# Patient Record
Sex: MALE | Race: ASIAN | NOT HISPANIC OR LATINO | Employment: FULL TIME | URBAN - METROPOLITAN AREA
[De-identification: names, ages, dates, MRNs, and addresses within clinical notes are randomized per-mention and may not be internally consistent; named-entity substitution may affect disease eponyms.]

---

## 2017-01-30 ENCOUNTER — OFFICE VISIT (OUTPATIENT)
Dept: FAMILY MEDICINE | Facility: CLINIC | Age: 37
End: 2017-01-30
Payer: COMMERCIAL

## 2017-01-30 VITALS
DIASTOLIC BLOOD PRESSURE: 91 MMHG | HEIGHT: 68 IN | TEMPERATURE: 98.4 F | BODY MASS INDEX: 41.68 KG/M2 | SYSTOLIC BLOOD PRESSURE: 141 MMHG | WEIGHT: 275 LBS | HEART RATE: 85 BPM

## 2017-01-30 DIAGNOSIS — I10 BENIGN ESSENTIAL HYPERTENSION: ICD-10-CM

## 2017-01-30 DIAGNOSIS — R51.9 NONINTRACTABLE EPISODIC HEADACHE, UNSPECIFIED HEADACHE TYPE: Primary | ICD-10-CM

## 2017-01-30 LAB
ANION GAP SERPL CALCULATED.3IONS-SCNC: 8 MMOL/L (ref 3–14)
BASOPHILS # BLD AUTO: 0.1 10E9/L (ref 0–0.2)
BASOPHILS NFR BLD AUTO: 1 %
BUN SERPL-MCNC: 12 MG/DL (ref 7–30)
CALCIUM SERPL-MCNC: 9.1 MG/DL (ref 8.5–10.1)
CHLORIDE SERPL-SCNC: 104 MMOL/L (ref 94–109)
CO2 SERPL-SCNC: 24 MMOL/L (ref 20–32)
CREAT SERPL-MCNC: 0.95 MG/DL (ref 0.66–1.25)
DIFFERENTIAL METHOD BLD: NORMAL
EOSINOPHIL # BLD AUTO: 0.2 10E9/L (ref 0–0.7)
EOSINOPHIL NFR BLD AUTO: 2.8 %
ERYTHROCYTE [DISTWIDTH] IN BLOOD BY AUTOMATED COUNT: 12.8 % (ref 10–15)
GFR SERPL CREATININE-BSD FRML MDRD: 90 ML/MIN/1.7M2
GLUCOSE SERPL-MCNC: 94 MG/DL (ref 70–99)
HBA1C MFR BLD: 6.3 % (ref 4.3–6)
HCT VFR BLD AUTO: 48.9 % (ref 40–53)
HGB BLD-MCNC: 16.9 G/DL (ref 13.3–17.7)
LYMPHOCYTES # BLD AUTO: 1.6 10E9/L (ref 0.8–5.3)
LYMPHOCYTES NFR BLD AUTO: 22.9 %
MCH RBC QN AUTO: 30.3 PG (ref 26.5–33)
MCHC RBC AUTO-ENTMCNC: 34.6 G/DL (ref 31.5–36.5)
MCV RBC AUTO: 88 FL (ref 78–100)
MONOCYTES # BLD AUTO: 0.6 10E9/L (ref 0–1.3)
MONOCYTES NFR BLD AUTO: 8.7 %
NEUTROPHILS # BLD AUTO: 4.6 10E9/L (ref 1.6–8.3)
NEUTROPHILS NFR BLD AUTO: 64.6 %
PLATELET # BLD AUTO: 237 10E9/L (ref 150–450)
POTASSIUM SERPL-SCNC: 3.7 MMOL/L (ref 3.4–5.3)
RBC # BLD AUTO: 5.57 10E12/L (ref 4.4–5.9)
SODIUM SERPL-SCNC: 136 MMOL/L (ref 133–144)
TSH SERPL DL<=0.005 MIU/L-ACNC: 0.92 MU/L (ref 0.4–4)
WBC # BLD AUTO: 7.2 10E9/L (ref 4–11)

## 2017-01-30 PROCEDURE — 86618 LYME DISEASE ANTIBODY: CPT | Performed by: PHYSICIAN ASSISTANT

## 2017-01-30 PROCEDURE — 84443 ASSAY THYROID STIM HORMONE: CPT | Performed by: PHYSICIAN ASSISTANT

## 2017-01-30 PROCEDURE — 36415 COLL VENOUS BLD VENIPUNCTURE: CPT | Performed by: PHYSICIAN ASSISTANT

## 2017-01-30 PROCEDURE — 99214 OFFICE O/P EST MOD 30 MIN: CPT | Performed by: PHYSICIAN ASSISTANT

## 2017-01-30 PROCEDURE — 83036 HEMOGLOBIN GLYCOSYLATED A1C: CPT | Performed by: PHYSICIAN ASSISTANT

## 2017-01-30 PROCEDURE — 80048 BASIC METABOLIC PNL TOTAL CA: CPT | Performed by: PHYSICIAN ASSISTANT

## 2017-01-30 PROCEDURE — 85025 COMPLETE CBC W/AUTO DIFF WBC: CPT | Performed by: PHYSICIAN ASSISTANT

## 2017-01-30 RX ORDER — LISINOPRIL 10 MG/1
10 TABLET ORAL DAILY
Qty: 90 TABLET | Refills: 1 | Status: SHIPPED | OUTPATIENT
Start: 2017-01-30 | End: 2018-10-01

## 2017-01-30 NOTE — MR AVS SNAPSHOT
After Visit Summary   1/30/2017    Sean Woodard    MRN: [id number]           Patient Information     Date Of Birth          1980        Visit Information        Provider Department      1/30/2017 5:20 PM Susanne Alberto PA-C [facility]        Today's Diagnoses     Nonintractable episodic headache, unspecified headache type    -  1     Benign essential hypertension           Care Instructions    Labs today - I will be in touch with you in the next 1-2 days with the results    Start the lisinopril for blood pressure    Set some goals for exercise and weight loss and start working on these as well     Return in 3-4 weeks to recheck blood pressure and follow up on new medication, may need to be sooner depending on results of labs        Follow-ups after your visit        Who to contact     Normal or non-critical lab and imaging results will be communicated to you by [employer], letter or phone within 4 business days after the clinic has received the results. If you do not hear from us within 7 days, please contact the clinic through [employer] or phone. If you have a critical or abnormal lab result, we will notify you by phone as soon as possible.  Submit refill requests through [employer] or call your pharmacy and they will forward the refill request to us. Please allow 3 business days for your refill to be completed.          If you need to speak with a  for additional information , please call: [phone]             Additional Information About Your Visit        [employer] Information     [employer] gives you secure access to your electronic health record. If you see a primary care provider, you can also send messages to your care team and make appointments. If you have questions, please call your primary care clinic.  If you do not have a primary care provider, please call [phone] and they will assist you.        Care EveryWhere ID     This is your Care EveryWhere  "ID. This could be used by other organizations to access your [facility] medical records  [id number]        Your Vitals Were     Pulse Temperature Height BMI (Body Mass Index)          85 98.4  F (36.9  C) (Tympanic) 5' 8\" (1.727 m) 41.82 kg/m2         Blood Pressure from Last 3 Encounters:   01/30/17 141/91   11/14/16 147/96   10/03/16 145/82    Weight from Last 3 Encounters:   01/30/17 275 lb (124.739 kg)   09/19/16 266 lb (120.657 kg)   09/02/16 266 lb (120.657 kg)              We Performed the Following     Basic metabolic panel  (Ca, Cl, CO2, Creat, Gluc, K, Na, BUN)     CBC with platelets and differential     Hemoglobin A1c     Lyme Disease Doris with reflex to WB Serum     TSH with free T4 reflex          Today's Medication Changes          These changes are accurate as of: 1/30/17  6:12 PM.  If you have any questions, ask your nurse or doctor.               Start taking these medicines.        Dose/Directions    lisinopril 10 MG tablet   Commonly known as:  PRINIVIL/ZESTRIL   Used for:  Benign essential hypertension   Started by:  Susanne Alberto PA-C        Dose:  10 mg   Take 1 tablet (10 mg) by mouth daily   Quantity:  90 tablet   Refills:  1            Where to get your medicines      These medications were sent to [facility] [place] [id number] [facility]  [address] [facility] [address]     Phone:  [phone]    - lisinopril 10 MG tablet             Primary Care Provider Office Phone # Fax #    Mariangel Sung PA-C [phone]       [facility] [id number] [place] [address]        Thank you!     Thank you for choosing [facility]  for your care. Our goal is always to provide you with excellent care. Hearing back from our patients is one way we can continue to improve our services. Please take a few minutes to complete the written survey that you may receive in the mail after your visit with us. Thank you!             Your " Updated Medication List - Protect others around you: Learn how to safely use, store and throw away your medicines at www.disposemymeds.org.          This list is accurate as of: 1/30/17  6:12 PM.  Always use your most recent med list.                   Brand Name Dispense Instructions for use    atorvastatin 20 MG tablet    LIPITOR    90 tablet    Take 1 tablet (20 mg) by mouth daily       augmented betamethasone dipropionate 0.05 % ointment    DIPROLENE-AF    45 g    Apply sparingly to affected area twice daily as needed.  Do not apply to face.       doxycycline Monohydrate 100 MG Caps     60 capsule    1 tab PO BID with food and full glass of water       lisinopril 10 MG tablet    PRINIVIL/ZESTRIL    90 tablet    Take 1 tablet (10 mg) by mouth daily       mupirocin 2 % cream    BACTROBAN    30 g    Apply topically 3 times daily

## 2017-01-30 NOTE — NURSING NOTE
"Chief Complaint   Patient presents with     Headache       Initial /91 mmHg  Pulse 85  Temp(Src) 98.4  F (36.9  C) (Tympanic)  Ht 5' 8\" (1.727 m)  Wt 275 lb (124.739 kg)  BMI 41.82 kg/m2 Estimated body mass index is 41.82 kg/(m^2) as calculated from the following:    Height as of this encounter: 5' 8\" (1.727 m).    Weight as of this encounter: 275 lb (124.739 kg).  BP completed using cuff size: violet Batres CMA    "

## 2017-01-30 NOTE — PROGRESS NOTES
SUBJECTIVE:                                                    Sean Woodard is a 37 year old male who presents to clinic today for the following health issues:      Headache     Onset: 1 week consistently, otherwise once every other day      Description:   Location: Right side of head, back of head    Character: Pressure  Frequency: Everyday   Duration: 1-2 hours, in the morning     Intensity: moderate    Progression of Symptoms:  same    Accompanying Signs & Symptoms:  Stiff neck: YES- sometimes   Neck or upper back pain: YES- sometimes when turning his head   Fever: no  Sinus pressure: no  Nausea or vomiting: no  Dizziness: no  Numbness: no  Weakness: no  Visual changes: no   History:   Head trauma: no  Family history of migraines: no  Previous tests for headaches: no  Neurologist evaluations: no  Able to do daily activities: YES  Wake with a headaches: YES, sometimes   Do headaches wake you up: no  Daily pain medication use: YES- Excedrin   Work/school stressors/changes: no    Precipitating factors:   Does light make it worse: no  Does sound make it worse: no    Alleviating factors:  Does sleep help: no         Therapies Tried and outcome: Excedrin    His blood pressure has been running higher lately, he is concerned about that.   He also has been gaining weight.   He has a brother that is a few years older than him who had a stroke. The symptoms he is having is making him concerned about having a stroke or heart attack.     Has had issues with more frequent headaches over the last 1-2 weeks  Says he does get headache but usually only one every few months          Problem list and histories reviewed & adjusted, as indicated.  Additional history: as documented    Current Outpatient Prescriptions   Medication Sig Dispense Refill     lisinopril (PRINIVIL/ZESTRIL) 10 MG tablet Take 1 tablet (10 mg) by mouth daily 90 tablet 1     atorvastatin (LIPITOR) 20 MG tablet Take 1 tablet (20 mg) by mouth daily 90 tablet 2      "augmented betamethasone dipropionate (DIPROLENE-AF) 0.05 % ointment Apply sparingly to affected area twice daily as needed.  Do not apply to face. 45 g 1     doxycycline Monohydrate 100 MG CAPS 1 tab PO BID with food and full glass of water 60 capsule 2     mupirocin (BACTROBAN) 2 % cream Apply topically 3 times daily 30 g 0     No Known Allergies    ROS:  Remainder of ROS obtained and found to be negative other than that which was documented above      OBJECTIVE:                                                    /91 mmHg  Pulse 85  Temp(Src) 98.4  F (36.9  C) (Tympanic)  Ht 5' 8\" (1.727 m)  Wt 275 lb (124.739 kg)  BMI 41.82 kg/m2  Body mass index is 41.82 kg/(m^2).  GENERAL: healthy, alert and no distress  EYES: Eyes grossly normal to inspection  HENT: ear canals and TM's normal, nose and mouth without ulcers or lesions  NECK: no adenopathy  RESP: lungs clear to auscultation - no rales, rhonchi or wheezes  CV: regular rates and rhythm, normal S1 S2, no S3 or S4 and no murmur, click or rub  MS: no gross musculoskeletal defects noted, no edema  SKIN: no suspicious lesions or rashes  NEURO: Normal strength and tone, sensory exam grossly normal, mentation intact, speech normal, cranial nerves 2-12 intact and Romberg normal    Diagnostic Test Results:  labs     ASSESSMENT/PLAN:                                                    (R51) Nonintractable episodic headache, unspecified headache type  (primary encounter diagnosis)  Comment: No red flags on exam today. Exam unremarkable other than bp which was again noted to be high. Treatment of bp as per below. Will check some labs today. Keep an eye on headaches and if they worsen or do not improve, follow up with me. Will follow with labs   Plan: TSH with free T4 reflex, Basic metabolic panel         (Ca, Cl, CO2, Creat, Gluc, K, Na, BUN), CBC         with platelets and differential, Lyme Disease         Doris with reflex to WB Serum, Hemoglobin A1c          (I10) " Benign essential hypertension  Comment: elevated blood pressure. Reviewed treatment and potential side effects. Possible that improvement in blood pressure could also improve headache frequency  Plan: lisinopril (PRINIVIL/ZESTRIL) 10 MG tablet                Susanne Alberto PA-C  [facility]

## 2017-01-31 LAB — B BURGDOR IGG+IGM SER QL: 0.04 (ref 0–0.89)

## 2017-01-31 NOTE — PATIENT INSTRUCTIONS
Labs today - I will be in touch with you in the next 1-2 days with the results    Start the lisinopril for blood pressure    Set some goals for exercise and weight loss and start working on these as well     Return in 3-4 weeks to recheck blood pressure and follow up on new medication, may need to be sooner depending on results of labs

## 2017-02-02 ENCOUNTER — HOSPITAL ENCOUNTER (OUTPATIENT)
Dept: MRI IMAGING | Facility: HOSPITAL | Age: 37
Discharge: HOME | End: 2017-02-02
Attending: FAMILY MEDICINE

## 2017-02-02 DIAGNOSIS — R51.9 HEADACHE: ICD-10-CM

## 2017-02-02 DIAGNOSIS — R51.9 SEVERE HEADACHE: ICD-10-CM

## 2017-02-21 DIAGNOSIS — L30.9 CHRONIC DERMATITIS: ICD-10-CM

## 2017-02-21 RX ORDER — DOXYCYCLINE 100 MG/1
CAPSULE ORAL
Qty: 60 CAPSULE | Refills: 2 | Status: SHIPPED | OUTPATIENT
Start: 2017-02-21 | End: 2017-09-18

## 2017-02-21 NOTE — TELEPHONE ENCOUNTER
Doxycycline      Last Written Prescription Date:  10/3/16  Last Fill Quantity: 60,   # refills: 2  Last Office Visit with G, P or [facility] prescribing provider: 11/14/16  Future Office visit:

## 2017-04-24 ENCOUNTER — OFFICE VISIT (OUTPATIENT)
Dept: DERMATOLOGY | Facility: CLINIC | Age: 37
End: 2017-04-24
Payer: COMMERCIAL

## 2017-04-24 VITALS — HEART RATE: 101 BPM | OXYGEN SATURATION: 98 % | DIASTOLIC BLOOD PRESSURE: 73 MMHG | SYSTOLIC BLOOD PRESSURE: 132 MMHG

## 2017-04-24 DIAGNOSIS — L73.0 ACNE KELOIDALIS: Primary | ICD-10-CM

## 2017-04-24 PROCEDURE — 11900 INJECT SKIN LESIONS </W 7: CPT | Performed by: PHYSICIAN ASSISTANT

## 2017-04-24 PROCEDURE — 99212 OFFICE O/P EST SF 10 MIN: CPT | Mod: 25 | Performed by: PHYSICIAN ASSISTANT

## 2017-04-24 RX ORDER — SULFAMETHOXAZOLE/TRIMETHOPRIM 800-160 MG
TABLET ORAL
Qty: 14 TABLET | Refills: 0 | Status: SHIPPED | OUTPATIENT
Start: 2017-04-24 | End: 2019-05-01

## 2017-04-24 RX ORDER — CLINDAMYCIN PHOSPHATE 10 UG/ML
LOTION TOPICAL
Qty: 60 ML | Refills: 11 | Status: SHIPPED | OUTPATIENT
Start: 2017-04-24 | End: 2018-04-27

## 2017-04-24 NOTE — NURSING NOTE
"Chief Complaint   Patient presents with     Derm Problem     neck lesions       Initial /73  Pulse 101  SpO2 98% Estimated body mass index is 41.81 kg/(m^2) as calculated from the following:    Height as of 1/30/17: 1.727 m (5' 8\").    Weight as of 1/30/17: 124.7 kg (275 lb).  BP completed using cuff size: violet Wright LPN    "

## 2017-04-24 NOTE — PATIENT INSTRUCTIONS
Start trimethoprim/sulfamethaoxazole  - take for 2 weeks; can repeat as needed    Start clindamycin lotion  - apply to back of neck twice per day    Continue the benzoyl peroxide wash daily in the shower    Will inject kenalog (steroid) today - can repeat every 4 weeks as needed

## 2017-04-24 NOTE — MR AVS SNAPSHOT
After Visit Summary   4/24/2017    Sean Woodard    MRN: [id number]           Patient Information     Date Of Birth          1980        Visit Information        Provider Department      4/24/2017 6:00 PM Jahaira Garcia PA-C [facility]        Today's Diagnoses     Acne keloidalis    -  1      Care Instructions    Start trimethoprim/sulfamethaoxazole  - take for 2 weeks; can repeat as needed    Start clindamycin lotion  - apply to back of neck twice per day    Continue the benzoyl peroxide wash daily in the shower    Will inject kenalog (steroid) today - can repeat every 4 weeks as needed        Follow-ups after your visit        Who to contact     If you have questions or need follow up information about today's clinic visit or your schedule please contact [facility] directly at [phone].  Normal or non-critical lab and imaging results will be communicated to you by [employer], letter or phone within 4 business days after the clinic has received the results. If you do not hear from us within 7 days, please contact the clinic through [employer] or phone. If you have a critical or abnormal lab result, we will notify you by phone as soon as possible.  Submit refill requests through [employer] or call your pharmacy and they will forward the refill request to us. Please allow 3 business days for your refill to be completed.          Additional Information About Your Visit        MyChart Information     [employer] gives you secure access to your electronic health record. If you see a primary care provider, you can also send messages to your care team and make appointments. If you have questions, please call your primary care clinic.  If you do not have a primary care provider, please call [phone] and they will assist you.        Care EveryWhere ID     This is your Care EveryWhere ID. This could be used by other organizations to access your [facility]  records  [id number]        Your Vitals Were     Pulse Pulse Oximetry                101 98%           Blood Pressure from Last 3 Encounters:   04/24/17 132/73   01/30/17 (!) 141/91   11/14/16 (!) 147/96    Weight from Last 3 Encounters:   01/30/17 124.7 kg (275 lb)   09/19/16 120.7 kg (266 lb)   09/02/16 120.7 kg (266 lb)              Today, you had the following     No orders found for display         Today's Medication Changes          These changes are accurate as of: 4/24/17  6:11 PM.  If you have any questions, ask your nurse or doctor.               Start taking these medicines.        Dose/Directions    clindamycin 1 % lotion   Commonly known as:  CLINDAMAX   Used for:  Acne keloidalis   Started by:  Jahaira Garcia PA-C        Apply to AA BID   Quantity:  60 mL   Refills:  11       sulfamethoxazole-trimethoprim 800-160 MG per tablet   Commonly known as:  BACTRIM DS   Used for:  Acne keloidalis   Started by:  Jahaira Garcia PA-C        1 tab PO BID   Quantity:  14 tablet   Refills:  0            Where to get your medicines      These medications were sent to [facility] [place], [place] - [address]  [address] [place] [address]     Phone:  [phone]     clindamycin 1 % lotion    sulfamethoxazole-trimethoprim 800-160 MG per tablet                Primary Care Provider Office Phone # Fax #    Mariangel Sung PA-C [phone]       [place] [address] [facility] [address]        Thank you!     Thank you for choosing [facility]  for your care. Our goal is always to provide you with excellent care. Hearing back from our patients is one way we can continue to improve our services. Please take a few minutes to complete the written survey that you may receive in the mail after your visit with us. Thank you!             Your Updated Medication List - Protect others around you: Learn how to safely use, store and throw away your  medicines at www.disposemymeds.org.          This list is accurate as of: 4/24/17  6:11 PM.  Always use your most recent med list.                   Brand Name Dispense Instructions for use    atorvastatin 20 MG tablet    LIPITOR    90 tablet    Take 1 tablet (20 mg) by mouth daily       augmented betamethasone dipropionate 0.05 % ointment    DIPROLENE-AF    45 g    Apply sparingly to affected area twice daily as needed.  Do not apply to face.       clindamycin 1 % lotion    CLINDAMAX    60 mL    Apply to AA BID       doxycycline Monohydrate 100 MG Caps     60 capsule    1 tab PO BID with food and full glass of water       lisinopril 10 MG tablet    PRINIVIL/ZESTRIL    90 tablet    Take 1 tablet (10 mg) by mouth daily       mupirocin 2 % cream    BACTROBAN    30 g    Apply topically 3 times daily       sulfamethoxazole-trimethoprim 800-160 MG per tablet    BACTRIM DS    14 tablet    1 tab PO BID

## 2017-04-25 NOTE — PROGRESS NOTES
HPI:   Sean Woodard is a 36 year old male who presents for recheck of skin eruption on back of neck  chief complaint  Location: back of neck   Condition present for:  About 1 year.   Previous treatments include: Lamisil, multiple abx, mupirocin, desonide. Began after hair cut    Review Of Systems  Eyes: negative  Ears/Nose/Throat: negative  Respiratory: No shortness of breath, dyspnea on exertion, cough, or hemoptysis  Cardiovascular: negative  Gastrointestinal: negative  Genitourinary: negative  Musculoskeletal: negative  Neurologic: negative  Psychiatric: negative        PHYSICAL EXAM:   A&Ox3:Yes    Well developed, well nourished male Yes   Skin Type: 3  1. Multiple pustules, firm nodules on back of neck    ASSESSMENT/PLAN:     1. Acne keloidalis on posterior neck - advised. Has a few areas that are flaring today. Didn't think that doxycycline was helping much so has been off for 3 weeks. Still using the betamethasone on inflamed areas. ILK actually helped a lot. Discussed ILK, abx, and accutane. He has done 1 round of accutane in his early 20's for acne.  After discussion, he would like to hold off on Accutane for now. Discussed ILK again and he is amenable to this. Negative tissue culture, biopsy and fungal culture. Had negative aerobic skin culture but + KOH scraping by PCP. Has been on multiple antibiotics, antifungals but nothing has helped.    --Start Bactrim DS x 2 weeks   --Continue betamethasone ointment BID alternating with mupirocin ointment BID  --Kenalog 10 mg/cc injected to posterior neck. Total of 1 cc's used.  Advised on risk of atrophy and failure to respond. Advised on aftercare.           Follow-up: 4 weeks  CC:   Scribed By: Jahaira Garcia, MS, PA-C

## 2017-05-22 ENCOUNTER — OFFICE VISIT (OUTPATIENT)
Dept: DERMATOLOGY | Facility: CLINIC | Age: 37
End: 2017-05-22
Payer: COMMERCIAL

## 2017-05-22 ENCOUNTER — OFFICE VISIT (OUTPATIENT)
Dept: FAMILY MEDICINE | Facility: CLINIC | Age: 37
End: 2017-05-22
Payer: COMMERCIAL

## 2017-05-22 VITALS
DIASTOLIC BLOOD PRESSURE: 88 MMHG | HEIGHT: 68 IN | SYSTOLIC BLOOD PRESSURE: 135 MMHG | HEART RATE: 81 BPM | BODY MASS INDEX: 41.22 KG/M2 | TEMPERATURE: 98.8 F | WEIGHT: 272 LBS

## 2017-05-22 VITALS — SYSTOLIC BLOOD PRESSURE: 139 MMHG | OXYGEN SATURATION: 98 % | HEART RATE: 95 BPM | DIASTOLIC BLOOD PRESSURE: 88 MMHG

## 2017-05-22 DIAGNOSIS — L73.0 ACNE NUCHAE KELOIDALIS: Primary | ICD-10-CM

## 2017-05-22 DIAGNOSIS — H69.91 EUSTACHIAN TUBE DYSFUNCTION, RIGHT: ICD-10-CM

## 2017-05-22 DIAGNOSIS — R73.03 PREDIABETES: ICD-10-CM

## 2017-05-22 DIAGNOSIS — I10 BENIGN ESSENTIAL HYPERTENSION: ICD-10-CM

## 2017-05-22 DIAGNOSIS — Z00.01 ENCOUNTER FOR ROUTINE ADULT HEALTH EXAMINATION WITH ABNORMAL FINDINGS: Primary | ICD-10-CM

## 2017-05-22 LAB — HBA1C MFR BLD: 6.1 % (ref 4.3–6)

## 2017-05-22 PROCEDURE — 36415 COLL VENOUS BLD VENIPUNCTURE: CPT | Performed by: PHYSICIAN ASSISTANT

## 2017-05-22 PROCEDURE — 99395 PREV VISIT EST AGE 18-39: CPT | Performed by: PHYSICIAN ASSISTANT

## 2017-05-22 PROCEDURE — 99213 OFFICE O/P EST LOW 20 MIN: CPT | Mod: 25 | Performed by: PHYSICIAN ASSISTANT

## 2017-05-22 PROCEDURE — 11900 INJECT SKIN LESIONS </W 7: CPT | Performed by: PHYSICIAN ASSISTANT

## 2017-05-22 PROCEDURE — 83036 HEMOGLOBIN GLYCOSYLATED A1C: CPT | Performed by: PHYSICIAN ASSISTANT

## 2017-05-22 RX ORDER — HYDROCHLOROTHIAZIDE 25 MG/1
25 TABLET ORAL DAILY
Qty: 90 TABLET | Refills: 1 | Status: SHIPPED | OUTPATIENT
Start: 2017-05-22 | End: 2018-03-26

## 2017-05-22 RX ORDER — SULFAMETHOXAZOLE/TRIMETHOPRIM 800-160 MG
1 TABLET ORAL 2 TIMES DAILY
Qty: 60 TABLET | Refills: 2 | Status: SHIPPED | OUTPATIENT
Start: 2017-05-22 | End: 2019-05-01

## 2017-05-22 NOTE — NURSING NOTE
"Chief Complaint   Patient presents with     Physical       Initial /88 (BP Location: Right arm, Patient Position: Chair, Cuff Size: Adult Large)  Pulse 81  Temp 98.8  F (37.1  C) (Tympanic)  Ht 5' 8\" (1.727 m)  Wt 272 lb (123.4 kg)  BMI 41.36 kg/m2 Estimated body mass index is 41.36 kg/(m^2) as calculated from the following:    Height as of this encounter: 5' 8\" (1.727 m).    Weight as of this encounter: 272 lb (123.4 kg).  Medication Reconciliation: complete     Travis Batres CMA    "

## 2017-05-22 NOTE — PROGRESS NOTES
HPI:   Sean Woodard is a 36 year old male who presents for recheck of skin eruption on back of neck  chief complaint  Location: back of neck   Condition present for:  About 1 year.   Previous treatments include: Lamisil, multiple abx, mupirocin, desonide. Began after hair cut    Review Of Systems  Eyes: negative  Ears/Nose/Throat: negative  Respiratory: No shortness of breath, dyspnea on exertion, cough, or hemoptysis  Cardiovascular: negative  Gastrointestinal: negative  Genitourinary: negative  Musculoskeletal: negative  Neurologic: negative  Psychiatric: negative        PHYSICAL EXAM:   A&Ox3:Yes    Well developed, well nourished male Yes   Skin Type: 3  1. Multiple pustules, firm nodules on back of neck    ASSESSMENT/PLAN:     1. Acne keloidalis on posterior neck - advised. Did well on ILK 4 weeks ago. Has a few areas that are flaring today.  He has done 1 round of accutane in his early 20's for acne.  After discussion, he would like to hold off on Accutane for now. Discussed ILK again and he is amenable to this. Negative tissue culture, biopsy and fungal culture. Had negative aerobic skin culture but + KOH scraping by PCP. Has been on multiple antibiotics, antifungals but nothing has helped.    --Continue Bactrim DS for another 4 weeks  --Continue betamethasone ointment BID alternating with mupirocin ointment BID  --Kenalog 10 mg/cc injected to posterior neck. Total of 1.5 cc's used.  Advised on risk of atrophy and failure to respond. Advised on aftercare.           Follow-up: 4 weeks  CC:   Scribed By: Jahaira Garcia, MS, PAMasonC

## 2017-05-22 NOTE — PATIENT INSTRUCTIONS
For plugged ear(s):     Continue the flonase - use daily for the next 1-2 weeks  Start zyrtec (certirizine) 10mg daily    Can use benadryl at night prn    If no improvement or worsening over the next week, let me know      Keep an eye on your blood pressure    GOAL is less than 140/90    Start the new blood pressure medication if numbers start to increase    Return for a lab visit after 1 month of medication          Preventive Health Recommendations  Male Ages 26 - 39    Yearly exam:             See your health care provider every year in order to  o   Review health changes.   o   Discuss preventive care.    o   Review your medicines if your doctor has prescribed any.    You should be tested each year for STDs (sexually transmitted diseases), if you re at risk.     After age 35, talk to your provider about cholesterol testing. If you are at risk for heart disease, have your cholesterol tested at least every 5 years.     If you are at risk for diabetes, you should have a diabetes test (fasting glucose).  Shots: Get a flu shot each year. Get a tetanus shot every 10 years.     Nutrition:    Eat at least 5 servings of fruits and vegetables daily.     Eat whole-grain bread, whole-wheat pasta and brown rice instead of white grains and rice.     Talk to your provider about Calcium and Vitamin D.     Lifestyle    Exercise for at least 150 minutes a week (30 minutes a day, 5 days a week). This will help you control your weight and prevent disease.     Limit alcohol to one drink per day.     No smoking.     Wear sunscreen to prevent skin cancer.     See your dentist every six months for an exam and cleaning.

## 2017-05-22 NOTE — PROGRESS NOTES
SUBJECTIVE:     CC: Sean Woodard is an 37 year old male who presents for preventative health visit.     Healthy Habits:    Do you get at least three servings of calcium containing foods daily (dairy, green leafy vegetables, etc.)? no, taking calcium and/or vitamin D supplement: no    Amount of exercise or daily activities, outside of work: Trying to start working out     Problems taking medications regularly No    Medication side effects: Yes, see below     Have you had an eye exam in the past two years? no    Do you see a dentist twice per year? yes    Do you have sleep apnea, excessive snoring or daytime drowsiness?no        Medication Followup of Lisinopril 10 mg     Taking Medication as prescribed: NO-patient stopped taking it 3-4 days ago    Side Effects: Cough    Medication Helping Symptoms:  Yes, it was helping with his blood pressure      Developed a cough that he thought was from medication  Stopped 4 days ago - cough still present but is improved  Had helped with blood pressure - headache also improved with getting bp under better control      He also is feeling that his right ear is plugged.  Some sinus congestion/pressure on the right side  No fevers  Slight nasal congestion  No sore throat  2 kids at home with hand, foot, mouth  1 had strep a couple weeks ago      Today's PHQ-2 Score:   PHQ-2 ( 1999 [employer]) 8/16/2016 1/9/2015   Q1: Little interest or pleasure in doing things 0 0   Q2: Feeling down, depressed or hopeless 0 0   PHQ-2 Score 0 0       Abuse: Current or Past(Physical, Sexual or Emotional)- No  Do you feel safe in your environment - Yes    Social History   Substance Use Topics     Smoking status: Former Smoker     Packs/day: 0.50     Years: 5.00     Types: Cigarettes     Quit date: 1/1/2005     Smokeless tobacco: Never Used     Alcohol use Yes      Comment: Social. 1 drink per month     The patient does not drink >3 drinks per day nor >7 drinks per week.    Last PSA: No results found for:  "PSA    Recent Labs   Lab Test  02/15/16   0805  11/13/15   0831   CHOL  195  249*   HDL  44  43   LDL  119*  166*   TRIG  158*  198*   CHOLHDLRATIO   --   5.8*   NHDL  151*   --        Reviewed orders with patient. Reviewed health maintenance and updated orders accordingly - Yes    Reviewed and updated as needed this visit by clinical staff         Reviewed and updated as needed this visit by Provider            ROS:  C: NEGATIVE for fever, chills, change in weight  I: NEGATIVE for worrisome rashes, moles or lesions  E: NEGATIVE for vision changes or irritation  ENT: NEGATIVE for ear, mouth and throat problems  R: NEGATIVE for significant cough or SOB  CV: NEGATIVE for chest pain, palpitations or peripheral edema  GI: NEGATIVE for nausea, abdominal pain, heartburn, or change in bowel habits   male: negative for dysuria, hematuria, decreased urinary stream, erectile dysfunction, urethral discharge  M: NEGATIVE for significant arthralgias or myalgia  N: NEGATIVE for weakness, dizziness or paresthesias  P: NEGATIVE for changes in mood or affect    Problem list, Medication list, Allergies, and Medical/Social/Surgical histories reviewed in EPIC and updated as appropriate.  BP Readings from Last 3 Encounters:   05/22/17 135/88   04/24/17 132/73   01/30/17 (!) 141/91    Wt Readings from Last 3 Encounters:   05/22/17 272 lb (123.4 kg)   01/30/17 275 lb (124.7 kg)   09/19/16 266 lb (120.7 kg)                  OBJECTIVE:     /88 (BP Location: Right arm, Patient Position: Chair, Cuff Size: Adult Large)  Pulse 81  Temp 98.8  F (37.1  C) (Tympanic)  Ht 5' 8\" (1.727 m)  Wt 272 lb (123.4 kg)  BMI 41.36 kg/m2  EXAM:  GENERAL: healthy, alert and no distress  EYES: Eyes grossly normal to inspection, PERRL and conjunctivae and sclerae normal  HENT: ear canals and TM's normal, nose and mouth without ulcers or lesions  NECK: no adenopathy, no asymmetry, masses, or scars and thyroid normal to palpation  RESP: lungs clear to " auscultation - no rales, rhonchi or wheezes  CV: regular rate and rhythm, normal S1 S2, no S3 or S4, no murmur, click or rub, no peripheral edema and peripheral pulses strong  ABDOMEN: soft, nontender, no hepatosplenomegaly, no masses and bowel sounds normal  MS: no gross musculoskeletal defects noted, no edema  SKIN: no suspicious lesions or rashes  NEURO: Normal strength and tone, mentation intact and speech normal  PSYCH: mentation appears normal, affect normal/bright    ASSESSMENT/PLAN:       ASSESSMENT/PLAN:      ICD-10-CM    1. Encounter for routine adult health examination with abnormal findings Z00.01    2. Eustachian tube dysfunction, right H69.81    3. Benign essential hypertension I10 hydrochlorothiazide (HYDRODIURIL) 25 MG tablet   4. Prediabetes R73.03 Hemoglobin A1c     Repeat A1c to keep tabs on    BP okay today but expect it will trend up off medication\  Start HCTZ      Patient Instructions   For plugged ear(s):     Continue the flonase - use daily for the next 1-2 weeks  Start zyrtec (certirizine) 10mg daily    Can use benadryl at night prn    If no improvement or worsening over the next week, let me know      Keep an eye on your blood pressure    GOAL is less than 140/90    Start the new blood pressure medication if numbers start to increase    Return for a lab visit after 1 month of medication          Preventive Health Recommendations  Male Ages 26 - 39    Yearly exam:             See your health care provider every year in order to  o   Review health changes.   o   Discuss preventive care.    o   Review your medicines if your doctor has prescribed any.    You should be tested each year for STDs (sexually transmitted diseases), if you re at risk.     After age 35, talk to your provider about cholesterol testing. If you are at risk for heart disease, have your cholesterol tested at least every 5 years.     If you are at risk for diabetes, you should have a diabetes test (fasting glucose).  Shots:  "Get a flu shot each year. Get a tetanus shot every 10 years.     Nutrition:    Eat at least 5 servings of fruits and vegetables daily.     Eat whole-grain bread, whole-wheat pasta and brown rice instead of white grains and rice.     Talk to your provider about Calcium and Vitamin D.     Lifestyle    Exercise for at least 150 minutes a week (30 minutes a day, 5 days a week). This will help you control your weight and prevent disease.     Limit alcohol to one drink per day.     No smoking.     Wear sunscreen to prevent skin cancer.     See your dentist every six months for an exam and cleaning.                 COUNSELING:  Reviewed preventive health counseling, as reflected in patient instructions       Regular exercise       Healthy diet/nutrition         reports that he quit smoking about 12 years ago. His smoking use included Cigarettes. He has a 2.50 pack-year smoking history. He has never used smokeless tobacco.    Estimated body mass index is 41.81 kg/(m^2) as calculated from the following:    Height as of 1/30/17: 5' 8\" (1.727 m).    Weight as of 1/30/17: 275 lb (124.7 kg).       Counseling Resources:  ATP IV Guidelines  Pooled Cohorts Equation Calculator  FRAX Risk Assessment  ICSI Preventive Guidelines  Dietary Guidelines for Americans, 2010  USDA's MyPlate  ASA Prophylaxis  Lung CA Screening    Susanne Alberto PA-C  [facility]  "

## 2017-05-22 NOTE — MR AVS SNAPSHOT
After Visit Summary   5/22/2017    Sean Woodard    MRN: [id number]           Patient Information     Date Of Birth          1980        Visit Information        Provider Department      5/22/2017 6:00 PM Jahaira Garcia PA-C [facility]        Today's Diagnoses     Acne nuchae keloidalis    -  1       Follow-ups after your visit        Your next 10 appointments already scheduled     Jun 26, 2017  6:00 PM CDT   Return Visit with Jahaira Garcia PA-C   [facility]    [address] [facility] [address]   [phone]              Who to contact     If you have questions or need follow up information about today's clinic visit or your schedule please contact [facility] directly at [phone].  Normal or non-critical lab and imaging results will be communicated to you by MyChart, letter or phone within 4 business days after the clinic has received the results. If you do not hear from us within 7 days, please contact the clinic through [employer] or phone. If you have a critical or abnormal lab result, we will notify you by phone as soon as possible.  Submit refill requests through [employer] or call your pharmacy and they will forward the refill request to us. Please allow 3 business days for your refill to be completed.          Additional Information About Your Visit        MyChart Information     [employer] gives you secure access to your electronic health record. If you see a primary care provider, you can also send messages to your care team and make appointments. If you have questions, please call your primary care clinic.  If you do not have a primary care provider, please call [phone] and they will assist you.        Care EveryWhere ID     This is your Care EveryWhere ID. This could be used by other organizations to access your [facility] medical records  [id number]        Your Vitals Were     Pulse Pulse Oximetry                 95 98%           Blood Pressure from Last 3 Encounters:   05/22/17 139/88   05/22/17 135/88   04/24/17 132/73    Weight from Last 3 Encounters:   05/22/17 123.4 kg (272 lb)   01/30/17 124.7 kg (275 lb)   09/19/16 120.7 kg (266 lb)              We Performed the Following     INJECTION INTO SKIN LESIONS <=7     KENALOG PER 10 MG          Today's Medication Changes          These changes are accurate as of: 5/22/17  6:58 PM.  If you have any questions, ask your nurse or doctor.               Start taking these medicines.        Dose/Directions    hydrochlorothiazide 25 MG tablet   Commonly known as:  HYDRODIURIL   Used for:  Benign essential hypertension   Started by:  Susanne Alberto PA-C        Dose:  25 mg   Take 1 tablet (25 mg) by mouth daily   Quantity:  90 tablet   Refills:  1         These medicines have changed or have updated prescriptions.        Dose/Directions    * sulfamethoxazole-trimethoprim 800-160 MG per tablet   Commonly known as:  BACTRIM DS   This may have changed:  Another medication with the same name was added. Make sure you understand how and when to take each.   Used for:  Acne keloidalis   Changed by:  Jahaira Garcia PA-C        1 tab PO BID   Quantity:  14 tablet   Refills:  0       * sulfamethoxazole-trimethoprim 800-160 MG per tablet   Commonly known as:  BACTRIM DS   This may have changed:  You were already taking a medication with the same name, and this prescription was added. Make sure you understand how and when to take each.   Used for:  Acne nuchae keloidalis   Changed by:  Jahaira Garcia PA-C        Dose:  1 tablet   Take 1 tablet by mouth 2 times daily   Quantity:  60 tablet   Refills:  2       * Notice:  This list has 2 medication(s) that are the same as other medications prescribed for you. Read the directions carefully, and ask your doctor or other care provider to review them with you.         Where to get your medicines      These  medications were sent to [facility] [address]  [address] [facility] [address]     Phone:  [phone]     hydrochlorothiazide 25 MG tablet    sulfamethoxazole-trimethoprim 800-160 MG per tablet                Primary Care Provider Office Phone # Fax #    Mariangel Sung PA-C [phone]       [place] [address] [facility] [address]        Thank you!     Thank you for choosing [facility]  for your care. Our goal is always to provide you with excellent care. Hearing back from our patients is one way we can continue to improve our services. Please take a few minutes to complete the written survey that you may receive in the mail after your visit with us. Thank you!             Your Updated Medication List - Protect others around you: Learn how to safely use, store and throw away your medicines at www.disposemymeds.org.          This list is accurate as of: 5/22/17  6:58 PM.  Always use your most recent med list.                   Brand Name Dispense Instructions for use    atorvastatin 20 MG tablet    LIPITOR    90 tablet    Take 1 tablet (20 mg) by mouth daily       augmented betamethasone dipropionate 0.05 % ointment    DIPROLENE-AF    45 g    Apply sparingly to affected area twice daily as needed.  Do not apply to face.       clindamycin 1 % lotion    CLINDAMAX    60 mL    Apply to AA BID       doxycycline Monohydrate 100 MG Caps     60 capsule    1 tab PO BID with food and full glass of water       hydrochlorothiazide 25 MG tablet    HYDRODIURIL    90 tablet    Take 1 tablet (25 mg) by mouth daily       lisinopril 10 MG tablet    PRINIVIL/ZESTRIL    90 tablet    Take 1 tablet (10 mg) by mouth daily       mupirocin 2 % cream    BACTROBAN    30 g    Apply topically 3 times daily       * sulfamethoxazole-trimethoprim 800-160 MG per tablet    BACTRIM DS    14 tablet    1 tab PO BID       * sulfamethoxazole-trimethoprim  800-160 MG per tablet    BACTRIM DS    60 tablet    Take 1 tablet by mouth 2 times daily       * Notice:  This list has 2 medication(s) that are the same as other medications prescribed for you. Read the directions carefully, and ask your doctor or other care provider to review them with you.

## 2017-05-22 NOTE — MR AVS SNAPSHOT
After Visit Summary   5/22/2017    Sean Woodard    MRN: [id number]           Patient Information     Date Of Birth          1980        Visit Information        Provider Department      5/22/2017 4:20 PM Susanne Alberto PA-C [facility]        Today's Diagnoses     Encounter for routine adult health examination with abnormal findings    -  1    Eustachian tube dysfunction, right        Benign essential hypertension        Prediabetes          Care Instructions    For plugged ear(s):     Continue the flonase - use daily for the next 1-2 weeks  Start zyrtec (certirizine) 10mg daily    Can use benadryl at night prn    If no improvement or worsening over the next week, let me know      Keep an eye on your blood pressure    GOAL is less than 140/90    Start the new blood pressure medication if numbers start to increase    Return for a lab visit after 1 month of medication          Preventive Health Recommendations  Male Ages 26 - 39    Yearly exam:             See your health care provider every year in order to  o   Review health changes.   o   Discuss preventive care.    o   Review your medicines if your doctor has prescribed any.    You should be tested each year for STDs (sexually transmitted diseases), if you re at risk.     After age 35, talk to your provider about cholesterol testing. If you are at risk for heart disease, have your cholesterol tested at least every 5 years.     If you are at risk for diabetes, you should have a diabetes test (fasting glucose).  Shots: Get a flu shot each year. Get a tetanus shot every 10 years.     Nutrition:    Eat at least 5 servings of fruits and vegetables daily.     Eat whole-grain bread, whole-wheat pasta and brown rice instead of white grains and rice.     Talk to your provider about Calcium and Vitamin D.     Lifestyle    Exercise for at least 150 minutes a week (30 minutes a day, 5 days a week). This will help you control your  "weight and prevent disease.     Limit alcohol to one drink per day.     No smoking.     Wear sunscreen to prevent skin cancer.     See your dentist every six months for an exam and cleaning.           Follow-ups after your visit        Your next 10 appointments already scheduled     May 22, 2017  6:00 PM CDT   Return Visit with Jahaira Garcia PA-C   [facility]    [address] [facility] [address] [phone]              Who to contact     Normal or non-critical lab and imaging results will be communicated to you by [employer], letter or phone within 4 business days after the clinic has received the results. If you do not hear from us within 7 days, please contact the clinic through [employer] or phone. If you have a critical or abnormal lab result, we will notify you by phone as soon as possible.  Submit refill requests through [employer] or call your pharmacy and they will forward the refill request to us. Please allow 3 business days for your refill to be completed.          If you need to speak with a  for additional information , please call: [phone]             Additional Information About Your Visit        [employer] Information     [employer] gives you secure access to your electronic health record. If you see a primary care provider, you can also send messages to your care team and make appointments. If you have questions, please call your primary care clinic.  If you do not have a primary care provider, please call [phone] and they will assist you.        Care EveryWhere ID     This is your Care EveryWhere ID. This could be used by other organizations to access your [facility] medical records  [id number]        Your Vitals Were     Pulse Temperature Height BMI (Body Mass Index)          81 98.8  F (37.1  C) (Tympanic) 5' 8\" (1.727 m) 41.36 kg/m2         Blood Pressure from Last 3 Encounters:   05/22/17 135/88   04/24/17 132/73 "   01/30/17 (!) 141/91    Weight from Last 3 Encounters:   05/22/17 272 lb (123.4 kg)   01/30/17 275 lb (124.7 kg)   09/19/16 266 lb (120.7 kg)              We Performed the Following     Hemoglobin A1c          Today's Medication Changes          These changes are accurate as of: 5/22/17  4:49 PM.  If you have any questions, ask your nurse or doctor.               Start taking these medicines.        Dose/Directions    hydrochlorothiazide 25 MG tablet   Commonly known as:  HYDRODIURIL   Used for:  Benign essential hypertension   Started by:  Susanne Alberto PA-C        Dose:  25 mg   Take 1 tablet (25 mg) by mouth daily   Quantity:  90 tablet   Refills:  1            Where to get your medicines      These medications were sent to [facility] [address]  [address] [place] [facility] [address]     Phone:  [phone]     hydrochlorothiazide 25 MG tablet                Primary Care Provider Office Phone # Fax #    Mariangel Sung PA-C [phone]       [address] [address]        Thank you!     Thank you for choosing [facility]  for your care. Our goal is always to provide you with excellent care. Hearing back from our patients is one way we can continue to improve our services. Please take a few minutes to complete the written survey that you may receive in the mail after your visit with us. Thank you!             Your Updated Medication List - Protect others around you: Learn how to safely use, store and throw away your medicines at www.disposemymeds.org.          This list is accurate as of: 5/22/17  4:49 PM.  Always use your most recent med list.                   Brand Name Dispense Instructions for use    atorvastatin 20 MG tablet    LIPITOR    90 tablet    Take 1 tablet (20 mg) by mouth daily       augmented betamethasone dipropionate 0.05 % ointment    DIPROLENE-AF    45 g    Apply sparingly to  affected area twice daily as needed.  Do not apply to face.       clindamycin 1 % lotion    CLINDAMAX    60 mL    Apply to AA BID       doxycycline Monohydrate 100 MG Caps     60 capsule    1 tab PO BID with food and full glass of water       hydrochlorothiazide 25 MG tablet    HYDRODIURIL    90 tablet    Take 1 tablet (25 mg) by mouth daily       lisinopril 10 MG tablet    PRINIVIL/ZESTRIL    90 tablet    Take 1 tablet (10 mg) by mouth daily       mupirocin 2 % cream    BACTROBAN    30 g    Apply topically 3 times daily       sulfamethoxazole-trimethoprim 800-160 MG per tablet    BACTRIM DS    14 tablet    1 tab PO BID

## 2017-05-22 NOTE — LETTER
[facility]  [address] [place] [address]  Phone: [phone]    May 23, 2017    Sean Woodard  [address] [facility] [address]              Dear Sean Jenkins,   Your A1c is 6.1 so still in that prediabetic range but lower than it was at last check which is good. Keep working on diet and exercise and we can plan on rechecking again in about 6 months.            Sincerely,      Susanne FULLER/ estellek

## 2017-05-22 NOTE — NURSING NOTE
"Chief Complaint   Patient presents with     Derm Problem     f/u rash       Initial /88  Pulse 95  SpO2 98% Estimated body mass index is 41.36 kg/(m^2) as calculated from the following:    Height as of an earlier encounter on 5/22/17: 1.727 m (5' 8\").    Weight as of an earlier encounter on 5/22/17: 123.4 kg (272 lb).  BP completed using cuff size: violet Wright LPN    "

## 2017-07-28 DIAGNOSIS — E78.5 HYPERLIPIDEMIA WITH TARGET LDL LESS THAN 100: ICD-10-CM

## 2017-07-28 DIAGNOSIS — I10 BENIGN ESSENTIAL HYPERTENSION: Primary | ICD-10-CM

## 2017-07-28 LAB
ANION GAP SERPL CALCULATED.3IONS-SCNC: 8 MMOL/L (ref 3–14)
BUN SERPL-MCNC: 14 MG/DL (ref 7–30)
CALCIUM SERPL-MCNC: 10.2 MG/DL (ref 8.5–10.1)
CHLORIDE SERPL-SCNC: 105 MMOL/L (ref 94–109)
CHOLEST SERPL-MCNC: 191 MG/DL
CO2 SERPL-SCNC: 27 MMOL/L (ref 20–32)
CREAT SERPL-MCNC: 1.08 MG/DL (ref 0.66–1.25)
GFR SERPL CREATININE-BSD FRML MDRD: 77 ML/MIN/1.7M2
GLUCOSE SERPL-MCNC: 81 MG/DL (ref 70–99)
HDLC SERPL-MCNC: 51 MG/DL
LDLC SERPL CALC-MCNC: 97 MG/DL
NONHDLC SERPL-MCNC: 140 MG/DL
POTASSIUM SERPL-SCNC: 3.5 MMOL/L (ref 3.4–5.3)
SODIUM SERPL-SCNC: 140 MMOL/L (ref 133–144)
TRIGL SERPL-MCNC: 214 MG/DL

## 2017-07-28 PROCEDURE — 80048 BASIC METABOLIC PNL TOTAL CA: CPT | Performed by: PHYSICIAN ASSISTANT

## 2017-07-28 PROCEDURE — 80061 LIPID PANEL: CPT | Performed by: PHYSICIAN ASSISTANT

## 2017-07-28 PROCEDURE — 36415 COLL VENOUS BLD VENIPUNCTURE: CPT | Performed by: PHYSICIAN ASSISTANT

## 2017-08-07 ENCOUNTER — OFFICE VISIT (OUTPATIENT)
Dept: DERMATOLOGY | Facility: CLINIC | Age: 37
End: 2017-08-07
Payer: COMMERCIAL

## 2017-08-07 VITALS — DIASTOLIC BLOOD PRESSURE: 86 MMHG | OXYGEN SATURATION: 96 % | SYSTOLIC BLOOD PRESSURE: 152 MMHG | HEART RATE: 88 BPM

## 2017-08-07 DIAGNOSIS — L73.0 ACNE NUCHAE KELOIDALIS: ICD-10-CM

## 2017-08-07 DIAGNOSIS — Z51.81 THERAPEUTIC DRUG MONITORING: Primary | ICD-10-CM

## 2017-08-07 LAB
ALBUMIN SERPL-MCNC: 4 G/DL (ref 3.4–5)
ALP SERPL-CCNC: 74 U/L (ref 40–150)
ALT SERPL W P-5'-P-CCNC: 59 U/L (ref 0–70)
ANION GAP SERPL CALCULATED.3IONS-SCNC: 8 MMOL/L (ref 3–14)
AST SERPL W P-5'-P-CCNC: 40 U/L (ref 0–45)
BILIRUB SERPL-MCNC: 0.9 MG/DL (ref 0.2–1.3)
BUN SERPL-MCNC: 16 MG/DL (ref 7–30)
CALCIUM SERPL-MCNC: 9.8 MG/DL (ref 8.5–10.1)
CHLORIDE SERPL-SCNC: 102 MMOL/L (ref 94–109)
CO2 SERPL-SCNC: 25 MMOL/L (ref 20–32)
CREAT SERPL-MCNC: 1.06 MG/DL (ref 0.66–1.25)
GFR SERPL CREATININE-BSD FRML MDRD: 79 ML/MIN/1.7M2
GLUCOSE SERPL-MCNC: 164 MG/DL (ref 70–99)
POTASSIUM SERPL-SCNC: 3.9 MMOL/L (ref 3.4–5.3)
PROT SERPL-MCNC: 8.3 G/DL (ref 6.8–8.8)
SODIUM SERPL-SCNC: 135 MMOL/L (ref 133–144)

## 2017-08-07 PROCEDURE — 80053 COMPREHEN METABOLIC PANEL: CPT | Performed by: PHYSICIAN ASSISTANT

## 2017-08-07 PROCEDURE — 36415 COLL VENOUS BLD VENIPUNCTURE: CPT | Performed by: PHYSICIAN ASSISTANT

## 2017-08-07 PROCEDURE — 11900 INJECT SKIN LESIONS </W 7: CPT | Performed by: PHYSICIAN ASSISTANT

## 2017-08-07 PROCEDURE — 99212 OFFICE O/P EST SF 10 MIN: CPT | Mod: 25 | Performed by: PHYSICIAN ASSISTANT

## 2017-08-07 RX ORDER — DOXYCYCLINE 100 MG/1
100 CAPSULE ORAL 2 TIMES DAILY
Qty: 28 CAPSULE | Refills: 0 | Status: SHIPPED | OUTPATIENT
Start: 2017-08-07 | End: 2017-08-21

## 2017-08-07 NOTE — PROGRESS NOTES
HPI:   Sean Woodard is a 36 year old male who presents for recheck of skin eruption on back of neck  chief complaint  Location: back of neck   Condition present for:  About 1 year.   Previous treatments include: Lamisil, multiple abx, mupirocin, desonide. Began after hair cut    Review Of Systems  Eyes: negative  Ears/Nose/Throat: negative  Respiratory: No shortness of breath, dyspnea on exertion, cough, or hemoptysis  Cardiovascular: negative  Gastrointestinal: negative  Genitourinary: negative  Musculoskeletal: negative  Neurologic: negative  Psychiatric: negative        PHYSICAL EXAM:   A&Ox3:Yes    Well developed, well nourished male Yes   Skin Type: 3  1. Multiple pustules, firm nodules on back of neck    ASSESSMENT/PLAN:     1. Acne keloidalis on posterior neck - advised. Has just a couple of areas that are minimally inflamed; overall better. Has only been using topicals. Didn't feel like Bactrim was helpful. Discussed doing Accutane again and he is amenable to starting this. Going on a trip to [facility] in 2 weeks so will wait until he returns to start this. Negative tissue culture, biopsy and fungal culture. Had negative aerobic skin culture but + KOH scraping by PCP. Has been on multiple antibiotics, antifungals but nothing has helped.    --Rx written for doxycycline - can resume this if needed  --Continue betamethasone ointment BID alternating with mupirocin ointment BID  --Kenalog 10 mg/cc injected to posterior neck. Total of 1.0 cc's used.  Advised on risk of atrophy and failure to respond. Advised on aftercare.   --Plan to start isotretinoin in 1 month          Follow-up: 4 weeks  CC:   Scribed By: Jahaira Garcia, MS, PA-C

## 2017-08-07 NOTE — MR AVS SNAPSHOT
After Visit Summary   8/7/2017    Sean Woodard    MRN: [id number]           Patient Information     Date Of Birth          1980        Visit Information        Provider Department      8/7/2017 6:00 PM Jahaira Garcia PA-C [facility]        Today's Diagnoses     Therapeutic drug monitoring    -  1    Acne nuchae keloidalis           Follow-ups after your visit        Your next 10 appointments already scheduled     Sep 18, 2017  5:45 PM CDT   Return Visit with Jahaira Garcia PA-C   [facility] ([facility]    [address] [address]   [phone]              Who to contact     If you have questions or need follow up information about today's clinic visit or your schedule please contact [facility] directly at [phone].  Normal or non-critical lab and imaging results will be communicated to you by MyChart, letter or phone within 4 business days after the clinic has received the results. If you do not hear from us within 7 days, please contact the clinic through MyChart or phone. If you have a critical or abnormal lab result, we will notify you by phone as soon as possible.  Submit refill requests through [employer] or call your pharmacy and they will forward the refill request to us. Please allow 3 business days for your refill to be completed.          Additional Information About Your Visit        MyChart Information     [employer] gives you secure access to your electronic health record. If you see a primary care provider, you can also send messages to your care team and make appointments. If you have questions, please call your primary care clinic.  If you do not have a primary care provider, please call [phone] and they will assist you.        Care EveryWhere ID     This is your Care EveryWhere ID. This could be used by other organizations to access your [place] medical records  [id number]        Your  Vitals Were     Pulse Pulse Oximetry                88 96%           Blood Pressure from Last 3 Encounters:   08/07/17 152/86   05/22/17 139/88   05/22/17 135/88    Weight from Last 3 Encounters:   05/22/17 123.4 kg (272 lb)   01/30/17 124.7 kg (275 lb)   09/19/16 120.7 kg (266 lb)              We Performed the Following     Comprehensive metabolic panel     INJECTION INTO SKIN LESIONS <=7     KENALOG PER 10 MG          Today's Medication Changes          These changes are accurate as of: 8/7/17  6:42 PM.  If you have any questions, ask your nurse or doctor.               These medicines have changed or have updated prescriptions.        Dose/Directions    * doxycycline Monohydrate 100 MG Caps   This may have changed:  Another medication with the same name was added. Make sure you understand how and when to take each.   Used for:  Chronic dermatitis   Changed by:  Mariangel Sung PA-C        1 tab PO BID with food and full glass of water   Quantity:  60 capsule   Refills:  2       * doxycycline Monohydrate 100 MG Caps   This may have changed:  You were already taking a medication with the same name, and this prescription was added. Make sure you understand how and when to take each.   Used for:  Acne nuchae keloidalis   Changed by:  Jahaira Garcia PA-C        Dose:  100 mg   Take 1 capsule (100 mg) by mouth 2 times daily for 14 days   Quantity:  28 capsule   Refills:  0       * Notice:  This list has 2 medication(s) that are the same as other medications prescribed for you. Read the directions carefully, and ask your doctor or other care provider to review them with you.         Where to get your medicines      These medications were sent to [facility] - [address]  [address] [address]     Phone:  [phone]     doxycycline Monohydrate 100 MG Caps                Primary Care Provider Office Phone # Fax #    Mariangel Sung PA-C [phone]  [phone]       [facility] [address]  [facility] [address]        Equal Access to Services     VINNIE MAYEN : Hadii pamela cortez shawn Lazo, wamemeda luqporfirio, carylta kajoseda christofer, kd jamesin hayaaalex albertsdeepika meyer laJacobnehemiah moncada. So [facility] [phone].    ATENCIÓN: Si habla español, tiene a camacho disposición servicios gratuitos de asistencia lingüística. Llame al [phone].    We comply with applicable federal civil rights laws and [place] laws. We do not discriminate on the basis of race, color, national origin, age, disability sex, sexual orientation or gender identity.            Thank you!     Thank you for choosing [facility]  for your care. Our goal is always to provide you with excellent care. Hearing back from our patients is one way we can continue to improve our services. Please take a few minutes to complete the written survey that you may receive in the mail after your visit with us. Thank you!             Your Updated Medication List - Protect others around you: Learn how to safely use, store and throw away your medicines at www.disposemymeds.org.          This list is accurate as of: 8/7/17  6:42 PM.  Always use your most recent med list.                   Brand Name Dispense Instructions for use Diagnosis    atorvastatin 20 MG tablet    LIPITOR    90 tablet    Take 1 tablet (20 mg) by mouth daily    Hyperlipidemia with target LDL less than 100       augmented betamethasone dipropionate 0.05 % ointment    DIPROLENE-AF    45 g    Apply sparingly to affected area twice daily as needed.  Do not apply to face.    Chronic dermatitis       clindamycin 1 % lotion    CLINDAMAX    60 mL    Apply to AA BID    Acne keloidalis       * doxycycline Monohydrate 100 MG Caps     60 capsule    1 tab PO BID with food and full glass of water    Chronic dermatitis       * doxycycline Monohydrate 100 MG Caps     28 capsule    Take 1 capsule (100 mg) by mouth 2 times daily for 14 days    Acne  nuchae keloidalis       hydrochlorothiazide 25 MG tablet    HYDRODIURIL    90 tablet    Take 1 tablet (25 mg) by mouth daily    Benign essential hypertension       lisinopril 10 MG tablet    PRINIVIL/ZESTRIL    90 tablet    Take 1 tablet (10 mg) by mouth daily    Benign essential hypertension       mupirocin 2 % cream    BACTROBAN    30 g    Apply topically 3 times daily    Rash and nonspecific skin eruption       * sulfamethoxazole-trimethoprim 800-160 MG per tablet    BACTRIM DS    14 tablet    1 tab PO BID    Acne keloidalis       * sulfamethoxazole-trimethoprim 800-160 MG per tablet    BACTRIM DS    60 tablet    Take 1 tablet by mouth 2 times daily    Acne nuchae keloidalis       * Notice:  This list has 4 medication(s) that are the same as other medications prescribed for you. Read the directions carefully, and ask your doctor or other care provider to review them with you.

## 2017-08-07 NOTE — NURSING NOTE
"Initial /86  Pulse 88  SpO2 96% Estimated body mass index is 41.36 kg/(m^2) as calculated from the following:    Height as of 5/22/17: 1.727 m (5' 8\").    Weight as of 5/22/17: 123.4 kg (272 lb). .      "

## 2017-09-18 ENCOUNTER — OFFICE VISIT (OUTPATIENT)
Dept: DERMATOLOGY | Facility: CLINIC | Age: 37
End: 2017-09-18
Payer: COMMERCIAL

## 2017-09-18 VITALS — OXYGEN SATURATION: 98 % | HEART RATE: 85 BPM | DIASTOLIC BLOOD PRESSURE: 92 MMHG | SYSTOLIC BLOOD PRESSURE: 151 MMHG

## 2017-09-18 DIAGNOSIS — L73.0 ACNE NUCHAE KELOIDALIS: Primary | ICD-10-CM

## 2017-09-18 DIAGNOSIS — Z51.81 THERAPEUTIC DRUG MONITORING: ICD-10-CM

## 2017-09-18 LAB
ALBUMIN SERPL-MCNC: NORMAL G/DL (ref 3.4–5)
ALP SERPL-CCNC: NORMAL U/L (ref 40–150)
ALT SERPL W P-5'-P-CCNC: NORMAL U/L (ref 0–70)
ANION GAP SERPL CALCULATED.3IONS-SCNC: NORMAL MMOL/L (ref 6–17)
AST SERPL W P-5'-P-CCNC: NORMAL U/L (ref 0–45)
BILIRUB SERPL-MCNC: NORMAL MG/DL (ref 0.2–1.3)
BUN SERPL-MCNC: NORMAL MG/DL (ref 7–30)
CALCIUM SERPL-MCNC: NORMAL MG/DL (ref 8.5–10.1)
CHLORIDE SERPL-SCNC: NORMAL MMOL/L (ref 94–109)
CHOLEST SERPL-MCNC: 189 MG/DL
CO2 SERPL-SCNC: NORMAL MMOL/L (ref 20–32)
CREAT SERPL-MCNC: NORMAL MG/DL (ref 0.66–1.25)
ERYTHROCYTE [DISTWIDTH] IN BLOOD BY AUTOMATED COUNT: 12.4 % (ref 10–15)
GFR SERPL CREATININE-BSD FRML MDRD: NORMAL ML/MIN/1.7M2
GLUCOSE SERPL-MCNC: NORMAL MG/DL (ref 70–99)
HCT VFR BLD AUTO: 45.3 % (ref 40–53)
HDLC SERPL-MCNC: 36 MG/DL
HGB BLD-MCNC: 15.7 G/DL (ref 13.3–17.7)
LDLC SERPL CALC-MCNC: ABNORMAL MG/DL
MCH RBC QN AUTO: 31.3 PG (ref 26.5–33)
MCHC RBC AUTO-ENTMCNC: 34.7 G/DL (ref 31.5–36.5)
MCV RBC AUTO: 90 FL (ref 78–100)
NONHDLC SERPL-MCNC: 153 MG/DL
PLATELET # BLD AUTO: 206 10E9/L (ref 150–450)
POTASSIUM SERPL-SCNC: NORMAL MMOL/L (ref 3.4–5.3)
PROT SERPL-MCNC: NORMAL G/DL (ref 6.8–8.8)
RBC # BLD AUTO: 5.01 10E12/L (ref 4.4–5.9)
SODIUM SERPL-SCNC: NORMAL MMOL/L (ref 133–144)
TRIGL SERPL-MCNC: 535 MG/DL
WBC # BLD AUTO: 6.5 10E9/L (ref 4–11)

## 2017-09-18 PROCEDURE — 11900 INJECT SKIN LESIONS </W 7: CPT | Performed by: PHYSICIAN ASSISTANT

## 2017-09-18 PROCEDURE — 85027 COMPLETE CBC AUTOMATED: CPT | Performed by: PHYSICIAN ASSISTANT

## 2017-09-18 PROCEDURE — 36415 COLL VENOUS BLD VENIPUNCTURE: CPT | Performed by: PHYSICIAN ASSISTANT

## 2017-09-18 PROCEDURE — 99213 OFFICE O/P EST LOW 20 MIN: CPT | Mod: 25 | Performed by: PHYSICIAN ASSISTANT

## 2017-09-18 PROCEDURE — 80061 LIPID PANEL: CPT | Performed by: PHYSICIAN ASSISTANT

## 2017-09-18 RX ORDER — ISOTRETINOIN 40 MG/1
CAPSULE ORAL
Qty: 30 CAPSULE | Refills: 0 | Status: SHIPPED | OUTPATIENT
Start: 2017-09-18 | End: 2018-04-09

## 2017-09-18 NOTE — MR AVS SNAPSHOT
After Visit Summary   9/18/2017    Sean Woodard    MRN: [id number]           Patient Information     Date Of Birth          1980        Visit Information        Provider Department      9/18/2017 5:45 PM Jahaira Garcia PA-C [facility]        Today's Diagnoses     Acne nuchae keloidalis    -  1    Therapeutic drug monitoring           Follow-ups after your visit        Your next 10 appointments already scheduled     Nov 14, 2017  4:00 PM CST   Return Visit with Sabi Bustos PA-C   [facility] ([facility]    [address] [address]   [phone]              Future tests that were ordered for you today     Open Standing Orders        Priority Remaining Interval Expires Ordered    CBC with platelets Routine 9/10  9/18/2018 9/18/2017    Comprehensive metabolic panel Routine 9/10  9/18/2018 9/18/2017    Lipid Profile Routine 9/10  9/18/2018 9/18/2017            Who to contact     If you have questions or need follow up information about today's clinic visit or your schedule please contact [facility] directly at [phone].  Normal or non-critical lab and imaging results will be communicated to you by [employer], letter or phone within 4 business days after the clinic has received the results. If you do not hear from us within 7 days, please contact the clinic through [employer] or phone. If you have a critical or abnormal lab result, we will notify you by phone as soon as possible.  Submit refill requests through [employer] or call your pharmacy and they will forward the refill request to us. Please allow 3 business days for your refill to be completed.          Additional Information About Your Visit        MyChart Information     [employer] gives you secure access to your electronic health record. If you see a primary care provider, you can also send messages to your care team and make appointments. If you have  questions, please call your primary care clinic.  If you do not have a primary care provider, please call [phone] and they will assist you.        Care EveryWhere ID     This is your Care EveryWhere ID. This could be used by other organizations to access your [place] medical records  [id number]        Your Vitals Were     Pulse Pulse Oximetry                85 98%           Blood Pressure from Last 3 Encounters:   09/18/17 (!) 151/92   08/07/17 152/86   05/22/17 139/88    Weight from Last 3 Encounters:   05/22/17 123.4 kg (272 lb)   01/30/17 124.7 kg (275 lb)   09/19/16 120.7 kg (266 lb)              We Performed the Following     CBC with platelets     Comprehensive metabolic panel     INJECTION INTO SKIN LESIONS <=7     KENALOG PER 10 MG     Lipid Profile          Today's Medication Changes          These changes are accurate as of: 9/18/17  7:03 PM.  If you have any questions, ask your nurse or doctor.               Start taking these medicines.        Dose/Directions    ISOtretinoin 40 MG capsule   Commonly known as:  ACCUTANE   Used for:  Acne nuchae keloidalis   Started by:  Jahaira Garcia PA-C        1 tab PO daily with food   Quantity:  30 capsule   Refills:  0            Where to get your medicines      These medications were sent to [facility] [place] [address]   [address] [address] [facility] [address] [facility] [address]     Phone:  [phone]     ISOtretinoin 40 MG capsule                Primary Care Provider Office Phone # Fax #    Mariangel ALEJANDRA Sung [phone]       [address] [facility] [address]        Equal Access to Services     [facility] AH: Hadii aad ku hadasho Soomaali, waaxda luqadaha, qaybta kaalmada adeegyada, kd moncada. So [facility] [phone].    ATENCIÓN: Si habla español, tiene a camacho disposición servicios gratuitos de asistencia lingüística. Llame al [phone].    We comply with applicable federal civil  rights laws and [place] laws. We do not discriminate on the basis of race, color, national origin, age, disability sex, sexual orientation or gender identity.            Thank you!     Thank you for choosing [facility]  for your care. Our goal is always to provide you with excellent care. Hearing back from our patients is one way we can continue to improve our services. Please take a few minutes to complete the written survey that you may receive in the mail after your visit with us. Thank you!             Your Updated Medication List - Protect others around you: Learn how to safely use, store and throw away your medicines at www.disposemymeds.org.          This list is accurate as of: 9/18/17  7:03 PM.  Always use your most recent med list.                   Brand Name Dispense Instructions for use Diagnosis    atorvastatin 20 MG tablet    LIPITOR    90 tablet    Take 1 tablet (20 mg) by mouth daily    Hyperlipidemia with target LDL less than 100       augmented betamethasone dipropionate 0.05 % ointment    DIPROLENE-AF    45 g    Apply sparingly to affected area twice daily as needed.  Do not apply to face.    Chronic dermatitis       clindamycin 1 % lotion    CLINDAMAX    60 mL    Apply to AA BID    Acne keloidalis       hydrochlorothiazide 25 MG tablet    HYDRODIURIL    90 tablet    Take 1 tablet (25 mg) by mouth daily    Benign essential hypertension       ISOtretinoin 40 MG capsule    ACCUTANE    30 capsule    1 tab PO daily with food    Acne nuchae keloidalis       lisinopril 10 MG tablet    PRINIVIL/ZESTRIL    90 tablet    Take 1 tablet (10 mg) by mouth daily    Benign essential hypertension       mupirocin 2 % cream    BACTROBAN    30 g    Apply topically 3 times daily    Rash and nonspecific skin eruption       * sulfamethoxazole-trimethoprim 800-160 MG per tablet    BACTRIM DS    14 tablet    1 tab PO BID    Acne keloidalis       * sulfamethoxazole-trimethoprim 800-160 MG per tablet     BACTRIM DS    60 tablet    Take 1 tablet by mouth 2 times daily    Acne nuchae keloidalis       * Notice:  This list has 2 medication(s) that are the same as other medications prescribed for you. Read the directions carefully, and ask your doctor or other care provider to review them with you.

## 2017-09-18 NOTE — PROGRESS NOTES
HPI:   Sean Woodard is a 36 year old male who presents for recheck of acne keloidalis nuchae - has been on abx and ILK which aren't really helping  chief complaint  Location: back of neck   Condition present for:  About 1 year.   Previous treatments include: Lamisil, multiple abx, mupirocin, desonide. Began after hair cut    Review Of Systems  Eyes: negative  Ears/Nose/Throat: negative  Respiratory: No shortness of breath, dyspnea on exertion, cough, or hemoptysis  Cardiovascular: negative  Gastrointestinal: negative  Genitourinary: negative  Musculoskeletal: negative  Neurologic: negative  Psychiatric: negative        PHYSICAL EXAM:   A&Ox3:Yes    Well developed, well nourished male Yes   Skin Type: 3  1. Multiple pustules, firm nodules and scarring on back of neck    ASSESSMENT/PLAN:     1. Acne keloidalis on posterior neck - advised. Continues to flare despite abx, topicals, ILK. Didn't feel like Bactrim was helpful. Discussed doing Accutane again and he is amenable to starting this. Negative tissue culture, biopsy and fungal culture. Had negative aerobic skin culture but + KOH scraping by PCP. Has been on multiple antibiotics, antifungals but nothing has helped.    --Stop doxycycline  --Kenalog 10 mg/cc injected to posterior neck. Total of 1.0 cc's used.  Advised on risk of atrophy and failure to respond. Advised on aftercare.     Accutane is discussed fully with the patient. It is a very effective drug to treat acne vulgaris but has many significant side effects. Chief among these are teratogensis, hepatic injury, dyslipidemia and severe drying of the mucous membranes. All of these issues have been discussed in details. Monthly blood tests to monitor lipids and liver functions will be necessary. Expect painful dryness and/or fissuring around the lips, eyes, and other moist areas of the body. Balms may be protective. Contact lens may be too painful to wear temporarily while on this drug. Episodes of significant  depression have been reported, including suicidal ideation and attempts in rare cases. It may also cause pseudotumor cerebri and hyperostosis. The patient will report any such changes in mood, depressive symptoms or suicidal thoughts, headaches, joint or bone pains.    Female patients MUST use two simultaneous methods of family planning. Accutane is Category X for pregnancy, meaning it will cause fetal teratogenic malformations, and pregnancy MUST be avoided while on this drug.    The dose is 0.5-1 mg/kg in two divided doses for 15-20 weeks.    After discussion of these important issues, s/he indicates complete understanding of all of the above, and does wish to proceed with accutane therapy.    --Start isotretinoin 40 mg daily with food  --Ipledge number is: [id number]   --Standing CBC, CMP, lipid panel        Follow-up: 1 month  CC:   Scribed By: Jahaira Garcia MS, PA-C

## 2017-09-18 NOTE — NURSING NOTE
"Chief Complaint   Patient presents with     Derm Problem     recheck rash       Initial BP (!) 151/92  Pulse 85  SpO2 98% Estimated body mass index is 41.36 kg/(m^2) as calculated from the following:    Height as of 5/22/17: 1.727 m (5' 8\").    Weight as of 5/22/17: 123.4 kg (272 lb).  BP completed using cuff size: violet Wright LPN    "

## 2017-09-25 DIAGNOSIS — E78.5 HYPERLIPIDEMIA WITH TARGET LDL LESS THAN 100: ICD-10-CM

## 2017-09-25 DIAGNOSIS — Z51.81 THERAPEUTIC DRUG MONITORING: ICD-10-CM

## 2017-09-25 LAB
ALBUMIN SERPL-MCNC: 3.9 G/DL (ref 3.4–5)
ALP SERPL-CCNC: 72 U/L (ref 40–150)
ALT SERPL W P-5'-P-CCNC: 52 U/L (ref 0–70)
ANION GAP SERPL CALCULATED.3IONS-SCNC: 7 MMOL/L (ref 3–14)
AST SERPL W P-5'-P-CCNC: 37 U/L (ref 0–45)
BILIRUB SERPL-MCNC: 1.2 MG/DL (ref 0.2–1.3)
BUN SERPL-MCNC: 13 MG/DL (ref 7–30)
CALCIUM SERPL-MCNC: 9.2 MG/DL (ref 8.5–10.1)
CHLORIDE SERPL-SCNC: 105 MMOL/L (ref 94–109)
CHOLEST SERPL-MCNC: 180 MG/DL
CO2 SERPL-SCNC: 26 MMOL/L (ref 20–32)
CREAT SERPL-MCNC: 0.98 MG/DL (ref 0.66–1.25)
ERYTHROCYTE [DISTWIDTH] IN BLOOD BY AUTOMATED COUNT: 12.6 % (ref 10–15)
GFR SERPL CREATININE-BSD FRML MDRD: 86 ML/MIN/1.7M2
GLUCOSE SERPL-MCNC: 101 MG/DL (ref 70–99)
HCT VFR BLD AUTO: 46.8 % (ref 40–53)
HDLC SERPL-MCNC: 56 MG/DL
HGB BLD-MCNC: 16.2 G/DL (ref 13.3–17.7)
LDLC SERPL CALC-MCNC: 102 MG/DL
MCH RBC QN AUTO: 30.7 PG (ref 26.5–33)
MCHC RBC AUTO-ENTMCNC: 34.6 G/DL (ref 31.5–36.5)
MCV RBC AUTO: 89 FL (ref 78–100)
NONHDLC SERPL-MCNC: 124 MG/DL
PLATELET # BLD AUTO: 201 10E9/L (ref 150–450)
POTASSIUM SERPL-SCNC: 3.9 MMOL/L (ref 3.4–5.3)
PROT SERPL-MCNC: 8.1 G/DL (ref 6.8–8.8)
RBC # BLD AUTO: 5.28 10E12/L (ref 4.4–5.9)
SODIUM SERPL-SCNC: 138 MMOL/L (ref 133–144)
TRIGL SERPL-MCNC: 110 MG/DL
WBC # BLD AUTO: 7 10E9/L (ref 4–11)

## 2017-09-25 PROCEDURE — 36415 COLL VENOUS BLD VENIPUNCTURE: CPT | Performed by: PHYSICIAN ASSISTANT

## 2017-09-25 PROCEDURE — 80053 COMPREHEN METABOLIC PANEL: CPT | Performed by: PHYSICIAN ASSISTANT

## 2017-09-25 PROCEDURE — 80061 LIPID PANEL: CPT | Performed by: PHYSICIAN ASSISTANT

## 2017-09-25 PROCEDURE — 85027 COMPLETE CBC AUTOMATED: CPT | Performed by: PHYSICIAN ASSISTANT

## 2017-09-25 NOTE — TELEPHONE ENCOUNTER
Routing refill request to provider for review/approval because:  Lab results are in process.  Fuad Worrell RN

## 2017-09-27 RX ORDER — ATORVASTATIN CALCIUM 20 MG/1
TABLET, FILM COATED ORAL
Qty: 90 TABLET | Refills: 2 | Status: SHIPPED | OUTPATIENT
Start: 2017-09-27 | End: 2018-07-16

## 2017-11-10 DIAGNOSIS — Z51.81 THERAPEUTIC DRUG MONITORING: ICD-10-CM

## 2017-11-10 LAB
ALBUMIN SERPL-MCNC: 3.8 G/DL (ref 3.4–5)
ALP SERPL-CCNC: 74 U/L (ref 40–150)
ALT SERPL W P-5'-P-CCNC: 48 U/L (ref 0–70)
ANION GAP SERPL CALCULATED.3IONS-SCNC: 4 MMOL/L (ref 3–14)
AST SERPL W P-5'-P-CCNC: 28 U/L (ref 0–45)
BILIRUB SERPL-MCNC: 0.6 MG/DL (ref 0.2–1.3)
BUN SERPL-MCNC: 13 MG/DL (ref 7–30)
CALCIUM SERPL-MCNC: 9.1 MG/DL (ref 8.5–10.1)
CHLORIDE SERPL-SCNC: 103 MMOL/L (ref 94–109)
CHOLEST SERPL-MCNC: 186 MG/DL
CO2 SERPL-SCNC: 28 MMOL/L (ref 20–32)
CREAT SERPL-MCNC: 1 MG/DL (ref 0.66–1.25)
ERYTHROCYTE [DISTWIDTH] IN BLOOD BY AUTOMATED COUNT: 12.5 % (ref 10–15)
GFR SERPL CREATININE-BSD FRML MDRD: 84 ML/MIN/1.7M2
GLUCOSE SERPL-MCNC: 103 MG/DL (ref 70–99)
HCT VFR BLD AUTO: 45.7 % (ref 40–53)
HDLC SERPL-MCNC: 45 MG/DL
HGB BLD-MCNC: 15.6 G/DL (ref 13.3–17.7)
LDLC SERPL CALC-MCNC: 122 MG/DL
MCH RBC QN AUTO: 30.4 PG (ref 26.5–33)
MCHC RBC AUTO-ENTMCNC: 34.1 G/DL (ref 31.5–36.5)
MCV RBC AUTO: 89 FL (ref 78–100)
NONHDLC SERPL-MCNC: 141 MG/DL
PLATELET # BLD AUTO: 223 10E9/L (ref 150–450)
POTASSIUM SERPL-SCNC: 3.9 MMOL/L (ref 3.4–5.3)
PROT SERPL-MCNC: 8.1 G/DL (ref 6.8–8.8)
RBC # BLD AUTO: 5.13 10E12/L (ref 4.4–5.9)
SODIUM SERPL-SCNC: 135 MMOL/L (ref 133–144)
TRIGL SERPL-MCNC: 93 MG/DL
WBC # BLD AUTO: 6.2 10E9/L (ref 4–11)

## 2017-11-10 PROCEDURE — 80061 LIPID PANEL: CPT | Performed by: PHYSICIAN ASSISTANT

## 2017-11-10 PROCEDURE — 80053 COMPREHEN METABOLIC PANEL: CPT | Performed by: PHYSICIAN ASSISTANT

## 2017-11-10 PROCEDURE — 85027 COMPLETE CBC AUTOMATED: CPT | Performed by: PHYSICIAN ASSISTANT

## 2017-11-10 PROCEDURE — 36415 COLL VENOUS BLD VENIPUNCTURE: CPT | Performed by: PHYSICIAN ASSISTANT

## 2017-11-14 ENCOUNTER — OFFICE VISIT (OUTPATIENT)
Dept: DERMATOLOGY | Facility: CLINIC | Age: 37
End: 2017-11-14
Payer: COMMERCIAL

## 2017-11-14 VITALS — SYSTOLIC BLOOD PRESSURE: 135 MMHG | HEART RATE: 93 BPM | OXYGEN SATURATION: 99 % | DIASTOLIC BLOOD PRESSURE: 89 MMHG

## 2017-11-14 DIAGNOSIS — L73.0 ACNE NUCHAE KELOIDALIS: Primary | ICD-10-CM

## 2017-11-14 PROCEDURE — 99213 OFFICE O/P EST LOW 20 MIN: CPT | Performed by: PHYSICIAN ASSISTANT

## 2017-11-14 RX ORDER — ISOTRETINOIN 30 MG/1
1 CAPSULE ORAL 2 TIMES DAILY WITH MEALS
Qty: 60 CAPSULE | Refills: 0 | Status: SHIPPED | OUTPATIENT
Start: 2017-11-14 | End: 2019-01-17

## 2017-11-14 NOTE — LETTER
11/14/2017         RE: Sean Woodard  [address] [address]        Dear Colleague,    Thank you for referring your patient, Sean Woodard, to the [facility]. Please see a copy of my visit note below.    Sean Woodard is a 37 year old year old male patient here today for recheck acne nuchae keloidalis. He just finished his first month isotretinoin. He noticed a flare after starting medication. He denies any side effects including mood changes. Patient has no other skin complaints today.  Remainder of the HPI, Meds, PMH, Allergies, FH, and SH was reviewed in chart.    Pertinent Hx:  Acne nuchae keloidialis  Past Medical History:   Diagnosis Date     NO ACTIVE PROBLEMS        Past Surgical History:   Procedure Laterality Date     ORTHOPEDIC SURGERY  1/1/2005    RT knee arthroscopic menisectomy     Thumb Surgery  2009    Mass removal neuroma        Family History   Problem Relation Age of Onset     Heart Defect Father      valve replacement     DIABETES Maternal Grandmother      CANCER Maternal Grandfather      stomach     Hypertension Brother      DIABETES Sister      CEREBROVASCULAR DISEASE Brother 38     Hypertension Brother        Social History     Social History     Marital status:      Spouse name: Joycelyn Stokes     Number of children: 1     Years of education: 12+     Occupational History      Department [employer]     Social History Main Topics     Smoking status: Former Smoker     Packs/day: 0.50     Years: 5.00     Types: Cigarettes     Quit date: 1/1/2005     Smokeless tobacco: Never Used     Alcohol use Yes      Comment: Social. 1 drink per month     Drug use: No     Sexual activity: Yes     Partners: Female     Other Topics Concern     Parent/Sibling W/ Cabg, Mi Or Angioplasty Before 65f 55m? No     Social History Narrative       Outpatient Encounter Prescriptions as of 11/14/2017   Medication Sig Dispense Refill     ISOtretinoin 30 MG CAPS Take 1  capsule by mouth 2 times daily (with meals) 60 capsule 0     atorvastatin (LIPITOR) 20 MG tablet TAKE ONE TABLET BY MOUTH EVERY DAY 90 tablet 2     ISOtretinoin (ACCUTANE) 40 MG capsule 1 tab PO daily with food 30 capsule 0     hydrochlorothiazide (HYDRODIURIL) 25 MG tablet Take 1 tablet (25 mg) by mouth daily 90 tablet 1     sulfamethoxazole-trimethoprim (BACTRIM DS) 800-160 MG per tablet Take 1 tablet by mouth 2 times daily 60 tablet 2     clindamycin (CLINDAMAX) 1 % lotion Apply to AA BID 60 mL 11     sulfamethoxazole-trimethoprim (BACTRIM DS) 800-160 MG per tablet 1 tab PO BID 14 tablet 0     lisinopril (PRINIVIL/ZESTRIL) 10 MG tablet Take 1 tablet (10 mg) by mouth daily 90 tablet 1     augmented betamethasone dipropionate (DIPROLENE-AF) 0.05 % ointment Apply sparingly to affected area twice daily as needed.  Do not apply to face. 45 g 1     mupirocin (BACTROBAN) 2 % cream Apply topically 3 times daily 30 g 0     No facility-administered encounter medications on file as of 11/14/2017.              Review Of Systems  Skin: As above  Eyes: negative  Ears/Nose/Throat: negative  Respiratory: No shortness of breath, dyspnea on exertion, cough, or hemoptysis  Cardiovascular: negative  Gastrointestinal: negative  Genitourinary: negative  Musculoskeletal: negative  Neurologic: negative  Psychiatric: negative  Hematologic/Lymphatic/Immunologic: negative  Endocrine: negative      O:   NAD, WDWN, Alert & Oriented, Mood & Affect wnl, Vitals stable   Here today alone   /89  Pulse 93  SpO2 99%   General appearance normal   Vitals stable   Alert, oriented and in no acute distress     Multiple inflammatory papules and scarring on back of neck    Eyes: Conjunctivae/lids:Normal     ENT: Lips    MSK:Normal    Pulm: Breathing Normal    Neuro/Psych: Orientation:Normal; Mood/Affect:Normal  A/P:  1. Acne Keloidalis on posterior neck   Continues to flare despite abx, topicals, ILK. Didn't feel like Bactrim was helpful.  Discussed doing Accutane again and he is amenable to starting this. Negative tissue culture, biopsy and fungal culture. Had negative aerobic skin culture but + KOH scraping by PCP. Has been on multiple antibiotics, antifungals but nothing has helped.    Started isotretinoin last month, noticed small flare with starting. Increase to 30 mg bid   Standing CBC, CMP and fasting lipids  Ipledge reviewed with patient and Ipledge consent form complete  Patient place in ipledge system  Ipledge: [id number]   Return to clinic 30 days  Dry lips and mouth, minor swelling of the eyelids or lips, crusty skin, nosebleeds, GI upset, or thinning of hair may occur. If any of these effects persist or worsen, tell your doctor or pharmacist promptly.   To relieve dry mouth, suck on (sugarless) hard candy or ice chips, chew (sugarless) gum, drink water.   Remember that your doctor has prescribed this medication because he or she has judged that the benefit to you is greater than the risk of side effects. Many people using this medication do not have serious side effects.   Contact office immediately if you have any of these unlikely but serious side effects: mental/mood changes (e.g., depression,  aggressive or violent behavior, and in rare cases, thoughts of suicide), tingling feeling in the skin, quick/severe sun sensitivity, back/joint/muscle pain, signs of infection (e.g., fever, persistent sore throat, painful swallowing, peeling skin on palms/soles.   Isotretinoin may infrequently cause disease of the pancreatitis, that may rarely be fatal. Stop taking this medication and contact office immediately if you develop: severe stomach pain severe or persistent GI upset,   Stop taking this medication and tell your doctor immediately if you develop these unlikely but very serious side effects: severe headache, vision changes, ear ringing, hearling loss, chest pain, yellowing eyes, skin, dark urine, severe diarrhea, rectal bleeding,   Seek  immediate medical attention if you notice any symptoms of a serious allergic reaction.    Accutane is discussed fully with the patient. It is a very effective drug to treat acne vulgaris but has many potential significant side effects. Chief among these are teratogensis, hepatic injury, dyslipidemia and severe drying of the mucous membranes. All of these issues have been discussed in details. Monthly blood tests to monitor lipids and liver functions will be necessary. Expect painful dryness and/or fissuring around the lips, eyes, and other moist areas of the body. Balms may be protective. Contact lens may be too painful to wear temporarily while on this drug. Episodes of significant depression have been reported, including suicidal ideation and attempts in rare cases. It may also cause pseudotumor cerebri and hyperostosis. The patient will report any such changes in mood, depressive symptoms or suicidal thoughts, headaches, joint or bone pains. There is also a possible association with inflammatory bowel disease, although this is unproven at this point.            Again, thank you for allowing me to participate in the care of your patient.        Sincerely,        Sabi Christie PA-C

## 2017-11-14 NOTE — NURSING NOTE
"Initial /89  Pulse 93  SpO2 99% Estimated body mass index is 41.36 kg/(m^2) as calculated from the following:    Height as of 5/22/17: 1.727 m (5' 8\").    Weight as of 5/22/17: 123.4 kg (272 lb). .      "

## 2017-11-14 NOTE — MR AVS SNAPSHOT
After Visit Summary   11/14/2017    Sean Woodard    MRN: [id number]           Patient Information     Date Of Birth          1980        Visit Information        Provider Department      11/14/2017 4:00 PM Sabi Bustos PA-C [facility]        Today's Diagnoses     Acne vulgaris    -  1       Follow-ups after your visit        Your next 10 appointments already scheduled     Dec 12, 2017  4:20 PM CST   [facility] Visit with Sabi Bustos PA-C   [facility]    [address] [facility] [address]   [phone]              Who to contact     If you have questions or need follow up information about today's clinic visit or your schedule please contact [facility] directly at [phone].  Normal or non-critical lab and imaging results will be communicated to you by MyChart, letter or phone within 4 business days after the clinic has received the results. If you do not hear from us within 7 days, please contact the clinic through MyChart or phone. If you have a critical or abnormal lab result, we will notify you by phone as soon as possible.  Submit refill requests through [employer] or call your pharmacy and they will forward the refill request to us. Please allow 3 business days for your refill to be completed.          Additional Information About Your Visit        MyChart Information     [employer] gives you secure access to your electronic health record. If you see a primary care provider, you can also send messages to your care team and make appointments. If you have questions, please call your primary care clinic.  If you do not have a primary care provider, please call [phone] and they will assist you.        Care EveryWhere ID     This is your Care EveryWhere ID. This could be used by other organizations to access your [place] medical records  [id number]        Your Vitals Were     Pulse Pulse  Oximetry                93 99%           Blood Pressure from Last 3 Encounters:   11/14/17 135/89   09/18/17 (!) 151/92   08/07/17 152/86    Weight from Last 3 Encounters:   05/22/17 123.4 kg (272 lb)   01/30/17 124.7 kg (275 lb)   09/19/16 120.7 kg (266 lb)              Today, you had the following     No orders found for display         Today's Medication Changes          These changes are accurate as of: 11/14/17 11:59 PM.  If you have any questions, ask your nurse or doctor.               These medicines have changed or have updated prescriptions.        Dose/Directions    * ISOtretinoin 40 MG capsule   Commonly known as:  ACCUTANE   This may have changed:  Another medication with the same name was added. Make sure you understand how and when to take each.   Used for:  Acne nuchae keloidalis   Changed by:  Jahaira Garcia PA-C        1 tab PO daily with food   Quantity:  30 capsule   Refills:  0       * ISOtretinoin 30 MG Caps   This may have changed:  You were already taking a medication with the same name, and this prescription was added. Make sure you understand how and when to take each.   Used for:  Acne vulgaris   Changed by:  Sabi Bustos PA-C        Dose:  1 capsule   Take 1 capsule by mouth 2 times daily (with meals)   Quantity:  60 capsule   Refills:  0       * Notice:  This list has 2 medication(s) that are the same as other medications prescribed for you. Read the directions carefully, and ask your doctor or other care provider to review them with you.         Where to get your medicines      These medications were sent to [facility] - [address]  [address] [address]     Phone:  [phone]     ISOtretinoin 30 MG Caps                Primary Care Provider Office Phone # Fax #    Susanne Alberto PA-C [phone] [address] [address]        Equal Access to Services     VINNIE MAYEN AH:  Hadii aad ku hadcindypramod Jimali, wamemeda luqadaha, qaybta kaaldion beaulieu, kd jamesin hayaan aristeodeepika callejasalex antwan. So [facility] [phone].    ATENCIÓN: Si habla jovany, tiene a camacho disposición servicios gratuitos de asistencia lingüística. Llame al [phone].    We comply with applicable federal civil rights laws and [place] laws. We do not discriminate on the basis of race, color, national origin, age, disability, sex, sexual orientation, or gender identity.            Thank you!     Thank you for choosing [facility]  for your care. Our goal is always to provide you with excellent care. Hearing back from our patients is one way we can continue to improve our services. Please take a few minutes to complete the written survey that you may receive in the mail after your visit with us. Thank you!             Your Updated Medication List - Protect others around you: Learn how to safely use, store and throw away your medicines at www.disposemymeds.org.          This list is accurate as of: 11/14/17 11:59 PM.  Always use your most recent med list.                   Brand Name Dispense Instructions for use Diagnosis    atorvastatin 20 MG tablet    LIPITOR    90 tablet    TAKE ONE TABLET BY MOUTH EVERY DAY    Hyperlipidemia with target LDL less than 100       augmented betamethasone dipropionate 0.05 % ointment    DIPROLENE-AF    45 g    Apply sparingly to affected area twice daily as needed.  Do not apply to face.    Chronic dermatitis       clindamycin 1 % lotion    CLINDAMAX    60 mL    Apply to AA BID    Acne keloidalis       hydrochlorothiazide 25 MG tablet    HYDRODIURIL    90 tablet    Take 1 tablet (25 mg) by mouth daily    Benign essential hypertension       * ISOtretinoin 40 MG capsule    ACCUTANE    30 capsule    1 tab PO daily with food    Acne nuchae keloidalis       * ISOtretinoin 30 MG Caps     60 capsule    Take 1 capsule by mouth 2 times daily (with meals)    Acne vulgaris        lisinopril 10 MG tablet    PRINIVIL/ZESTRIL    90 tablet    Take 1 tablet (10 mg) by mouth daily    Benign essential hypertension       mupirocin 2 % cream    BACTROBAN    30 g    Apply topically 3 times daily    Rash and nonspecific skin eruption       * sulfamethoxazole-trimethoprim 800-160 MG per tablet    BACTRIM DS    14 tablet    1 tab PO BID    Acne keloidalis       * sulfamethoxazole-trimethoprim 800-160 MG per tablet    BACTRIM DS    60 tablet    Take 1 tablet by mouth 2 times daily    Acne nuchae keloidalis       * Notice:  This list has 4 medication(s) that are the same as other medications prescribed for you. Read the directions carefully, and ask your doctor or other care provider to review them with you.

## 2017-11-15 NOTE — PROGRESS NOTES
Sean Woodard is a 37 year old year old male patient here today for recheck acne nuchae keloidalis. He just finished his first month isotretinoin. He noticed a flare after starting medication. He denies any side effects including mood changes. Patient has no other skin complaints today.  Remainder of the HPI, Meds, PMH, Allergies, FH, and SH was reviewed in chart.    Pertinent Hx:  Acne nuchae keloidialis  Past Medical History:   Diagnosis Date     NO ACTIVE PROBLEMS        Past Surgical History:   Procedure Laterality Date     ORTHOPEDIC SURGERY  1/1/2005    RT knee arthroscopic menisectomy     Thumb Surgery  2009    Mass removal neuroma        Family History   Problem Relation Age of Onset     Heart Defect Father      valve replacement     DIABETES Maternal Grandmother      CANCER Maternal Grandfather      stomach     Hypertension Brother      DIABETES Sister      CEREBROVASCULAR DISEASE Brother 38     Hypertension Brother        Social History     Social History     Marital status:      Spouse name: Joycelyn Stokes     Number of children: 1     Years of education: 12+     Occupational History      [employer]     Social History Main Topics     Smoking status: Former Smoker     Packs/day: 0.50     Years: 5.00     Types: Cigarettes     Quit date: 1/1/2005     Smokeless tobacco: Never Used     Alcohol use Yes      Comment: Social. 1 drink per month     Drug use: No     Sexual activity: Yes     Partners: Female     Other Topics Concern     Parent/Sibling W/ Cabg, Mi Or Angioplasty Before 65f 55m? No     Social History Narrative       Outpatient Encounter Prescriptions as of 11/14/2017   Medication Sig Dispense Refill     ISOtretinoin 30 MG CAPS Take 1 capsule by mouth 2 times daily (with meals) 60 capsule 0     atorvastatin (LIPITOR) 20 MG tablet TAKE ONE TABLET BY MOUTH EVERY DAY 90 tablet 2     ISOtretinoin (ACCUTANE) 40 MG capsule 1 tab PO daily with food 30 capsule 0      hydrochlorothiazide (HYDRODIURIL) 25 MG tablet Take 1 tablet (25 mg) by mouth daily 90 tablet 1     sulfamethoxazole-trimethoprim (BACTRIM DS) 800-160 MG per tablet Take 1 tablet by mouth 2 times daily 60 tablet 2     clindamycin (CLINDAMAX) 1 % lotion Apply to AA BID 60 mL 11     sulfamethoxazole-trimethoprim (BACTRIM DS) 800-160 MG per tablet 1 tab PO BID 14 tablet 0     lisinopril (PRINIVIL/ZESTRIL) 10 MG tablet Take 1 tablet (10 mg) by mouth daily 90 tablet 1     augmented betamethasone dipropionate (DIPROLENE-AF) 0.05 % ointment Apply sparingly to affected area twice daily as needed.  Do not apply to face. 45 g 1     mupirocin (BACTROBAN) 2 % cream Apply topically 3 times daily 30 g 0     No facility-administered encounter medications on file as of 11/14/2017.              Review Of Systems  Skin: As above  Eyes: negative  Ears/Nose/Throat: negative  Respiratory: No shortness of breath, dyspnea on exertion, cough, or hemoptysis  Cardiovascular: negative  Gastrointestinal: negative  Genitourinary: negative  Musculoskeletal: negative  Neurologic: negative  Psychiatric: negative  Hematologic/Lymphatic/Immunologic: negative  Endocrine: negative      O:   NAD, WDWN, Alert & Oriented, Mood & Affect wnl, Vitals stable   Here today alone   /89  Pulse 93  SpO2 99%   General appearance normal   Vitals stable   Alert, oriented and in no acute distress     Multiple inflammatory papules and scarring on back of neck    Eyes: Conjunctivae/lids:Normal     ENT: Lips    MSK:Normal    Pulm: Breathing Normal    Neuro/Psych: Orientation:Normal; Mood/Affect:Normal  A/P:  1. Acne Keloidalis on posterior neck   Continues to flare despite abx, topicals, ILK. Didn't feel like Bactrim was helpful. Discussed doing Accutane again and he is amenable to starting this. Negative tissue culture, biopsy and fungal culture. Had negative aerobic skin culture but + KOH scraping by PCP. Has been on multiple antibiotics, antifungals but  nothing has helped.    Started isotretinoin last month, noticed small flare with starting. Increase to 30 mg bid   Standing CBC, CMP and fasting lipids  Ipledge reviewed with patient and Ipledge consent form complete  Patient place in ipledge system  Ipledge: [phone]   Return to clinic 30 days  Dry lips and mouth, minor swelling of the eyelids or lips, crusty skin, nosebleeds, GI upset, or thinning of hair may occur. If any of these effects persist or worsen, tell your doctor or pharmacist promptly.   To relieve dry mouth, suck on (sugarless) hard candy or ice chips, chew (sugarless) gum, drink water.   Remember that your doctor has prescribed this medication because he or she has judged that the benefit to you is greater than the risk of side effects. Many people using this medication do not have serious side effects.   Contact office immediately if you have any of these unlikely but serious side effects: mental/mood changes (e.g., depression,  aggressive or violent behavior, and in rare cases, thoughts of suicide), tingling feeling in the skin, quick/severe sun sensitivity, back/joint/muscle pain, signs of infection (e.g., fever, persistent sore throat, painful swallowing, peeling skin on palms/soles.   Isotretinoin may infrequently cause disease of the pancreatitis, that may rarely be fatal. Stop taking this medication and contact office immediately if you develop: severe stomach pain severe or persistent GI upset,   Stop taking this medication and tell your doctor immediately if you develop these unlikely but very serious side effects: severe headache, vision changes, ear ringing, hearling loss, chest pain, yellowing eyes, skin, dark urine, severe diarrhea, rectal bleeding,   Seek immediate medical attention if you notice any symptoms of a serious allergic reaction.    Accutane is discussed fully with the patient. It is a very effective drug to treat acne vulgaris but has many potential significant side  effects. Chief among these are teratogensis, hepatic injury, dyslipidemia and severe drying of the mucous membranes. All of these issues have been discussed in details. Monthly blood tests to monitor lipids and liver functions will be necessary. Expect painful dryness and/or fissuring around the lips, eyes, and other moist areas of the body. Balms may be protective. Contact lens may be too painful to wear temporarily while on this drug. Episodes of significant depression have been reported, including suicidal ideation and attempts in rare cases. It may also cause pseudotumor cerebri and hyperostosis. The patient will report any such changes in mood, depressive symptoms or suicidal thoughts, headaches, joint or bone pains. There is also a possible association with inflammatory bowel disease, although this is unproven at this point.

## 2017-12-11 DIAGNOSIS — Z51.81 THERAPEUTIC DRUG MONITORING: ICD-10-CM

## 2017-12-11 LAB
ERYTHROCYTE [DISTWIDTH] IN BLOOD BY AUTOMATED COUNT: 12.5 % (ref 10–15)
HCT VFR BLD AUTO: 45.3 % (ref 40–53)
HGB BLD-MCNC: 15.6 G/DL (ref 13.3–17.7)
MCH RBC QN AUTO: 30.1 PG (ref 26.5–33)
MCHC RBC AUTO-ENTMCNC: 34.4 G/DL (ref 31.5–36.5)
MCV RBC AUTO: 87 FL (ref 78–100)
PLATELET # BLD AUTO: 243 10E9/L (ref 150–450)
RBC # BLD AUTO: 5.19 10E12/L (ref 4.4–5.9)
WBC # BLD AUTO: 7 10E9/L (ref 4–11)

## 2017-12-11 PROCEDURE — 80061 LIPID PANEL: CPT | Performed by: PHYSICIAN ASSISTANT

## 2017-12-11 PROCEDURE — 80053 COMPREHEN METABOLIC PANEL: CPT | Performed by: PHYSICIAN ASSISTANT

## 2017-12-11 PROCEDURE — 36415 COLL VENOUS BLD VENIPUNCTURE: CPT | Performed by: PHYSICIAN ASSISTANT

## 2017-12-11 PROCEDURE — 85027 COMPLETE CBC AUTOMATED: CPT | Performed by: PHYSICIAN ASSISTANT

## 2017-12-12 ENCOUNTER — OFFICE VISIT (OUTPATIENT)
Dept: DERMATOLOGY | Facility: CLINIC | Age: 37
End: 2017-12-12
Payer: COMMERCIAL

## 2017-12-12 VITALS — DIASTOLIC BLOOD PRESSURE: 84 MMHG | SYSTOLIC BLOOD PRESSURE: 135 MMHG | HEART RATE: 83 BPM | OXYGEN SATURATION: 97 %

## 2017-12-12 DIAGNOSIS — L73.0 ACNE NUCHAE KELOIDALIS: Primary | ICD-10-CM

## 2017-12-12 LAB
ALBUMIN SERPL-MCNC: 4 G/DL (ref 3.4–5)
ALP SERPL-CCNC: 76 U/L (ref 40–150)
ALT SERPL W P-5'-P-CCNC: 50 U/L (ref 0–70)
ANION GAP SERPL CALCULATED.3IONS-SCNC: 8 MMOL/L (ref 3–14)
AST SERPL W P-5'-P-CCNC: 40 U/L (ref 0–45)
BILIRUB SERPL-MCNC: 1.3 MG/DL (ref 0.2–1.3)
BUN SERPL-MCNC: 11 MG/DL (ref 7–30)
CALCIUM SERPL-MCNC: 9.1 MG/DL (ref 8.5–10.1)
CHLORIDE SERPL-SCNC: 99 MMOL/L (ref 94–109)
CHOLEST SERPL-MCNC: 199 MG/DL
CO2 SERPL-SCNC: 28 MMOL/L (ref 20–32)
CREAT SERPL-MCNC: 1.01 MG/DL (ref 0.66–1.25)
GFR SERPL CREATININE-BSD FRML MDRD: 83 ML/MIN/1.7M2
GLUCOSE SERPL-MCNC: 86 MG/DL (ref 70–99)
HDLC SERPL-MCNC: 46 MG/DL
LDLC SERPL CALC-MCNC: 123 MG/DL
NONHDLC SERPL-MCNC: 153 MG/DL
POTASSIUM SERPL-SCNC: 3.4 MMOL/L (ref 3.4–5.3)
PROT SERPL-MCNC: 8.5 G/DL (ref 6.8–8.8)
SODIUM SERPL-SCNC: 135 MMOL/L (ref 133–144)
TRIGL SERPL-MCNC: 151 MG/DL

## 2017-12-12 PROCEDURE — 99213 OFFICE O/P EST LOW 20 MIN: CPT | Performed by: PHYSICIAN ASSISTANT

## 2017-12-12 RX ORDER — ISOTRETINOIN 40 MG/1
40 CAPSULE ORAL 2 TIMES DAILY WITH MEALS
Qty: 60 CAPSULE | Refills: 0 | Status: SHIPPED | OUTPATIENT
Start: 2017-12-12 | End: 2018-01-10

## 2017-12-12 NOTE — MR AVS SNAPSHOT
After Visit Summary   12/12/2017    Sean Woodard    MRN: [id number]           Patient Information     Date Of Birth          1980        Visit Information        Provider Department      12/12/2017 4:20 PM Sabi Bustos PA-C [facility]        Today's Diagnoses     Acne nuchae keloidalis    -  1       Follow-ups after your visit        Your next 10 appointments already scheduled     Santos 10, 2018  4:00 PM CST   Return Visit with Sabi Bustos PA-C   [facility]    [address] [facility] [address]   [phone]            Feb 06, 2018  4:20 PM CST   Return Visit with Sabi Bustos PA-C   [facility]    [address] [facility] [address]   [phone]            Mar 06, 2018  4:20 PM CST   Return Visit with Sabi Bustos PA-C   [facility]    [address] [facility] [address]   [phone]            Apr 04, 2018  4:20 PM CDT   Return Visit with Sabi Bustos PA-C   [facility]    [address] [facility] [address]   [phone]              Who to contact     If you have questions or need follow up information about today's clinic visit or your schedule please contact [facility] directly at [phone].  Normal or non-critical lab and imaging results will be communicated to you by MyChart, letter or phone within 4 business days after the clinic has received the results. If you do not hear from us within 7 days, please contact the clinic through MyChart or phone. If you have a critical or abnormal lab result, we will notify you by phone as soon as possible.  Submit refill requests through [employer] or call your pharmacy and they will forward the refill request to us. Please allow 3 business days for your refill to be  completed.          Additional Information About Your Visit        [employer] Information     [employer] gives you secure access to your electronic health record. If you see a primary care provider, you can also send messages to your care team and make appointments. If you have questions, please call your primary care clinic.  If you do not have a primary care provider, please call [phone] and they will assist you.        Care EveryWhere ID     This is your Care EveryWhere ID. This could be used by other organizations to access your [facility] medical records  [id number]        Your Vitals Were     Pulse Pulse Oximetry                83 97%           Blood Pressure from Last 3 Encounters:   12/12/17 135/84   11/14/17 135/89   09/18/17 (!) 151/92    Weight from Last 3 Encounters:   05/22/17 123.4 kg (272 lb)   01/30/17 124.7 kg (275 lb)   09/19/16 120.7 kg (266 lb)              Today, you had the following     No orders found for display         Today's Medication Changes          These changes are accurate as of: 12/12/17 11:59 PM.  If you have any questions, ask your nurse or doctor.               These medicines have changed or have updated prescriptions.        Dose/Directions    * ISOtretinoin 40 MG capsule   Commonly known as:  ACCUTANE   This may have changed:  Another medication with the same name was added. Make sure you understand how and when to take each.   Used for:  Acne nuchae keloidalis        1 tab PO daily with food   Quantity:  30 capsule   Refills:  0       * ISOtretinoin 30 MG Caps   This may have changed:  Another medication with the same name was added. Make sure you understand how and when to take each.        Dose:  1 capsule   Take 1 capsule by mouth 2 times daily (with meals)   Quantity:  60 capsule   Refills:  0       * ISOtretinoin 40 MG capsule   Commonly known as:  ACCUTANE   This may have changed:  You were already taking a medication with the same name, and this prescription was added.  Make sure you understand how and when to take each.   Used for:  Acne nuchae keloidalis        Dose:  40 mg   Take 1 capsule (40 mg) by mouth 2 times daily (with meals)   Quantity:  60 capsule   Refills:  0       * Notice:  This list has 3 medication(s) that are the same as other medications prescribed for you. Read the directions carefully, and ask your doctor or other care provider to review them with you.         Where to get your medicines      These medications were sent to [facility] [place] - [address]  [address] [address] [facility] [address]     Phone:  [phone]     ISOtretinoin 40 MG capsule                Primary Care Provider Office Phone # Fax #    Susanne Alberto PA-C [phone] [address]  [facility] [address]        Equal Access to Services     VINNIE MAYEN : Hadii pamela escobar Somiles, waaxda luqadaha, qaybta kaalmada christofer, kd mccullough . So [facility] [phone].    ATENCIÓN: Si habla español, tiene a camacho disposición servicios gratuitos de asistencia lingüística. [facility] [place] [phone].    We comply with applicable federal civil rights laws and [place] laws. We do not discriminate on the basis of race, color, national origin, age, disability, sex, sexual orientation, or gender identity.            Thank you!     Thank you for choosing [facility]  for your care. Our goal is always to provide you with excellent care. Hearing back from our patients is one way we can continue to improve our services. Please take a few minutes to complete the written survey that you may receive in the mail after your visit with us. Thank you!             Your Updated Medication List - Protect others around you: Learn how to safely use, store and throw away your medicines at www.disposemymeds.org.          This list is accurate as of: 12/12/17 11:59 PM.  Always use your most recent med list.                    Brand Name Dispense Instructions for use Diagnosis    atorvastatin 20 MG tablet    LIPITOR    90 tablet    TAKE ONE TABLET BY MOUTH EVERY DAY    Hyperlipidemia with target LDL less than 100       augmented betamethasone dipropionate 0.05 % ointment    DIPROLENE-AF    45 g    Apply sparingly to affected area twice daily as needed.  Do not apply to face.    Chronic dermatitis       clindamycin 1 % lotion    CLINDAMAX    60 mL    Apply to AA BID    Acne keloidalis       hydrochlorothiazide 25 MG tablet    HYDRODIURIL    90 tablet    Take 1 tablet (25 mg) by mouth daily    Benign essential hypertension       * ISOtretinoin 40 MG capsule    ACCUTANE    30 capsule    1 tab PO daily with food    Acne nuchae keloidalis       * ISOtretinoin 30 MG Caps     60 capsule    Take 1 capsule by mouth 2 times daily (with meals)        * ISOtretinoin 40 MG capsule    ACCUTANE    60 capsule    Take 1 capsule (40 mg) by mouth 2 times daily (with meals)    Acne nuchae keloidalis       lisinopril 10 MG tablet    PRINIVIL/ZESTRIL    90 tablet    Take 1 tablet (10 mg) by mouth daily    Benign essential hypertension       mupirocin 2 % cream    BACTROBAN    30 g    Apply topically 3 times daily    Rash and nonspecific skin eruption       * sulfamethoxazole-trimethoprim 800-160 MG per tablet    BACTRIM DS    14 tablet    1 tab PO BID    Acne keloidalis       * sulfamethoxazole-trimethoprim 800-160 MG per tablet    BACTRIM DS    60 tablet    Take 1 tablet by mouth 2 times daily    Acne nuchae keloidalis       * Notice:  This list has 5 medication(s) that are the same as other medications prescribed for you. Read the directions carefully, and ask your doctor or other care provider to review them with you.

## 2017-12-12 NOTE — LETTER
12/12/2017         RE: Sean Woodard  [address] [facility] [address]        Dear Colleague,    Thank you for referring your patient, Sean Woodard, to the [facility]. Please see a copy of my visit note below.    Sean Woodard is a 37 year old year old male patient here today for recheck acne nuchae keloidalis. He just finished his second month isotretinoin. He noticed a flare after starting medication. He denies any side effects including mood changes. Labs are normal today.  Patient has no other skin complaints today.  Remainder of the HPI, Meds, PMH, Allergies, FH, and SH was reviewed in chart.    Pertinent Hx:  Acne nuchae keloidialis  Past Medical History:   Diagnosis Date     NO ACTIVE PROBLEMS        Past Surgical History:   Procedure Laterality Date     ORTHOPEDIC SURGERY  1/1/2005    RT knee arthroscopic menisectomy     Thumb Surgery  2009    Mass removal neuroma        Family History   Problem Relation Age of Onset     Heart Defect Father      valve replacement     DIABETES Maternal Grandmother      CANCER Maternal Grandfather      stomach     Hypertension Brother      DIABETES Sister      CEREBROVASCULAR DISEASE Brother 38     Hypertension Brother        Social History     Social History     Marital status:      Spouse name: Joycelyn Stokes     Number of children: 1     Years of education: 12+     Occupational History      Department [employer]     Social History Main Topics     Smoking status: Former Smoker     Packs/day: 0.50     Years: 5.00     Types: Cigarettes     Quit date: 1/1/2005     Smokeless tobacco: Never Used     Alcohol use Yes      Comment: Social. 1 drink per month     Drug use: No     Sexual activity: Yes     Partners: Female     Other Topics Concern     Parent/Sibling W/ Cabg, Mi Or Angioplasty Before 65f 55m? No     Social History Narrative       Outpatient Encounter Prescriptions as of 12/12/2017   Medication Sig Dispense Refill      ISOtretinoin (ACCUTANE) 40 MG capsule Take 1 capsule (40 mg) by mouth 2 times daily (with meals) 60 capsule 0     ISOtretinoin 30 MG CAPS Take 1 capsule by mouth 2 times daily (with meals) 60 capsule 0     atorvastatin (LIPITOR) 20 MG tablet TAKE ONE TABLET BY MOUTH EVERY DAY 90 tablet 2     ISOtretinoin (ACCUTANE) 40 MG capsule 1 tab PO daily with food 30 capsule 0     hydrochlorothiazide (HYDRODIURIL) 25 MG tablet Take 1 tablet (25 mg) by mouth daily 90 tablet 1     sulfamethoxazole-trimethoprim (BACTRIM DS) 800-160 MG per tablet Take 1 tablet by mouth 2 times daily 60 tablet 2     clindamycin (CLINDAMAX) 1 % lotion Apply to AA BID 60 mL 11     sulfamethoxazole-trimethoprim (BACTRIM DS) 800-160 MG per tablet 1 tab PO BID 14 tablet 0     lisinopril (PRINIVIL/ZESTRIL) 10 MG tablet Take 1 tablet (10 mg) by mouth daily 90 tablet 1     augmented betamethasone dipropionate (DIPROLENE-AF) 0.05 % ointment Apply sparingly to affected area twice daily as needed.  Do not apply to face. 45 g 1     mupirocin (BACTROBAN) 2 % cream Apply topically 3 times daily 30 g 0     No facility-administered encounter medications on file as of 12/12/2017.              Review Of Systems  Skin: As above  Eyes: negative  Ears/Nose/Throat: negative  Respiratory: No shortness of breath, dyspnea on exertion, cough, or hemoptysis  Cardiovascular: negative  Gastrointestinal: negative  Genitourinary: negative  Musculoskeletal: negative  Neurologic: negative  Psychiatric: negative  Hematologic/Lymphatic/Immunologic: negative  Endocrine: negative      O:   NAD, WDWN, Alert & Oriented, Mood & Affect wnl, Vitals stable   Here today alone   /84  Pulse 83  SpO2 97%   General appearance normal   Vitals stable   Alert, oriented and in no acute distress       Healing inflammatory papules and scarring on back of neck      Eyes: Conjunctivae/lids:Normal     ENT: Lips    MSK:Normal    Pulm: Breathing Normal    Neuro/Psych: Orientation:Normal;  Mood/Affect:Normal  A/P:  1. Acne Keloidalis on posterior neck   Continues to flare despite abx, topicals, ILK. Didn't feel like Bactrim was helpful. Discussed doing Accutane again and he is amenable to starting this. Negative tissue culture, biopsy and fungal culture. Had negative aerobic skin culture but + KOH scraping by PCP. Has been on multiple antibiotics, antifungals but nothing has helped.     Started isotretinoin last month, noticed small flare with starting. Increase to 40 mg bid   Standing CBC, CMP and fasting lipids  Ipledge reviewed with patient and Ipledge consent form complete  Patient place in ipledge system  Ipledge: [phone]   Total Dosage: 3000 mg   Return to clinic 30 days  Dry lips and mouth, minor swelling of the eyelids or lips, crusty skin, nosebleeds, GI upset, or thinning of hair may occur. If any of these effects persist or worsen, tell your doctor or pharmacist promptly.   To relieve dry mouth, suck on (sugarless) hard candy or ice chips, chew (sugarless) gum, drink water.   Remember that your doctor has prescribed this medication because he or she has judged that the benefit to you is greater than the risk of side effects. Many people using this medication do not have serious side effects.   Contact office immediately if you have any of these unlikely but serious side effects: mental/mood adan  es (e.g., depression,  aggressive or violent behavior, and in rare cases, thoughts of suicide), tingling feeling in the skin, quick/severe sun sensitivity, back/joint/muscle pain, signs of infection (e.g., fever, persistent sore throat, painful swallowing, peeling skin on palms/soles.   Isotretinoin may infrequently cause disease of the pancreatitis, that may rarely be fatal. Stop taking this medication and contact office immediately if you develop: severe stomach pain severe or persistent GI upset,   Stop taking this medication and tell your doctor immediately if you develop these unlikely but  very serious side effects: severe headache, vision changes, ear ringing, hearling loss, chest pain, yellowing eyes, skin, dark urine, severe diarrhea, rectal bleeding,   Seek immediate medical attention if you notice any symptoms of a serious allergic reaction.     Accutane is discussed fully with the patient. It is a very effective drug to treat acne vulgaris but has many potential significant side effects. Chief among these are teratogensis, hepatic injury, dyslipidemia and severe drying of the mucous membranes. All of these issues have been discussed in details. Monthly blood tests to monitor lipids and liver functions will be necessary. Expect painful dryness and/or fissuring around the lips, eyes, and other moist areas of the body. Balms may be protective. Contact lens may be too painful to wear temporarily while on this drug. Episodes of significant depression have been reported, including suicidal ideation and attempts in rare cases. It may also cause pseudotumor cerebri and hyperostosis. The patient will report any such changes in mood, depressive symptoms or suicidal thoughts, headaches, joint or bone pains. There is also a possible association with inflammatory bowel disease, although this is unproven at this point.        Again, thank you for allowing me to participate in the care of your patient.        Sincerely,        Sabi Christie PA-C

## 2017-12-12 NOTE — NURSING NOTE
"Initial /84  Pulse 83  SpO2 97% Estimated body mass index is 41.36 kg/(m^2) as calculated from the following:    Height as of 5/22/17: 1.727 m (5' 8\").    Weight as of 5/22/17: 123.4 kg (272 lb). .      "

## 2017-12-14 NOTE — PROGRESS NOTES
Sean Woodard is a 37 year old year old male patient here today for recheck acne nuchae keloidalis. He just finished his second month isotretinoin. He noticed a flare after starting medication. He denies any side effects including mood changes. Labs are normal today.  Patient has no other skin complaints today.  Remainder of the HPI, Meds, PMH, Allergies, FH, and SH was reviewed in chart.    Pertinent Hx:  Acne nuchae keloidialis  Past Medical History:   Diagnosis Date     NO ACTIVE PROBLEMS        Past Surgical History:   Procedure Laterality Date     ORTHOPEDIC SURGERY  1/1/2005    RT knee arthroscopic menisectomy     Thumb Surgery  2009    Mass removal neuroma        Family History   Problem Relation Age of Onset     Heart Defect Father      valve replacement     DIABETES Maternal Grandmother      CANCER Maternal Grandfather      stomach     Hypertension Brother      DIABETES Sister      CEREBROVASCULAR DISEASE Brother 38     Hypertension Brother        Social History     Social History     Marital status:      Spouse name: Joycelyn Stokes     Number of children: 1     Years of education: 12+     Occupational History      [employer]     Social History Main Topics     Smoking status: Former Smoker     Packs/day: 0.50     Years: 5.00     Types: Cigarettes     Quit date: 1/1/2005     Smokeless tobacco: Never Used     Alcohol use Yes      Comment: Social. 1 drink per month     Drug use: No     Sexual activity: Yes     Partners: Female     Other Topics Concern     Parent/Sibling W/ Cabg, Mi Or Angioplasty Before 65f 55m? No     Social History Narrative       Outpatient Encounter Prescriptions as of 12/12/2017   Medication Sig Dispense Refill     ISOtretinoin (ACCUTANE) 40 MG capsule Take 1 capsule (40 mg) by mouth 2 times daily (with meals) 60 capsule 0     ISOtretinoin 30 MG CAPS Take 1 capsule by mouth 2 times daily (with meals) 60 capsule 0     atorvastatin (LIPITOR) 20 MG tablet  TAKE ONE TABLET BY MOUTH EVERY DAY 90 tablet 2     ISOtretinoin (ACCUTANE) 40 MG capsule 1 tab PO daily with food 30 capsule 0     hydrochlorothiazide (HYDRODIURIL) 25 MG tablet Take 1 tablet (25 mg) by mouth daily 90 tablet 1     sulfamethoxazole-trimethoprim (BACTRIM DS) 800-160 MG per tablet Take 1 tablet by mouth 2 times daily 60 tablet 2     clindamycin (CLINDAMAX) 1 % lotion Apply to AA BID 60 mL 11     sulfamethoxazole-trimethoprim (BACTRIM DS) 800-160 MG per tablet 1 tab PO BID 14 tablet 0     lisinopril (PRINIVIL/ZESTRIL) 10 MG tablet Take 1 tablet (10 mg) by mouth daily 90 tablet 1     augmented betamethasone dipropionate (DIPROLENE-AF) 0.05 % ointment Apply sparingly to affected area twice daily as needed.  Do not apply to face. 45 g 1     mupirocin (BACTROBAN) 2 % cream Apply topically 3 times daily 30 g 0     No facility-administered encounter medications on file as of 12/12/2017.              Review Of Systems  Skin: As above  Eyes: negative  Ears/Nose/Throat: negative  Respiratory: No shortness of breath, dyspnea on exertion, cough, or hemoptysis  Cardiovascular: negative  Gastrointestinal: negative  Genitourinary: negative  Musculoskeletal: negative  Neurologic: negative  Psychiatric: negative  Hematologic/Lymphatic/Immunologic: negative  Endocrine: negative      O:   NAD, WDWN, Alert & Oriented, Mood & Affect wnl, Vitals stable   Here today alone   /84  Pulse 83  SpO2 97%   General appearance normal   Vitals stable   Alert, oriented and in no acute distress       Healing inflammatory papules and scarring on back of neck      Eyes: Conjunctivae/lids:Normal     ENT: Lips    MSK:Normal    Pulm: Breathing Normal    Neuro/Psych: Orientation:Normal; Mood/Affect:Normal  A/P:  1. Acne Keloidalis on posterior neck   Continues to flare despite abx, topicals, ILK. Didn't feel like Bactrim was helpful. Discussed doing Accutane again and he is amenable to starting this. Negative tissue culture, biopsy  and fungal culture. Had negative aerobic skin culture but + KOH scraping by PCP. Has been on multiple antibiotics, antifungals but nothing has helped.     Started isotretinoin last month, noticed small flare with starting. Increase to 40 mg bid   Standing CBC, CMP and fasting lipids  Ipledge reviewed with patient and Ipledge consent form complete  Patient place in ipledge system  Ipledge: [phone]   Total Dosage: 3000 mg   Return to clinic 30 days  Dry lips and mouth, minor swelling of the eyelids or lips, crusty skin, nosebleeds, GI upset, or thinning of hair may occur. If any of these effects persist or worsen, tell your doctor or pharmacist promptly.   To relieve dry mouth, suck on (sugarless) hard candy or ice chips, chew (sugarless) gum, drink water.   Remember that your doctor has prescribed this medication because he or she has judged that the benefit to you is greater than the risk of side effects. Many people using this medication do not have serious side effects.   Contact office immediately if you have any of these unlikely but serious side effects: mental/mood adan  es (e.g., depression,  aggressive or violent behavior, and in rare cases, thoughts of suicide), tingling feeling in the skin, quick/severe sun sensitivity, back/joint/muscle pain, signs of infection (e.g., fever, persistent sore throat, painful swallowing, peeling skin on palms/soles.   Isotretinoin may infrequently cause disease of the pancreatitis, that may rarely be fatal. Stop taking this medication and contact office immediately if you develop: severe stomach pain severe or persistent GI upset,   Stop taking this medication and tell your doctor immediately if you develop these unlikely but very serious side effects: severe headache, vision changes, ear ringing, hearling loss, chest pain, yellowing eyes, skin, dark urine, severe diarrhea, rectal bleeding,   Seek immediate medical attention if you notice any symptoms of a serious  allergic reaction.     Accutane is discussed fully with the patient. It is a very effective drug to treat acne vulgaris but has many potential significant side effects. Chief among these are teratogensis, hepatic injury, dyslipidemia and severe drying of the mucous membranes. All of these issues have been discussed in details. Monthly blood tests to monitor lipids and liver functions will be necessary. Expect painful dryness and/or fissuring around the lips, eyes, and other moist areas of the body. Balms may be protective. Contact lens may be too painful to wear temporarily while on this drug. Episodes of significant depression have been reported, including suicidal ideation and attempts in rare cases. It may also cause pseudotumor cerebri and hyperostosis. The patient will report any such changes in mood, depressive symptoms or suicidal thoughts, headaches, joint or bone pains. There is also a possible association with inflammatory bowel disease, although this is unproven at this point.

## 2018-01-09 DIAGNOSIS — Z51.81 THERAPEUTIC DRUG MONITORING: ICD-10-CM

## 2018-01-09 PROCEDURE — 80053 COMPREHEN METABOLIC PANEL: CPT | Performed by: PHYSICIAN ASSISTANT

## 2018-01-09 PROCEDURE — 85027 COMPLETE CBC AUTOMATED: CPT | Performed by: PHYSICIAN ASSISTANT

## 2018-01-09 PROCEDURE — 80061 LIPID PANEL: CPT | Performed by: PHYSICIAN ASSISTANT

## 2018-01-09 PROCEDURE — 36415 COLL VENOUS BLD VENIPUNCTURE: CPT | Performed by: PHYSICIAN ASSISTANT

## 2018-01-10 ENCOUNTER — OFFICE VISIT (OUTPATIENT)
Dept: DERMATOLOGY | Facility: CLINIC | Age: 38
End: 2018-01-10
Payer: COMMERCIAL

## 2018-01-10 VITALS — OXYGEN SATURATION: 98 % | SYSTOLIC BLOOD PRESSURE: 138 MMHG | HEART RATE: 106 BPM | DIASTOLIC BLOOD PRESSURE: 85 MMHG

## 2018-01-10 DIAGNOSIS — L73.9 INFLAMED HAIR FOLLICLE: Primary | ICD-10-CM

## 2018-01-10 DIAGNOSIS — L73.0 ACNE NUCHAE KELOIDALIS: ICD-10-CM

## 2018-01-10 LAB
ALBUMIN SERPL-MCNC: 4.2 G/DL (ref 3.4–5)
ALP SERPL-CCNC: 80 U/L (ref 40–150)
ALT SERPL W P-5'-P-CCNC: 57 U/L (ref 0–70)
ANION GAP SERPL CALCULATED.3IONS-SCNC: 7 MMOL/L (ref 3–14)
AST SERPL W P-5'-P-CCNC: 39 U/L (ref 0–45)
BILIRUB SERPL-MCNC: 1.2 MG/DL (ref 0.2–1.3)
BUN SERPL-MCNC: 13 MG/DL (ref 7–30)
CALCIUM SERPL-MCNC: 9.4 MG/DL (ref 8.5–10.1)
CHLORIDE SERPL-SCNC: 100 MMOL/L (ref 94–109)
CHOLEST SERPL-MCNC: 208 MG/DL
CO2 SERPL-SCNC: 26 MMOL/L (ref 20–32)
CREAT SERPL-MCNC: 0.98 MG/DL (ref 0.66–1.25)
ERYTHROCYTE [DISTWIDTH] IN BLOOD BY AUTOMATED COUNT: 12.5 % (ref 10–15)
GFR SERPL CREATININE-BSD FRML MDRD: 86 ML/MIN/1.7M2
GLUCOSE SERPL-MCNC: 99 MG/DL (ref 70–99)
HCT VFR BLD AUTO: 46.9 % (ref 40–53)
HDLC SERPL-MCNC: 41 MG/DL
HGB BLD-MCNC: 16.2 G/DL (ref 13.3–17.7)
LDLC SERPL CALC-MCNC: 110 MG/DL
MCH RBC QN AUTO: 29.9 PG (ref 26.5–33)
MCHC RBC AUTO-ENTMCNC: 34.5 G/DL (ref 31.5–36.5)
MCV RBC AUTO: 87 FL (ref 78–100)
NONHDLC SERPL-MCNC: 167 MG/DL
PLATELET # BLD AUTO: 240 10E9/L (ref 150–450)
POTASSIUM SERPL-SCNC: 3.6 MMOL/L (ref 3.4–5.3)
PROT SERPL-MCNC: 8.7 G/DL (ref 6.8–8.8)
RBC # BLD AUTO: 5.41 10E12/L (ref 4.4–5.9)
SODIUM SERPL-SCNC: 133 MMOL/L (ref 133–144)
TRIGL SERPL-MCNC: 283 MG/DL
WBC # BLD AUTO: 6.7 10E9/L (ref 4–11)

## 2018-01-10 PROCEDURE — 99213 OFFICE O/P EST LOW 20 MIN: CPT | Mod: 25 | Performed by: PHYSICIAN ASSISTANT

## 2018-01-10 PROCEDURE — 11900 INJECT SKIN LESIONS </W 7: CPT | Performed by: PHYSICIAN ASSISTANT

## 2018-01-10 RX ORDER — AMPICILLIN TRIHYDRATE 500 MG
500 CAPSULE ORAL 2 TIMES DAILY WITH MEALS
Qty: 28 CAPSULE | Refills: 0 | Status: SHIPPED | OUTPATIENT
Start: 2018-01-10 | End: 2018-01-24

## 2018-01-10 RX ORDER — ISOTRETINOIN 40 MG/1
40 CAPSULE ORAL 2 TIMES DAILY WITH MEALS
Qty: 60 CAPSULE | Refills: 0 | Status: SHIPPED | OUTPATIENT
Start: 2018-01-10 | End: 2018-02-06

## 2018-01-10 NOTE — LETTER
1/10/2018         RE: Sean Woodard  [address] [facility] [address]        Dear Colleague,    Thank you for referring your patient, Sean Woodard, to the [facility]. Please see a copy of my visit note below.    Sean Woodard is a 38 year old year old male patient here today for recheck acne nuchae keloidalis. He just finished his second month isotretinoin. He noticed some mild improvements this month. He denies any side effects including mood changes. Patient has no other skin complaints today.  Remainder of the HPI, Meds, PMH, Allergies, FH, and SH was reviewed in chart.    Pertinent Hx:  Acne nuchae keloidalis   Past Medical History:   Diagnosis Date     NO ACTIVE PROBLEMS        Past Surgical History:   Procedure Laterality Date     ORTHOPEDIC SURGERY  1/1/2005    RT knee arthroscopic menisectomy     Thumb Surgery  2009    Mass removal neuroma        Family History   Problem Relation Age of Onset     Heart Defect Father      valve replacement     DIABETES Maternal Grandmother      CANCER Maternal Grandfather      stomach     Hypertension Brother      DIABETES Sister      CEREBROVASCULAR DISEASE Brother 38     Hypertension Brother        Social History     Social History     Marital status:      Spouse name: Joycelyn Stokes     Number of children: 1     Years of education: 12+     Occupational History      Department [employer]     Social History Main Topics     Smoking status: Former Smoker     Packs/day: 0.50     Years: 5.00     Types: Cigarettes     Quit date: 1/1/2005     Smokeless tobacco: Never Used     Alcohol use Yes      Comment: Social. 1 drink per month     Drug use: No     Sexual activity: Yes     Partners: Female     Other Topics Concern     Parent/Sibling W/ Cabg, Mi Or Angioplasty Before 65f 55m? No     Social History Narrative       Outpatient Encounter Prescriptions as of 1/10/2018   Medication Sig Dispense Refill     ampicillin (PRINCIPEN) 500 MG  capsule Take 1 capsule (500 mg) by mouth 2 times daily (with meals) for 14 days 28 capsule 0     ISOtretinoin (ACCUTANE) 40 MG capsule Take 1 capsule (40 mg) by mouth 2 times daily (with meals) 60 capsule 0     ISOtretinoin 30 MG CAPS Take 1 capsule by mouth 2 times daily (with meals) 60 capsule 0     atorvastatin (LIPITOR) 20 MG tablet TAKE ONE TABLET BY MOUTH EVERY DAY 90 tablet 2     ISOtretinoin (ACCUTANE) 40 MG capsule 1 tab PO daily with food 30 capsule 0     hydrochlorothiazide (HYDRODIURIL) 25 MG tablet Take 1 tablet (25 mg) by mouth daily 90 tablet 1     clindamycin (CLINDAMAX) 1 % lotion Apply to AA BID 60 mL 11     lisinopril (PRINIVIL/ZESTRIL) 10 MG tablet Take 1 tablet (10 mg) by mouth daily 90 tablet 1     augmented betamethasone dipropionate (DIPROLENE-AF) 0.05 % ointment Apply sparingly to affected area twice daily as needed.  Do not apply to face. 45 g 1     mupirocin (BACTROBAN) 2 % cream Apply topically 3 times daily 30 g 0     [DISCONTINUED] ISOtretinoin (ACCUTANE) 40 MG capsule Take 1 capsule (40 mg) by mouth 2 times daily (with meals) 60 capsule 0     sulfamethoxazole-trimethoprim (BACTRIM DS) 800-160 MG per tablet Take 1 tablet by mouth 2 times daily (Patient not taking: Reported on 1/10/2018) 60 tablet 2     sulfamethoxazole-trimethoprim (BACTRIM DS) 800-160 MG per tablet 1 tab PO BID (Patient not taking: Reported on 1/10/2018) 14 tablet 0     No facility-administered encounter medications on file as of 1/10/2018.              Review Of Systems  Skin: As above  Eyes: negative  Ears/Nose/Throat: negative  Respiratory: No shortness of breath, dyspnea on exertion, cough, or hemoptysis  Cardiovascular: negative  Gastrointestinal: negative  Genitourinary: negative  Musculoskeletal: negative  Neurologic: negative  Psychiatric: negative  Hematologic/Lymphatic/Immunologic: negative  Endocrine: negative      O:   NAD, WDWN, Alert & Oriented, Mood & Affect wnl, Vitals stable   Here today alone   BP  138/85  Pulse 106  SpO2 98%   General appearance normal   Vitals stable   Alert, oriented and in no acute distress     Multiple inflammatory papules and scarring on back of neck      Eyes: Conjunctivae/lids:Normal     ENT: Lips    MSK:Normal    Pulm: Breathing Normal    Neuro/Psych: Orientation:Normal; Mood/Affect:Normal  A/P:  1. Acne Keloidalis on posterior neck   Continues to flare despite abx, topicals, ILK. Didn't feel like Bactrim was helpful. Discussed doing Accutane again and he is amenable to starting this. Negative tissue culture, biopsy and fungal culture. Had negative aerobic skin culture but + KOH scraping by PCP. Has been on multiple antibiotics, antifungals but nothing has helped.      Seeing mild improvements, continue 40 mg bid   Standing CBC, CMP and fasting lipids  Ipledge reviewed with patient and Ipledge consent form complete  Patient place in ipledge system  Ipledge: [phone]   Total Dosage: 5400 mg   Return to clinic 30 days  Dry lips and mouth, minor swelling of the eyelids or lips, crusty skin, nosebleeds, GI upset, or thinning of hair may occur. If any of these effects persist or worsen, tell your doctor or pharmacist promptly.   To relieve dry mouth, suck on (sugarless) hard candy or ice chips, chew (sugarless) gum, drink water.   Remember that your doctor has prescribed this medication because he or she has judged that the benefit to you is greater than the risk of side effects. Many people using this medication do not have serious side effects.   Contact office immediately if you have any of these unlikely but serious side effects: mental/mood adan  es (e.g., depression,  aggressive or violent behavior, and in rare cases, thoughts of suicide), tingling feeling in the skin, quick/severe sun sensitivity, back/joint/muscle pain, signs of infection (e.g., fever, persistent sore throat, painful swallowing, peeling skin on palms/soles.   Isotretinoin may infrequently cause disease of the  pancreatitis, that may rarely be fatal. Stop taking this medication and contact office immediately if you develop: severe stomach pain severe or persistent GI upset,   Stop taking this medication and tell your doctor immediately if you develop these unlikely but very serious side effects: severe headache, vision changes, ear ringing, hearling loss, chest pain, yellowing eyes, skin, dark urine, severe diarrhea, rectal bleeding,   Seek immediate medical attention if you notice any symptoms of a serious allergic reaction.      Accutane is discussed fully with the patient. It is a very effective drug to treat acne vulgaris but has many potential significant side effects. Chief among these are teratogensis, hepatic injury, dyslipidemia and severe drying of the mucous membranes. All of these issues have been discussed in details. Monthly blood tests to monitor lipids and liver functions will be necessary. Expect painful dryness and/or fissuring around the lips, eyes, and other moist areas of the body. Balms may be protective. Contact lens may be too painful to wear temporarily while on this drug. Episodes of significant depression have been reported, including suicidal ideation and attempts in rare cases. It may also cause pseudotumor cerebri and hyperostosis. The patient will report any such changes in mood, depressive symptoms or suicidal thoughts, headaches, joint or bone pains. There is also a possible association with inflammatory bowel disease, although this is unproven at this point.    Injection few inflammatory areas on neck   IL TAC: PGACAC discussed.  Risks including but not limited to injection site reaction, bruising, no resolution.  All questions answered and entertained to patient s satisfaction.  Informed consent obtained.  IL TAC in concentration of 10mg/ml was injected ID to inflamed areas on neck.  Total injected was  0.1 ml.  Patient tolerated without complications and given wound care instructions,  including not to move product around.  Return in 4 weeks for follow-up and possible additional IL TAC.           Again, thank you for allowing me to participate in the care of your patient.        Sincerely,        Sabi Christie PA-C

## 2018-01-10 NOTE — NURSING NOTE
"Chief Complaint   Patient presents with     Derm Problem     accutane recheck       Initial /85  Pulse 106  SpO2 98% Estimated body mass index is 41.36 kg/(m^2) as calculated from the following:    Height as of 5/22/17: 1.727 m (5' 8\").    Weight as of 5/22/17: 123.4 kg (272 lb).  BP completed using cuff size: violet Wright LPN    "

## 2018-01-10 NOTE — MR AVS SNAPSHOT
After Visit Summary   1/10/2018    Sean Woodard    MRN: [id number]           Patient Information     Date Of Birth          1980        Visit Information        Provider Department      1/10/2018 4:00 PM Sabi Bustos PA-C [facility]        Today's Diagnoses     Inflamed hair follicle    -  1    Acne nuchae keloidalis           Follow-ups after your visit        Your next 10 appointments already scheduled     Feb 05, 2018  4:15 PM CST   LAB with  LAB   [facility]    [address] [address]   [phone]           Please do not eat 10-12 hours before your appointment if you are coming in fasting for labs on lipids, cholesterol, or glucose (sugar). This does not apply to pregnant women. Water, hot tea and black coffee (with nothing added) are okay. Do not drink other fluids, diet soda or chew gum.            Feb 06, 2018  4:20 PM CST   Return Visit with Sabi Bustos PA-C   [facility])    [address] [address]   [phone]            Mar 05, 2018  4:15 PM CST   LAB with  LAB   [facility]    [address] [address] [phone]           Please do not eat 10-12 hours before your appointment if you are coming in fasting for labs on lipids, cholesterol, or glucose (sugar). This does not apply to pregnant women. Water, hot tea and black coffee (with nothing added) are okay. Do not drink other fluids, diet soda or chew gum.            Mar 06, 2018  4:20 PM CST   Return Visit with Sabi Bustos PA-C   [facility] ([facility]    [address] [address]   [phone]            Apr 03, 2018  4:15 PM CDT   LAB with [facility])    [address] [address] [phone]            Please do not eat 10-12 hours before your appointment if you are coming in fasting for labs on lipids, cholesterol, or glucose (sugar). This does not apply to pregnant women. Water, hot tea and black coffee (with nothing added) are okay. Do not drink other fluids, diet soda or chew gum.            Apr 04, 2018  4:20 PM CDT   [facility]    [address] [address] [phone]              Who to contact     If you have questions or need follow up information about today's clinic visit or your schedule please contact [facility] directly at [phone].  Normal or non-critical lab and imaging results will be communicated to you by MyChart, letter or phone within 4 business days after the clinic has received the results. If you do not hear from us within 7 days, please contact the clinic through [employer] or phone. If you have a critical or abnormal lab result, we will notify you by phone as soon as possible.  Submit refill requests through SeeOn or call your pharmacy and they will forward the refill request to us. Please allow 3 business days for your refill to be completed.          Additional Information About Your Visit        SeeOn Information     SeeOn gives you secure access to your electronic health record. If you see a primary care provider, you can also send messages to your care team and make appointments. If you have questions, please call your primary care clinic.  If you do not have a primary care provider, please call [phone] and they will assist you.        Care EveryWhere ID     This is your Care EveryWhere ID. This could be used by other organizations to access your [facility] medical records  [id number]        Your Vitals Were     Pulse Pulse Oximetry                106 98%           Blood Pressure from Last 3 Encounters:   01/10/18 138/85   12/12/17 135/84    11/14/17 135/89    Weight from Last 3 Encounters:   05/22/17 123.4 kg (272 lb)   01/30/17 124.7 kg (275 lb)   09/19/16 120.7 kg (266 lb)              Today, you had the following     No orders found for display         Today's Medication Changes          These changes are accurate as of: 1/10/18 11:59 PM.  If you have any questions, ask your nurse or doctor.               Start taking these medicines.        Dose/Directions    ampicillin 500 MG capsule   Commonly known as:  PRINCIPEN   Used for:  Inflamed hair follicle   Started by:  Sabi Bustos PA-C        Dose:  500 mg   Take 1 capsule (500 mg) by mouth 2 times daily (with meals) for 14 days   Quantity:  28 capsule   Refills:  0            Where to get your medicines      These medications were sent to [facility] - [address]  [address] [place] [address]     Phone:  [phone]     ampicillin 500 MG capsule    ISOtretinoin 40 MG capsule                Primary Care Provider Office Phone # Fax #    Susanne Alberto PA-C [phone] [address] [address]        Equal Access to Services     VENICE MAYEN AH: Hadii pamela cortez hadasho Soomaali, waaxda luqadaha, qaybta kaalmada adeegyada, kd moncada. So [facility] [phone].    ATENCIÓN: Si habla español, tiene a camacho disposición servicios gratuitos de asistencia lingüística. [facility] [phone].    We comply with applicable federal civil rights laws and [place] laws. We do not discriminate on the basis of race, color, national origin, age, disability, sex, sexual orientation, or gender identity.            Thank you!     Thank you for choosing [facility]  for your care. Our goal is always to provide you with excellent care. Hearing back from our patients is one way we can continue to improve our services. Please take a few minutes to complete the written survey that you may  receive in the mail after your visit with us. Thank you!             Your Updated Medication List - Protect others around you: Learn how to safely use, store and throw away your medicines at www.disposemymeds.org.          This list is accurate as of: 1/10/18 11:59 PM.  Always use your most recent med list.                   Brand Name Dispense Instructions for use Diagnosis    ampicillin 500 MG capsule    PRINCIPEN    28 capsule    Take 1 capsule (500 mg) by mouth 2 times daily (with meals) for 14 days    Inflamed hair follicle       atorvastatin 20 MG tablet    LIPITOR    90 tablet    TAKE ONE TABLET BY MOUTH EVERY DAY    Hyperlipidemia with target LDL less than 100       augmented betamethasone dipropionate 0.05 % ointment    DIPROLENE-AF    45 g    Apply sparingly to affected area twice daily as needed.  Do not apply to face.    Chronic dermatitis       clindamycin 1 % lotion    CLINDAMAX    60 mL    Apply to AA BID    Acne keloidalis       hydrochlorothiazide 25 MG tablet    HYDRODIURIL    90 tablet    Take 1 tablet (25 mg) by mouth daily    Benign essential hypertension       * ISOtretinoin 40 MG capsule    ACCUTANE    30 capsule    1 tab PO daily with food    Acne nuchae keloidalis       * ISOtretinoin 30 MG Caps     60 capsule    Take 1 capsule by mouth 2 times daily (with meals)        * ISOtretinoin 40 MG capsule    ACCUTANE    60 capsule    Take 1 capsule (40 mg) by mouth 2 times daily (with meals)    Acne nuchae keloidalis       lisinopril 10 MG tablet    PRINIVIL/ZESTRIL    90 tablet    Take 1 tablet (10 mg) by mouth daily    Benign essential hypertension       mupirocin 2 % cream    BACTROBAN    30 g    Apply topically 3 times daily    Rash and nonspecific skin eruption       * sulfamethoxazole-trimethoprim 800-160 MG per tablet    BACTRIM DS    14 tablet    1 tab PO BID    Acne keloidalis       * sulfamethoxazole-trimethoprim 800-160 MG per tablet    BACTRIM DS    60 tablet    Take 1 tablet by mouth 2  times daily    Acne nuchae keloidalis       * Notice:  This list has 5 medication(s) that are the same as other medications prescribed for you. Read the directions carefully, and ask your doctor or other care provider to review them with you.

## 2018-01-15 NOTE — PROGRESS NOTES
Sean Woodard is a 38 year old year old male patient here today for recheck acne nuchae keloidalis. He just finished his second month isotretinoin. He noticed some mild improvements this month. He denies any side effects including mood changes. Patient has no other skin complaints today.  Remainder of the HPI, Meds, PMH, Allergies, FH, and SH was reviewed in chart.    Pertinent Hx:  Acne nuchae keloidalis   Past Medical History:   Diagnosis Date     NO ACTIVE PROBLEMS        Past Surgical History:   Procedure Laterality Date     ORTHOPEDIC SURGERY  1/1/2005    RT knee arthroscopic menisectomy     Thumb Surgery  2009    Mass removal neuroma        Family History   Problem Relation Age of Onset     Heart Defect Father      valve replacement     DIABETES Maternal Grandmother      CANCER Maternal Grandfather      stomach     Hypertension Brother      DIABETES Sister      CEREBROVASCULAR DISEASE Brother 38     Hypertension Brother        Social History     Social History     Marital status:      Spouse name: Joycelyn Stokes     Number of children: 1     Years of education: 12+     Occupational History      [employer]     Social History Main Topics     Smoking status: Former Smoker     Packs/day: 0.50     Years: 5.00     Types: Cigarettes     Quit date: 1/1/2005     Smokeless tobacco: Never Used     Alcohol use Yes      Comment: Social. 1 drink per month     Drug use: No     Sexual activity: Yes     Partners: Female     Other Topics Concern     Parent/Sibling W/ Cabg, Mi Or Angioplasty Before 65f 55m? No     Social History Narrative       Outpatient Encounter Prescriptions as of 1/10/2018   Medication Sig Dispense Refill     ampicillin (PRINCIPEN) 500 MG capsule Take 1 capsule (500 mg) by mouth 2 times daily (with meals) for 14 days 28 capsule 0     ISOtretinoin (ACCUTANE) 40 MG capsule Take 1 capsule (40 mg) by mouth 2 times daily (with meals) 60 capsule 0     ISOtretinoin 30 MG CAPS Take  1 capsule by mouth 2 times daily (with meals) 60 capsule 0     atorvastatin (LIPITOR) 20 MG tablet TAKE ONE TABLET BY MOUTH EVERY DAY 90 tablet 2     ISOtretinoin (ACCUTANE) 40 MG capsule 1 tab PO daily with food 30 capsule 0     hydrochlorothiazide (HYDRODIURIL) 25 MG tablet Take 1 tablet (25 mg) by mouth daily 90 tablet 1     clindamycin (CLINDAMAX) 1 % lotion Apply to AA BID 60 mL 11     lisinopril (PRINIVIL/ZESTRIL) 10 MG tablet Take 1 tablet (10 mg) by mouth daily 90 tablet 1     augmented betamethasone dipropionate (DIPROLENE-AF) 0.05 % ointment Apply sparingly to affected area twice daily as needed.  Do not apply to face. 45 g 1     mupirocin (BACTROBAN) 2 % cream Apply topically 3 times daily 30 g 0     [DISCONTINUED] ISOtretinoin (ACCUTANE) 40 MG capsule Take 1 capsule (40 mg) by mouth 2 times daily (with meals) 60 capsule 0     sulfamethoxazole-trimethoprim (BACTRIM DS) 800-160 MG per tablet Take 1 tablet by mouth 2 times daily (Patient not taking: Reported on 1/10/2018) 60 tablet 2     sulfamethoxazole-trimethoprim (BACTRIM DS) 800-160 MG per tablet 1 tab PO BID (Patient not taking: Reported on 1/10/2018) 14 tablet 0     No facility-administered encounter medications on file as of 1/10/2018.              Review Of Systems  Skin: As above  Eyes: negative  Ears/Nose/Throat: negative  Respiratory: No shortness of breath, dyspnea on exertion, cough, or hemoptysis  Cardiovascular: negative  Gastrointestinal: negative  Genitourinary: negative  Musculoskeletal: negative  Neurologic: negative  Psychiatric: negative  Hematologic/Lymphatic/Immunologic: negative  Endocrine: negative      O:   NAD, WDWN, Alert & Oriented, Mood & Affect wnl, Vitals stable   Here today alone   /85  Pulse 106  SpO2 98%   General appearance normal   Vitals stable   Alert, oriented and in no acute distress     Multiple inflammatory papules and scarring on back of neck      Eyes: Conjunctivae/lids:Normal     ENT:  Lips    MSK:Normal    Pulm: Breathing Normal    Neuro/Psych: Orientation:Normal; Mood/Affect:Normal  A/P:  1. Acne Keloidalis on posterior neck   Continues to flare despite abx, topicals, ILK. Didn't feel like Bactrim was helpful. Discussed doing Accutane again and he is amenable to starting this. Negative tissue culture, biopsy and fungal culture. Had negative aerobic skin culture but + KOH scraping by PCP. Has been on multiple antibiotics, antifungals but nothing has helped.      Seeing mild improvements, continue 40 mg bid   Standing CBC, CMP and fasting lipids  Ipledge reviewed with patient and Ipledge consent form complete  Patient place in ipledge system  Ipledge: [phone]   Total Dosage: 5400 mg   Return to clinic 30 days  Dry lips and mouth, minor swelling of the eyelids or lips, crusty skin, nosebleeds, GI upset, or thinning of hair may occur. If any of these effects persist or worsen, tell your doctor or pharmacist promptly.   To relieve dry mouth, suck on (sugarless) hard candy or ice chips, chew (sugarless) gum, drink water.   Remember that your doctor has prescribed this medication because he or she has judged that the benefit to you is greater than the risk of side effects. Many people using this medication do not have serious side effects.   Contact office immediately if you have any of these unlikely but serious side effects: mental/mood adan  es (e.g., depression,  aggressive or violent behavior, and in rare cases, thoughts of suicide), tingling feeling in the skin, quick/severe sun sensitivity, back/joint/muscle pain, signs of infection (e.g., fever, persistent sore throat, painful swallowing, peeling skin on palms/soles.   Isotretinoin may infrequently cause disease of the pancreatitis, that may rarely be fatal. Stop taking this medication and contact office immediately if you develop: severe stomach pain severe or persistent GI upset,   Stop taking this medication and tell your doctor  immediately if you develop these unlikely but very serious side effects: severe headache, vision changes, ear ringing, hearling loss, chest pain, yellowing eyes, skin, dark urine, severe diarrhea, rectal bleeding,   Seek immediate medical attention if you notice any symptoms of a serious allergic reaction.      Accutane is discussed fully with the patient. It is a very effective drug to treat acne vulgaris but has many potential significant side effects. Chief among these are teratogensis, hepatic injury, dyslipidemia and severe drying of the mucous membranes. All of these issues have been discussed in details. Monthly blood tests to monitor lipids and liver functions will be necessary. Expect painful dryness and/or fissuring around the lips, eyes, and other moist areas of the body. Balms may be protective. Contact lens may be too painful to wear temporarily while on this drug. Episodes of significant depression have been reported, including suicidal ideation and attempts in rare cases. It may also cause pseudotumor cerebri and hyperostosis. The patient will report any such changes in mood, depressive symptoms or suicidal thoughts, headaches, joint or bone pains. There is also a possible association with inflammatory bowel disease, although this is unproven at this point.    Injection few inflammatory areas on neck   IL TAC: PGACAC discussed.  Risks including but not limited to injection site reaction, bruising, no resolution.  All questions answered and entertained to patient s satisfaction.  Informed consent obtained.  IL TAC in concentration of 10mg/ml was injected ID to inflamed areas on neck.  Total injected was  0.1 ml.  Patient tolerated without complications and given wound care instructions, including not to move product around.  Return in 4 weeks for follow-up and possible additional IL TAC.

## 2018-02-05 DIAGNOSIS — Z51.81 THERAPEUTIC DRUG MONITORING: ICD-10-CM

## 2018-02-05 LAB
ERYTHROCYTE [DISTWIDTH] IN BLOOD BY AUTOMATED COUNT: 12.8 % (ref 10–15)
HCT VFR BLD AUTO: 46.7 % (ref 40–53)
HGB BLD-MCNC: 16 G/DL (ref 13.3–17.7)
MCH RBC QN AUTO: 30.2 PG (ref 26.5–33)
MCHC RBC AUTO-ENTMCNC: 34.3 G/DL (ref 31.5–36.5)
MCV RBC AUTO: 88 FL (ref 78–100)
PLATELET # BLD AUTO: 240 10E9/L (ref 150–450)
RBC # BLD AUTO: 5.3 10E12/L (ref 4.4–5.9)
WBC # BLD AUTO: 7.1 10E9/L (ref 4–11)

## 2018-02-05 PROCEDURE — 85027 COMPLETE CBC AUTOMATED: CPT | Performed by: PHYSICIAN ASSISTANT

## 2018-02-05 PROCEDURE — 80053 COMPREHEN METABOLIC PANEL: CPT | Performed by: PHYSICIAN ASSISTANT

## 2018-02-05 PROCEDURE — 36415 COLL VENOUS BLD VENIPUNCTURE: CPT | Performed by: PHYSICIAN ASSISTANT

## 2018-02-05 PROCEDURE — 80061 LIPID PANEL: CPT | Performed by: PHYSICIAN ASSISTANT

## 2018-02-06 ENCOUNTER — OFFICE VISIT (OUTPATIENT)
Dept: DERMATOLOGY | Facility: CLINIC | Age: 38
End: 2018-02-06
Payer: COMMERCIAL

## 2018-02-06 VITALS — DIASTOLIC BLOOD PRESSURE: 83 MMHG | OXYGEN SATURATION: 98 % | HEART RATE: 103 BPM | SYSTOLIC BLOOD PRESSURE: 130 MMHG

## 2018-02-06 DIAGNOSIS — L73.0 ACNE NUCHAE KELOIDALIS: ICD-10-CM

## 2018-02-06 LAB
ALBUMIN SERPL-MCNC: 4.1 G/DL (ref 3.4–5)
ALP SERPL-CCNC: 71 U/L (ref 40–150)
ALT SERPL W P-5'-P-CCNC: 58 U/L (ref 0–70)
ANION GAP SERPL CALCULATED.3IONS-SCNC: 4 MMOL/L (ref 3–14)
AST SERPL W P-5'-P-CCNC: 36 U/L (ref 0–45)
BILIRUB SERPL-MCNC: 1 MG/DL (ref 0.2–1.3)
BUN SERPL-MCNC: 12 MG/DL (ref 7–30)
CALCIUM SERPL-MCNC: 9.3 MG/DL (ref 8.5–10.1)
CHLORIDE SERPL-SCNC: 101 MMOL/L (ref 94–109)
CHOLEST SERPL-MCNC: 191 MG/DL
CO2 SERPL-SCNC: 29 MMOL/L (ref 20–32)
CREAT SERPL-MCNC: 0.96 MG/DL (ref 0.66–1.25)
GFR SERPL CREATININE-BSD FRML MDRD: 88 ML/MIN/1.7M2
GLUCOSE SERPL-MCNC: 95 MG/DL (ref 70–99)
HDLC SERPL-MCNC: 40 MG/DL
LDLC SERPL CALC-MCNC: 96 MG/DL
NONHDLC SERPL-MCNC: 151 MG/DL
POTASSIUM SERPL-SCNC: 3.5 MMOL/L (ref 3.4–5.3)
PROT SERPL-MCNC: 8.3 G/DL (ref 6.8–8.8)
SODIUM SERPL-SCNC: 134 MMOL/L (ref 133–144)
TRIGL SERPL-MCNC: 273 MG/DL

## 2018-02-06 PROCEDURE — 99213 OFFICE O/P EST LOW 20 MIN: CPT | Performed by: PHYSICIAN ASSISTANT

## 2018-02-06 RX ORDER — ISOTRETINOIN 40 MG/1
40 CAPSULE ORAL 2 TIMES DAILY WITH MEALS
Qty: 60 CAPSULE | Refills: 0 | Status: SHIPPED | OUTPATIENT
Start: 2018-02-06 | End: 2018-04-09

## 2018-02-06 NOTE — NURSING NOTE
"Chief Complaint   Patient presents with     Derm Problem     accutane recheck       Initial /83  Pulse 103  SpO2 98% Estimated body mass index is 41.36 kg/(m^2) as calculated from the following:    Height as of 5/22/17: 1.727 m (5' 8\").    Weight as of 5/22/17: 123.4 kg (272 lb).  BP completed using cuff size: violet Wright LPN    "

## 2018-02-06 NOTE — LETTER
2/6/2018         RE: Sean Woodard  [address] [address]        Dear Colleague,    Thank you for referring your patient, Sean Woodard, to the [facility]. Please see a copy of my visit note below.    Sean Woodard is a 38 year old year old male patient here today for recheck acne nuchae keloidalis. He just finished his fourth month isotretinoin. He noticed some mild improvements this month. He denies any side effects including mood changes.  Patient has no other skin complaints today.  Remainder of the HPI, Meds, PMH, Allergies, FH, and SH was reviewed in chart.    Pertinent Hx:   Acne nuchae keloidalis   Past Medical History:   Diagnosis Date     NO ACTIVE PROBLEMS        Past Surgical History:   Procedure Laterality Date     ORTHOPEDIC SURGERY  1/1/2005    RT knee arthroscopic menisectomy     Thumb Surgery  2009    Mass removal neuroma        Family History   Problem Relation Age of Onset     Heart Defect Father      valve replacement     DIABETES Maternal Grandmother      CANCER Maternal Grandfather      stomach     Hypertension Brother      DIABETES Sister      CEREBROVASCULAR DISEASE Brother 38     Hypertension Brother        Social History     Social History     Marital status:      Spouse name: Joycelyn Stokes     Number of children: 1     Years of education: 12+     Occupational History      Department [employer]     Social History Main Topics     Smoking status: Former Smoker     Packs/day: 0.50     Years: 5.00     Types: Cigarettes     Quit date: 1/1/2005     Smokeless tobacco: Never Used     Alcohol use Yes      Comment: Social. 1 drink per month     Drug use: No     Sexual activity: Yes     Partners: Female     Other Topics Concern     Parent/Sibling W/ Cabg, Mi Or Angioplasty Before 65f 55m? No     Social History Narrative       Outpatient Encounter Prescriptions as of 2/6/2018   Medication Sig Dispense Refill     ISOtretinoin (ACCUTANE) 40 MG  capsule Take 1 capsule (40 mg) by mouth 2 times daily (with meals) 60 capsule 0     ISOtretinoin 30 MG CAPS Take 1 capsule by mouth 2 times daily (with meals) 60 capsule 0     atorvastatin (LIPITOR) 20 MG tablet TAKE ONE TABLET BY MOUTH EVERY DAY 90 tablet 2     ISOtretinoin (ACCUTANE) 40 MG capsule 1 tab PO daily with food 30 capsule 0     hydrochlorothiazide (HYDRODIURIL) 25 MG tablet Take 1 tablet (25 mg) by mouth daily 90 tablet 1     sulfamethoxazole-trimethoprim (BACTRIM DS) 800-160 MG per tablet Take 1 tablet by mouth 2 times daily 60 tablet 2     clindamycin (CLINDAMAX) 1 % lotion Apply to AA BID 60 mL 11     sulfamethoxazole-trimethoprim (BACTRIM DS) 800-160 MG per tablet 1 tab PO BID 14 tablet 0     lisinopril (PRINIVIL/ZESTRIL) 10 MG tablet Take 1 tablet (10 mg) by mouth daily 90 tablet 1     augmented betamethasone dipropionate (DIPROLENE-AF) 0.05 % ointment Apply sparingly to affected area twice daily as needed.  Do not apply to face. 45 g 1     mupirocin (BACTROBAN) 2 % cream Apply topically 3 times daily 30 g 0     [DISCONTINUED] ISOtretinoin (ACCUTANE) 40 MG capsule Take 1 capsule (40 mg) by mouth 2 times daily (with meals) 60 capsule 0     No facility-administered encounter medications on file as of 2/6/2018.              Review Of Systems  Skin: As above  Eyes: negative  Ears/Nose/Throat: negative  Respiratory: No shortness of breath, dyspnea on exertion, cough, or hemoptysis  Cardiovascular: negative  Gastrointestinal: negative  Genitourinary: negative  Musculoskeletal: negative  Neurologic: negative  Psychiatric: negative  Hematologic/Lymphatic/Immunologic: negative  Endocrine: negative      O:   NAD, WDWN, Alert & Oriented, Mood & Affect wnl, Vitals stable   Here today alone   /83  Pulse 103  SpO2 98%   General appearance normal   Vitals stable   Alert, oriented and in no acute distress     3 inflammatory papules and scarring on back of neck       Eyes: Conjunctivae/lids:Normal     ENT:  Lips, : normal    MSK:Normal    Cardiovascular: peripheral edema none    Pulm: Breathing Normal    Neuro/Psych: Orientation:Normal; Mood/Affect:Normal  A/P:  1. Acne Keloidalis on posterior neck   Continues to flare despite abx, topicals, ILK. Didn't feel like Bactrim was helpful. He feels like accutane maybe helping a little.  Negative tissue culture, biopsy and fungal culture. Had negative aerobic skin culture but + KOH scraping by PCP. Has been on multiple antibiotics, antifungals but nothing has helped.      Seeing mild improvements, continue 40 mg bid   Standing CBC, CMP and fasting lipids  Ipledge reviewed with patient and Ipledge consent form complete  Patient place in ipledge system  Ipledge: [phone]   Total Dosage: 7800 mg   Return to clinic 30 days  Dry lips and mouth, minor swelling of the eyelids or lips, crusty skin, nosebleeds, GI upset, or thinning of hair may occur. If any of these effects persist or worsen, tell your doctor or pharmacist promptly.   To relieve dry mouth, suck on (sugarless) hard candy or ice chips, chew (sugarless) gum, drink water.   Remember that your doctor has prescribed this medication because he or she has judged that the benefit to you is greater than the risk of side effects. Many people using this medication do not have serious side effects.   Contact office immediately if you have any of these unlikely but serious side effects: mental/mood adan  es (e.g., depression,  aggressive or violent behavior, and in rare cases, thoughts of suicide), tingling feeling in the skin, quick/severe sun sensitivity, back/joint/muscle pain, signs of infection (e.g., fever, persistent sore throat, painful swallowing, peeling skin on palms/soles.   Isotretinoin may infrequently cause disease of the pancreatitis, that may rarely be fatal. Stop taking this medication and contact office immediately if you develop: severe stomach pain severe or persistent GI upset,   Stop taking this medication  and tell your doctor immediately if you develop these unlikely but very serious side effects: severe headache, vision changes, ear ringing, hearling loss, chest pain, yellowing eyes, skin, dark urine, severe diarrhea, rectal bleeding,   Seek immediate medical attention if you notice any symptoms of a serious allergic reaction.      Accutane is discussed fully with the patient. It is a very effective drug to treat acne vulgaris but has many potential significant side effects. Chief among these are teratogensis, hepatic injury, dyslipidemia and severe drying of the mucous membranes. All of these issues have been discussed in details. Monthly blood tests to monitor lipids and liver functions will be necessary. Expect painful dryness and/or fissuring around the lips, eyes, and other moist areas of the body. Balms may be protective. Contact lens may be too painful to wear temporarily while on this drug. Episodes of significant depression have been reported, including suicidal ideation and attempts in rare cases. It may also cause pseudotumor cerebri and hyperostosis. The patient will report any such changes in mood, depressive symptoms or suicidal thoughts, headaches, joint or bone pains. There is also a possible association with inflammatory bowel disease, although this is unproven at this point.     Injection few inflammatory areas on neck   IL TAC: PGACAC discussed.  Risks including but not limited to injection site reaction, bruising, no resolution.  All questions answered and entertained to patient s satisfaction.  Informed consent obtained.  IL TAC in concentration of 10mg/ml was injected ID to inflamed areas on neck.  Total injected was  0.1 ml.  Patient tolerated without complications and given wound care instructions, including not to move product around.  Return in 4 weeks for follow-up and possible additional IL TAC.           Again, thank you for allowing me to participate in the care of your patient.         Sincerely,        Sabi Christie PA-C

## 2018-02-08 NOTE — PROGRESS NOTES
Sean Woodard is a 38 year old year old male patient here today for recheck acne nuchae keloidalis. He just finished his fourth month isotretinoin. He noticed some mild improvements this month. He denies any side effects including mood changes.  Patient has no other skin complaints today.  Remainder of the HPI, Meds, PMH, Allergies, FH, and SH was reviewed in chart.    Pertinent Hx:   Acne nuchae keloidalis   Past Medical History:   Diagnosis Date     NO ACTIVE PROBLEMS        Past Surgical History:   Procedure Laterality Date     ORTHOPEDIC SURGERY  1/1/2005    RT knee arthroscopic menisectomy     Thumb Surgery  2009    Mass removal neuroma        Family History   Problem Relation Age of Onset     Heart Defect Father      valve replacement     DIABETES Maternal Grandmother      CANCER Maternal Grandfather      stomach     Hypertension Brother      DIABETES Sister      CEREBROVASCULAR DISEASE Brother 38     Hypertension Brother        Social History     Social History     Marital status:      Spouse name: Joycelyn Stokes     Number of children: 1     Years of education: 12+     Occupational History      [employer]     Social History Main Topics     Smoking status: Former Smoker     Packs/day: 0.50     Years: 5.00     Types: Cigarettes     Quit date: 1/1/2005     Smokeless tobacco: Never Used     Alcohol use Yes      Comment: Social. 1 drink per month     Drug use: No     Sexual activity: Yes     Partners: Female     Other Topics Concern     Parent/Sibling W/ Cabg, Mi Or Angioplasty Before 65f 55m? No     Social History Narrative       Outpatient Encounter Prescriptions as of 2/6/2018   Medication Sig Dispense Refill     ISOtretinoin (ACCUTANE) 40 MG capsule Take 1 capsule (40 mg) by mouth 2 times daily (with meals) 60 capsule 0     ISOtretinoin 30 MG CAPS Take 1 capsule by mouth 2 times daily (with meals) 60 capsule 0     atorvastatin (LIPITOR) 20 MG tablet TAKE ONE TABLET BY MOUTH  EVERY DAY 90 tablet 2     ISOtretinoin (ACCUTANE) 40 MG capsule 1 tab PO daily with food 30 capsule 0     hydrochlorothiazide (HYDRODIURIL) 25 MG tablet Take 1 tablet (25 mg) by mouth daily 90 tablet 1     sulfamethoxazole-trimethoprim (BACTRIM DS) 800-160 MG per tablet Take 1 tablet by mouth 2 times daily 60 tablet 2     clindamycin (CLINDAMAX) 1 % lotion Apply to AA BID 60 mL 11     sulfamethoxazole-trimethoprim (BACTRIM DS) 800-160 MG per tablet 1 tab PO BID 14 tablet 0     lisinopril (PRINIVIL/ZESTRIL) 10 MG tablet Take 1 tablet (10 mg) by mouth daily 90 tablet 1     augmented betamethasone dipropionate (DIPROLENE-AF) 0.05 % ointment Apply sparingly to affected area twice daily as needed.  Do not apply to face. 45 g 1     mupirocin (BACTROBAN) 2 % cream Apply topically 3 times daily 30 g 0     [DISCONTINUED] ISOtretinoin (ACCUTANE) 40 MG capsule Take 1 capsule (40 mg) by mouth 2 times daily (with meals) 60 capsule 0     No facility-administered encounter medications on file as of 2/6/2018.              Review Of Systems  Skin: As above  Eyes: negative  Ears/Nose/Throat: negative  Respiratory: No shortness of breath, dyspnea on exertion, cough, or hemoptysis  Cardiovascular: negative  Gastrointestinal: negative  Genitourinary: negative  Musculoskeletal: negative  Neurologic: negative  Psychiatric: negative  Hematologic/Lymphatic/Immunologic: negative  Endocrine: negative      O:   NAD, WDWN, Alert & Oriented, Mood & Affect wnl, Vitals stable   Here today alone   /83  Pulse 103  SpO2 98%   General appearance normal   Vitals stable   Alert, oriented and in no acute distress     3 inflammatory papules and scarring on back of neck       Eyes: Conjunctivae/lids:Normal     ENT: Lips, : normal    MSK:Normal    Cardiovascular: peripheral edema none    Pulm: Breathing Normal    Neuro/Psych: Orientation:Normal; Mood/Affect:Normal  A/P:  1. Acne Keloidalis on posterior neck   Continues to flare despite abx,  topicals, ILK. Didn't feel like Bactrim was helpful. He feels like accutane maybe helping a little.  Negative tissue culture, biopsy and fungal culture. Had negative aerobic skin culture but + KOH scraping by PCP. Has been on multiple antibiotics, antifungals but nothing has helped.      Seeing mild improvements, continue 40 mg bid   Standing CBC, CMP and fasting lipids  Ipledge reviewed with patient and Ipledge consent form complete  Patient place in ipledge system  Ipledge: [phone]   Total Dosage: 7800 mg   Return to clinic 30 days  Dry lips and mouth, minor swelling of the eyelids or lips, crusty skin, nosebleeds, GI upset, or thinning of hair may occur. If any of these effects persist or worsen, tell your doctor or pharmacist promptly.   To relieve dry mouth, suck on (sugarless) hard candy or ice chips, chew (sugarless) gum, drink water.   Remember that your doctor has prescribed this medication because he or she has judged that the benefit to you is greater than the risk of side effects. Many people using this medication do not have serious side effects.   Contact office immediately if you have any of these unlikely but serious side effects: mental/mood adan  es (e.g., depression,  aggressive or violent behavior, and in rare cases, thoughts of suicide), tingling feeling in the skin, quick/severe sun sensitivity, back/joint/muscle pain, signs of infection (e.g., fever, persistent sore throat, painful swallowing, peeling skin on palms/soles.   Isotretinoin may infrequently cause disease of the pancreatitis, that may rarely be fatal. Stop taking this medication and contact office immediately if you develop: severe stomach pain severe or persistent GI upset,   Stop taking this medication and tell your doctor immediately if you develop these unlikely but very serious side effects: severe headache, vision changes, ear ringing, hearling loss, chest pain, yellowing eyes, skin, dark urine, severe diarrhea, rectal  bleeding,   Seek immediate medical attention if you notice any symptoms of a serious allergic reaction.      Accutane is discussed fully with the patient. It is a very effective drug to treat acne vulgaris but has many potential significant side effects. Chief among these are teratogensis, hepatic injury, dyslipidemia and severe drying of the mucous membranes. All of these issues have been discussed in details. Monthly blood tests to monitor lipids and liver functions will be necessary. Expect painful dryness and/or fissuring around the lips, eyes, and other moist areas of the body. Balms may be protective. Contact lens may be too painful to wear temporarily while on this drug. Episodes of significant depression have been reported, including suicidal ideation and attempts in rare cases. It may also cause pseudotumor cerebri and hyperostosis. The patient will report any such changes in mood, depressive symptoms or suicidal thoughts, headaches, joint or bone pains. There is also a possible association with inflammatory bowel disease, although this is unproven at this point.     Injection few inflammatory areas on neck   IL TAC: PGACAC discussed.  Risks including but not limited to injection site reaction, bruising, no resolution.  All questions answered and entertained to patient s satisfaction.  Informed consent obtained.  IL TAC in concentration of 10mg/ml was injected ID to inflamed areas on neck.  Total injected was  0.1 ml.  Patient tolerated without complications and given wound care instructions, including not to move product around.  Return in 4 weeks for follow-up and possible additional IL TAC.

## 2018-02-16 ENCOUNTER — TELEPHONE (OUTPATIENT)
Dept: FAMILY MEDICINE | Facility: CLINIC | Age: 38
End: 2018-02-16

## 2018-02-16 ENCOUNTER — NURSE TRIAGE (OUTPATIENT)
Dept: NURSING | Facility: CLINIC | Age: 38
End: 2018-02-16

## 2018-02-16 NOTE — TELEPHONE ENCOUNTER
Reason for Disposition    Caller requesting a NON-URGENT new prescription or refill and triager unable to refill per unit policy    Protocols used: MEDICATION QUESTION CALL-ADULT-

## 2018-02-16 NOTE — TELEPHONE ENCOUNTER
"Clinic Action Needed:Yes  Reason for Call: Patient needs prescriber of Accutane to send message via the \"I pledge\" account before [facility] pharmacy can dispense his Accutane. Prescribed 02-06-18. Please call patient at [phone] with questions.   Routed to: ABY Pitts RN  [facility]    "

## 2018-02-19 NOTE — TELEPHONE ENCOUNTER
Pt notified of confirmation in Ipledge.  Pt verbalized understanding.  Marlene BILL RN BSN PHN  Specialty Clinics

## 2018-03-05 DIAGNOSIS — Z51.81 THERAPEUTIC DRUG MONITORING: ICD-10-CM

## 2018-03-05 LAB
ALBUMIN SERPL-MCNC: 3.9 G/DL (ref 3.4–5)
ALP SERPL-CCNC: 69 U/L (ref 40–150)
ALT SERPL W P-5'-P-CCNC: 60 U/L (ref 0–70)
ANION GAP SERPL CALCULATED.3IONS-SCNC: 6 MMOL/L (ref 3–14)
AST SERPL W P-5'-P-CCNC: 35 U/L (ref 0–45)
BILIRUB SERPL-MCNC: 0.8 MG/DL (ref 0.2–1.3)
BUN SERPL-MCNC: 11 MG/DL (ref 7–30)
CALCIUM SERPL-MCNC: 9 MG/DL (ref 8.5–10.1)
CHLORIDE SERPL-SCNC: 101 MMOL/L (ref 94–109)
CHOLEST SERPL-MCNC: 192 MG/DL
CO2 SERPL-SCNC: 28 MMOL/L (ref 20–32)
CREAT SERPL-MCNC: 0.84 MG/DL (ref 0.66–1.25)
ERYTHROCYTE [DISTWIDTH] IN BLOOD BY AUTOMATED COUNT: 12.6 % (ref 10–15)
GFR SERPL CREATININE-BSD FRML MDRD: >90 ML/MIN/1.7M2
GLUCOSE SERPL-MCNC: 122 MG/DL (ref 70–99)
HCT VFR BLD AUTO: 45.7 % (ref 40–53)
HDLC SERPL-MCNC: 42 MG/DL
HGB BLD-MCNC: 15.9 G/DL (ref 13.3–17.7)
LDLC SERPL CALC-MCNC: 111 MG/DL
MCH RBC QN AUTO: 30.5 PG (ref 26.5–33)
MCHC RBC AUTO-ENTMCNC: 34.8 G/DL (ref 31.5–36.5)
MCV RBC AUTO: 88 FL (ref 78–100)
NONHDLC SERPL-MCNC: 150 MG/DL
PLATELET # BLD AUTO: 228 10E9/L (ref 150–450)
POTASSIUM SERPL-SCNC: 3.5 MMOL/L (ref 3.4–5.3)
PROT SERPL-MCNC: 8.4 G/DL (ref 6.8–8.8)
RBC # BLD AUTO: 5.21 10E12/L (ref 4.4–5.9)
SODIUM SERPL-SCNC: 135 MMOL/L (ref 133–144)
TRIGL SERPL-MCNC: 196 MG/DL
WBC # BLD AUTO: 5.7 10E9/L (ref 4–11)

## 2018-03-05 PROCEDURE — 85027 COMPLETE CBC AUTOMATED: CPT | Performed by: PHYSICIAN ASSISTANT

## 2018-03-05 PROCEDURE — 80053 COMPREHEN METABOLIC PANEL: CPT | Performed by: PHYSICIAN ASSISTANT

## 2018-03-05 PROCEDURE — 36415 COLL VENOUS BLD VENIPUNCTURE: CPT | Performed by: PHYSICIAN ASSISTANT

## 2018-03-05 PROCEDURE — 80061 LIPID PANEL: CPT | Performed by: PHYSICIAN ASSISTANT

## 2018-03-06 ENCOUNTER — OFFICE VISIT (OUTPATIENT)
Dept: DERMATOLOGY | Facility: CLINIC | Age: 38
End: 2018-03-06
Payer: COMMERCIAL

## 2018-03-06 VITALS — DIASTOLIC BLOOD PRESSURE: 86 MMHG | HEART RATE: 75 BPM | SYSTOLIC BLOOD PRESSURE: 143 MMHG | OXYGEN SATURATION: 98 %

## 2018-03-06 DIAGNOSIS — L73.0 ACNE NUCHAE KELOIDALIS: Primary | ICD-10-CM

## 2018-03-06 PROCEDURE — 99213 OFFICE O/P EST LOW 20 MIN: CPT | Performed by: PHYSICIAN ASSISTANT

## 2018-03-06 NOTE — MR AVS SNAPSHOT
After Visit Summary   3/6/2018    Sean Woodard    MRN: [id number]           Patient Information     Date Of Birth          1980        Visit Information        Provider Department      3/6/2018 4:20 PM Sabi Bustos PA-C [facility]        Today's Diagnoses     Acne nuchae keloidalis    -  1       Follow-ups after your visit        Who to contact     If you have questions or need follow up information about today's clinic visit or your schedule please contact [facility] directly at [phone].  Normal or non-critical lab and imaging results will be communicated to you by [employer], letter or phone within 4 business days after the clinic has received the results. If you do not hear from us within 7 days, please contact the clinic through [employer] or phone. If you have a critical or abnormal lab result, we will notify you by phone as soon as possible.  Submit refill requests through [employer] or call your pharmacy and they will forward the refill request to us. Please allow 3 business days for your refill to be completed.          Additional Information About Your Visit        MyChart Information     [employer] gives you secure access to your electronic health record. If you see a primary care provider, you can also send messages to your care team and make appointments. If you have questions, please call your primary care clinic.  If you do not have a primary care provider, please call [phone] and they will assist you.        Care EveryWhere ID     This is your Care EveryWhere ID. This could be used by other organizations to access your [facility] medical records  [id number]        Your Vitals Were     Pulse Pulse Oximetry                75 98%           Blood Pressure from Last 3 Encounters:   03/06/18 143/86   02/06/18 130/83   01/10/18 138/85    Weight from Last 3 Encounters:   05/22/17 123.4 kg (272 lb)   01/30/17 124.7 kg (275 lb)   09/19/16 120.7 kg (266 lb)               Today, you had the following     No orders found for display       Primary Care Provider Office Phone # Fax #    Susanne Alberto PA-C [phone] [address] [facility] [address]        Equal Access to Services     MACKVENICE TIARRA : Hadii pamela cortez hadcindyo Solaryali, waaxda luqadaha, qaybta kaalmada adedeepikayada, kd meyer laJacobnehemiah . So [facility] [phone].    ATENCIÓN: Si habla español, tiene a camacho disposición servicios gratuitos de asistencia lingüística. Llame al [phone].    We comply with applicable federal civil rights laws and [place] laws. We do not discriminate on the basis of race, color, national origin, age, disability, sex, sexual orientation, or gender identity.            Thank you!     Thank you for choosing [facility]  for your care. Our goal is always to provide you with excellent care. Hearing back from our patients is one way we can continue to improve our services. Please take a few minutes to complete the written survey that you may receive in the mail after your visit with us. Thank you!             Your Updated Medication List - Protect others around you: Learn how to safely use, store and throw away your medicines at www.disposemymeds.org.          This list is accurate as of 3/6/18 11:59 PM.  Always use your most recent med list.                   Brand Name Dispense Instructions for use Diagnosis    atorvastatin 20 MG tablet    LIPITOR    90 tablet    TAKE ONE TABLET BY MOUTH EVERY DAY    Hyperlipidemia with target LDL less than 100       augmented betamethasone dipropionate 0.05 % ointment    DIPROLENE-AF    45 g    Apply sparingly to affected area twice daily as needed.  Do not apply to face.    Chronic dermatitis       clindamycin 1 % lotion    CLINDAMAX    60 mL    Apply to AA BID    Acne keloidalis       hydrochlorothiazide 25 MG tablet    HYDRODIURIL    90 tablet    Take 1 tablet (25 mg) by mouth daily     Benign essential hypertension       * ISOtretinoin 40 MG capsule    ACCUTANE    30 capsule    1 tab PO daily with food    Acne nuchae keloidalis       * ISOtretinoin 30 MG Caps     60 capsule    Take 1 capsule by mouth 2 times daily (with meals)        * ISOtretinoin 40 MG capsule    ACCUTANE    60 capsule    Take 1 capsule (40 mg) by mouth 2 times daily (with meals)    Acne nuchae keloidalis       lisinopril 10 MG tablet    PRINIVIL/ZESTRIL    90 tablet    Take 1 tablet (10 mg) by mouth daily    Benign essential hypertension       mupirocin 2 % cream    BACTROBAN    30 g    Apply topically 3 times daily    Rash and nonspecific skin eruption       * sulfamethoxazole-trimethoprim 800-160 MG per tablet    BACTRIM DS    14 tablet    1 tab PO BID    Acne keloidalis       * sulfamethoxazole-trimethoprim 800-160 MG per tablet    BACTRIM DS    60 tablet    Take 1 tablet by mouth 2 times daily    Acne nuchae keloidalis       * Notice:  This list has 5 medication(s) that are the same as other medications prescribed for you. Read the directions carefully, and ask your doctor or other care provider to review them with you.

## 2018-03-06 NOTE — LETTER
3/6/2018         RE: Sean Woodard  [address] [address]        Dear Colleague,    Thank you for referring your patient, Sean Woodard, to the [facility]. Please see a copy of my visit note below.    Sean Woodard is a 38 year old year old male patient here today for recheck acne nuchae keloidalis. He just finished his fifth month isotretinoin. Patient denies any significant improvements in his neck, he continue to have areas that drain. He reports that he has had some stomach pain a few days ago so he stopped medication his stomach pain symptoms have improved. Blood work is normal.  Patient has no other skin complaints today. Remainder of the HPI, Meds, PMH, Allergies, FH, and SH was reviewed in chart.    Pertinent Hx:   Acne Nuchae Keloidalis   Past Medical History:   Diagnosis Date     NO ACTIVE PROBLEMS        Past Surgical History:   Procedure Laterality Date     ORTHOPEDIC SURGERY  1/1/2005    RT knee arthroscopic menisectomy     Thumb Surgery  2009    Mass removal neuroma        Family History   Problem Relation Age of Onset     Heart Defect Father      valve replacement     DIABETES Maternal Grandmother      CANCER Maternal Grandfather      stomach     Hypertension Brother      DIABETES Sister      CEREBROVASCULAR DISEASE Brother 38     Hypertension Brother        Social History     Social History     Marital status:      Spouse name: Joycelyn Stokes     Number of children: 1     Years of education: 12+     Occupational History      [employer]     Social History Main Topics     Smoking status: Former Smoker     Packs/day: 0.50     Years: 5.00     Types: Cigarettes     Quit date: 1/1/2005     Smokeless tobacco: Never Used     Alcohol use Yes      Comment: Social. 1 drink per month     Drug use: No     Sexual activity: Yes     Partners: Female     Other Topics Concern     Parent/Sibling W/ Cabg, Mi Or Angioplasty Before 65f 55m? No      Social History Narrative       Outpatient Encounter Prescriptions as of 3/6/2018   Medication Sig Dispense Refill     ISOtretinoin (ACCUTANE) 40 MG capsule Take 1 capsule (40 mg) by mouth 2 times daily (with meals) 60 capsule 0     ISOtretinoin 30 MG CAPS Take 1 capsule by mouth 2 times daily (with meals) 60 capsule 0     atorvastatin (LIPITOR) 20 MG tablet TAKE ONE TABLET BY MOUTH EVERY DAY 90 tablet 2     ISOtretinoin (ACCUTANE) 40 MG capsule 1 tab PO daily with food 30 capsule 0     hydrochlorothiazide (HYDRODIURIL) 25 MG tablet Take 1 tablet (25 mg) by mouth daily 90 tablet 1     sulfamethoxazole-trimethoprim (BACTRIM DS) 800-160 MG per tablet Take 1 tablet by mouth 2 times daily 60 tablet 2     clindamycin (CLINDAMAX) 1 % lotion Apply to AA BID 60 mL 11     sulfamethoxazole-trimethoprim (BACTRIM DS) 800-160 MG per tablet 1 tab PO BID 14 tablet 0     lisinopril (PRINIVIL/ZESTRIL) 10 MG tablet Take 1 tablet (10 mg) by mouth daily 90 tablet 1     augmented betamethasone dipropionate (DIPROLENE-AF) 0.05 % ointment Apply sparingly to affected area twice daily as needed.  Do not apply to face. 45 g 1     mupirocin (BACTROBAN) 2 % cream Apply topically 3 times daily 30 g 0     No facility-administered encounter medications on file as of 3/6/2018.              Review Of Systems  Skin: As above  Eyes: negative  Ears/Nose/Throat: negative  Respiratory: No shortness of breath, dyspnea on exertion, cough, or hemoptysis  Cardiovascular: negative  Gastrointestinal: negative  Genitourinary: negative  Musculoskeletal: negative  Neurologic: negative  Psychiatric: negative  Hematologic/Lymphatic/Immunologic: negative  Endocrine: negative      O:   NAD, WDWN, Alert & Oriented, Mood & Affect wnl, Vitals stable   Here today alone   /86  Pulse 75  SpO2 98%   General appearance normal   Vitals stable   Alert, oriented and in no acute distress     3 inflammatory weeping papules and scarring on back of neck     Eyes:  Conjunctivae/lids:Normal     ENT: Lips: normal    MSK:Normal    Pulm: Breathing Normal     Neuro/Psych: Orientation:Normal; Mood/Affect:Normal  A/P:  1. Acne Nuchae Keloidalis   1. Acne Keloidalis on posterior neck   Continues to flare despite abx, topicals, ILK. Didn't feel like Bactrim was helpful. Decided to stop isotretinoin due to abdominal pain and not much significant improvements..  Negative tissue culture, biopsy and fungal culture. Had negative aerobic skin culture but + KOH scraping by PCP. Has been on multiple antibiotics, antifungals but nothing has helped.    Discussed laser treatment vs humira which is what Jahaira recommended. I discussed it may be difficult to get humira approved for acne nuchae keloidalis. Patient will go to a laser specialist for a consult.       Standing CBC, CMP and fasting lipids  Ipledge reviewed with patient and Ipledge consent form complete  Patient place in ipledge system  Ipledge: [id number]   Total Dosage: 10,200 mg   Return to clinic 30 days  Dry lips and mouth, minor swelling of the eyelids or lips, crusty skin, nosebleeds, GI upset, or thinning of hair may occur. If any of these effects persist or worsen, tell your doctor or pharmacist promptly.   To relieve dry mouth, suck on (sugarless) hard candy or ice chips, chew (sugarless) gum, drink water.   Remember that your doctor has prescribed this medication because he or she has judged that the benefit to you is greater than the risk of side effects. Many people using this medication do not have serious side effects.   Contact office immediately if you have any of these unlikely but serious side effects: mental/mood adan  es (e.g., depression,  aggressive or violent behavior, and in rare cases, thoughts of suicide), tingling feeling in the skin, quick/severe sun sensitivity, back/joint/muscle pain, signs of infection (e.g., fever, persistent sore throat, painful swallowing, peeling skin on palms/soles.   Isotretinoin may  infrequently cause disease of the pancreatitis, that may rarely be fatal. Stop taking this medication and contact office immediately if you develop: severe stomach pain severe or persistent GI upset,   Stop taking this medication and tell your doctor immediately if you develop these unlikely but very serious side effects: severe headache, vision changes, ear ringing, hearling loss, chest pain, yellowing eyes, skin, dark urine, severe diarrhea, rectal bleeding,   Seek immediate medical attention if you notice any symptoms of a serious allergic reaction.      Accutane is discussed fully with the patient. It is a very effective drug to treat acne vulgaris but has many potential significant side effects. Chief among these are teratogensis, hepatic injury, dyslipidemia and severe drying of the mucous membranes. All of these issues have been discussed in details. Monthly blood tests to monitor lipids and liver functions will be necessary. Expect painful dryness and/or fissuring around the lips, eyes, and other moist areas of the body. Balms may be protective. Contact lens may be too painful to wear temporarily while on this drug. Episodes of significant depression have been reported, including suicidal ideation and attempts in rare cases. It may also cause pseudotumor cerebri and hyperostosis. The patient will report any such changes in mood, depressive symptoms or suicidal thoughts, headaches, joint or bone pains. There is also a possible association with inflammatory bowel disease, although this is unproven at this point.        Again, thank you for allowing me to participate in the care of your patient.        Sincerely,        Sabi Christie PA-C

## 2018-03-06 NOTE — NURSING NOTE
"Chief Complaint   Patient presents with     Derm Problem     recheck accutane       Initial /86  Pulse 75  SpO2 98% Estimated body mass index is 41.36 kg/(m^2) as calculated from the following:    Height as of 5/22/17: 1.727 m (5' 8\").    Weight as of 5/22/17: 123.4 kg (272 lb).  BP completed using cuff size: violet Wright LPN    "

## 2018-03-12 NOTE — PROGRESS NOTES
Sean Woodard is a 38 year old year old male patient here today for recheck acne nuchae keloidalis. He just finished his fifth month isotretinoin. Patient denies any significant improvements in his neck, he continue to have areas that drain. He reports that he has had some stomach pain a few days ago so he stopped medication his stomach pain symptoms have improved. Blood work is normal.  Patient has no other skin complaints today. Remainder of the HPI, Meds, PMH, Allergies, FH, and SH was reviewed in chart.    Pertinent Hx:   Acne Nuchae Keloidalis   Past Medical History:   Diagnosis Date     NO ACTIVE PROBLEMS        Past Surgical History:   Procedure Laterality Date     ORTHOPEDIC SURGERY  1/1/2005    RT knee arthroscopic menisectomy     Thumb Surgery  2009    Mass removal neuroma        Family History   Problem Relation Age of Onset     Heart Defect Father      valve replacement     DIABETES Maternal Grandmother      CANCER Maternal Grandfather      stomach     Hypertension Brother      DIABETES Sister      CEREBROVASCULAR DISEASE Brother 38     Hypertension Brother        Social History     Social History     Marital status:      Spouse name: Joycelyn Stokes     Number of children: 1     Years of education: 12+     Occupational History      Department [employer]     Social History Main Topics     Smoking status: Former Smoker     Packs/day: 0.50     Years: 5.00     Types: Cigarettes     Quit date: 1/1/2005     Smokeless tobacco: Never Used     Alcohol use Yes      Comment: Social. 1 drink per month     Drug use: No     Sexual activity: Yes     Partners: Female     Other Topics Concern     Parent/Sibling W/ Cabg, Mi Or Angioplasty Before 65f 55m? No     Social History Narrative       Outpatient Encounter Prescriptions as of 3/6/2018   Medication Sig Dispense Refill     ISOtretinoin (ACCUTANE) 40 MG capsule Take 1 capsule (40 mg) by mouth 2 times daily (with meals) 60 capsule 0      ISOtretinoin 30 MG CAPS Take 1 capsule by mouth 2 times daily (with meals) 60 capsule 0     atorvastatin (LIPITOR) 20 MG tablet TAKE ONE TABLET BY MOUTH EVERY DAY 90 tablet 2     ISOtretinoin (ACCUTANE) 40 MG capsule 1 tab PO daily with food 30 capsule 0     hydrochlorothiazide (HYDRODIURIL) 25 MG tablet Take 1 tablet (25 mg) by mouth daily 90 tablet 1     sulfamethoxazole-trimethoprim (BACTRIM DS) 800-160 MG per tablet Take 1 tablet by mouth 2 times daily 60 tablet 2     clindamycin (CLINDAMAX) 1 % lotion Apply to AA BID 60 mL 11     sulfamethoxazole-trimethoprim (BACTRIM DS) 800-160 MG per tablet 1 tab PO BID 14 tablet 0     lisinopril (PRINIVIL/ZESTRIL) 10 MG tablet Take 1 tablet (10 mg) by mouth daily 90 tablet 1     augmented betamethasone dipropionate (DIPROLENE-AF) 0.05 % ointment Apply sparingly to affected area twice daily as needed.  Do not apply to face. 45 g 1     mupirocin (BACTROBAN) 2 % cream Apply topically 3 times daily 30 g 0     No facility-administered encounter medications on file as of 3/6/2018.              Review Of Systems  Skin: As above  Eyes: negative  Ears/Nose/Throat: negative  Respiratory: No shortness of breath, dyspnea on exertion, cough, or hemoptysis  Cardiovascular: negative  Gastrointestinal: negative  Genitourinary: negative  Musculoskeletal: negative  Neurologic: negative  Psychiatric: negative  Hematologic/Lymphatic/Immunologic: negative  Endocrine: negative      O:   NAD, WDWN, Alert & Oriented, Mood & Affect wnl, Vitals stable   Here today alone   /86  Pulse 75  SpO2 98%   General appearance normal   Vitals stable   Alert, oriented and in no acute distress     3 inflammatory weeping papules and scarring on back of neck     Eyes: Conjunctivae/lids:Normal     ENT: Lips: normal    MSK:Normal    Pulm: Breathing Normal     Neuro/Psych: Orientation:Normal; Mood/Affect:Normal  A/P:  1. Acne Nuchae Keloidalis   1. Acne Keloidalis on posterior neck   Continues to flare  despite abx, topicals, ILK. Didn't feel like Bactrim was helpful. Decided to stop isotretinoin due to abdominal pain and not much significant improvements..  Negative tissue culture, biopsy and fungal culture. Had negative aerobic skin culture but + KOH scraping by PCP. Has been on multiple antibiotics, antifungals but nothing has helped.    Discussed laser treatment vs humira which is what Jahaira recommended. I discussed it may be difficult to get humira approved for acne nuchae keloidalis. Patient will go to a laser specialist for a consult.       Standing CBC, CMP and fasting lipids  Ipledge reviewed with patient and Ipledge consent form complete  Patient place in ipledge system  Ipledge: [phone]   Total Dosage: 10,200 mg   Return to clinic 30 days  Dry lips and mouth, minor swelling of the eyelids or lips, crusty skin, nosebleeds, GI upset, or thinning of hair may occur. If any of these effects persist or worsen, tell your doctor or pharmacist promptly.   To relieve dry mouth, suck on (sugarless) hard candy or ice chips, chew (sugarless) gum, drink water.   Remember that your doctor has prescribed this medication because he or she has judged that the benefit to you is greater than the risk of side effects. Many people using this medication do not have serious side effects.   Contact office immediately if you have any of these unlikely but serious side effects: mental/mood adan  es (e.g., depression,  aggressive or violent behavior, and in rare cases, thoughts of suicide), tingling feeling in the skin, quick/severe sun sensitivity, back/joint/muscle pain, signs of infection (e.g., fever, persistent sore throat, painful swallowing, peeling skin on palms/soles.   Isotretinoin may infrequently cause disease of the pancreatitis, that may rarely be fatal. Stop taking this medication and contact office immediately if you develop: severe stomach pain severe or persistent GI upset,   Stop taking this medication and  tell your doctor immediately if you develop these unlikely but very serious side effects: severe headache, vision changes, ear ringing, hearling loss, chest pain, yellowing eyes, skin, dark urine, severe diarrhea, rectal bleeding,   Seek immediate medical attention if you notice any symptoms of a serious allergic reaction.      Accutane is discussed fully with the patient. It is a very effective drug to treat acne vulgaris but has many potential significant side effects. Chief among these are teratogensis, hepatic injury, dyslipidemia and severe drying of the mucous membranes. All of these issues have been discussed in details. Monthly blood tests to monitor lipids and liver functions will be necessary. Expect painful dryness and/or fissuring around the lips, eyes, and other moist areas of the body. Balms may be protective. Contact lens may be too painful to wear temporarily while on this drug. Episodes of significant depression have been reported, including suicidal ideation and attempts in rare cases. It may also cause pseudotumor cerebri and hyperostosis. The patient will report any such changes in mood, depressive symptoms or suicidal thoughts, headaches, joint or bone pains. There is also a possible association with inflammatory bowel disease, although this is unproven at this point.

## 2018-03-26 DIAGNOSIS — I10 BENIGN ESSENTIAL HYPERTENSION: ICD-10-CM

## 2018-03-26 RX ORDER — HYDROCHLOROTHIAZIDE 25 MG/1
TABLET ORAL
Qty: 90 TABLET | Refills: 0 | Status: SHIPPED | OUTPATIENT
Start: 2018-03-26 | End: 2018-07-11

## 2018-03-26 NOTE — TELEPHONE ENCOUNTER
"HYDROCHLOROTHIAZIDE 25MG TABS        Last Written Prescription Date:  5/22/17  Last Fill Quantity: 90,   # refills: 1  Last Office Visit: 5/22/17  Future Office visit:       Requested Prescriptions   Pending Prescriptions Disp Refills     hydrochlorothiazide (HYDRODIURIL) 25 MG tablet [Pharmacy Med Name: HYDROCHLOROTHIAZIDE 25MG TABS] 90 tablet 1     Sig: TAKE ONE TABLET BY MOUTH EVERY DAY    Diuretics (Including Combos) Protocol Failed    3/26/2018 10:50 AM       Failed - Blood pressure under 140/90 in past 12 months    BP Readings from Last 3 Encounters:   03/06/18 143/86   02/06/18 130/83   01/10/18 138/85                Passed - Recent (12 mo) or future (30 days) visit within the authorizing provider's specialty    Patient had office visit in the last 12 months or has a visit in the next 30 days with authorizing provider or within the authorizing provider's specialty.  See \"Patient Info\" tab in inbasket, or \"Choose Columns\" in Meds & Orders section of the refill encounter.           Passed - Patient is age 18 or older       Passed - Normal serum creatinine on file in past 12 months    Recent Labs   Lab Test  03/05/18   0832   CR  0.84             Passed - Normal serum potassium on file in past 12 months    Recent Labs   Lab Test  03/05/18   0832   POTASSIUM  3.5                   Passed - Normal serum sodium on file in past 12 months    Recent Labs   Lab Test  03/05/18   0832   NA  135                "

## 2018-04-09 ENCOUNTER — OFFICE VISIT (OUTPATIENT)
Dept: FAMILY MEDICINE | Facility: CLINIC | Age: 38
End: 2018-04-09
Payer: COMMERCIAL

## 2018-04-09 VITALS
BODY MASS INDEX: 40.92 KG/M2 | SYSTOLIC BLOOD PRESSURE: 130 MMHG | DIASTOLIC BLOOD PRESSURE: 86 MMHG | HEIGHT: 68 IN | HEART RATE: 82 BPM | WEIGHT: 270 LBS | TEMPERATURE: 98.3 F

## 2018-04-09 DIAGNOSIS — Z23 NEED FOR VACCINATION: ICD-10-CM

## 2018-04-09 DIAGNOSIS — L02.92 BOIL: ICD-10-CM

## 2018-04-09 DIAGNOSIS — L91.8 SKIN TAG: ICD-10-CM

## 2018-04-09 DIAGNOSIS — I10 BENIGN ESSENTIAL HYPERTENSION: ICD-10-CM

## 2018-04-09 DIAGNOSIS — Z00.01 ENCOUNTER FOR ROUTINE ADULT HEALTH EXAMINATION WITH ABNORMAL FINDINGS: Primary | ICD-10-CM

## 2018-04-09 PROCEDURE — 99395 PREV VISIT EST AGE 18-39: CPT | Mod: 25 | Performed by: PHYSICIAN ASSISTANT

## 2018-04-09 PROCEDURE — 99213 OFFICE O/P EST LOW 20 MIN: CPT | Mod: 25 | Performed by: PHYSICIAN ASSISTANT

## 2018-04-09 PROCEDURE — 90471 IMMUNIZATION ADMIN: CPT | Performed by: PHYSICIAN ASSISTANT

## 2018-04-09 PROCEDURE — 90714 TD VACC NO PRESV 7 YRS+ IM: CPT | Performed by: PHYSICIAN ASSISTANT

## 2018-04-09 NOTE — NURSING NOTE
"Chief Complaint   Patient presents with     Physical       Initial BP (!) 132/92  Pulse 82  Temp 98.3  F (36.8  C) (Tympanic)  Ht 5' 8\" (1.727 m)  Wt 270 lb (122.5 kg)  BMI 41.05 kg/m2 Estimated body mass index is 41.05 kg/(m^2) as calculated from the following:    Height as of this encounter: 5' 8\" (1.727 m).    Weight as of this encounter: 270 lb (122.5 kg).  Medication Reconciliation: complete     Travis Batres CMA    "

## 2018-04-09 NOTE — MR AVS SNAPSHOT
After Visit Summary   4/9/2018    Sean Woodard    MRN: [id number]           Patient Information     Date Of Birth          1980        Visit Information        Provider Department      4/9/2018 4:40 PM Susanne Alberto PA-C [facility] [place] Instructions      Preventive Health Recommendations  Male Ages 26 - 39    Yearly exam:             See your health care provider every year in order to  o   Review health changes.   o   Discuss preventive care.    o   Review your medicines if your doctor has prescribed any.    You should be tested each year for STDs (sexually transmitted diseases), if you re at risk.     After age 35, talk to your provider about cholesterol testing. If you are at risk for heart disease, have your cholesterol tested at least every 5 years.     If you are at risk for diabetes, you should have a diabetes test (fasting glucose).  Shots: Get a flu shot each year. Get a tetanus shot every 10 years.     Nutrition:    Eat at least 5 servings of fruits and vegetables daily.     Eat whole-grain bread, whole-wheat pasta and brown rice instead of white grains and rice.     Talk to your provider about Calcium and Vitamin D.     Lifestyle    Exercise for at least 150 minutes a week (30 minutes a day, 5 days a week). This will help you control your weight and prevent disease.     Limit alcohol to one drink per day.     No smoking.     Wear sunscreen to prevent skin cancer.     See your dentist every six months for an exam and cleaning.             Follow-ups after your visit        Who to contact     Normal or non-critical lab and imaging results will be communicated to you by MyChart, letter or phone within 4 business days after the clinic has received the results. If you do not hear from us within 7 days, please contact the clinic through MyChart or phone. If you have a critical or abnormal lab result, we will notify you by phone as soon as possible.  Submit  "refill requests through zulily or call your pharmacy and they will forward the refill request to us. Please allow 3 business days for your refill to be completed.          If you need to speak with a  for additional information , please call: [phone]             Additional Information About Your Visit        [employer] Information     zulily gives you secure access to your electronic health record. If you see a primary care provider, you can also send messages to your care team and make appointments. If you have questions, please call your primary care clinic.  If you do not have a primary care provider, please call [phone] and they will assist you.        Care EveryWhere ID     This is your Care EveryWhere ID. This could be used by other organizations to access your [facility] medical records  [id number]        Your Vitals Were     Pulse Temperature Height BMI (Body Mass Index)          82 98.3  F (36.8  C) (Tympanic) 5' 8\" (1.727 m) 41.05 kg/m2         Blood Pressure from Last 3 Encounters:   04/09/18 (!) 132/92   03/06/18 143/86   02/06/18 130/83    Weight from Last 3 Encounters:   04/09/18 270 lb (122.5 kg)   05/22/17 272 lb (123.4 kg)   01/30/17 275 lb (124.7 kg)              Today, you had the following     No orders found for display       Primary Care Provider Office Phone # Fax #    Susanne Alberto PA-C [phone] [id number] [facility] [id number]        Equal Access to Services     [facility] AH: Hadii aad ku hadasho Soomaali, waaxda luqadaha, qaybta kaalmada adeegyada, waxemily moncada. So [facility] [phone].    ATENCIÓN: Si jossla jovany, tiene a camacho disposición servicios gratuitos de asistencia lingüística. Llame al [phone].    We comply with applicable federal civil rights laws and [place] laws. We do not discriminate on the basis of race, color, national origin, age, disability, sex, sexual orientation, or " gender identity.            Thank you!     Thank you for choosing [facility]  for your care. Our goal is always to provide you with excellent care. Hearing back from our patients is one way we can continue to improve our services. Please take a few minutes to complete the written survey that you may receive in the mail after your visit with us. Thank you!             Your Updated Medication List - Protect others around you: Learn how to safely use, store and throw away your medicines at www.disposemymeds.org.          This list is accurate as of 4/9/18  5:00 PM.  Always use your most recent med list.                   Brand Name Dispense Instructions for use Diagnosis    atorvastatin 20 MG tablet    LIPITOR    90 tablet    TAKE ONE TABLET BY MOUTH EVERY DAY    Hyperlipidemia with target LDL less than 100       augmented betamethasone dipropionate 0.05 % ointment    DIPROLENE-AF    45 g    Apply sparingly to affected area twice daily as needed.  Do not apply to face.    Chronic dermatitis       clindamycin 1 % lotion    CLINDAMAX    60 mL    Apply to AA BID    Acne keloidalis       hydrochlorothiazide 25 MG tablet    HYDRODIURIL    90 tablet    TAKE ONE TABLET BY MOUTH EVERY DAY    Benign essential hypertension       ISOtretinoin 30 MG Caps     60 capsule    Take 1 capsule by mouth 2 times daily (with meals)        lisinopril 10 MG tablet    PRINIVIL/ZESTRIL    90 tablet    Take 1 tablet (10 mg) by mouth daily    Benign essential hypertension       mupirocin 2 % cream    BACTROBAN    30 g    Apply topically 3 times daily    Rash and nonspecific skin eruption       * sulfamethoxazole-trimethoprim 800-160 MG per tablet    BACTRIM DS    14 tablet    1 tab PO BID    Acne keloidalis       * sulfamethoxazole-trimethoprim 800-160 MG per tablet    BACTRIM DS    60 tablet    Take 1 tablet by mouth 2 times daily    Acne nuchae keloidalis       * Notice:  This list has 2 medication(s) that are the same as other  medications prescribed for you. Read the directions carefully, and ask your doctor or other care provider to review them with you.

## 2018-04-09 NOTE — PROGRESS NOTES
SUBJECTIVE:   CC: Sean Woodard is an 38 year old male who presents for preventative health visit.     Healthy Habits:    Do you get at least three servings of calcium containing foods daily (dairy, green leafy vegetables, etc.)? yes    Amount of exercise or daily activities, outside of work: None    Problems taking medications regularly No    Medication side effects: No    Have you had an eye exam in the past two years? yes    Do you see a dentist twice per year? yes    Do you have sleep apnea, excessive snoring or daytime drowsiness?no       -He has some skin tags he would like removed.   -He noticed a cyst or a boil on his left buttock cheek. He would like it checked out. It bleeds at times and is very tender.   Was bigger and more painful in size initially   Smaller now but still draining  Says it probably doesn't help that he is sitting all day at work    Followed by dermatology for acne nuchae keloidalis  Says they have referred him to a different dermatologist given ongoing issues - says things are better but still without full relief    Today's PHQ-2 Score:   PHQ-2 ( 1999 Pf[employer]) 5/22/2017 8/16/2016   Q1: Little interest or pleasure in doing things 0 0   Q2: Feeling down, depressed or hopeless 0 0   PHQ-2 Score 0 0       Abuse: Current or Past(Physical, Sexual or Emotional)- No  Do you feel safe in your environment - Yes    Social History   Substance Use Topics     Smoking status: Former Smoker     Packs/day: 0.50     Years: 5.00     Types: Cigarettes     Quit date: 1/1/2005     Smokeless tobacco: Never Used     Alcohol use Yes      Comment: Social. 1 drink per month      If you drink alcohol do you typically have >3 drinks per day or >7 drinks per week? No                      Last PSA: No results found for: PSA    Reviewed orders with patient. Reviewed health maintenance and updated orders accordingly - Yes  BP Readings from Last 3 Encounters:   04/09/18 130/86   03/06/18 143/86   02/06/18 130/83    Wt  Readings from Last 3 Encounters:   04/09/18 270 lb (122.5 kg)   05/22/17 272 lb (123.4 kg)   01/30/17 275 lb (124.7 kg)                  Current Outpatient Prescriptions   Medication Sig Dispense Refill     hydrochlorothiazide (HYDRODIURIL) 25 MG tablet TAKE ONE TABLET BY MOUTH EVERY DAY 90 tablet 0     ISOtretinoin 30 MG CAPS Take 1 capsule by mouth 2 times daily (with meals) 60 capsule 0     atorvastatin (LIPITOR) 20 MG tablet TAKE ONE TABLET BY MOUTH EVERY DAY 90 tablet 2     sulfamethoxazole-trimethoprim (BACTRIM DS) 800-160 MG per tablet Take 1 tablet by mouth 2 times daily 60 tablet 2     clindamycin (CLINDAMAX) 1 % lotion Apply to AA BID 60 mL 11     sulfamethoxazole-trimethoprim (BACTRIM DS) 800-160 MG per tablet 1 tab PO BID 14 tablet 0     lisinopril (PRINIVIL/ZESTRIL) 10 MG tablet Take 1 tablet (10 mg) by mouth daily 90 tablet 1     augmented betamethasone dipropionate (DIPROLENE-AF) 0.05 % ointment Apply sparingly to affected area twice daily as needed.  Do not apply to face. 45 g 1     mupirocin (BACTROBAN) 2 % cream Apply topically 3 times daily 30 g 0       Reviewed and updated as needed this visit by clinical staff  Tobacco  Allergies  Meds  Problems  Med Hx  Surg Hx  Fam Hx  Soc Hx          Reviewed and updated as needed this visit by Provider  Allergies  Meds  Problems            ROS:  C: NEGATIVE for fever, chills, change in weight  I: NEGATIVE for worrisome rashes, moles or lesions  E: NEGATIVE for vision changes or irritation  ENT: NEGATIVE for ear, mouth and throat problems  R: NEGATIVE for significant cough or SOB  CV: NEGATIVE for chest pain, palpitations or peripheral edema  GI: NEGATIVE for nausea, abdominal pain, heartburn, or change in bowel habits   male: negative for dysuria, hematuria, decreased urinary stream, erectile dysfunction, urethral discharge  M: NEGATIVE for significant arthralgias or myalgia  N: NEGATIVE for weakness, dizziness or paresthesias  P: NEGATIVE for  "changes in mood or affect    OBJECTIVE:   /86  Pulse 82  Temp 98.3  F (36.8  C) (Tympanic)  Ht 5' 8\" (1.727 m)  Wt 270 lb (122.5 kg)  BMI 41.05 kg/m2  EXAM:  GENERAL: healthy, alert and no distress  EYES: Eyes grossly normal to inspection, PERRL and conjunctivae and sclerae normal  HENT: ear canals and TM's normal, nose and mouth without ulcers or lesions  NECK: no adenopathy, no asymmetry, masses, or scars and thyroid normal to palpation  RESP: lungs clear to auscultation - no rales, rhonchi or wheezes  CV: regular rate and rhythm, normal S1 S2, no S3 or S4, no murmur, click or rub, no peripheral edema and peripheral pulses strong  ABDOMEN: soft, nontender, no hepatosplenomegaly, no masses and bowel sounds normal   (male): normal male genitalia without lesions or urethral discharge, no hernia  MS: no gross musculoskeletal defects noted, no edema  NEURO: Normal strength and tone, mentation intact and speech normal  PSYCH: mentation appears normal, affect normal/bright  SKIN: several skin tags over body - specific ones of concern are on the left side of his neck and right side of his abdomen along his waistline  Over the left butt cheek there is a small puncture sized lesion that continues to drain some blood with trace pus also expressed with pressure. No palpable area of induration or fluctuance noted. No surrounding erythema to suggest secondary infection    ASSESSMENT/PLAN:   (Z00.01) Encounter for routine adult health examination with abnormal findings  (primary encounter diagnosis)  Comment:   Plan:     (L02.92) Boil  Comment: draining. No palpable firmness to suggest persistent abscess. No surrounding erythema to suggest secondary infection  Plan: overall improved and draining on own. Will continue to encourage drainage with heat or warm compresses. Keep an eye on area and return if it increases again in size or becomes more painful    (L91.8) Skin tag  Comment: several skin tags  Plan: will " "schedule a return visit for skin tag removal given size of some of them, may need local anesthetic     (Z23) Need for vaccination  Comment:   Plan: TD (ADULT, 7+) PRESERVE FREE, ADMIN 1st         VACCINE, SCREENING QUESTIONS FOR ADULT         IMMUNIZATIONS            (I10) Benign essential hypertension  Comment:   Plan: recheck bp okay. Continue to monitor    COUNSELING:  Reviewed preventive health counseling, as reflected in patient instructions       Regular exercise       Healthy diet/nutrition       reports that he quit smoking about 13 years ago. His smoking use included Cigarettes. He has a 2.50 pack-year smoking history. He has never used smokeless tobacco.    Estimated body mass index is 41.05 kg/(m^2) as calculated from the following:    Height as of this encounter: 5' 8\" (1.727 m).    Weight as of this encounter: 270 lb (122.5 kg).   Weight management plan: Discussed healthy diet and exercise guidelines and patient will follow up in 12 months in clinic to re-evaluate.    Counseling Resources:  ATP IV Guidelines  Pooled Cohorts Equation Calculator  FRAX Risk Assessment  ICSI Preventive Guidelines  Dietary Guidelines for Americans, 2010  USDA's MyPlate  ASA Prophylaxis  Lung CA Screening    Susanne Alberto PA-C  [facility]  "

## 2018-04-12 ENCOUNTER — TRANSFERRED RECORDS (OUTPATIENT)
Dept: HEALTH INFORMATION MANAGEMENT | Facility: CLINIC | Age: 38
End: 2018-04-12

## 2018-04-23 ENCOUNTER — OFFICE VISIT (OUTPATIENT)
Dept: FAMILY MEDICINE | Facility: CLINIC | Age: 38
End: 2018-04-23
Payer: COMMERCIAL

## 2018-04-23 VITALS
WEIGHT: 271 LBS | DIASTOLIC BLOOD PRESSURE: 83 MMHG | BODY MASS INDEX: 41.07 KG/M2 | HEIGHT: 68 IN | SYSTOLIC BLOOD PRESSURE: 128 MMHG | HEART RATE: 75 BPM

## 2018-04-23 DIAGNOSIS — L91.8 SKIN TAG: Primary | ICD-10-CM

## 2018-04-23 DIAGNOSIS — L02.32 BOIL OF BUTTOCK: ICD-10-CM

## 2018-04-23 PROCEDURE — 11200 RMVL SKIN TAGS UP TO&INC 15: CPT | Performed by: PHYSICIAN ASSISTANT

## 2018-04-23 PROCEDURE — 99213 OFFICE O/P EST LOW 20 MIN: CPT | Mod: 25 | Performed by: PHYSICIAN ASSISTANT

## 2018-04-23 NOTE — MR AVS SNAPSHOT
After Visit Summary   4/23/2018    Sean Woodard    MRN: [id number]           Patient Information     Date Of Birth          1980        Visit Information        Provider Department      4/23/2018 4:40 PM Susanne Alberto PA-C [facility]        Today's Diagnoses     Boil of buttock    -  1       Follow-ups after your visit        Additional Services     GENERAL SURG ADULT REFERRAL       Your provider has referred you to: G: [facility] [phone]   [email or web address]    Please be aware that coverage of these services is subject to the terms and limitations of your health insurance plan.  Call member services at your health plan with any benefit or coverage questions.      Please bring the following with you to your appointment:    (1) Any X-Rays, CTs or MRIs which have been performed.  Contact the facility where they were done to arrange for  prior to your scheduled appointment.   (2) List of current medications   (3) This referral request   (4) Any documents/labs given to you for this referral                  Your next 10 appointments already scheduled     May 22, 2018  4:20 PM CDT   Return Visit with Sabi Bustos PA-C   [facility]    [address] [address] [phone]              Who to contact     Normal or non-critical lab and imaging results will be communicated to you by MyChart, letter or phone within 4 business days after the clinic has received the results. If you do not hear from us within 7 days, please contact the clinic through MyChart or phone. If you have a critical or abnormal lab result, we will notify you by phone as soon as possible.  Submit refill requests through [employer] or call your pharmacy and they will forward the refill request to us. Please allow 3 business days for your refill to be completed.          If you  "need to speak with a  for additional information , please call: [phone]             Additional Information About Your Visit        [employer] Information     [employer] lets you send messages to your doctor, view your test results, renew your prescriptions, schedule appointments and more. To sign up, go to [email or web address] . Click on \"Log in\" on the left side of the screen, which will take you to the Welcome page. Then click on \"Sign up Now\" on the right side of the page.     You will be asked to enter the access code listed below, as well as some personal information. Please follow the directions to create your username and password.     Your access code is: [email or web address]  Expires: 2018  4:52 PM     Your access code will  in 90 days. If you need help or a new code, please call your [place] clinic or [phone].        Care EveryWhere ID     This is your Care EveryWhere ID. This could be used by other organizations to access your [place] medical records  [id number]        Your Vitals Were     Pulse Height BMI (Body Mass Index)             75 5' 8\" (1.727 m) 41.21 kg/m2          Blood Pressure from Last 3 Encounters:   18 128/83   18 130/86   18 143/86    Weight from Last 3 Encounters:   18 271 lb (122.9 kg)   18 270 lb (122.5 kg)   17 272 lb (123.4 kg)              We Performed the Following     GENERAL SURG ADULT REFERRAL        Primary Care Provider Office Phone # Fax #    Susanne Alberto PA-C [phone] [address] [facility] [facility] [place] [address]        Equal Access to Services     [facility] : Diane Lazo, basil still, anand beaulieu, kd moncada. So [facility] [phone].    ATENCIÓN: Si habla español, tiene a camacho disposición servicios gratuitos de asistencia lingüística. Llame al [phone].    We comply with applicable federal civil rights laws and " [place] laws. We do not discriminate on the basis of race, color, national origin, age, disability, sex, sexual orientation, or gender identity.            Thank you!     Thank you for choosing [facility]  for your care. Our goal is always to provide you with excellent care. Hearing back from our patients is one way we can continue to improve our services. Please take a few minutes to complete the written survey that you may receive in the mail after your visit with us. Thank you!             Your Updated Medication List - Protect others around you: Learn how to safely use, store and throw away your medicines at www.disposemymeds.org.          This list is accurate as of 4/23/18  5:04 PM.  Always use your most recent med list.                   Brand Name Dispense Instructions for use Diagnosis    atorvastatin 20 MG tablet    LIPITOR    90 tablet    TAKE ONE TABLET BY MOUTH EVERY DAY    Hyperlipidemia with target LDL less than 100       augmented betamethasone dipropionate 0.05 % ointment    DIPROLENE-AF    45 g    Apply sparingly to affected area twice daily as needed.  Do not apply to face.    Chronic dermatitis       clindamycin 1 % lotion    CLINDAMAX    60 mL    Apply to AA BID    Acne keloidalis       hydrochlorothiazide 25 MG tablet    HYDRODIURIL    90 tablet    TAKE ONE TABLET BY MOUTH EVERY DAY    Benign essential hypertension       ISOtretinoin 30 MG Caps     60 capsule    Take 1 capsule by mouth 2 times daily (with meals)        lisinopril 10 MG tablet    PRINIVIL/ZESTRIL    90 tablet    Take 1 tablet (10 mg) by mouth daily    Benign essential hypertension       mupirocin 2 % cream    BACTROBAN    30 g    Apply topically 3 times daily    Rash and nonspecific skin eruption       * sulfamethoxazole-trimethoprim 800-160 MG per tablet    BACTRIM DS    14 tablet    1 tab PO BID    Acne keloidalis       * sulfamethoxazole-trimethoprim 800-160 MG per tablet    BACTRIM DS    60 tablet    Take 1  tablet by mouth 2 times daily    Acne nuchae keloidalis       * Notice:  This list has 2 medication(s) that are the same as other medications prescribed for you. Read the directions carefully, and ask your doctor or other care provider to review them with you.

## 2018-04-23 NOTE — NURSING NOTE
"Chief Complaint   Patient presents with     Skin Tags       Initial /83  Pulse 75  Ht 5' 8\" (1.727 m)  Wt 271 lb (122.9 kg)  BMI 41.21 kg/m2 Estimated body mass index is 41.21 kg/(m^2) as calculated from the following:    Height as of this encounter: 5' 8\" (1.727 m).    Weight as of this encounter: 271 lb (122.9 kg).  Medication Reconciliation: gama Batres CMA    "

## 2018-04-23 NOTE — PROGRESS NOTES
SUBJECTIVE:   Sean Woodard is a 38 year old male who presents to clinic today for the following health issues:      Patient is here today to get some skin tags removed.  -The spot on his buttock are is still bleeding as well.    Here to remove some skin tags that are getting irritated.   Because of size of one on waistline, felt lidocaine was necessary   Also notes that he has several over his inner thighs as well as under his armpits    Spot on butt continues to bleed - doesn't seem to be healing    Problem list and histories reviewed & adjusted, as indicated.  Additional history: as documented    Current Outpatient Prescriptions   Medication Sig Dispense Refill     atorvastatin (LIPITOR) 20 MG tablet TAKE ONE TABLET BY MOUTH EVERY DAY 90 tablet 2     augmented betamethasone dipropionate (DIPROLENE-AF) 0.05 % ointment Apply sparingly to affected area twice daily as needed.  Do not apply to face. 45 g 1     clindamycin (CLINDAMAX) 1 % lotion Apply to AA BID 60 mL 11     hydrochlorothiazide (HYDRODIURIL) 25 MG tablet TAKE ONE TABLET BY MOUTH EVERY DAY 90 tablet 0     ISOtretinoin 30 MG CAPS Take 1 capsule by mouth 2 times daily (with meals) 60 capsule 0     lisinopril (PRINIVIL/ZESTRIL) 10 MG tablet Take 1 tablet (10 mg) by mouth daily 90 tablet 1     mupirocin (BACTROBAN) 2 % cream Apply topically 3 times daily 30 g 0     sulfamethoxazole-trimethoprim (BACTRIM DS) 800-160 MG per tablet 1 tab PO BID 14 tablet 0     sulfamethoxazole-trimethoprim (BACTRIM DS) 800-160 MG per tablet Take 1 tablet by mouth 2 times daily 60 tablet 2     No Known Allergies  BP Readings from Last 3 Encounters:   04/23/18 128/83   04/09/18 130/86   03/06/18 143/86    Wt Readings from Last 3 Encounters:   04/23/18 271 lb (122.9 kg)   04/09/18 270 lb (122.5 kg)   05/22/17 272 lb (123.4 kg)                    Reviewed and updated as needed this visit by clinical staff       Reviewed and updated as needed this visit by Provider      "    ROS:  Remainder of ROS obtained and found to be negative other than that which was documented above      OBJECTIVE:     /83  Pulse 75  Ht 5' 8\" (1.727 m)  Wt 271 lb (122.9 kg)  BMI 41.21 kg/m2  Body mass index is 41.21 kg/(m^2).  GENERAL: healthy, alert and no distress  SKIN: large skin tag at waistline on right side of abdomen  Several skin tags over inner thighs b/l  Also several skin tags underneath both armpits    Diagnostic Test Results:  none     ASSESSMENT/PLAN:     (L91.8) Skin tag  (primary encounter diagnosis)  Comment: Discussed with patient removal of skin tags. He would really like the one on his right abdomen removed given it gets easily irritated. Given size we did use liodcaine.   Also wondering if a few others could be removed, however patient got very nervous and anxious with removal of first and given the number of skin tags he has I didn't feel he could tolerate further excision/removal. Discussed he could return to have some frozen if he thinks he may better tolerate that  Plan: REMOVAL OF SKIN TAGS, FIRST 15          Informed consent obtained - will be scanned into his chart  Using 1% lidocaine (<1cc) was inject into the base of the large skin tag over the right abdomen  Using forceps to grab the skin tag, the lesion was removed using scissors.   Pressure dressing applied.   Also removed one larger skin tag with skinny base from inner thigh but given patient's anxiety chose to leave others    (L02.32) Boil of buttock  Comment: nonhealing. Patient followed by dermatology for skin issues on back of neck. Suspect this could be similar but will not close. Seems far enough away from anus that I don't think it is tracking but cannot be certain. Given location and difficuly in keeping area clean, would like him to see surgery to discuss if removal would be an option or not  Plan: GENERAL SURG ADULT REFERRAL                Susanne Alberto PA-C  [facility]    "

## 2018-04-27 DIAGNOSIS — L73.0 ACNE KELOIDALIS: ICD-10-CM

## 2018-04-27 NOTE — TELEPHONE ENCOUNTER
"Requested Prescriptions   Pending Prescriptions Disp Refills     clindamycin (CLINDAMAX) 1 % lotion  Last Written Prescription Date:  04/24/2017  Last Fill Quantity: 60 ml,  # refills: 11   Last office visit: 04/23/2018with prescribing provider:  hasmukh   Future Office Visit:   Next 5 appointments (look out 90 days)     May 22, 2018  4:20 PM CDT   [facility] Sabi Bustos [facility]-C   [facility]    [address] [address]   [phone]                  60 mL 11     Sig: Apply to AA BID    Topical Acne Medications Protocol Passed    4/27/2018  1:39 PM       Passed - Patient is 12 years of age or older       Passed - Recent (12 mo) or future (30 days) visit within the authorizing provider's specialty    Patient had office visit in the last 12 months or has a visit in the next 30 days with authorizing provider or within the authorizing provider's specialty.  See \"Patient Info\" tab in inbasket, or \"Choose Columns\" in Meds & Orders section of the refill encounter.            Regis Garcia RT (R)    "

## 2018-04-30 ENCOUNTER — OFFICE VISIT (OUTPATIENT)
Dept: SURGERY | Facility: CLINIC | Age: 38
End: 2018-04-30
Payer: COMMERCIAL

## 2018-04-30 VITALS
TEMPERATURE: 98.3 F | BODY MASS INDEX: 41.07 KG/M2 | DIASTOLIC BLOOD PRESSURE: 96 MMHG | RESPIRATION RATE: 18 BRPM | WEIGHT: 271 LBS | HEIGHT: 68 IN | SYSTOLIC BLOOD PRESSURE: 148 MMHG | HEART RATE: 88 BPM

## 2018-04-30 DIAGNOSIS — L73.2 HIDRADENITIS SUPPURATIVA: Primary | ICD-10-CM

## 2018-04-30 PROCEDURE — 99203 OFFICE O/P NEW LOW 30 MIN: CPT | Performed by: SURGERY

## 2018-04-30 RX ORDER — AMOXICILLIN AND CLAVULANATE POTASSIUM 500; 125 MG/1; MG/1
1 TABLET, FILM COATED ORAL 2 TIMES DAILY
Qty: 20 TABLET | Refills: 0 | Status: SHIPPED | OUTPATIENT
Start: 2018-04-30 | End: 2018-04-30

## 2018-04-30 NOTE — MR AVS SNAPSHOT
"              After Visit Summary   4/30/2018    Sean Woodard    MRN: [id number]           Patient Information     Date Of Birth          1980        Visit Information        Provider Department      4/30/2018 3:30 PM Derrick James MD [facility]        Today's Diagnoses     Hidradenitis suppurativa    -  1      Care Instructions    If you have questions or concerns on any instructions given to you by your provider today or if you need to schedule an appointment, you can reach us at [phone].                         Follow-ups after your visit        Your next 10 appointments already scheduled     May 22, 2018  4:20 PM CDT   Return Visit with Sabi Bustos PA-C   [facility] ([facility]    [address] [address] [phone]              Who to contact     If you have questions or need follow up information about today's clinic visit or your schedule please contact [facility] directly at [phone].  Normal or non-critical lab and imaging results will be communicated to you by [employer], letter or phone within 4 business days after the clinic has received the results. If you do not hear from us within 7 days, please contact the clinic through [employer] or phone. If you have a critical or abnormal lab result, we will notify you by phone as soon as possible.  Submit refill requests through [employer] or call your pharmacy and they will forward the refill request to us. Please allow 3 business days for your refill to be completed.          Additional Information About Your Visit        [employer] Information     [employer] lets you send messages to your doctor, view your test results, renew your prescriptions, schedule appointments and more. To sign up, go to [email or web address] . Click on \"Log in\" on the left side of the screen, which will take you to the Welcome page. Then click on \"Sign up Now\" on the right side of the " "page.     You will be asked to enter the access code listed below, as well as some personal information. Please follow the directions to create your username and password.     Your access code is: [email or web address]  Expires: 2018  4:52 PM     Your access code will  in 90 days. If you need help or a new code, please call your [place] clinic or [phone].        Care EveryWhere ID     This is your Care EveryWhere ID. This could be used by other organizations to access your [place] medical records  [id number]        Your Vitals Were     Pulse Temperature Respirations Height BMI (Body Mass Index)       88 98.3  F (36.8  C) 18 1.727 m (5' 8\") 41.21 kg/m2        Blood Pressure from Last 3 Encounters:   18 (!) 148/96   18 128/83   18 130/86    Weight from Last 3 Encounters:   18 122.9 kg (271 lb)   18 122.9 kg (271 lb)   18 122.5 kg (270 lb)              Today, you had the following     No orders found for display       Primary Care Provider Office Phone # Fax #    Susanne Alberto PA-C [phone] [address] [facility] [facility] [address]        Equal Access to Services     VINNIE MAYEN : Hadii pamela ku hadasho Soomaali, waaxda luqadaha, qaybta kaalmada adeegyada, kd moncada. So [facility] [phone].    ATENCIÓN: Si habla español, tiene a camacho disposición servicios gratuitos de asistencia lingüística. Llame al [phone].    We comply with applicable federal civil rights laws and [place] laws. We do not discriminate on the basis of race, color, national origin, age, disability, sex, sexual orientation, or gender identity.            Thank you!     Thank you for choosing [facility]  for your care. Our goal is always to provide you with excellent care. Hearing back from our patients is one way we can continue to improve our services. Please take a few minutes to complete the written survey that you may receive " in the mail after your visit with us. Thank you!             Your Updated Medication List - Protect others around you: Learn how to safely use, store and throw away your medicines at www.disposemymeds.org.          This list is accurate as of 4/30/18  5:02 PM.  Always use your most recent med list.                   Brand Name Dispense Instructions for use Diagnosis    atorvastatin 20 MG tablet    LIPITOR    90 tablet    TAKE ONE TABLET BY MOUTH EVERY DAY    Hyperlipidemia with target LDL less than 100       augmented betamethasone dipropionate 0.05 % ointment    DIPROLENE-AF    45 g    Apply sparingly to affected area twice daily as needed.  Do not apply to face.    Chronic dermatitis       clindamycin 1 % lotion    CLINDAMAX    60 mL    Apply to AA BID    Acne keloidalis       hydrochlorothiazide 25 MG tablet    HYDRODIURIL    90 tablet    TAKE ONE TABLET BY MOUTH EVERY DAY    Benign essential hypertension       ISOtretinoin 30 MG Caps     60 capsule    Take 1 capsule by mouth 2 times daily (with meals)        lisinopril 10 MG tablet    PRINIVIL/ZESTRIL    90 tablet    Take 1 tablet (10 mg) by mouth daily    Benign essential hypertension       mupirocin 2 % cream    BACTROBAN    30 g    Apply topically 3 times daily    Rash and nonspecific skin eruption       * sulfamethoxazole-trimethoprim 800-160 MG per tablet    BACTRIM DS    14 tablet    1 tab PO BID    Acne keloidalis       * sulfamethoxazole-trimethoprim 800-160 MG per tablet    BACTRIM DS    60 tablet    Take 1 tablet by mouth 2 times daily    Acne nuchae keloidalis       * Notice:  This list has 2 medication(s) that are the same as other medications prescribed for you. Read the directions carefully, and ask your doctor or other care provider to review them with you.

## 2018-04-30 NOTE — PATIENT INSTRUCTIONS
If you have questions or concerns on any instructions given to you by your provider today or if you need to schedule an appointment, you can reach us at [phone].

## 2018-04-30 NOTE — LETTER
4/30/2018         RE: Sean Woodard  [address] [facility] [id number]        Dear Colleague,    Thank you for referring your patient, Sean Woodard, to the [facility]. Please see a copy of my visit note below.    PCP:  Susanne Alberto    Chief complaint: Recurrent skin infections    History of Present Illness: Patient is a 38-year-old man who presents to the surgical clinic for evaluation of 2 different skin lesions. Firstly, he's had an ongoing problem for the last 2 years or so with recurrent infections along the back of his neck. He's been seen in the dermatology clinic for this and was even referred to a laser center, but they were unable to help him. He has an appointment back in the dermatology clinic in about 3 weeks. He has tried antibiotics and topicals without success. He's never had any surgical treatment of these lesions.    Secondly, he has a lesion on his left buttock fairly distal to the anus. This is been a problem for about 2 months. It opens up and will drain and then we will try to heal in starch drain again. He doesn't recall ever having anything like this previous. No change in his bowel habits. No blood in stool. No history of any inflammatory bowel disease.    His past medical history is unremarkable.    Histories:  Past Medical History:   Diagnosis Date     NO ACTIVE PROBLEMS        Past Surgical History:   Procedure Laterality Date     ORTHOPEDIC SURGERY  1/1/2005    RT knee arthroscopic menisectomy     Thumb Surgery  2009    Mass removal neuroma       Family History   Problem Relation Age of Onset     Heart Defect Father      valve replacement     DIABETES Maternal Grandmother      CANCER Maternal Grandfather      stomach     Hypertension Brother      DIABETES Sister      CEREBROVASCULAR DISEASE Brother 38     Hypertension Brother        Social History   Substance Use Topics     Smoking status: Former Smoker     Packs/day: 0.50     Years: 5.00     Types:  Cigarettes     Quit date: 1/1/2005     Smokeless tobacco: Never Used     Alcohol use Yes      Comment: Social. 1 drink per month       Current Outpatient Prescriptions   Medication Sig Dispense Refill     amoxicillin-clavulanate (AUGMENTIN) 500-125 MG per tablet Take 1 tablet by mouth 2 times daily 20 tablet 0     atorvastatin (LIPITOR) 20 MG tablet TAKE ONE TABLET BY MOUTH EVERY DAY 90 tablet 2     augmented betamethasone dipropionate (DIPROLENE-AF) 0.05 % ointment Apply sparingly to affected area twice daily as needed.  Do not apply to face. 45 g 1     clindamycin (CLINDAMAX) 1 % lotion Apply to AA BID 60 mL 11     hydrochlorothiazide (HYDRODIURIL) 25 MG tablet TAKE ONE TABLET BY MOUTH EVERY DAY 90 tablet 0     ISOtretinoin 30 MG CAPS Take 1 capsule by mouth 2 times daily (with meals) 60 capsule 0     lisinopril (PRINIVIL/ZESTRIL) 10 MG tablet Take 1 tablet (10 mg) by mouth daily 90 tablet 1     mupirocin (BACTROBAN) 2 % cream Apply topically 3 times daily 30 g 0     sulfamethoxazole-trimethoprim (BACTRIM DS) 800-160 MG per tablet 1 tab PO BID 14 tablet 0     sulfamethoxazole-trimethoprim (BACTRIM DS) 800-160 MG per tablet Take 1 tablet by mouth 2 times daily 60 tablet 2       No Known Allergies    Images:  No results found for this or any previous visit (from the past 744 hour(s)).    Labs:  Results for orders placed or performed in visit on 03/05/18   CBC with platelets   Result Value Ref Range    WBC 5.7 4.0 - 11.0 10e9/L    RBC Count 5.21 4.4 - 5.9 10e12/L    Hemoglobin 15.9 13.3 - 17.7 g/dL    Hematocrit 45.7 40.0 - 53.0 %    MCV 88 78 - 100 fl    MCH 30.5 26.5 - 33.0 pg    MCHC 34.8 31.5 - 36.5 g/dL    RDW 12.6 10.0 - 15.0 %    Platelet Count 228 150 - 450 10e9/L   Comprehensive metabolic panel   Result Value Ref Range    Sodium 135 133 - 144 mmol/L    Potassium 3.5 3.4 - 5.3 mmol/L    Chloride 101 94 - 109 mmol/L    Carbon Dioxide 28 20 - 32 mmol/L    Anion Gap 6 3 - 14 mmol/L    Glucose 122 (H) 70 - 99  "mg/dL    Urea Nitrogen 11 7 - 30 mg/dL    Creatinine 0.84 0.66 - 1.25 mg/dL    GFR Estimate >90 >60 mL/min/1.7m2    GFR Estimate If Black >90 >60 mL/min/1.7m2    Calcium 9.0 8.5 - 10.1 mg/dL    Bilirubin Total 0.8 0.2 - 1.3 mg/dL    Albumin 3.9 3.4 - 5.0 g/dL    Protein Total 8.4 6.8 - 8.8 g/dL    Alkaline Phosphatase 69 40 - 150 U/L    ALT 60 0 - 70 U/L    AST 35 0 - 45 U/L   Lipid Profile   Result Value Ref Range    Cholesterol 192 <200 mg/dL    Triglycerides 196 (H) <150 mg/dL    HDL Cholesterol 42 >39 mg/dL    LDL Cholesterol Calculated 111 (H) <100 mg/dL    Non HDL Cholesterol 150 (H) <130 mg/dL       ROS:  Constitutional - Denies fevers, weight loss, malaise, lethargy  Neuro - Denies tremors or seizures  Pulmon - Denies SOB, dyspnea, hemoptysis, chronic cough or use of an inhaler  CV - Denies CP, SOB, lower extremity edema, difficulty w/ stairs, has never used NTG  GI - Denies hematemesis, BRBPR, melena, chronic diarrhea or epigastric pain   - Denies hematuria, difficulty voiding, h/o STDs  Hematology - Denies blood clotting disorders, chronic anemias  Dermatology - No melanomas or skin cancers  Rheumatology - No h/o RA  Pysch - Denies depression, bipolar d/o or schizophrenia    BP (!) 148/96 (BP Location: Left arm, Patient Position: Chair, Cuff Size: Adult Large)  Pulse 88  Temp 98.3  F (36.8  C)  Resp 18  Ht 1.727 m (5' 8\")  Wt 122.9 kg (271 lb)  BMI 41.21 kg/m2    Exam:  General - Alert and Oriented X4, NAD, well nourished  HEENT - Normocephalic, atraumatic  Neck - supple, no LAD,  Lungs -respirations unlabored, chest wall excursion normal   CV - Heart RRR,  Abdomen - Soft, non-tender  Groins - deferred   Rectal - rectal exam deferred, but there is a 1 cm in diameter slightly raised lesion located on the left buttock cheek about 6-7 cm away from the anus. I tried to probe this with a wire but could not get it to go anywhere. The patient had a fair amount of pain. There was no purulence noted no " evidence of undrained pus  Neuro - Full ROM, Strength 5/5 and major muscle groups, sensation intact  Extremities - No cyanosis, clubbing or edema.  Skin: On the back of his neck is a linear area of what appears to be hidradenitis. There is multiple openings on the skin which part her healed but over toward the left side there is a prominent area with some discolored skin. This is probably 6-8 mm in diameter. No obvious fluctuance. This looks to be chronically inflamed tissue.      Assessment and Plan: This patient presents with 2 separate and difficult problems. Regarding the problem on his neck, I don't think that there is anything surgically I can offer him other than excision which would require an operating room in leave him with a big open wound that will take a long time to heal, especially given the location on the back of the neck. I think his best bet is to continue with his plan to see dermatology, and if that fails I think he might consider seeing a plastic surgeon. This might require excision with a flap or skin graft in order to heal effectively.    Secondly, the lesion on his buttock appears to represent a simple cyst. I don't think it communicates to his anus, but I'm unable to prove that in the office. I offered him excision of this lesion and just to leave the skin open, but he elected to defer that for the time being. He is a little concerned about having to sit on an open wound.    The plan from his standpoint will be to see dermatology in the come back to see me if they can't offer him any treatment of the buttock lesion. I told him I'll be happy to see him even on and off clinic today to excise this lesion.    Patient is agreeable with this plan. I will see him back in about 3 weeks if necessary.        Derrick James MD FACS            Again, thank you for allowing me to participate in the care of your patient.        Sincerely,        Derrick James MD

## 2018-04-30 NOTE — PROGRESS NOTES
PCP:  Susanne Alberto    Chief complaint: Recurrent skin infections    History of Present Illness: Patient is a 38-year-old man who presents to the surgical clinic for evaluation of 2 different skin lesions. Firstly, he's had an ongoing problem for the last 2 years or so with recurrent infections along the back of his neck. He's been seen in the dermatology clinic for this and was even referred to a laser center, but they were unable to help him. He has an appointment back in the dermatology clinic in about 3 weeks. He has tried antibiotics and topicals without success. He's never had any surgical treatment of these lesions.    Secondly, he has a lesion on his left buttock fairly distal to the anus. This is been a problem for about 2 months. It opens up and will drain and then we will try to heal in starch drain again. He doesn't recall ever having anything like this previous. No change in his bowel habits. No blood in stool. No history of any inflammatory bowel disease.    His past medical history is unremarkable.    Histories:  Past Medical History:   Diagnosis Date     NO ACTIVE PROBLEMS        Past Surgical History:   Procedure Laterality Date     ORTHOPEDIC SURGERY  1/1/2005    RT knee arthroscopic menisectomy     Thumb Surgery  2009    Mass removal neuroma       Family History   Problem Relation Age of Onset     Heart Defect Father      valve replacement     DIABETES Maternal Grandmother      CANCER Maternal Grandfather      stomach     Hypertension Brother      DIABETES Sister      CEREBROVASCULAR DISEASE Brother 38     Hypertension Brother        Social History   Substance Use Topics     Smoking status: Former Smoker     Packs/day: 0.50     Years: 5.00     Types: Cigarettes     Quit date: 1/1/2005     Smokeless tobacco: Never Used     Alcohol use Yes      Comment: Social. 1 drink per month       Current Outpatient Prescriptions   Medication Sig Dispense Refill     amoxicillin-clavulanate  (AUGMENTIN) 500-125 MG per tablet Take 1 tablet by mouth 2 times daily 20 tablet 0     atorvastatin (LIPITOR) 20 MG tablet TAKE ONE TABLET BY MOUTH EVERY DAY 90 tablet 2     augmented betamethasone dipropionate (DIPROLENE-AF) 0.05 % ointment Apply sparingly to affected area twice daily as needed.  Do not apply to face. 45 g 1     clindamycin (CLINDAMAX) 1 % lotion Apply to AA BID 60 mL 11     hydrochlorothiazide (HYDRODIURIL) 25 MG tablet TAKE ONE TABLET BY MOUTH EVERY DAY 90 tablet 0     ISOtretinoin 30 MG CAPS Take 1 capsule by mouth 2 times daily (with meals) 60 capsule 0     lisinopril (PRINIVIL/ZESTRIL) 10 MG tablet Take 1 tablet (10 mg) by mouth daily 90 tablet 1     mupirocin (BACTROBAN) 2 % cream Apply topically 3 times daily 30 g 0     sulfamethoxazole-trimethoprim (BACTRIM DS) 800-160 MG per tablet 1 tab PO BID 14 tablet 0     sulfamethoxazole-trimethoprim (BACTRIM DS) 800-160 MG per tablet Take 1 tablet by mouth 2 times daily 60 tablet 2       No Known Allergies    Images:  No results found for this or any previous visit (from the past 744 hour(s)).    Labs:  Results for orders placed or performed in visit on 03/05/18   CBC with platelets   Result Value Ref Range    WBC 5.7 4.0 - 11.0 10e9/L    RBC Count 5.21 4.4 - 5.9 10e12/L    Hemoglobin 15.9 13.3 - 17.7 g/dL    Hematocrit 45.7 40.0 - 53.0 %    MCV 88 78 - 100 fl    MCH 30.5 26.5 - 33.0 pg    MCHC 34.8 31.5 - 36.5 g/dL    RDW 12.6 10.0 - 15.0 %    Platelet Count 228 150 - 450 10e9/L   Comprehensive metabolic panel   Result Value Ref Range    Sodium 135 133 - 144 mmol/L    Potassium 3.5 3.4 - 5.3 mmol/L    Chloride 101 94 - 109 mmol/L    Carbon Dioxide 28 20 - 32 mmol/L    Anion Gap 6 3 - 14 mmol/L    Glucose 122 (H) 70 - 99 mg/dL    Urea Nitrogen 11 7 - 30 mg/dL    Creatinine 0.84 0.66 - 1.25 mg/dL    GFR Estimate >90 >60 mL/min/1.7m2    GFR Estimate If Black >90 >60 mL/min/1.7m2    Calcium 9.0 8.5 - 10.1 mg/dL    Bilirubin Total 0.8 0.2 - 1.3 mg/dL     "Albumin 3.9 3.4 - 5.0 g/dL    Protein Total 8.4 6.8 - 8.8 g/dL    Alkaline Phosphatase 69 40 - 150 U/L    ALT 60 0 - 70 U/L    AST 35 0 - 45 U/L   Lipid Profile   Result Value Ref Range    Cholesterol 192 <200 mg/dL    Triglycerides 196 (H) <150 mg/dL    HDL Cholesterol 42 >39 mg/dL    LDL Cholesterol Calculated 111 (H) <100 mg/dL    Non HDL Cholesterol 150 (H) <130 mg/dL       ROS:  Constitutional - Denies fevers, weight loss, malaise, lethargy  Neuro - Denies tremors or seizures  Pulmon - Denies SOB, dyspnea, hemoptysis, chronic cough or use of an inhaler  CV - Denies CP, SOB, lower extremity edema, difficulty w/ stairs, has never used NTG  GI - Denies hematemesis, BRBPR, melena, chronic diarrhea or epigastric pain   - Denies hematuria, difficulty voiding, h/o STDs  Hematology - Denies blood clotting disorders, chronic anemias  Dermatology - No melanomas or skin cancers  Rheumatology - No h/o RA  Pysch - Denies depression, bipolar d/o or schizophrenia    BP (!) 148/96 (BP Location: Left arm, Patient Position: Chair, Cuff Size: Adult Large)  Pulse 88  Temp 98.3  F (36.8  C)  Resp 18  Ht 1.727 m (5' 8\")  Wt 122.9 kg (271 lb)  BMI 41.21 kg/m2    Exam:  General - Alert and Oriented X4, NAD, well nourished  HEENT - Normocephalic, atraumatic  Neck - supple, no LAD,  Lungs -respirations unlabored, chest wall excursion normal   CV - Heart RRR,  Abdomen - Soft, non-tender  Groins - deferred   Rectal - rectal exam deferred, but there is a 1 cm in diameter slightly raised lesion located on the left buttock cheek about 6-7 cm away from the anus. I tried to probe this with a wire but could not get it to go anywhere. The patient had a fair amount of pain. There was no purulence noted no evidence of undrained pus  Neuro - Full ROM, Strength 5/5 and major muscle groups, sensation intact  Extremities - No cyanosis, clubbing or edema.  Skin: On the back of his neck is a linear area of what appears to be hidradenitis. There " is multiple openings on the skin which part her healed but over toward the left side there is a prominent area with some discolored skin. This is probably 6-8 mm in diameter. No obvious fluctuance. This looks to be chronically inflamed tissue.      Assessment and Plan: This patient presents with 2 separate and difficult problems. Regarding the problem on his neck, I don't think that there is anything surgically I can offer him other than excision which would require an operating room in leave him with a big open wound that will take a long time to heal, especially given the location on the back of the neck. I think his best bet is to continue with his plan to see dermatology, and if that fails I think he might consider seeing a plastic surgeon. This might require excision with a flap or skin graft in order to heal effectively.    Secondly, the lesion on his buttock appears to represent a simple cyst. I don't think it communicates to his anus, but I'm unable to prove that in the office. I offered him excision of this lesion and just to leave the skin open, but he elected to defer that for the time being. He is a little concerned about having to sit on an open wound.    The plan from his standpoint will be to see dermatology in the come back to see me if they can't offer him any treatment of the buttock lesion. I told him I'll be happy to see him even on and off clinic today to excise this lesion.    Patient is agreeable with this plan. I will see him back in about 3 weeks if necessary.        Derrick James MD FACS

## 2018-04-30 NOTE — NURSING NOTE
"Chief Complaint   Patient presents with     Consult For     boil on LT buttock x2 months and lump on back of neck       Initial BP (!) 148/96 (BP Location: Left arm, Patient Position: Chair, Cuff Size: Adult Large)  Pulse 88  Temp 98.3  F (36.8  C)  Resp 18  Ht 1.727 m (5' 8\")  Wt 122.9 kg (271 lb)  BMI 41.21 kg/m2 Estimated body mass index is 41.21 kg/(m^2) as calculated from the following:    Height as of this encounter: 1.727 m (5' 8\").    Weight as of this encounter: 122.9 kg (271 lb).  Medication Reconciliation: complete     Amy Rangel MA      "

## 2018-05-01 RX ORDER — CLINDAMYCIN PHOSPHATE 10 UG/ML
LOTION TOPICAL
Qty: 60 ML | Refills: 11 | Status: SHIPPED | OUTPATIENT
Start: 2018-05-01 | End: 2021-10-21

## 2018-05-22 ENCOUNTER — OFFICE VISIT (OUTPATIENT)
Dept: DERMATOLOGY | Facility: CLINIC | Age: 38
End: 2018-05-22
Payer: COMMERCIAL

## 2018-05-22 ENCOUNTER — TELEPHONE (OUTPATIENT)
Dept: DERMATOLOGY | Facility: CLINIC | Age: 38
End: 2018-05-22

## 2018-05-22 VITALS — DIASTOLIC BLOOD PRESSURE: 82 MMHG | HEART RATE: 85 BPM | SYSTOLIC BLOOD PRESSURE: 131 MMHG | OXYGEN SATURATION: 98 %

## 2018-05-22 DIAGNOSIS — L73.2 HIDRADENITIS SUPPURATIVA: Primary | ICD-10-CM

## 2018-05-22 LAB
ALBUMIN SERPL-MCNC: 3.8 G/DL (ref 3.4–5)
ALP SERPL-CCNC: 66 U/L (ref 40–150)
ALT SERPL W P-5'-P-CCNC: 55 U/L (ref 0–70)
ANION GAP SERPL CALCULATED.3IONS-SCNC: 7 MMOL/L (ref 3–14)
AST SERPL W P-5'-P-CCNC: 36 U/L (ref 0–45)
BILIRUB SERPL-MCNC: 0.9 MG/DL (ref 0.2–1.3)
BUN SERPL-MCNC: 14 MG/DL (ref 7–30)
CALCIUM SERPL-MCNC: 9.3 MG/DL (ref 8.5–10.1)
CHLORIDE SERPL-SCNC: 101 MMOL/L (ref 94–109)
CO2 SERPL-SCNC: 28 MMOL/L (ref 20–32)
CREAT SERPL-MCNC: 0.99 MG/DL (ref 0.66–1.25)
ERYTHROCYTE [DISTWIDTH] IN BLOOD BY AUTOMATED COUNT: 12.6 % (ref 10–15)
GFR SERPL CREATININE-BSD FRML MDRD: 85 ML/MIN/1.7M2
GLUCOSE SERPL-MCNC: 119 MG/DL (ref 70–99)
HCT VFR BLD AUTO: 45.6 % (ref 40–53)
HGB BLD-MCNC: 15.8 G/DL (ref 13.3–17.7)
MCH RBC QN AUTO: 30.9 PG (ref 26.5–33)
MCHC RBC AUTO-ENTMCNC: 34.6 G/DL (ref 31.5–36.5)
MCV RBC AUTO: 89 FL (ref 78–100)
PLATELET # BLD AUTO: 228 10E9/L (ref 150–450)
POTASSIUM SERPL-SCNC: 3.5 MMOL/L (ref 3.4–5.3)
PROT SERPL-MCNC: 8.5 G/DL (ref 6.8–8.8)
RBC # BLD AUTO: 5.12 10E12/L (ref 4.4–5.9)
SODIUM SERPL-SCNC: 136 MMOL/L (ref 133–144)
WBC # BLD AUTO: 7.1 10E9/L (ref 4–11)

## 2018-05-22 PROCEDURE — 99213 OFFICE O/P EST LOW 20 MIN: CPT | Performed by: PHYSICIAN ASSISTANT

## 2018-05-22 PROCEDURE — 80053 COMPREHEN METABOLIC PANEL: CPT | Performed by: PHYSICIAN ASSISTANT

## 2018-05-22 PROCEDURE — 86480 TB TEST CELL IMMUN MEASURE: CPT | Performed by: PHYSICIAN ASSISTANT

## 2018-05-22 PROCEDURE — 85027 COMPLETE CBC AUTOMATED: CPT | Performed by: PHYSICIAN ASSISTANT

## 2018-05-22 PROCEDURE — 36415 COLL VENOUS BLD VENIPUNCTURE: CPT | Performed by: PHYSICIAN ASSISTANT

## 2018-05-22 NOTE — LETTER
5/22/2018         RE: Sean Woodard  [address] [facility] [address]        Dear Colleague,    Thank you for referring your patient, Sean Woodard, to the [facility]. Please see a copy of my visit note below.    Sean Woodard is a 38 year old year old male patient here today for recheck neck, axilla, and new cyst on buttocks. He reports that cyst on buttocks have been present since February. He reports that it will get tender and drain. He has tried oral antibiotics, accutane with little improvement. He is interested in trying Humira. He denies any heart disease or history of lymphoma.   Patient has no other skin complaints today.  Remainder of the HPI, Meds, PMH, Allergies, FH, and SH was reviewed in chart.   Past Medical History:   Diagnosis Date     NO ACTIVE PROBLEMS        Past Surgical History:   Procedure Laterality Date     ORTHOPEDIC SURGERY  1/1/2005    RT knee arthroscopic menisectomy     Thumb Surgery  2009    Mass removal neuroma        Family History   Problem Relation Age of Onset     Heart Defect Father      valve replacement     DIABETES Maternal Grandmother      CANCER Maternal Grandfather      stomach     Hypertension Brother      DIABETES Sister      CEREBROVASCULAR DISEASE Brother 38     Hypertension Brother        Social History     Social History     Marital status:      Spouse name: Joycelyn Stokes     Number of children: 1     Years of education: 12+     Occupational History      Department [employer] [place]     Social History Main Topics     Smoking status: Former Smoker     Packs/day: 0.50     Years: 5.00     Types: Cigarettes     Quit date: 1/1/2005     Smokeless tobacco: Never Used     Alcohol use Yes      Comment: Social. 1 drink per month     Drug use: No     Sexual activity: Yes     Partners: Female     Other Topics Concern     Parent/Sibling W/ Cabg, Mi Or Angioplasty Before 65f 55m? No     Social History Narrative       Outpatient Encounter  Prescriptions as of 5/22/2018   Medication Sig Dispense Refill     adalimumab (HUMIRA) 40 MG/0.8ML prefilled syringe kit Inject 160 mg (Day 1), followed by 80 mg two weeks later (Day 15). Begin 40 mg weekly dosing two weeks later (Day 29). 3 kit 1     atorvastatin (LIPITOR) 20 MG tablet TAKE ONE TABLET BY MOUTH EVERY DAY 90 tablet 2     augmented betamethasone dipropionate (DIPROLENE-AF) 0.05 % ointment Apply sparingly to affected area twice daily as needed.  Do not apply to face. 45 g 1     clindamycin (CLINDAMAX) 1 % lotion Apply to AA BID 60 mL 11     hydrochlorothiazide (HYDRODIURIL) 25 MG tablet TAKE ONE TABLET BY MOUTH EVERY DAY 90 tablet 0     ISOtretinoin 30 MG CAPS Take 1 capsule by mouth 2 times daily (with meals) 60 capsule 0     lisinopril (PRINIVIL/ZESTRIL) 10 MG tablet Take 1 tablet (10 mg) by mouth daily 90 tablet 1     mupirocin (BACTROBAN) 2 % cream Apply topically 3 times daily 30 g 0     sulfamethoxazole-trimethoprim (BACTRIM DS) 800-160 MG per tablet Take 1 tablet by mouth 2 times daily 60 tablet 2     sulfamethoxazole-trimethoprim (BACTRIM DS) 800-160 MG per tablet 1 tab PO BID 14 tablet 0     No facility-administered encounter medications on file as of 5/22/2018.              Review Of Systems  Skin: As above  Eyes: negative  Ears/Nose/Throat: negative  Respiratory: No shortness of breath, dyspnea on exertion, cough, or hemoptysis  Cardiovascular: negative  Gastrointestinal: negative  Genitourinary: negative  Musculoskeletal: negative  Neurologic: negative  Psychiatric: negative  Hematologic/Lymphatic/Immunologic: negative  Endocrine: negative      O:   NAD, WDWN, Alert & Oriented, Mood & Affect wnl, Vitals stable   Here today alone   /82  Pulse 85  SpO2 98%   General appearance normal   Vitals stable   Alert, oriented and in no acute distress      Open cyst area on left side of buttock x 2   3 inflammatory weeping papules and scarring on back of neck     Eyes: Conjunctivae/lids:Normal      ENT: Lips: normal    MSK:[place]    Pulm: Breathing Normal    Neuro/Psych: Orientation:Normal; Mood/Affect:Normal  A/P:  1. Hidradenitis Suppurativa on buttock and axilla and neck   Continues to flare despite abx, topicals, ILK, and accutane. Didn't feel like Bactrim=  was helpful. Decided to stop isotretinoin due to abdominal pain and not much significant improvements..  Negative tissue culture, biopsy and fungal culture. Had negative aerobic skin culture but + KOH scraping by PCP. Has been on multiple antibiotics, antifungals but nothing has helped.    He was recently seen by Dr. Nassar who felt that his neck was more consistent with hidradenitis.     Discussed Humira, risks including increased risk of skin cancer, immunosuppression.     Will check CBC, CMP, and M Tuberculosis by [facility]     Start Humira dosing for HS. Informational brochure on Humira was given to patient, ambassador program information was given to patient.      Return in three months.     Again, thank you for allowing me to participate in the care of your patient.        Sincerely,        Sabi Christie PA-C

## 2018-05-22 NOTE — NURSING NOTE
"Initial /82  Pulse 85  SpO2 98% Estimated body mass index is 41.21 kg/(m^2) as calculated from the following:    Height as of 4/30/18: 1.727 m (5' 8\").    Weight as of 4/30/18: 122.9 kg (271 lb). .      "

## 2018-05-22 NOTE — MR AVS SNAPSHOT
"              After Visit Summary   5/22/2018    Sean Woodard    MRN: [id number]           Patient Information     Date Of Birth          1980        Visit Information        Provider Department      5/22/2018 4:20 PM Sabi Bustos PA-C [facility]        Today's Diagnoses     Hidradenitis suppurativa    -  1       Follow-ups after your visit        Your next 10 appointments already scheduled     Aug 21, 2018  4:20 PM CDT   Return Visit with Sabi Bustos PA-C   [facility] ([facility])    [address] [address]   [phone]              Who to contact     If you have questions or need follow up information about today's clinic visit or your schedule please contact [facility] directly at [phone].  Normal or non-critical lab and imaging results will be communicated to you by MyChart, letter or phone within 4 business days after the clinic has received the results. If you do not hear from us within 7 days, please contact the clinic through MyChart or phone. If you have a critical or abnormal lab result, we will notify you by phone as soon as possible.  Submit refill requests through [employer] or call your pharmacy and they will forward the refill request to us. Please allow 3 business days for your refill to be completed.          Additional Information About Your Visit        MyChart Information     [employer] lets you send messages to your doctor, view your test results, renew your prescriptions, schedule appointments and more. To sign up, go to [email or web address] . Click on \"Log in\" on the left side of the screen, which will take you to the Welcome page. Then click on \"Sign up Now\" on the right side of the page.     You will be asked to enter the access code listed below, as well as some personal information. Please follow the directions to create your username and password.     Your access code is: " [email or web address]  Expires: 2018  4:52 PM     Your access code will  in 90 days. If you need help or a new code, please call your [place] clinic or [phone].        Care EveryWhere ID     This is your Care EveryWhere ID. This could be used by other organizations to access your [place] medical records  [id number]        Your Vitals Were     Pulse Pulse Oximetry                85 98%           Blood Pressure from Last 3 Encounters:   18 131/82   18 (!) 148/96   18 128/83    Weight from Last 3 Encounters:   18 122.9 kg (271 lb)   18 122.9 kg (271 lb)   18 122.5 kg (270 lb)              We Performed the Following     CBC with platelets     Comprehensive metabolic panel     M Tuberculosis by Quantiferon          Today's Medication Changes          These changes are accurate as of 18 11:59 PM.  If you have any questions, ask your nurse or doctor.               Start taking these medicines.        Dose/Directions    adalimumab 40 MG/0.8ML pen kit   Commonly known as:  HUMIRA PEN   Used for:  Hidradenitis suppurativa   Started by:  Sabi Bustos PA-C        Inject 160 mg (Day 1), followed by 80 mg two weeks later (Day 15). Begin 40 mg weekly dosing two weeks later (Day 29).   Quantity:  3 kit   Refills:  2            Where to get your medicines      Call your pharmacy to confirm that your medication is ready for pickup. It may take up to 24 hours for them to receive the prescription. If the prescription is not ready within 3 business days, please contact your clinic or your provider.     We will let you know when these medications are ready. If you don't hear back within 3 business days, please contact us.     adalimumab 40 MG/0.8ML pen kit                Primary Care Provider Office Phone # Fax #    Susanne Alberto PA-C [phone] [address] [address]        Equal Access to Services     VINNIE MAYEN AH: Diane santiago  diego Lazo, wadarwin tafoyacarissaha, qaybta kaalmada christofer, kd jamesin hayaaalex albertsdeeipka hannahmaria eugenia laJacobnehemiah antwan. So [facility] [phone].    ATENCIÓN: Si jossla jovany, tiene a camacho disposición servicios gratuitos de asistencia lingüística. Winsome al [phone].    We comply with applicable federal civil rights laws and [place] laws. We do not discriminate on the basis of race, color, national origin, age, disability, sex, sexual orientation, or gender identity.            Thank you!     Thank you for choosing [facility]  for your care. Our goal is always to provide you with excellent care. Hearing back from our patients is one way we can continue to improve our services. Please take a few minutes to complete the written survey that you may receive in the mail after your visit with us. Thank you!             Your Updated Medication List - Protect others around you: Learn how to safely use, store and throw away your medicines at www.disposemymeds.org.          This list is accurate as of 5/22/18 11:59 PM.  Always use your most recent med list.                   Brand Name Dispense Instructions for use Diagnosis    adalimumab 40 MG/0.8ML pen kit    HUMIRA PEN    3 kit    Inject 160 mg (Day 1), followed by 80 mg two weeks later (Day 15). Begin 40 mg weekly dosing two weeks later (Day 29).    Hidradenitis suppurativa       atorvastatin 20 MG tablet    LIPITOR    90 tablet    TAKE ONE TABLET BY MOUTH EVERY DAY    Hyperlipidemia with target LDL less than 100       augmented betamethasone dipropionate 0.05 % ointment    DIPROLENE-AF    45 g    Apply sparingly to affected area twice daily as needed.  Do not apply to face.    Chronic dermatitis       clindamycin 1 % lotion    CLINDAMAX    60 mL    Apply to AA BID    Acne keloidalis       hydrochlorothiazide 25 MG tablet    HYDRODIURIL    90 tablet    TAKE ONE TABLET BY MOUTH EVERY DAY    Benign essential hypertension       ISOtretinoin 30 MG Caps     60 capsule     Take 1 capsule by mouth 2 times daily (with meals)        lisinopril 10 MG tablet    PRINIVIL/ZESTRIL    90 tablet    Take 1 tablet (10 mg) by mouth daily    Benign essential hypertension       mupirocin 2 % cream    BACTROBAN    30 g    Apply topically 3 times daily    Rash and nonspecific skin eruption       * sulfamethoxazole-trimethoprim 800-160 MG per tablet    BACTRIM DS    14 tablet    1 tab PO BID    Acne keloidalis       * sulfamethoxazole-trimethoprim 800-160 MG per tablet    BACTRIM DS    60 tablet    Take 1 tablet by mouth 2 times daily    Acne nuchae keloidalis       * Notice:  This list has 2 medication(s) that are the same as other medications prescribed for you. Read the directions carefully, and ask your doctor or other care provider to review them with you.

## 2018-05-23 NOTE — PROGRESS NOTES
Sean Woodard is a 38 year old year old male patient here today for recheck neck, axilla, and new cyst on buttocks. He reports that cyst on buttocks have been present since February. He reports that it will get tender and drain. He has tried oral antibiotics, accutane with little improvement. He is interested in trying Humira. He denies any heart disease or history of lymphoma.   Patient has no other skin complaints today.  Remainder of the HPI, Meds, PMH, Allergies, FH, and SH was reviewed in chart.   Past Medical History:   Diagnosis Date     NO ACTIVE PROBLEMS        Past Surgical History:   Procedure Laterality Date     ORTHOPEDIC SURGERY  1/1/2005    RT knee arthroscopic menisectomy     Thumb Surgery  2009    Mass removal neuroma        Family History   Problem Relation Age of Onset     Heart Defect Father      valve replacement     DIABETES Maternal Grandmother      CANCER Maternal Grandfather      stomach     Hypertension Brother      DIABETES Sister      CEREBROVASCULAR DISEASE Brother 38     Hypertension Brother        Social History     Social History     Marital status:      Spouse name: Joycelyn Stokes     Number of children: 1     Years of education: 12+     Occupational History      [employer]     Social History Main Topics     Smoking status: Former Smoker     Packs/day: 0.50     Years: 5.00     Types: Cigarettes     Quit date: 1/1/2005     Smokeless tobacco: Never Used     Alcohol use Yes      Comment: Social. 1 drink per month     Drug use: No     Sexual activity: Yes     Partners: Female     Other Topics Concern     Parent/Sibling W/ Cabg, Mi Or Angioplasty Before 65f 55m? No     Social History Narrative       Outpatient Encounter Prescriptions as of 5/22/2018   Medication Sig Dispense Refill     adalimumab (HUMIRA) 40 MG/0.8ML prefilled syringe kit Inject 160 mg (Day 1), followed by 80 mg two weeks later (Day 15). Begin 40 mg weekly dosing two weeks later (Day 29).  3 kit 1     atorvastatin (LIPITOR) 20 MG tablet TAKE ONE TABLET BY MOUTH EVERY DAY 90 tablet 2     augmented betamethasone dipropionate (DIPROLENE-AF) 0.05 % ointment Apply sparingly to affected area twice daily as needed.  Do not apply to face. 45 g 1     clindamycin (CLINDAMAX) 1 % lotion Apply to AA BID 60 mL 11     hydrochlorothiazide (HYDRODIURIL) 25 MG tablet TAKE ONE TABLET BY MOUTH EVERY DAY 90 tablet 0     ISOtretinoin 30 MG CAPS Take 1 capsule by mouth 2 times daily (with meals) 60 capsule 0     lisinopril (PRINIVIL/ZESTRIL) 10 MG tablet Take 1 tablet (10 mg) by mouth daily 90 tablet 1     mupirocin (BACTROBAN) 2 % cream Apply topically 3 times daily 30 g 0     sulfamethoxazole-trimethoprim (BACTRIM DS) 800-160 MG per tablet Take 1 tablet by mouth 2 times daily 60 tablet 2     sulfamethoxazole-trimethoprim (BACTRIM DS) 800-160 MG per tablet 1 tab PO BID 14 tablet 0     No facility-administered encounter medications on file as of 5/22/2018.              Review Of Systems  Skin: As above  Eyes: negative  Ears/Nose/Throat: negative  Respiratory: No shortness of breath, dyspnea on exertion, cough, or hemoptysis  Cardiovascular: negative  Gastrointestinal: negative  Genitourinary: negative  Musculoskeletal: negative  Neurologic: negative  Psychiatric: negative  Hematologic/Lymphatic/Immunologic: negative  Endocrine: negative      O:   NAD, WDWN, Alert & Oriented, Mood & Affect wnl, Vitals stable   Here today alone   /82  Pulse 85  SpO2 98%   General appearance normal   Vitals stable   Alert, oriented and in no acute distress      Open cyst area on left side of buttock x 2   3 inflammatory weeping papules and scarring on back of neck     Eyes: Conjunctivae/lids:Normal     ENT: Lips: normal    MSK:[place]cetta    Pulm: Breathing Normal    Neuro/Psych: Orientation:Normal; Mood/Affect:Normal  A/P:  1. Hidradenitis Suppurativa on buttock and axilla and neck   Continues to flare despite abx, topicals, ILK, and  accutane. Didn't feel like Bactrim=  was helpful. Decided to stop isotretinoin due to abdominal pain and not much significant improvements..  Negative tissue culture, biopsy and fungal culture. Had negative aerobic skin culture but + KOH scraping by PCP. Has been on multiple antibiotics, antifungals but nothing has helped.    He was recently seen by Dr. Nassar who felt that his neck was more consistent with hidradenitis.     Discussed Humira, risks including increased risk of skin cancer, immunosuppression.     Will check CBC, CMP, and M Tuberculosis by Quantiferon     Start Humira dosing for HS. Informational brochure on Humira was given to patient, ambassador program information was given to patient.      Return in three months.

## 2018-05-23 NOTE — TELEPHONE ENCOUNTER
Prior Authorization Approval    Authorization Effective Date: 4/23/2018  Authorization Expiration Date: 5/23/2019  Medication: adalimumab (Humira) 40mg/ 0.8mL pens (HS start kit & maintenance) - Approved  Approved Dose/Quantity:   Reference #: CMM key# [id number]   Insurance Company: [employer] - Phone [phone] Fax [phone]  Expected CoPay: $65     CoPay Card Available: Yes   Foundation Assistance Needed:    Which Pharmacy is filling the prescription (Not needed for infusion/clinic administered): [facility] - [place], [place] - [facility] [address]  Pharmacy Notified: Yes  Patient Notified: Yes

## 2018-05-23 NOTE — TELEPHONE ENCOUNTER
PA Initiation    Medication: adalimumab (Humira) 40mg/ 0.8mL pens (HS start kit & maintenance)  Insurance Company: [employer] - Phone [phone] Fax [phone]  Pharmacy Filling the Rx: [facility] [place], [place] - [address]  Filling Pharmacy Phone:    Filling Pharmacy Fax:    Start Date: 5/23/2018

## 2018-05-24 ENCOUNTER — TELEPHONE (OUTPATIENT)
Dept: DERMATOLOGY | Facility: CLINIC | Age: 38
End: 2018-05-24

## 2018-05-24 DIAGNOSIS — L73.2 HIDRADENITIS SUPPURATIVA: ICD-10-CM

## 2018-05-24 LAB
M TB TUBERC IFN-G BLD QL: ABNORMAL
M TB TUBERC IFN-G/MITOGEN IGNF BLD: 0 IU/ML

## 2018-05-24 NOTE — TELEPHONE ENCOUNTER
Requesting new rx for Humira be sent to [facility]    Fax #: [phone]    Denise Behrendt  Specialty CSS

## 2018-05-24 NOTE — TELEPHONE ENCOUNTER
RX changed and sent to [facility].  Marlene BILL RN BSN PHN  Specialty Clinics

## 2018-05-29 ENCOUNTER — HOSPITAL ENCOUNTER (OUTPATIENT)
Dept: GENERAL RADIOLOGY | Facility: CLINIC | Age: 38
Discharge: HOME | End: 2018-05-29
Attending: PHYSICIAN ASSISTANT | Admitting: PHYSICIAN ASSISTANT
Payer: COMMERCIAL

## 2018-05-29 DIAGNOSIS — L73.2 HIDRADENITIS SUPPURATIVA: ICD-10-CM

## 2018-05-29 PROCEDURE — 71046 X-RAY EXAM CHEST 2 VIEWS: CPT

## 2018-05-30 ENCOUNTER — ALLIED HEALTH/NURSE VISIT (OUTPATIENT)
Dept: FAMILY MEDICINE | Facility: CLINIC | Age: 38
End: 2018-05-30
Payer: COMMERCIAL

## 2018-05-30 DIAGNOSIS — L73.2 HIDRADENITIS SUPPURATIVA: ICD-10-CM

## 2018-05-30 DIAGNOSIS — Z11.1 SCREENING EXAMINATION FOR PULMONARY TUBERCULOSIS: Primary | ICD-10-CM

## 2018-05-30 PROCEDURE — 86580 TB INTRADERMAL TEST: CPT

## 2018-05-30 PROCEDURE — 99207 ZZC NO CHARGE NURSE ONLY: CPT

## 2018-05-30 NOTE — MR AVS SNAPSHOT
"              After Visit Summary   5/30/2018    Sean Woodard    MRN: [id number]           Patient Information     Date Of Birth          1980        Visit Information        Provider Department      5/30/2018 4:15 PM LITO GRAVES CMA/LPN [facility]        Today's Diagnoses     Screening examination for pulmonary tuberculosis    -  1    Hidradenitis suppurativa           Follow-ups after your visit        Your next 10 appointments already scheduled     Jun 01, 2018  3:30 PM CDT   Nurse Only with [place] HU RN   [facility] ([facility])    [address]  [facility] [address] [phone]            Aug 21, 2018  4:20 PM CDT   Return Visit with Sabi Bustos PA-C   [facility] ([facility])    [address] [id number] [phone]              Who to contact     Normal or non-critical lab and imaging results will be communicated to you by [employer], letter or phone within 4 business days after the clinic has received the results. If you do not hear from us within 7 days, please contact the clinic through MyChart or phone. If you have a critical or abnormal lab result, we will notify you by phone as soon as possible.  Submit refill requests through [employer] or call your pharmacy and they will forward the refill request to us. Please allow 3 business days for your refill to be completed.          If you need to speak with a  for additional information , please call: [phone]             Additional Information About Your Visit        [employer] Information     [employer] lets you send messages to your doctor, view your test results, renew your prescriptions, schedule appointments and more. To sign up, go to [email or web address] . Click on \"Log in\" on the left side of the screen, which will take you to the Welcome page. Then click on \"Sign up Now\" on the right side of the page.     You will be asked to enter the " access code listed below, as well as some personal information. Please follow the directions to create your username and password.     Your access code is: [email or web address]  Expires: 2018  4:52 PM     Your access code will  in 90 days. If you need help or a new code, please call your [place] clinic or [phone].        Care EveryWhere ID     This is your Care EveryWhere ID. This could be used by other organizations to access your [place] medical records  [id number]         Blood Pressure from Last 3 Encounters:   18 131/82   18 (!) 148/96   18 128/83    Weight from Last 3 Encounters:   18 271 lb (122.9 kg)   18 271 lb (122.9 kg)   18 270 lb (122.5 kg)              We Performed the Following     TB INTRADERMAL TEST        Primary Care Provider Office Phone # Fax #    Susanne Alberto PA-C [phone] [address] [address]        Equal Access to Services     [facility]: Hadii aad ku hadasho Solaryali, waaxda luqadaha, qaybta kaalmada christofer, kd mccullough . So [facility] [phone].    ATENCIÓN: Si habla español, tiene a camacho disposición servicios gratuitos de asistencia lingüística. Winsome al [phone].    We comply with applicable federal civil rights laws and [place] laws. We do not discriminate on the basis of race, color, national origin, age, disability, sex, sexual orientation, or gender identity.            Thank you!     Thank you for choosing [facility]  for your care. Our goal is always to provide you with excellent care. Hearing back from our patients is one way we can continue to improve our services. Please take a few minutes to complete the written survey that you may receive in the mail after your visit with us. Thank you!             Your Updated Medication List - Protect others around you: Learn how to safely use, store and throw away your medicines at  www.disposemymeds.org.          This list is accurate as of 5/30/18  4:33 PM.  Always use your most recent med list.                   Brand Name Dispense Instructions for use Diagnosis    adalimumab 40 MG/0.8ML pen kit    HUMIRA PEN    3 kit    Inject 160 mg (Day 1), followed by 80 mg two weeks later (Day 15). Begin 40 mg weekly dosing two weeks later (Day 29).    Hidradenitis suppurativa       atorvastatin 20 MG tablet    LIPITOR    90 tablet    TAKE ONE TABLET BY MOUTH EVERY DAY    Hyperlipidemia with target LDL less than 100       augmented betamethasone dipropionate 0.05 % ointment    DIPROLENE-AF    45 g    Apply sparingly to affected area twice daily as needed.  Do not apply to face.    Chronic dermatitis       clindamycin 1 % lotion    CLINDAMAX    60 mL    Apply to AA BID    Acne keloidalis       hydrochlorothiazide 25 MG tablet    HYDRODIURIL    90 tablet    TAKE ONE TABLET BY MOUTH EVERY DAY    Benign essential hypertension       ISOtretinoin 30 MG Caps     60 capsule    Take 1 capsule by mouth 2 times daily (with meals)        lisinopril 10 MG tablet    PRINIVIL/ZESTRIL    90 tablet    Take 1 tablet (10 mg) by mouth daily    Benign essential hypertension       mupirocin 2 % cream    BACTROBAN    30 g    Apply topically 3 times daily    Rash and nonspecific skin eruption       * sulfamethoxazole-trimethoprim 800-160 MG per tablet    BACTRIM DS    14 tablet    1 tab PO BID    Acne keloidalis       * sulfamethoxazole-trimethoprim 800-160 MG per tablet    BACTRIM DS    60 tablet    Take 1 tablet by mouth 2 times daily    Acne nuchae keloidalis       * Notice:  This list has 2 medication(s) that are the same as other medications prescribed for you. Read the directions carefully, and ask your doctor or other care provider to review them with you.

## 2018-06-01 ENCOUNTER — ALLIED HEALTH/NURSE VISIT (OUTPATIENT)
Dept: FAMILY MEDICINE | Facility: CLINIC | Age: 38
End: 2018-06-01
Payer: COMMERCIAL

## 2018-06-01 DIAGNOSIS — Z11.1 SCREENING EXAMINATION FOR PULMONARY TUBERCULOSIS: Primary | ICD-10-CM

## 2018-06-01 LAB
PPDINDURATION: 0 MM (ref 0–5)
PPDREDNESS: 0 MM

## 2018-06-01 PROCEDURE — 99207 ZZC NO CHARGE NURSE ONLY: CPT

## 2018-06-01 NOTE — MR AVS SNAPSHOT
"              After Visit Summary   6/1/2018    Sean Woodard    MRN: [id number]           Patient Information     Date Of Birth          1980        Visit Information        Provider Department      6/1/2018 3:30 PM [place] HU RN [facility]        Today's Diagnoses     Screening examination for pulmonary tuberculosis    -  1       Follow-ups after your visit        Your next 10 appointments already scheduled     Aug 21, 2018  4:20 PM CDT   Return Visit with Sabi Bustos PA-C   [facility] ([facility]    [address] [address] [phone]              Who to contact     Normal or non-critical lab and imaging results will be communicated to you by MyChart, letter or phone within 4 business days after the clinic has received the results. If you do not hear from us within 7 days, please contact the clinic through MyChart or phone. If you have a critical or abnormal lab result, we will notify you by phone as soon as possible.  Submit refill requests through madKast or call your pharmacy and they will forward the refill request to us. Please allow 3 business days for your refill to be completed.          If you need to speak with a  for additional information , please call: [phone]             Additional Information About Your Visit        madKast Information     madKast lets you send messages to your doctor, view your test results, renew your prescriptions, schedule appointments and more. To sign up, go to [email or web address] . Click on \"Log in\" on the left side of the screen, which will take you to the Welcome page. Then click on \"Sign up Now\" on the right side of the page.     You will be asked to enter the access code listed below, as well as some personal information. Please follow the directions to create your username and password.     Your access code is: [email or web address]  Expires: 7/22/2018  4:52 PM     Your access " code will  in 90 days. If you need help or a new code, please call your [place] clinic or [phone].        Care EveryWhere ID     This is your Care EveryWhere ID. This could be used by other organizations to access your [place] medical records  [id number]         Blood Pressure from Last 3 Encounters:   18 131/82   18 (!) 148/96   18 128/83    Weight from Last 3 Encounters:   18 271 lb (122.9 kg)   18 271 lb (122.9 kg)   18 270 lb (122.5 kg)              Today, you had the following     No orders found for display       Primary Care Provider Office Phone # Fax #    Susanne Alberto PA-C [phone] [address] [facility] [address]        Equal Access to Services     [facility]: Hadii pamela ku hadasho Soomaali, waaxda luqadaha, qaybta kaalmada adeegyada, kd cardona haynehemiah mccullough . So [facility] [phone].    ATENCIÓN: Si habla español, tiene a camacho disposición servicios gratuitos de asistencia lingüística. Llame al [phone].    We comply with applicable federal civil rights laws and [place] laws. We do not discriminate on the basis of race, color, national origin, age, disability, sex, sexual orientation, or gender identity.            Thank you!     Thank you for choosing [facility]  for your care. Our goal is always to provide you with excellent care. Hearing back from our patients is one way we can continue to improve our services. Please take a few minutes to complete the written survey that you may receive in the mail after your visit with us. Thank you!             Your Updated Medication List - Protect others around you: Learn how to safely use, store and throw away your medicines at www.disposemymeds.org.          This list is accurate as of 18  4:11 PM.  Always use your most recent med list.                   Brand Name Dispense Instructions for use Diagnosis    adalimumab 40 MG/0.8ML pen kit     HUMIRA PEN    3 kit    Inject 160 mg (Day 1), followed by 80 mg two weeks later (Day 15). Begin 40 mg weekly dosing two weeks later (Day 29).    Hidradenitis suppurativa       atorvastatin 20 MG tablet    LIPITOR    90 tablet    TAKE ONE TABLET BY MOUTH EVERY DAY    Hyperlipidemia with target LDL less than 100       augmented betamethasone dipropionate 0.05 % ointment    DIPROLENE-AF    45 g    Apply sparingly to affected area twice daily as needed.  Do not apply to face.    Chronic dermatitis       clindamycin 1 % lotion    CLINDAMAX    60 mL    Apply to AA BID    Acne keloidalis       hydrochlorothiazide 25 MG tablet    HYDRODIURIL    90 tablet    TAKE ONE TABLET BY MOUTH EVERY DAY    Benign essential hypertension       ISOtretinoin 30 MG Caps     60 capsule    Take 1 capsule by mouth 2 times daily (with meals)        lisinopril 10 MG tablet    PRINIVIL/ZESTRIL    90 tablet    Take 1 tablet (10 mg) by mouth daily    Benign essential hypertension       mupirocin 2 % cream    BACTROBAN    30 g    Apply topically 3 times daily    Rash and nonspecific skin eruption       * sulfamethoxazole-trimethoprim 800-160 MG per tablet    BACTRIM DS    14 tablet    1 tab PO BID    Acne keloidalis       * sulfamethoxazole-trimethoprim 800-160 MG per tablet    BACTRIM DS    60 tablet    Take 1 tablet by mouth 2 times daily    Acne nuchae keloidalis       * Notice:  This list has 2 medication(s) that are the same as other medications prescribed for you. Read the directions carefully, and ask your doctor or other care provider to review them with you.

## 2018-06-04 DIAGNOSIS — L73.2 HIDRADENITIS SUPPURATIVA: ICD-10-CM

## 2018-06-19 ENCOUNTER — MEDICAL CORRESPONDENCE (OUTPATIENT)
Dept: HEALTH INFORMATION MANAGEMENT | Facility: CLINIC | Age: 38
End: 2018-06-19

## 2018-07-11 DIAGNOSIS — I10 BENIGN ESSENTIAL HYPERTENSION: ICD-10-CM

## 2018-07-11 RX ORDER — HYDROCHLOROTHIAZIDE 25 MG/1
TABLET ORAL
Qty: 90 TABLET | Refills: 1 | Status: SHIPPED | OUTPATIENT
Start: 2018-07-11 | End: 2018-10-01

## 2018-07-11 NOTE — TELEPHONE ENCOUNTER
"HYDROCHLOROTHIAZIDE 25MG TABS        Last Written Prescription Date:  3/26/18  Last Fill Quantity: 90,   # refills: 0  Last Office Visit: 4/23/18  Future Office visit:    Next 5 appointments (look out 90 days)     Aug 21, 2018  4:20 PM CDT   Return Visit with Sabi Bustos PA-C   [facility]    [address] [address] [phone]                   Requested Prescriptions   Pending Prescriptions Disp Refills     hydrochlorothiazide (HYDRODIURIL) 25 MG tablet [Pharmacy Med Name: HYDROCHLOROTHIAZIDE 25MG TABS] 90 tablet 0     Sig: TAKE ONE TABLET BY MOUTH EVERY DAY    Diuretics (Including Combos) Protocol Passed    7/11/2018  9:27 AM       Passed - Blood pressure under 140/90 in past 12 months    BP Readings from Last 3 Encounters:   05/22/18 131/82   04/30/18 (!) 148/96   04/23/18 128/83                Passed - Recent (12 mo) or future (30 days) visit within the authorizing provider's specialty    Patient had office visit in the last 12 months or has a visit in the next 30 days with authorizing provider or within the authorizing provider's specialty.  See \"Patient Info\" tab in inbasket, or \"Choose Columns\" in Meds & Orders section of the refill encounter.           Passed - Patient is age 18 or older       Passed - Normal serum creatinine on file in past 12 months    Recent Labs   Lab Test  05/22/18   1656   CR  0.99             Passed - Normal serum potassium on file in past 12 months    Recent Labs   Lab Test  05/22/18   1656   POTASSIUM  3.5                   Passed - Normal serum sodium on file in past 12 months    Recent Labs   Lab Test  05/22/18   1656   NA  136                  "

## 2018-07-16 DIAGNOSIS — E78.5 HYPERLIPIDEMIA WITH TARGET LDL LESS THAN 100: ICD-10-CM

## 2018-07-16 RX ORDER — ATORVASTATIN CALCIUM 20 MG/1
TABLET, FILM COATED ORAL
Qty: 90 TABLET | Refills: 1 | Status: SHIPPED | OUTPATIENT
Start: 2018-07-16 | End: 2019-01-08

## 2018-07-16 NOTE — TELEPHONE ENCOUNTER
"ATORVASTATIN CALCIUM 20MG TABS        Last Written Prescription Date:  9/27/17  Last Fill Quantity: 90,   # refills: 2  Last Office Visit: 4/23/18  Future Office visit:    Next 5 appointments (look out 90 days)     Aug 21, 2018  4:20 PM CDT   Return Visit with Sabi Bustos PA-C   [facility] ([facility])    [address] [address] [phone]                   Requested Prescriptions   Pending Prescriptions Disp Refills     atorvastatin (LIPITOR) 20 MG tablet [Pharmacy Med Name: ATORVASTATIN CALCIUM 20MG TABS] 90 tablet 2     Sig: TAKE ONE TABLET BY MOUTH EVERY DAY    Statins Protocol Passed    7/16/2018  1:18 PM       Passed - LDL on file in past 12 months    Recent Labs   Lab Test  03/05/18   0832   LDL  111*            Passed - No abnormal creatine kinase in past 12 months    No lab results found.            Passed - Recent (12 mo) or future (30 days) visit within the authorizing provider's specialty    Patient had office visit in the last 12 months or has a visit in the next 30 days with authorizing provider or within the authorizing provider's specialty.  See \"Patient Info\" tab in inbasket, or \"Choose Columns\" in Meds & Orders section of the refill encounter.           Passed - Patient is age 18 or older          "

## 2018-07-16 NOTE — TELEPHONE ENCOUNTER
Prescription approved per [facility] Refill Protocol.  Angelique ANDERSON RN

## 2018-08-20 DIAGNOSIS — Z51.81 THERAPEUTIC DRUG MONITORING: ICD-10-CM

## 2018-08-20 LAB
ERYTHROCYTE [DISTWIDTH] IN BLOOD BY AUTOMATED COUNT: 12.5 % (ref 10–15)
HCT VFR BLD AUTO: 47.4 % (ref 40–53)
HGB BLD-MCNC: 16.4 G/DL (ref 13.3–17.7)
MCH RBC QN AUTO: 30.4 PG (ref 26.5–33)
MCHC RBC AUTO-ENTMCNC: 34.6 G/DL (ref 31.5–36.5)
MCV RBC AUTO: 88 FL (ref 78–100)
PLATELET # BLD AUTO: 219 10E9/L (ref 150–450)
RBC # BLD AUTO: 5.39 10E12/L (ref 4.4–5.9)
WBC # BLD AUTO: 8.2 10E9/L (ref 4–11)

## 2018-08-20 PROCEDURE — 85027 COMPLETE CBC AUTOMATED: CPT | Performed by: PHYSICIAN ASSISTANT

## 2018-08-20 PROCEDURE — 36415 COLL VENOUS BLD VENIPUNCTURE: CPT | Performed by: PHYSICIAN ASSISTANT

## 2018-08-21 ENCOUNTER — OFFICE VISIT (OUTPATIENT)
Dept: DERMATOLOGY | Facility: CLINIC | Age: 38
End: 2018-08-21
Payer: COMMERCIAL

## 2018-08-21 VITALS — SYSTOLIC BLOOD PRESSURE: 138 MMHG | HEART RATE: 72 BPM | DIASTOLIC BLOOD PRESSURE: 89 MMHG | OXYGEN SATURATION: 98 %

## 2018-08-21 DIAGNOSIS — L73.2 HIDRADENITIS SUPPURATIVA: ICD-10-CM

## 2018-08-21 DIAGNOSIS — Z51.81 THERAPEUTIC DRUG MONITORING: Primary | ICD-10-CM

## 2018-08-21 LAB
ALBUMIN SERPL-MCNC: 3.8 G/DL (ref 3.4–5)
ALP SERPL-CCNC: 70 U/L (ref 40–150)
ALT SERPL W P-5'-P-CCNC: 55 U/L (ref 0–70)
ANION GAP SERPL CALCULATED.3IONS-SCNC: 8 MMOL/L (ref 3–14)
AST SERPL W P-5'-P-CCNC: 30 U/L (ref 0–45)
BILIRUB SERPL-MCNC: 0.7 MG/DL (ref 0.2–1.3)
BUN SERPL-MCNC: 13 MG/DL (ref 7–30)
CALCIUM SERPL-MCNC: 8.8 MG/DL (ref 8.5–10.1)
CHLORIDE SERPL-SCNC: 101 MMOL/L (ref 94–109)
CO2 SERPL-SCNC: 26 MMOL/L (ref 20–32)
CREAT SERPL-MCNC: 0.98 MG/DL (ref 0.66–1.25)
GFR SERPL CREATININE-BSD FRML MDRD: 86 ML/MIN/1.7M2
GLUCOSE SERPL-MCNC: 108 MG/DL (ref 70–99)
POTASSIUM SERPL-SCNC: 3.4 MMOL/L (ref 3.4–5.3)
PROT SERPL-MCNC: 8.6 G/DL (ref 6.8–8.8)
SODIUM SERPL-SCNC: 135 MMOL/L (ref 133–144)

## 2018-08-21 PROCEDURE — 36415 COLL VENOUS BLD VENIPUNCTURE: CPT | Performed by: PHYSICIAN ASSISTANT

## 2018-08-21 PROCEDURE — 11900 INJECT SKIN LESIONS </W 7: CPT | Performed by: PHYSICIAN ASSISTANT

## 2018-08-21 PROCEDURE — 80053 COMPREHEN METABOLIC PANEL: CPT | Performed by: PHYSICIAN ASSISTANT

## 2018-08-21 PROCEDURE — 99213 OFFICE O/P EST LOW 20 MIN: CPT | Mod: 25 | Performed by: PHYSICIAN ASSISTANT

## 2018-08-21 NOTE — MR AVS SNAPSHOT
"              After Visit Summary   8/21/2018    Sean Woodard    MRN: [id number]           Patient Information     Date Of Birth          1980        Visit Information        Provider Department      8/21/2018 4:20 PM Sabi Bustos PA-C [facility]        Today's Diagnoses     Therapeutic drug monitoring    -  1    Hidradenitis suppurativa           Follow-ups after your visit        Your next 10 appointments already scheduled     Oct 16, 2018  4:20 PM CDT   Return Visit with Sabi Bustos PA-C   [facility]    [address] [address]   [phone]              Who to contact     If you have questions or need follow up information about today's clinic visit or your schedule please contact [facility] directly at [phone].  Normal or non-critical lab and imaging results will be communicated to you by MyChart, letter or phone within 4 business days after the clinic has received the results. If you do not hear from us within 7 days, please contact the clinic through MyChart or phone. If you have a critical or abnormal lab result, we will notify you by phone as soon as possible.  Submit refill requests through [employer] or call your pharmacy and they will forward the refill request to us. Please allow 3 business days for your refill to be completed.          Additional Information About Your Visit        MyChart Information     [employer] lets you send messages to your doctor, view your test results, renew your prescriptions, schedule appointments and more. To sign up, go to [email or web address] . Click on \"Log in\" on the left side of the screen, which will take you to the Welcome page. Then click on \"Sign up Now\" on the right side of the page.     You will be asked to enter the access code listed below, as well as some personal information. Please follow the directions to create your username and " password.     Your access code is: [email or web address]  Expires: 2018 10:46 PM     Your access code will  in 90 days. If you need help or a new code, please call your [facility] or [phone].        Care EveryWhere ID     This is your Care EveryWhere ID. This could be used by other organizations to access your [place] medical records  [id number]        Your Vitals Were     Pulse Pulse Oximetry                72 98%           Blood Pressure from Last 3 Encounters:   18 138/89   18 131/82   18 (!) 148/96    Weight from Last 3 Encounters:   18 122.9 kg (271 lb)   18 122.9 kg (271 lb)   18 122.5 kg (270 lb)              We Performed the Following     Comprehensive metabolic panel     INJECTION INTO SKIN LESIONS <=7     TRIAMCINOLONE ACET INJ NOS          Today's Medication Changes          These changes are accurate as of 18 11:59 PM.  If you have any questions, ask your nurse or doctor.               Start taking these medicines.        Dose/Directions    adalimumab 40 MG/0.8ML pen kit   Commonly known as:  HUMIRA PEN   Used for:  Hidradenitis suppurativa   Started by:  Sabi Bustos PA-C        Inject 40 mg weekly SQ   Quantity:  2 kit   Refills:  2            Where to get your medicines      These medications were sent to [address]  [address] [address]     Phone:  [phone]     adalimumab 40 MG/0.8ML pen kit                Primary Care Provider Office Phone # Fax #    Susanne Alberto PA-C [phone] [address] [place] [address]        Equal Access to Services     [facility]: Hadii pamela Lazo, waaxda luqadaha, qaybta kaalmada claudetteda, kd moncada. So [facility] [phone].    ATENCIÓN: Si habla español, tiene a camacho disposición servicios gratuitos de asistencia  lingüística. Winsome al [phone].    We comply with applicable federal civil rights laws and [place] laws. We do not discriminate on the basis of race, color, national origin, age, disability, sex, sexual orientation, or gender identity.            Thank you!     Thank you for choosing [facility]  for your care. Our goal is always to provide you with excellent care. Hearing back from our patients is one way we can continue to improve our services. Please take a few minutes to complete the written survey that you may receive in the mail after your visit with us. Thank you!             Your Updated Medication List - Protect others around you: Learn how to safely use, store and throw away your medicines at www.disposemymeds.org.          This list is accurate as of 8/21/18 11:59 PM.  Always use your most recent med list.                   Brand Name Dispense Instructions for use Diagnosis    adalimumab 40 MG/0.8ML pen kit    HUMIRA PEN    2 kit    Inject 40 mg weekly SQ    Hidradenitis suppurativa       atorvastatin 20 MG tablet    LIPITOR    90 tablet    TAKE ONE TABLET BY MOUTH EVERY DAY    Hyperlipidemia with target LDL less than 100       augmented betamethasone dipropionate 0.05 % ointment    DIPROLENE-AF    45 g    Apply sparingly to affected area twice daily as needed.  Do not apply to face.    Chronic dermatitis       clindamycin 1 % lotion    CLINDAMAX    60 mL    Apply to AA BID    Acne keloidalis       hydrochlorothiazide 25 MG tablet    HYDRODIURIL    90 tablet    TAKE ONE TABLET BY MOUTH EVERY DAY    Benign essential hypertension       ISOtretinoin 30 MG Caps     60 capsule    Take 1 capsule by mouth 2 times daily (with meals)        lisinopril 10 MG tablet    PRINIVIL/ZESTRIL    90 tablet    Take 1 tablet (10 mg) by mouth daily    Benign essential hypertension       mupirocin 2 % cream    BACTROBAN    30 g    Apply topically 3 times daily    Rash and nonspecific skin eruption       *  sulfamethoxazole-trimethoprim 800-160 MG per tablet    BACTRIM DS    14 tablet    1 tab PO BID    Acne keloidalis       * sulfamethoxazole-trimethoprim 800-160 MG per tablet    BACTRIM DS    60 tablet    Take 1 tablet by mouth 2 times daily    Acne nuchae keloidalis       * Notice:  This list has 2 medication(s) that are the same as other medications prescribed for you. Read the directions carefully, and ask your doctor or other care provider to review them with you.

## 2018-08-21 NOTE — LETTER
8/21/2018         RE: Sean Woodard  [address] [address]        Dear Colleague,    Thank you for referring your patient, Sean Woodard, to the [facility]. Please see a copy of my visit note below.    Sean Woodard is a 38 year old year old male patient here today for recheck hidradenitis suppurativa.  Patient started Humira about 3 months ago. He reports when he was on the loading dosing that his HS cleared but since he is only on Humira 40 mg once weekly he has noticed a flare on his neck and his buttocks.  Patient has no other skin complaints today.  Remainder of the HPI, Meds, PMH, Allergies, FH, and SH was reviewed in chart.    Pertinent Hx:  Hidradenitis suppurativa   Past Medical History:   Diagnosis Date     NO ACTIVE PROBLEMS        Past Surgical History:   Procedure Laterality Date     ORTHOPEDIC SURGERY  1/1/2005    RT knee arthroscopic menisectomy     Thumb Surgery  2009    Mass removal neuroma        Family History   Problem Relation Age of Onset     Heart Defect Father      valve replacement     Diabetes Maternal Grandmother      Cancer Maternal Grandfather      stomach     Hypertension Brother      Diabetes Sister      Cerebrovascular Disease Brother 38     Hypertension Brother        Social History     Social History     Marital status:      Spouse name: Joycelyn Stokes     Number of children: 1     Years of education: 12+     Occupational History      Department [employer] [place]     Social History Main Topics     Smoking status: Former Smoker     Packs/day: 0.50     Years: 5.00     Types: Cigarettes     Quit date: 1/1/2005     Smokeless tobacco: Never Used     Alcohol use Yes      Comment: Social. 1 drink per month     Drug use: No     Sexual activity: Yes     Partners: Female     Other Topics Concern     Parent/Sibling W/ Cabg, Mi Or Angioplasty Before 65f 55m? No     Social History Narrative       Outpatient Encounter Prescriptions as of  8/21/2018   Medication Sig Dispense Refill     adalimumab (HUMIRA PEN) 40 MG/0.8ML pen kit Inject 40 mg weekly SQ 2 kit 2     atorvastatin (LIPITOR) 20 MG tablet TAKE ONE TABLET BY MOUTH EVERY DAY 90 tablet 1     augmented betamethasone dipropionate (DIPROLENE-AF) 0.05 % ointment Apply sparingly to affected area twice daily as needed.  Do not apply to face. 45 g 1     clindamycin (CLINDAMAX) 1 % lotion Apply to AA BID 60 mL 11     hydrochlorothiazide (HYDRODIURIL) 25 MG tablet TAKE ONE TABLET BY MOUTH EVERY DAY 90 tablet 1     ISOtretinoin 30 MG CAPS Take 1 capsule by mouth 2 times daily (with meals) 60 capsule 0     lisinopril (PRINIVIL/ZESTRIL) 10 MG tablet Take 1 tablet (10 mg) by mouth daily 90 tablet 1     mupirocin (BACTROBAN) 2 % cream Apply topically 3 times daily 30 g 0     sulfamethoxazole-trimethoprim (BACTRIM DS) 800-160 MG per tablet Take 1 tablet by mouth 2 times daily 60 tablet 2     sulfamethoxazole-trimethoprim (BACTRIM DS) 800-160 MG per tablet 1 tab PO BID 14 tablet 0     [DISCONTINUED] adalimumab (HUMIRA PEN) 40 MG/0.8ML pen kit Inject 40 mg weekly SQ 2 kit 2     [DISCONTINUED] adalimumab (HUMIRA PEN) 40 MG/0.8ML pen kit Inject 160 mg (Day 1), followed by 80 mg two weeks later (Day 15). Begin 40 mg weekly dosing two weeks later (Day 29). 3 kit 2     No facility-administered encounter medications on file as of 8/21/2018.              Review Of Systems  Skin: As above  Eyes: negative  Ears/Nose/Throat: negative  Respiratory: No shortness of breath, dyspnea on exertion, cough, or hemoptysis  Cardiovascular: negative  Gastrointestinal: negative  Genitourinary: negative  Musculoskeletal: negative  Neurologic: negative  Psychiatric: negative  Hematologic/Lymphatic/Immunologic: negative  Endocrine: negative      O:   NAD, WDWN, Alert & Oriented, Mood & Affect wnl, Vitals stable   Here today alone   /89 (BP Location: Left arm, Cuff Size: Adult Large)  Pulse 72  SpO2 98%   General appearance  normal   Vitals stable   Alert, oriented and in no acute distress     Scar on neck, left axilla  Healing inflammatory papule on right axilla  inflamed nodule on left neck and left buttock       Eyes: Conjunctivae/lids:Normal     ENT: Lips: normal    MSK:Normal    Pulm: Breathing Normal     Neuro/Psych: Orientation:Normal; Mood/Affect:Normal  A/P:  1. Hidradenitis Suppurativa   Will check CBC and CMP today.  Discussed patient with Dr. Hill he reports to see if he improves with intralesional steroid before we try for an increase to 80 mg once weekly.   IL TAC: PGACAC discussed.  Risks including but not limited to injection site reaction, bruising, no resolution.  All questions answered and entertained to patient s satisfaction.  Informed consent obtained.  IL TAC in concentration of 40mg/ml was injected ID to inflamed cystic areas on neck and left buttock.  Total injected was  0.2 ml.  Patient tolerated without complications and given wound care instructions, including not to move product around.  Return in 4 weeks for follow-up and possible additional IL TAC.    Continue Humira 40 mg once weekly.   CBC is normal, will check CMP today.  Recheck in 3 months    The following medication was given:     MEDICATION: Kenalog 10 mg  ROUTE: ID  SITE: inflamed cyst on left neck and left buttock  DOSE: 0.2 mL  LOT #: [id number]  :  [facility]  EXPIRATION DATE:  02/2020  NDC#: 1487-2809-70      Again, thank you for allowing me to participate in the care of your patient.        Sincerely,        Sabi Christie PA-C

## 2018-08-21 NOTE — NURSING NOTE
"Initial /89 (BP Location: Left arm, Cuff Size: Adult Large)  Pulse 72  SpO2 98% Estimated body mass index is 41.21 kg/(m^2) as calculated from the following:    Height as of 4/30/18: 1.727 m (5' 8\").    Weight as of 4/30/18: 122.9 kg (271 lb). .      "

## 2018-08-22 NOTE — PROGRESS NOTES
Sean Woodard is a 38 year old year old male patient here today for recheck hidradenitis suppurativa.  Patient started Humira about 3 months ago. He reports when he was on the loading dosing that his HS cleared but since he is only on Humira 40 mg once weekly he has noticed a flare on his neck and his buttocks.  Patient has no other skin complaints today.  Remainder of the HPI, Meds, PMH, Allergies, FH, and SH was reviewed in chart.    Pertinent Hx:  Hidradenitis suppurativa   Past Medical History:   Diagnosis Date     NO ACTIVE PROBLEMS        Past Surgical History:   Procedure Laterality Date     ORTHOPEDIC SURGERY  1/1/2005    RT knee arthroscopic menisectomy     Thumb Surgery  2009    Mass removal neuroma        Family History   Problem Relation Age of Onset     Heart Defect Father      valve replacement     Diabetes Maternal Grandmother      Cancer Maternal Grandfather      stomach     Hypertension Brother      Diabetes Sister      Cerebrovascular Disease Brother 38     Hypertension Brother        Social History     Social History     Marital status:      Spouse name: Joycelyn Stokes     Number of children: 1     Years of education: 12+     Occupational History      [employer]     Social History Main Topics     Smoking status: Former Smoker     Packs/day: 0.50     Years: 5.00     Types: Cigarettes     Quit date: 1/1/2005     Smokeless tobacco: Never Used     Alcohol use Yes      Comment: Social. 1 drink per month     Drug use: No     Sexual activity: Yes     Partners: Female     Other Topics Concern     Parent/Sibling W/ Cabg, Mi Or Angioplasty Before 65f 55m? No     Social History Narrative       Outpatient Encounter Prescriptions as of 8/21/2018   Medication Sig Dispense Refill     adalimumab (HUMIRA PEN) 40 MG/0.8ML pen kit Inject 40 mg weekly SQ 2 kit 2     atorvastatin (LIPITOR) 20 MG tablet TAKE ONE TABLET BY MOUTH EVERY DAY 90 tablet 1     augmented betamethasone  dipropionate (DIPROLENE-AF) 0.05 % ointment Apply sparingly to affected area twice daily as needed.  Do not apply to face. 45 g 1     clindamycin (CLINDAMAX) 1 % lotion Apply to AA BID 60 mL 11     hydrochlorothiazide (HYDRODIURIL) 25 MG tablet TAKE ONE TABLET BY MOUTH EVERY DAY 90 tablet 1     ISOtretinoin 30 MG CAPS Take 1 capsule by mouth 2 times daily (with meals) 60 capsule 0     lisinopril (PRINIVIL/ZESTRIL) 10 MG tablet Take 1 tablet (10 mg) by mouth daily 90 tablet 1     mupirocin (BACTROBAN) 2 % cream Apply topically 3 times daily 30 g 0     sulfamethoxazole-trimethoprim (BACTRIM DS) 800-160 MG per tablet Take 1 tablet by mouth 2 times daily 60 tablet 2     sulfamethoxazole-trimethoprim (BACTRIM DS) 800-160 MG per tablet 1 tab PO BID 14 tablet 0     [DISCONTINUED] adalimumab (HUMIRA PEN) 40 MG/0.8ML pen kit Inject 40 mg weekly SQ 2 kit 2     [DISCONTINUED] adalimumab (HUMIRA PEN) 40 MG/0.8ML pen kit Inject 160 mg (Day 1), followed by 80 mg two weeks later (Day 15). Begin 40 mg weekly dosing two weeks later (Day 29). 3 kit 2     No facility-administered encounter medications on file as of 8/21/2018.              Review Of Systems  Skin: As above  Eyes: negative  Ears/Nose/Throat: negative  Respiratory: No shortness of breath, dyspnea on exertion, cough, or hemoptysis  Cardiovascular: negative  Gastrointestinal: negative  Genitourinary: negative  Musculoskeletal: negative  Neurologic: negative  Psychiatric: negative  Hematologic/Lymphatic/Immunologic: negative  Endocrine: negative      O:   NAD, WDWN, Alert & Oriented, Mood & Affect wnl, Vitals stable   Here today alone   /89 (BP Location: Left arm, Cuff Size: Adult Large)  Pulse 72  SpO2 98%   General appearance normal   Vitals stable   Alert, oriented and in no acute distress     Scar on neck, left axilla  Healing inflammatory papule on right axilla  inflamed nodule on left neck and left buttock       Eyes: Conjunctivae/lids:Normal     ENT: Lips:  normal    MSK:Normal    Pulm: Breathing Normal     Neuro/Psych: Orientation:Normal; Mood/Affect:Normal  A/P:  1. Hidradenitis Suppurativa   Will check CBC and CMP today.  Discussed patient with Dr. Hill he reports to see if he improves with intralesional steroid before we try for an increase to 80 mg once weekly.   IL TAC: PGACAC discussed.  Risks including but not limited to injection site reaction, bruising, no resolution.  All questions answered and entertained to patient s satisfaction.  Informed consent obtained.  IL TAC in concentration of 40mg/ml was injected ID to inflamed cystic areas on neck and left buttock.  Total injected was  0.2 ml.  Patient tolerated without complications and given wound care instructions, including not to move product around.  Return in 4 weeks for follow-up and possible additional IL TAC.    Continue Humira 40 mg once weekly.   CBC is normal, will check CMP today.  Recheck in 3 months    The following medication was given:     MEDICATION: Kenalog 10 mg  ROUTE: ID  SITE: inflamed cyst on left neck and left buttock  DOSE: 0.2 mL  LOT #: [id number]  :  Zwamy  EXPIRATION DATE:  02/2020  NDC#: 6693-5485-36

## 2018-09-10 ENCOUNTER — TELEPHONE (OUTPATIENT)
Dept: DERMATOLOGY | Facility: CLINIC | Age: 38
End: 2018-09-10

## 2018-09-10 ENCOUNTER — OFFICE VISIT (OUTPATIENT)
Dept: DERMATOLOGY | Facility: CLINIC | Age: 38
End: 2018-09-10
Payer: COMMERCIAL

## 2018-09-10 VITALS — SYSTOLIC BLOOD PRESSURE: 137 MMHG | OXYGEN SATURATION: 97 % | DIASTOLIC BLOOD PRESSURE: 83 MMHG | HEART RATE: 100 BPM

## 2018-09-10 DIAGNOSIS — L73.2 HIDRADENITIS SUPPURATIVA: Primary | ICD-10-CM

## 2018-09-10 PROCEDURE — 99213 OFFICE O/P EST LOW 20 MIN: CPT | Mod: 25 | Performed by: PHYSICIAN ASSISTANT

## 2018-09-10 PROCEDURE — 11900 INJECT SKIN LESIONS </W 7: CPT | Performed by: PHYSICIAN ASSISTANT

## 2018-09-10 NOTE — NURSING NOTE
"Initial /83  Pulse 100  SpO2 97% Estimated body mass index is 41.21 kg/(m^2) as calculated from the following:    Height as of 4/30/18: 1.727 m (5' 8\").    Weight as of 4/30/18: 122.9 kg (271 lb). .      "

## 2018-09-10 NOTE — TELEPHONE ENCOUNTER
Reason for Call:  Other     Detailed comments: Pt has hidradenitis suppurativa. Was told by provider he could come in for a steroid shot whenever he has a flare up. States he has bad inflammation on the back of his neck, area is oozing. Pt requests to be seen today for a steroid shot - Please advise    Phone Number Patient can be reached at: Home number on file [phone] (home)    Best Time: Any    Can we leave a detailed message on this number? YES    Call taken on 9/10/2018 at 7:35 AM by Denise Behrendt

## 2018-09-10 NOTE — MR AVS SNAPSHOT
"              After Visit Summary   9/10/2018    Sean Woodard    MRN: [id number]           Patient Information     Date Of Birth          1980        Visit Information        Provider Department      9/10/2018 9:00 AM Jahaira Garcia PA-C [facility]        Today's Diagnoses     Hidradenitis suppurativa    -  1       Follow-ups after your visit        Your next 10 appointments already scheduled     Oct 16, 2018  4:20 PM CDT   Return Visit with Sabi Bustos PA-C   [facility] ([facility])    [address] [address]   [phone]              Who to contact     If you have questions or need follow up information about today's clinic visit or your schedule please contact [facility] directly at [phone].  Normal or non-critical lab and imaging results will be communicated to you by MyChart, letter or phone within 4 business days after the clinic has received the results. If you do not hear from us within 7 days, please contact the clinic through MyChart or phone. If you have a critical or abnormal lab result, we will notify you by phone as soon as possible.  Submit refill requests through [employer] or call your pharmacy and they will forward the refill request to us. Please allow 3 business days for your refill to be completed.          Additional Information About Your Visit        MyChart Information     [employer] lets you send messages to your doctor, view your test results, renew your prescriptions, schedule appointments and more. To sign up, go to [email or web address] . Click on \"Log in\" on the left side of the screen, which will take you to the Welcome page. Then click on \"Sign up Now\" on the right side of the page.     You will be asked to enter the access code listed below, as well as some personal information. Please follow the directions to create your username and password.     Your access code is: " [email or web address]  Expires: 2018 10:46 PM     Your access code will  in 90 days. If you need help or a new code, please call your [facility] or [phone].        Care EveryWhere ID     This is your Care EveryWhere ID. This could be used by other organizations to access your [facility] medical records  [id number]        Your Vitals Were     Pulse Pulse Oximetry                100 97%           Blood Pressure from Last 3 Encounters:   09/10/18 137/83   18 138/89   18 131/82    Weight from Last 3 Encounters:   18 122.9 kg (271 lb)   18 122.9 kg (271 lb)   18 122.5 kg (270 lb)              We Performed the Following     INJECTION INTO SKIN LESIONS <=7     TRIAMCINOLONE ACET INJ NOS          Today's Medication Changes          These changes are accurate as of 9/10/18  2:30 PM.  If you have any questions, ask your nurse or doctor.               These medicines have changed or have updated prescriptions.        Dose/Directions    * adalimumab 40 MG/0.8ML pen kit   Commonly known as:  HUMIRA PEN   This may have changed:  Another medication with the same name was added. Make sure you understand how and when to take each.   Used for:  Hidradenitis suppurativa   Changed by:  Jahaira Garcia PA-C        Inject 40 mg weekly SQ   Quantity:  2 kit   Refills:  2       * Adalimumab 40 MG/0.4ML pen kit   Commonly known as:  HUMIRA   This may have changed:  You were already taking a medication with the same name, and this prescription was added. Make sure you understand how and when to take each.   Used for:  Hidradenitis suppurativa   Changed by:  Jahaira Garcia PA-C        80 mg subcutaneous once weekly   Quantity:  4 each   Refills:  5       * Notice:  This list has 2 medication(s) that are the same as other medications prescribed for you. Read the directions carefully, and ask your doctor or other care provider to review them with you.         Where to get your medicines      Call  your pharmacy to confirm that your medication is ready for pickup. It may take up to 24 hours for them to receive the prescription. If the prescription is not ready within 3 business days, please contact your clinic or your provider.     We will let you know when these medications are ready. If you don't hear back within 3 business days, please contact us.     Adalimumab 40 MG/0.4ML pen kit                Primary Care Provider Office Phone # Fax #    Susanne Alberto PA-C [phone] [id number]  [place] [address]        Equal Access to Services     [facility]: Hadii aad ku hadasho Soomaali, waaxda luqadaha, qaybta kaalmada adeegyayang, kd mccullough . So [facility] [phone].    ATENCIÓN: Si habla español, tiene a camacho disposición servicios gratuitos de asistencia lingüística. [facility] [phone].    We comply with applicable federal civil rights laws and [place] laws. We do not discriminate on the basis of race, color, national origin, age, disability, sex, sexual orientation, or gender identity.            Thank you!     Thank you for choosing [facility]  for your care. Our goal is always to provide you with excellent care. Hearing back from our patients is one way we can continue to improve our services. Please take a few minutes to complete the written survey that you may receive in the mail after your visit with us. Thank you!             Your Updated Medication List - Protect others around you: Learn how to safely use, store and throw away your medicines at www.disposemymeds.org.          This list is accurate as of 9/10/18  2:30 PM.  Always use your most recent med list.                   Brand Name Dispense Instructions for use Diagnosis    * adalimumab 40 MG/0.8ML pen kit    HUMIRA PEN    2 kit    Inject 40 mg weekly SQ    Hidradenitis suppurativa       * Adalimumab 40 MG/0.4ML pen kit    HUMIRA    4 each    80 mg subcutaneous  once weekly    Hidradenitis suppurativa       atorvastatin 20 MG tablet    LIPITOR    90 tablet    TAKE ONE TABLET BY MOUTH EVERY DAY    Hyperlipidemia with target LDL less than 100       augmented betamethasone dipropionate 0.05 % ointment    DIPROLENE-AF    45 g    Apply sparingly to affected area twice daily as needed.  Do not apply to face.    Chronic dermatitis       clindamycin 1 % lotion    CLINDAMAX    60 mL    Apply to AA BID    Acne keloidalis       hydrochlorothiazide 25 MG tablet    HYDRODIURIL    90 tablet    TAKE ONE TABLET BY MOUTH EVERY DAY    Benign essential hypertension       ISOtretinoin 30 MG Caps     60 capsule    Take 1 capsule by mouth 2 times daily (with meals)        lisinopril 10 MG tablet    PRINIVIL/ZESTRIL    90 tablet    Take 1 tablet (10 mg) by mouth daily    Benign essential hypertension       mupirocin 2 % cream    BACTROBAN    30 g    Apply topically 3 times daily    Rash and nonspecific skin eruption       * sulfamethoxazole-trimethoprim 800-160 MG per tablet    BACTRIM DS    14 tablet    1 tab PO BID    Acne keloidalis       * sulfamethoxazole-trimethoprim 800-160 MG per tablet    BACTRIM DS    60 tablet    Take 1 tablet by mouth 2 times daily    Acne nuchae keloidalis       * Notice:  This list has 4 medication(s) that are the same as other medications prescribed for you. Read the directions carefully, and ask your doctor or other care provider to review them with you.

## 2018-09-10 NOTE — LETTER
9/10/2018         RE: Sean Woodard  [address] [address]        Dear Colleague,    Thank you for referring your patient, Sean Woodard, to the [facility]. Please see a copy of my visit note below.    Sean Woodard is a 38 year old year old male patient here today for recheck hidradenitis suppurativa.  Patient started Humira about 3.5 months ago. He reports when he was on the loading dosing that his HS cleared but since he is only on Humira 40 mg once weekly he has noticed a flare on his neck and his buttocks.  Patient has no other skin complaints today.  Remainder of the HPI, Meds, PMH, Allergies, FH, and SH was reviewed in chart.    Pertinent Hx:  Hidradenitis suppurativa   Past Medical History:   Diagnosis Date     NO ACTIVE PROBLEMS        Past Surgical History:   Procedure Laterality Date     ORTHOPEDIC SURGERY  1/1/2005    RT knee arthroscopic menisectomy     Thumb Surgery  2009    Mass removal neuroma        Family History   Problem Relation Age of Onset     Heart Defect Father      valve replacement     Diabetes Maternal Grandmother      Cancer Maternal Grandfather      stomach     Hypertension Brother      Diabetes Sister      Cerebrovascular Disease Brother 38     Hypertension Brother        Social History     Social History     Marital status:      Spouse name: Joycelyn Stokes     Number of children: 1     Years of education: 12+     Occupational History      Department [employer]     Social History Main Topics     Smoking status: Former Smoker     Packs/day: 0.50     Years: 5.00     Types: Cigarettes     Quit date: 1/1/2005     Smokeless tobacco: Never Used     Alcohol use Yes      Comment: Social. 1 drink per month     Drug use: No     Sexual activity: Yes     Partners: Female     Other Topics Concern     Parent/Sibling W/ Cabg, Mi Or Angioplasty Before 65f 55m? No     Social History Narrative       Outpatient Encounter Prescriptions as of  9/10/2018   Medication Sig Dispense Refill     adalimumab (HUMIRA PEN) 40 MG/0.8ML pen kit Inject 40 mg weekly SQ 2 kit 2     Adalimumab (HUMIRA) 40 MG/0.4ML pen kit 80 mg subcutaneous once weekly 4 each 5     atorvastatin (LIPITOR) 20 MG tablet TAKE ONE TABLET BY MOUTH EVERY DAY 90 tablet 1     augmented betamethasone dipropionate (DIPROLENE-AF) 0.05 % ointment Apply sparingly to affected area twice daily as needed.  Do not apply to face. 45 g 1     clindamycin (CLINDAMAX) 1 % lotion Apply to AA BID 60 mL 11     hydrochlorothiazide (HYDRODIURIL) 25 MG tablet TAKE ONE TABLET BY MOUTH EVERY DAY 90 tablet 1     ISOtretinoin 30 MG CAPS Take 1 capsule by mouth 2 times daily (with meals) 60 capsule 0     lisinopril (PRINIVIL/ZESTRIL) 10 MG tablet Take 1 tablet (10 mg) by mouth daily 90 tablet 1     mupirocin (BACTROBAN) 2 % cream Apply topically 3 times daily 30 g 0     sulfamethoxazole-trimethoprim (BACTRIM DS) 800-160 MG per tablet Take 1 tablet by mouth 2 times daily 60 tablet 2     sulfamethoxazole-trimethoprim (BACTRIM DS) 800-160 MG per tablet 1 tab PO BID 14 tablet 0     No facility-administered encounter medications on file as of 9/10/2018.              Review Of Systems  Skin: As above  Eyes: negative  Ears/Nose/Throat: negative  Respiratory: No shortness of breath, dyspnea on exertion, cough, or hemoptysis  Cardiovascular: negative  Gastrointestinal: negative  Genitourinary: negative  Musculoskeletal: negative  Neurologic: negative  Psychiatric: negative  Hematologic/Lymphatic/Immunologic: negative  Endocrine: negative      O:   NAD, WDWN, Alert & Oriented, Mood & Affect wnl, Vitals stable   Here today alone   /83  Pulse 100  SpO2 97%   General appearance normal   Vitals stable   Alert, oriented and in no acute distress     Scar on neck, left axilla  Healing inflammatory papule on right axilla  inflamed nodule on left neck and left buttock       Eyes: Conjunctivae/lids:Normal     ENT: Lips:  normal    MSK:Normal    Pulm: Breathing Normal     Neuro/Psych: Orientation:Normal; Mood/Affect:Normal  A/P:  1. Hidradenitis Suppurativa - flaring on 40 mg; discussed increasing to 80 mg and he is amenable to this.   --Increase Humira 80 mg once weekly     IL TAC: PGACAC discussed.  Risks including but not limited to injection site reaction, bruising, no resolution.  All questions answered and entertained to patient s satisfaction.  Informed consent obtained.  IL TAC in concentration of 40mg/ml was injected ID to inflamed cystic areas on neck and left buttock.  Total injected was  0.2 ml.  Patient tolerated without complications and given wound care instructions, including not to move product around.  Return in 4 weeks for follow-up and possible additional IL TAC.    Continue Humira 40 mg once weekly.   Recheck in 2 months  The following medication was given:     MEDICATION: Kenalog 10 mg  ROUTE: ID  SITE: inflamed cyst on neck x 4  DOSE: 0.2 mL  LOT #: [id number]  :  Lotame  EXPIRATION DATE:  02/2020  NDC#: 7430-8420-88      Again, thank you for allowing me to participate in the care of your patient.        Sincerely,        Jahaira Escobar PA-C

## 2018-09-10 NOTE — PROGRESS NOTES
Sean Woodard is a 38 year old year old male patient here today for recheck hidradenitis suppurativa.  Patient started Humira about 3.5 months ago. He reports when he was on the loading dosing that his HS cleared but since he is only on Humira 40 mg once weekly he has noticed a flare on his neck and his buttocks.  Patient has no other skin complaints today.  Remainder of the HPI, Meds, PMH, Allergies, FH, and SH was reviewed in chart.    Pertinent Hx:  Hidradenitis suppurativa   Past Medical History:   Diagnosis Date     NO ACTIVE PROBLEMS        Past Surgical History:   Procedure Laterality Date     ORTHOPEDIC SURGERY  1/1/2005    RT knee arthroscopic menisectomy     Thumb Surgery  2009    Mass removal neuroma        Family History   Problem Relation Age of Onset     Heart Defect Father      valve replacement     Diabetes Maternal Grandmother      Cancer Maternal Grandfather      stomach     Hypertension Brother      Diabetes Sister      Cerebrovascular Disease Brother 38     Hypertension Brother        Social History     Social History     Marital status:      Spouse name: Joycelyn Stokes     Number of children: 1     Years of education: 12+     Occupational History      [employer]     Social History Main Topics     Smoking status: Former Smoker     Packs/day: 0.50     Years: 5.00     Types: Cigarettes     Quit date: 1/1/2005     Smokeless tobacco: Never Used     Alcohol use Yes      Comment: Social. 1 drink per month     Drug use: No     Sexual activity: Yes     Partners: Female     Other Topics Concern     Parent/Sibling W/ Cabg, Mi Or Angioplasty Before 65f 55m? No     Social History Narrative       Outpatient Encounter Prescriptions as of 9/10/2018   Medication Sig Dispense Refill     adalimumab (HUMIRA PEN) 40 MG/0.8ML pen kit Inject 40 mg weekly SQ 2 kit 2     Adalimumab (HUMIRA) 40 MG/0.4ML pen kit 80 mg subcutaneous once weekly 4 each 5     atorvastatin (LIPITOR) 20 MG  tablet TAKE ONE TABLET BY MOUTH EVERY DAY 90 tablet 1     augmented betamethasone dipropionate (DIPROLENE-AF) 0.05 % ointment Apply sparingly to affected area twice daily as needed.  Do not apply to face. 45 g 1     clindamycin (CLINDAMAX) 1 % lotion Apply to AA BID 60 mL 11     hydrochlorothiazide (HYDRODIURIL) 25 MG tablet TAKE ONE TABLET BY MOUTH EVERY DAY 90 tablet 1     ISOtretinoin 30 MG CAPS Take 1 capsule by mouth 2 times daily (with meals) 60 capsule 0     lisinopril (PRINIVIL/ZESTRIL) 10 MG tablet Take 1 tablet (10 mg) by mouth daily 90 tablet 1     mupirocin (BACTROBAN) 2 % cream Apply topically 3 times daily 30 g 0     sulfamethoxazole-trimethoprim (BACTRIM DS) 800-160 MG per tablet Take 1 tablet by mouth 2 times daily 60 tablet 2     sulfamethoxazole-trimethoprim (BACTRIM DS) 800-160 MG per tablet 1 tab PO BID 14 tablet 0     No facility-administered encounter medications on file as of 9/10/2018.              Review Of Systems  Skin: As above  Eyes: negative  Ears/Nose/Throat: negative  Respiratory: No shortness of breath, dyspnea on exertion, cough, or hemoptysis  Cardiovascular: negative  Gastrointestinal: negative  Genitourinary: negative  Musculoskeletal: negative  Neurologic: negative  Psychiatric: negative  Hematologic/Lymphatic/Immunologic: negative  Endocrine: negative      O:   NAD, WDWN, Alert & Oriented, Mood & Affect wnl, Vitals stable   Here today alone   /83  Pulse 100  SpO2 97%   General appearance normal   Vitals stable   Alert, oriented and in no acute distress     Scar on neck, left axilla  Healing inflammatory papule on right axilla  inflamed nodule on left neck and left buttock       Eyes: Conjunctivae/lids:Normal     ENT: Lips: normal    MSK:Normal    Pulm: Breathing Normal     Neuro/Psych: Orientation:Normal; Mood/Affect:Normal  A/P:  1. Hidradenitis Suppurativa - flaring on 40 mg; discussed increasing to 80 mg and he is amenable to this.   --Increase Humira 80 mg once  weekly     IL TAC: PGACAC discussed.  Risks including but not limited to injection site reaction, bruising, no resolution.  All questions answered and entertained to patient s satisfaction.  Informed consent obtained.  IL TAC in concentration of 40mg/ml was injected ID to inflamed cystic areas on neck and left buttock.  Total injected was  0.2 ml.  Patient tolerated without complications and given wound care instructions, including not to move product around.  Return in 4 weeks for follow-up and possible additional IL TAC.    Continue Humira 40 mg once weekly.   Recheck in 2 months  The following medication was given:     MEDICATION: Kenalog 10 mg  ROUTE: ID  SITE: inflamed cyst on neck x 4  DOSE: 0.2 mL  LOT #: [id number]  :  Nomanini  EXPIRATION DATE:  02/2020  NDC#: 5446-2452-90

## 2018-09-11 ENCOUNTER — TELEPHONE (OUTPATIENT)
Dept: DERMATOLOGY | Facility: CLINIC | Age: 38
End: 2018-09-11

## 2018-09-11 DIAGNOSIS — L73.2 HIDRADENITIS SUPPURATIVA: Primary | ICD-10-CM

## 2018-09-11 NOTE — TELEPHONE ENCOUNTER
Prior Authorization Not Needed per Insurance    Medication: Humira 40mg/.04mL - PA not needed  Insurance Company: [facility] FEDERAL - Phone [phone] Fax [phone]  Expected CoPay:      Pharmacy Filling the Rx: [facility] - [address] [place] [address]  Pharmacy Notified: Yes  Patient Notified: Yes

## 2018-10-01 ENCOUNTER — OFFICE VISIT (OUTPATIENT)
Dept: FAMILY MEDICINE | Facility: CLINIC | Age: 38
End: 2018-10-01
Payer: COMMERCIAL

## 2018-10-01 VITALS
HEIGHT: 68 IN | WEIGHT: 268 LBS | DIASTOLIC BLOOD PRESSURE: 90 MMHG | HEART RATE: 93 BPM | TEMPERATURE: 98.9 F | BODY MASS INDEX: 40.62 KG/M2 | SYSTOLIC BLOOD PRESSURE: 128 MMHG

## 2018-10-01 DIAGNOSIS — I10 BENIGN ESSENTIAL HYPERTENSION: Primary | ICD-10-CM

## 2018-10-01 DIAGNOSIS — E66.813 CLASS 3 SEVERE OBESITY WITHOUT SERIOUS COMORBIDITY WITH BODY MASS INDEX (BMI) OF 40.0 TO 44.9 IN ADULT, UNSPECIFIED OBESITY TYPE (H): ICD-10-CM

## 2018-10-01 DIAGNOSIS — M25.50 MULTIPLE JOINT PAIN: ICD-10-CM

## 2018-10-01 DIAGNOSIS — E66.01 CLASS 3 SEVERE OBESITY WITHOUT SERIOUS COMORBIDITY WITH BODY MASS INDEX (BMI) OF 40.0 TO 44.9 IN ADULT, UNSPECIFIED OBESITY TYPE (H): ICD-10-CM

## 2018-10-01 LAB
BASOPHILS # BLD AUTO: 0.1 10E9/L (ref 0–0.2)
BASOPHILS NFR BLD AUTO: 0.7 %
DIFFERENTIAL METHOD BLD: NORMAL
EOSINOPHIL # BLD AUTO: 0.1 10E9/L (ref 0–0.7)
EOSINOPHIL NFR BLD AUTO: 1.6 %
ERYTHROCYTE [DISTWIDTH] IN BLOOD BY AUTOMATED COUNT: 12.5 % (ref 10–15)
HBA1C MFR BLD: 6.3 % (ref 0–5.6)
HCT VFR BLD AUTO: 47.2 % (ref 40–53)
HGB BLD-MCNC: 16.3 G/DL (ref 13.3–17.7)
LYMPHOCYTES # BLD AUTO: 2 10E9/L (ref 0.8–5.3)
LYMPHOCYTES NFR BLD AUTO: 26.8 %
MCH RBC QN AUTO: 30.5 PG (ref 26.5–33)
MCHC RBC AUTO-ENTMCNC: 34.5 G/DL (ref 31.5–36.5)
MCV RBC AUTO: 88 FL (ref 78–100)
MONOCYTES # BLD AUTO: 0.7 10E9/L (ref 0–1.3)
MONOCYTES NFR BLD AUTO: 9.3 %
NEUTROPHILS # BLD AUTO: 4.5 10E9/L (ref 1.6–8.3)
NEUTROPHILS NFR BLD AUTO: 61.6 %
PLATELET # BLD AUTO: 206 10E9/L (ref 150–450)
RBC # BLD AUTO: 5.34 10E12/L (ref 4.4–5.9)
WBC # BLD AUTO: 7.4 10E9/L (ref 4–11)

## 2018-10-01 PROCEDURE — 36415 COLL VENOUS BLD VENIPUNCTURE: CPT | Performed by: PHYSICIAN ASSISTANT

## 2018-10-01 PROCEDURE — 84443 ASSAY THYROID STIM HORMONE: CPT | Performed by: PHYSICIAN ASSISTANT

## 2018-10-01 PROCEDURE — 99214 OFFICE O/P EST MOD 30 MIN: CPT | Performed by: PHYSICIAN ASSISTANT

## 2018-10-01 PROCEDURE — 80053 COMPREHEN METABOLIC PANEL: CPT | Performed by: PHYSICIAN ASSISTANT

## 2018-10-01 PROCEDURE — 86618 LYME DISEASE ANTIBODY: CPT | Performed by: PHYSICIAN ASSISTANT

## 2018-10-01 PROCEDURE — 82728 ASSAY OF FERRITIN: CPT | Performed by: PHYSICIAN ASSISTANT

## 2018-10-01 PROCEDURE — 86140 C-REACTIVE PROTEIN: CPT | Performed by: PHYSICIAN ASSISTANT

## 2018-10-01 PROCEDURE — 85025 COMPLETE CBC W/AUTO DIFF WBC: CPT | Performed by: PHYSICIAN ASSISTANT

## 2018-10-01 PROCEDURE — 83036 HEMOGLOBIN GLYCOSYLATED A1C: CPT | Performed by: PHYSICIAN ASSISTANT

## 2018-10-01 RX ORDER — LOSARTAN POTASSIUM AND HYDROCHLOROTHIAZIDE 12.5; 5 MG/1; MG/1
1 TABLET ORAL DAILY
Qty: 90 TABLET | Refills: 3 | Status: SHIPPED | OUTPATIENT
Start: 2018-10-01 | End: 2019-06-14

## 2018-10-01 NOTE — PATIENT INSTRUCTIONS
Labs today - I will contact you with the results      Stop the hydrochlorothiazide you are on and start the new medication (hyzaar) instead. Take one/day. Goal is to get your blood pressure <140/90      See me again in 1-2 months      Look in to the following programs for weight management:       [facility] (in [place])     [place] Comprehensive Weight Management program - call [phone] and they can talk to you and figure out what might be the best fit for you     Also, the [facility] and [facility] are designed to help with eating disorders (of all kinds) and specifically address the psychological component behind what is driving the eating disorder.

## 2018-10-01 NOTE — PROGRESS NOTES
SUBJECTIVE:   Sean Woodard is a 38 year old male who presents to clinic today for the following health issues:      Patient is here today to talk about joint pain his knees, neck, back and sometimes hips bother him. He is not sure if it is related to his weight gain. He takes otc medication to help with the pain. It has been ongoing for about 1 year.     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    Blood pressure has still been high   Tolerating hydrochlorothiazide well   Had issues with cough on lisinopril    Problem list and histories reviewed & adjusted, as indicated.  Additional history: as documented    Current Outpatient Prescriptions   Medication Sig Dispense Refill     adalimumab (HUMIRA PEN) 40 MG/0.8ML pen kit Inject 40 mg weekly SQ 2 kit 2     Adalimumab (HUMIRA) 40 MG/0.4ML pen kit 80 mg subcutaneous once weekly 8 each 5     Adalimumab (HUMIRA) 40 MG/0.4ML pen kit 80 mg subcutaneous once weekly 4 each 5     atorvastatin (LIPITOR) 20 MG tablet TAKE ONE TABLET BY MOUTH EVERY DAY 90 tablet 1     augmented betamethasone dipropionate (DIPROLENE-AF) 0.05 % ointment Apply sparingly to  "affected area twice daily as needed.  Do not apply to face. 45 g 1     clindamycin (CLINDAMAX) 1 % lotion Apply to AA BID 60 mL 11     ISOtretinoin 30 MG CAPS Take 1 capsule by mouth 2 times daily (with meals) 60 capsule 0     losartan-hydrochlorothiazide (HYZAAR) 50-12.5 MG per tablet Take 1 tablet by mouth daily 90 tablet 3     mupirocin (BACTROBAN) 2 % cream Apply topically 3 times daily 30 g 0     sulfamethoxazole-trimethoprim (BACTRIM DS) 800-160 MG per tablet Take 1 tablet by mouth 2 times daily 60 tablet 2     sulfamethoxazole-trimethoprim (BACTRIM DS) 800-160 MG per tablet 1 tab PO BID 14 tablet 0     Allergies   Allergen Reactions     Lisinopril Cough     BP Readings from Last 3 Encounters:   10/01/18 128/90   09/10/18 137/83   08/21/18 138/89    Wt Readings from Last 3 Encounters:   10/01/18 268 lb (121.6 kg)   04/30/18 271 lb (122.9 kg)   04/23/18 271 lb (122.9 kg)                    Reviewed and updated as needed this visit by clinical staff  Tobacco  Allergies  Meds  Med Hx  Surg Hx  Fam Hx  Soc Hx      Reviewed and updated as needed this visit by Provider  Allergies         ROS:  Remainder of ROS obtained and found to be negative other than that which was documented above      OBJECTIVE:     /90  Pulse 93  Temp 98.9  F (37.2  C) (Tympanic)  Ht 5' 8\" (1.727 m)  Wt 268 lb (121.6 kg)  BMI 40.75 kg/m2  Body mass index is 40.75 kg/(m^2).  GENERAL: healthy, alert and no distress  EYES: Eyes grossly normal to inspection  RESP: lungs clear to auscultation - no rales, rhonchi or wheezes  CV: regular rates and rhythm, normal S1 S2, no S3 or S4, no murmur, click or rub and no peripheral edema  MS: no gross musculoskeletal defects noted, no edema  SKIN: no suspicious lesions or rashes    Diagnostic Test Results:  labs    ASSESSMENT/PLAN:     (I10) Benign essential hypertension  (primary encounter diagnosis)  Comment: change hydrochlorothiazide to Hyzaar  Plan: losartan-hydrochlorothiazide " (HYZAAR) 50-12.5         MG per tablet            (M25.50) Multiple joint pain  Comment: weight likely contributing. Will check some labs to exclude other factors  Plan: Lyme Disease Doris with reflex to WB Serum, TSH         with free T4 reflex, Hemoglobin A1c, CBC with         platelets and differential, Ferritin,         Comprehensive metabolic panel (BMP + Alb, Alk         Phos, ALT, AST, Total. Bili, TP), CRP,         inflammation        \    (E66.01,  Z68.41) Class 3 severe obesity without serious comorbidity with body mass index (BMI) of 40.0 to 44.9 in adult, unspecified obesity type (H)  Comment: patient with h/o binge eating secondary to PTSD. Has done cousneling in the past  Plan: Information on various programs for weight management and binge eating. Patient with huge psychologic component to his binge eating which will need to be addressed        Susanne Alberto PA-C  [facility]

## 2018-10-01 NOTE — MR AVS SNAPSHOT
After Visit Summary   10/1/2018    Sean Woodard    MRN: [id number]           Patient Information     Date Of Birth          1980        Visit Information        Provider Department      10/1/2018 5:00 PM Susanne Alberto PA-C [facility]        Today's Diagnoses     Benign essential hypertension    -  1    Multiple joint pain          Care Instructions    Labs today - I will contact you with the results      Stop the hydrochlorothiazide you are on and start the new medication (hyzaar) instead. Take one/day. Goal is to get your blood pressure <140/90      See me again in 1-2 months      Look in to the following programs for weight management:       [facility] (in [place])     [place] Comprehensive Weight Management program - call [phone] and they can talk to you and figure out what might be the best fit for you     Also, the [facility] and [facility] are designed to help with eating disorders (of all kinds) and specifically address the psychological component behind what is driving the eating disorder.           Follow-ups after your visit        Your next 10 appointments already scheduled     Oct 16, 2018  4:20 PM CDT   Return Visit with Sabi Bustos PA-C   [facility]    [address] [address] [phone]              Who to contact     Normal or non-critical lab and imaging results will be communicated to you by MyChart, letter or phone within 4 business days after the clinic has received the results. If you do not hear from us within 7 days, please contact the clinic through MyChart or phone. If you have a critical or abnormal lab result, we will notify you by phone as soon as possible.  Submit refill requests through [employer] or call your pharmacy and they will forward the refill request to us. Please allow 3 business days for your refill to be completed.          If  "you need to speak with a  for additional information , please call: [phone]             Additional Information About Your Visit        [facility] Information     [facility] gives you secure access to your electronic health record. If you see a primary care provider, you can also send messages to your care team and make appointments. If you have questions, please call your primary care clinic.  If you do not have a primary care provider, please call [phone] and they will assist you.        Care EveryWhere ID     This is your Care EveryWhere ID. This could be used by other organizations to access your [facility] medical records  [id number]        Your Vitals Were     Pulse Temperature Height BMI (Body Mass Index)          93 98.9  F (37.2  C) (Tympanic) 5' 8\" (1.727 m) 40.75 kg/m2         Blood Pressure from Last 3 Encounters:   10/01/18 128/90   09/10/18 137/83   08/21/18 138/89    Weight from Last 3 Encounters:   10/01/18 268 lb (121.6 kg)   04/30/18 271 lb (122.9 kg)   04/23/18 271 lb (122.9 kg)              We Performed the Following     CBC with platelets and differential     Comprehensive metabolic panel (BMP + Alb, Alk Phos, ALT, AST, Total. Bili, TP)     CRP, inflammation     Ferritin     Hemoglobin A1c     Lyme Disease Doris with reflex to WB Serum     TSH with free T4 reflex          Today's Medication Changes          These changes are accurate as of 10/1/18  5:52 PM.  If you have any questions, ask your nurse or doctor.               Start taking these medicines.        Dose/Directions    losartan-hydrochlorothiazide 50-12.5 MG per tablet   Commonly known as:  HYZAAR   Used for:  Benign essential hypertension   Started by:  Susanne Alberto PA-C        Dose:  1 tablet   Take 1 tablet by mouth daily   Quantity:  90 tablet   Refills:  3         Stop taking these medicines if you haven't already. Please contact your care team if you have questions.     hydrochlorothiazide 25 MG " tablet   Commonly known as:  HYDRODIURIL   Stopped by:  Susanne Alberto PA-C           lisinopril 10 MG tablet   Commonly known as:  PRINIVIL/ZESTRIL   Stopped by:  Susanne Alberto PA-C                Where to get your medicines      These medications were sent to [facility], [place] - [address]  [address] [facility] [address]     Phone:  [phone]     losartan-hydrochlorothiazide 50-12.5 MG per tablet                Primary Care Provider Office Phone # Fax #    Susanne Alberto PA-C [phone] [address] [address]        Equal Access to Services     VENICE MAYEN : Hadii pamela ku hadasho Soomaali, waaxda luqadaha, qaybta kaalmada adeegyada, waxay jamesin haynehemiah mccullough . So [facility] [phone].    ATENCIÓN: Si habla español, tiene a camacho disposición servicios gratuitos de asistencia lingüística. [facility] [phone].    We comply with applicable federal civil rights laws and [place] laws. We do not discriminate on the basis of race, color, national origin, age, disability, sex, sexual orientation, or gender identity.            Thank you!     Thank you for choosing [facility]  for your care. Our goal is always to provide you with excellent care. Hearing back from our patients is one way we can continue to improve our services. Please take a few minutes to complete the written survey that you may receive in the mail after your visit with us. Thank you!             Your Updated Medication List - Protect others around you: Learn how to safely use, store and throw away your medicines at www.disposemymeds.org.          This list is accurate as of 10/1/18  5:52 PM.  Always use your most recent med list.                   Brand Name Dispense Instructions for use Diagnosis    * adalimumab 40 MG/0.8ML pen kit    HUMIRA PEN    2 kit    Inject 40 mg weekly SQ    Hidradenitis suppurativa       *  Adalimumab 40 MG/0.4ML pen kit    HUMIRA    4 each    80 mg subcutaneous once weekly    Hidradenitis suppurativa       * Adalimumab 40 MG/0.4ML pen kit    HUMIRA    8 each    80 mg subcutaneous once weekly    Hidradenitis suppurativa       atorvastatin 20 MG tablet    LIPITOR    90 tablet    TAKE ONE TABLET BY MOUTH EVERY DAY    Hyperlipidemia with target LDL less than 100       augmented betamethasone dipropionate 0.05 % ointment    DIPROLENE-AF    45 g    Apply sparingly to affected area twice daily as needed.  Do not apply to face.    Chronic dermatitis       clindamycin 1 % lotion    CLINDAMAX    60 mL    Apply to AA BID    Acne keloidalis       ISOtretinoin 30 MG Caps     60 capsule    Take 1 capsule by mouth 2 times daily (with meals)        losartan-hydrochlorothiazide 50-12.5 MG per tablet    HYZAAR    90 tablet    Take 1 tablet by mouth daily    Benign essential hypertension       mupirocin 2 % cream    BACTROBAN    30 g    Apply topically 3 times daily    Rash and nonspecific skin eruption       * sulfamethoxazole-trimethoprim 800-160 MG per tablet    BACTRIM DS    14 tablet    1 tab PO BID    Acne keloidalis       * sulfamethoxazole-trimethoprim 800-160 MG per tablet    BACTRIM DS    60 tablet    Take 1 tablet by mouth 2 times daily    Acne nuchae keloidalis       * Notice:  This list has 5 medication(s) that are the same as other medications prescribed for you. Read the directions carefully, and ask your doctor or other care provider to review them with you.

## 2018-10-02 LAB
ALBUMIN SERPL-MCNC: 4.1 G/DL (ref 3.4–5)
ALP SERPL-CCNC: 64 U/L (ref 40–150)
ALT SERPL W P-5'-P-CCNC: 50 U/L (ref 0–70)
ANION GAP SERPL CALCULATED.3IONS-SCNC: 8 MMOL/L (ref 3–14)
AST SERPL W P-5'-P-CCNC: 26 U/L (ref 0–45)
BILIRUB SERPL-MCNC: 0.8 MG/DL (ref 0.2–1.3)
BUN SERPL-MCNC: 17 MG/DL (ref 7–30)
CALCIUM SERPL-MCNC: 9.3 MG/DL (ref 8.5–10.1)
CHLORIDE SERPL-SCNC: 103 MMOL/L (ref 94–109)
CO2 SERPL-SCNC: 27 MMOL/L (ref 20–32)
CREAT SERPL-MCNC: 0.93 MG/DL (ref 0.66–1.25)
CRP SERPL-MCNC: <2.9 MG/L (ref 0–8)
FERRITIN SERPL-MCNC: 298 NG/ML (ref 26–388)
GFR SERPL CREATININE-BSD FRML MDRD: >90 ML/MIN/1.7M2
GLUCOSE SERPL-MCNC: 112 MG/DL (ref 70–99)
POTASSIUM SERPL-SCNC: 3.6 MMOL/L (ref 3.4–5.3)
PROT SERPL-MCNC: 8.6 G/DL (ref 6.8–8.8)
SODIUM SERPL-SCNC: 138 MMOL/L (ref 133–144)
TSH SERPL DL<=0.005 MIU/L-ACNC: 1.63 MU/L (ref 0.4–4)

## 2018-10-03 LAB — B BURGDOR IGG+IGM SER QL: 0.04 (ref 0–0.89)

## 2018-10-05 DIAGNOSIS — R73.03 PRE-DIABETES: Primary | ICD-10-CM

## 2018-10-05 DIAGNOSIS — E66.01 CLASS 3 SEVERE OBESITY WITH BODY MASS INDEX (BMI) OF 40.0 TO 44.9 IN ADULT, UNSPECIFIED OBESITY TYPE, UNSPECIFIED WHETHER SERIOUS COMORBIDITY PRESENT (H): ICD-10-CM

## 2018-10-05 DIAGNOSIS — E66.813 CLASS 3 SEVERE OBESITY WITH BODY MASS INDEX (BMI) OF 40.0 TO 44.9 IN ADULT, UNSPECIFIED OBESITY TYPE, UNSPECIFIED WHETHER SERIOUS COMORBIDITY PRESENT (H): ICD-10-CM

## 2018-10-05 RX ORDER — METFORMIN HCL 500 MG
500 TABLET, EXTENDED RELEASE 24 HR ORAL
Qty: 90 TABLET | Refills: 1 | Status: SHIPPED | OUTPATIENT
Start: 2018-10-05 | End: 2019-01-25

## 2018-10-15 ENCOUNTER — TELEPHONE (OUTPATIENT)
Dept: DERMATOLOGY | Facility: CLINIC | Age: 38
End: 2018-10-15

## 2018-10-15 DIAGNOSIS — Z51.81 THERAPEUTIC DRUG MONITORING: Primary | ICD-10-CM

## 2018-10-15 DIAGNOSIS — Z51.81 THERAPEUTIC DRUG MONITORING: ICD-10-CM

## 2018-10-15 LAB
ALBUMIN SERPL-MCNC: 3.9 G/DL (ref 3.4–5)
ALP SERPL-CCNC: 59 U/L (ref 40–150)
ALT SERPL W P-5'-P-CCNC: 61 U/L (ref 0–70)
ANION GAP SERPL CALCULATED.3IONS-SCNC: 4 MMOL/L (ref 3–14)
AST SERPL W P-5'-P-CCNC: 26 U/L (ref 0–45)
BILIRUB SERPL-MCNC: 0.9 MG/DL (ref 0.2–1.3)
BUN SERPL-MCNC: 10 MG/DL (ref 7–30)
CALCIUM SERPL-MCNC: 9 MG/DL (ref 8.5–10.1)
CHLORIDE SERPL-SCNC: 103 MMOL/L (ref 94–109)
CO2 SERPL-SCNC: 30 MMOL/L (ref 20–32)
CREAT SERPL-MCNC: 0.94 MG/DL (ref 0.66–1.25)
ERYTHROCYTE [DISTWIDTH] IN BLOOD BY AUTOMATED COUNT: 12.6 % (ref 10–15)
GFR SERPL CREATININE-BSD FRML MDRD: >90 ML/MIN/1.7M2
GLUCOSE SERPL-MCNC: 98 MG/DL (ref 70–99)
HCT VFR BLD AUTO: 46.9 % (ref 40–53)
HGB BLD-MCNC: 16.2 G/DL (ref 13.3–17.7)
MCH RBC QN AUTO: 30.8 PG (ref 26.5–33)
MCHC RBC AUTO-ENTMCNC: 34.5 G/DL (ref 31.5–36.5)
MCV RBC AUTO: 89 FL (ref 78–100)
PLATELET # BLD AUTO: 207 10E9/L (ref 150–450)
POTASSIUM SERPL-SCNC: 3.7 MMOL/L (ref 3.4–5.3)
PROT SERPL-MCNC: 8.4 G/DL (ref 6.8–8.8)
RBC # BLD AUTO: 5.26 10E12/L (ref 4.4–5.9)
SODIUM SERPL-SCNC: 137 MMOL/L (ref 133–144)
WBC # BLD AUTO: 6.8 10E9/L (ref 4–11)

## 2018-10-15 PROCEDURE — 80053 COMPREHEN METABOLIC PANEL: CPT | Performed by: PHYSICIAN ASSISTANT

## 2018-10-15 PROCEDURE — 85027 COMPLETE CBC AUTOMATED: CPT | Performed by: PHYSICIAN ASSISTANT

## 2018-10-15 PROCEDURE — 36415 COLL VENOUS BLD VENIPUNCTURE: CPT | Performed by: PHYSICIAN ASSISTANT

## 2018-10-15 NOTE — TELEPHONE ENCOUNTER
Patient notified. Patient verbalized understanding. I also let [facility] know as well via voice message. Elsa Linn RN

## 2018-10-15 NOTE — TELEPHONE ENCOUNTER
Reason for Call:  Other orders    Detailed comments: Pt at lab in [facility] , needs orders for lab draw for appt with Derm tomorrow 10/16, pleasew put into Epic    Phone Number Patient can be reached at: Home number on file [phone] (home)    Best Time: today    Can we leave a detailed message on this number? YES    Call taken on 10/15/2018 at 10:12 AM by Nicole Puente

## 2018-10-15 NOTE — TELEPHONE ENCOUNTER
Please advise in Sabi Bustos PA-C's absence-wants lab orders for 5 pm lab appt today.     Does he need any labs drawn for Humira follow up appt tomorrow?...    Looks like he had a CBC drawn on 10-1-18 and a CMP on 8-21-18....    Elsa Linn RN

## 2018-10-16 ENCOUNTER — OFFICE VISIT (OUTPATIENT)
Dept: DERMATOLOGY | Facility: CLINIC | Age: 38
End: 2018-10-16
Payer: COMMERCIAL

## 2018-10-16 VITALS — SYSTOLIC BLOOD PRESSURE: 146 MMHG | OXYGEN SATURATION: 98 % | DIASTOLIC BLOOD PRESSURE: 83 MMHG | HEART RATE: 85 BPM

## 2018-10-16 DIAGNOSIS — L73.2 HIDRADENITIS SUPPURATIVA: Primary | ICD-10-CM

## 2018-10-16 PROCEDURE — 99213 OFFICE O/P EST LOW 20 MIN: CPT | Performed by: PHYSICIAN ASSISTANT

## 2018-10-16 NOTE — LETTER
10/16/2018         RE: Sean Woodard  [address] [address]        Dear Colleague,    Thank you for referring your patient, Sean Woodard, to the [facility]. Please see a copy of my visit note below.    Sean Woodard is a 38 year old year old male patient here today for recheck hidradenitis suppurativa on neck, axilla, and buttocks. Patient reports that his HS started in 2015. He feels like his is most likely related his weight gain. Patient reports that he came back from [place] in 2011 and was diagnosed with PTSD. He notes that he was diagnosed with PTSD with binge eating disorder about 2-3 years ago. Recently within the past month start on 80 mg weekly of Humira. He denies any side effects of Humira. He reports that areas on axilla, posterior neck, and bottom are still inflamed and weeping. He reports still improved. Patient has no other skin complaints today.  Remainder of the HPI, Meds, PMH, Allergies, FH, and SH was reviewed in chart.    Pertinent Hx:  Hidradenitis Suppurativa   Past Medical History:   Diagnosis Date     NO ACTIVE PROBLEMS        Past Surgical History:   Procedure Laterality Date     ORTHOPEDIC SURGERY  1/1/2005    RT knee arthroscopic menisectomy     Thumb Surgery  2009    Mass removal neuroma        Family History   Problem Relation Age of Onset     Heart Defect Father      valve replacement     Diabetes Maternal Grandmother      Cancer Maternal Grandfather      stomach     Hypertension Brother      Diabetes Sister      Cerebrovascular Disease Brother 38     Hypertension Brother        Social History     Social History     Marital status:      Spouse name: Joycelyn Stokes     Number of children: 1     Years of education: 12+     Occupational History      Department [employer]     Social History Main Topics     Smoking status: Former Smoker     Packs/day: 0.50     Years: 5.00     Types: Cigarettes     Quit date: 1/1/2005     Smokeless  tobacco: Never Used     Alcohol use Yes      Comment: Social. 1 drink per month     Drug use: No     Sexual activity: Yes     Partners: Female     Other Topics Concern     Parent/Sibling W/ Cabg, Mi Or Angioplasty Before 65f 55m? No     Social History Narrative       Outpatient Encounter Prescriptions as of 10/16/2018   Medication Sig Dispense Refill     adalimumab (HUMIRA PEN) 40 MG/0.8ML pen kit Inject 40 mg weekly SQ 2 kit 2     atorvastatin (LIPITOR) 20 MG tablet TAKE ONE TABLET BY MOUTH EVERY DAY 90 tablet 1     augmented betamethasone dipropionate (DIPROLENE-AF) 0.05 % ointment Apply sparingly to affected area twice daily as needed.  Do not apply to face. 45 g 1     clindamycin (CLINDAMAX) 1 % lotion Apply to AA BID 60 mL 11     ISOtretinoin 30 MG CAPS Take 1 capsule by mouth 2 times daily (with meals) 60 capsule 0     losartan-hydrochlorothiazide (HYZAAR) 50-12.5 MG per tablet Take 1 tablet by mouth daily 90 tablet 3     metFORMIN (GLUCOPHAGE-XR) 500 MG 24 hr tablet Take 1 tablet (500 mg) by mouth daily (with dinner) 90 tablet 1     mupirocin (BACTROBAN) 2 % cream Apply topically 3 times daily 30 g 0     sulfamethoxazole-trimethoprim (BACTRIM DS) 800-160 MG per tablet Take 1 tablet by mouth 2 times daily 60 tablet 2     sulfamethoxazole-trimethoprim (BACTRIM DS) 800-160 MG per tablet 1 tab PO BID 14 tablet 0     Adalimumab (HUMIRA) 40 MG/0.4ML pen kit 80 mg subcutaneous once weekly (Patient not taking: Reported on 10/16/2018) 8 each 5     Adalimumab (HUMIRA) 40 MG/0.4ML pen kit 80 mg subcutaneous once weekly (Patient not taking: Reported on 10/16/2018) 4 each 5     No facility-administered encounter medications on file as of 10/16/2018.              Review Of Systems  Skin: As above  Eyes: negative  Ears/Nose/Throat: negative  Respiratory: No shortness of breath, dyspnea on exertion, cough, or hemoptysis  Cardiovascular: negative  Gastrointestinal: negative  Genitourinary: negative  Musculoskeletal:  negative  Neurologic: negative  Psychiatric: negative  Hematologic/Lymphatic/Immunologic: negative  Endocrine: negative      O:   NAD, WDWN, Alert & Oriented, Mood & Affect wnl, Vitals stable   Here today alone   /83  Pulse 85  SpO2 98%   General appearance normal   Vitals stable   Alert, oriented and in no acute distress     Scar on neck, left axilla  Healing inflammatory papule on right axilla  inflamed nodule on left neck and left buttock        Eyes: Conjunctivae/lids:Normal     ENT: Lips: normal    MSK:Normal    Pulm: Breathing Normal    Neuro/Psych: Orientation:Normal; Mood/Affect:Normal  A/P:  1. Hidradenitis Suppurativa   Check CBC and CMP.   Continue Humira 80 mg weekly.   Patient would like neck injected today.   IL TAC: PGACAC discussed.  Risks including but not limited to injection site reaction, bruising, no resolution.  All questions answered and entertained to patient s satisfaction.  Informed consent obtained.  IL TAC in concentration of 10mg/ml was injected ID to inflamed cyst on neck.  Total injected was  0.1 ml.  Patient tolerated without complications and given wound care instructions, including not to move product around.  Return in 4 weeks for follow-up and possible additional IL TAC.      Patient to follow up with Primary Care provider regarding elevated blood pressure.      Again, thank you for allowing me to participate in the care of your patient.        Sincerely,        Sabi Christie PA-C

## 2018-10-16 NOTE — MR AVS SNAPSHOT
After Visit Summary   10/16/2018    Sean Woodard    MRN: [id number]           Patient Information     Date Of Birth          1980        Visit Information        Provider Department      10/16/2018 4:20 PM Sabi Bustos PA-C [facility]        Today's Diagnoses     Hidradenitis suppurativa    -  1       Follow-ups after your visit        Your next 10 appointments already scheduled     Dec 03, 2018  5:00 PM CST   SHORT with Susanne Alberto PA-C   [facility]    [address] [id number] [phone]              Who to contact     If you have questions or need follow up information about today's clinic visit or your schedule please contact [facility] directly at [phone].  Normal or non-critical lab and imaging results will be communicated to you by MyChart, letter or phone within 4 business days after the clinic has received the results. If you do not hear from us within 7 days, please contact the clinic through MyChart or phone. If you have a critical or abnormal lab result, we will notify you by phone as soon as possible.  Submit refill requests through [employer] or call your pharmacy and they will forward the refill request to us. Please allow 3 business days for your refill to be completed.          Additional Information About Your Visit        MyChart Information     [employer] gives you secure access to your electronic health record. If you see a primary care provider, you can also send messages to your care team and make appointments. If you have questions, please call your primary care clinic.  If you do not have a primary care provider, please call [phone] and they will assist you.        Care EveryWhere ID     This is your Care EveryWhere ID. This could be used by other organizations to access your [facility] medical records  [id number]        Your Vitals Were     Pulse Pulse Oximetry                 85 98%           Blood Pressure from Last 3 Encounters:   10/16/18 146/83   10/01/18 128/90   09/10/18 137/83    Weight from Last 3 Encounters:   10/01/18 121.6 kg (268 lb)   04/30/18 122.9 kg (271 lb)   04/23/18 122.9 kg (271 lb)              Today, you had the following     No orders found for display       Primary Care Provider Office Phone # Fax #    Susanne Alberto PA-C [phone] [address] [address]        Equal Access to Services     [facility]: Hadii aad ku hadasho Soomaali, waaxda luqadaha, qaybta kaalmada adeegyada, kd mccullough . So [facility] [phone].    ATENCIÓN: Si habla español, tiene a camacho disposición servicios gratuitos de asistencia lingüística. [facility] [phone].    We comply with applicable federal civil rights laws and [place] laws. We do not discriminate on the basis of race, color, national origin, age, disability, sex, sexual orientation, or gender identity.            Thank you!     Thank you for choosing [facility]  for your care. Our goal is always to provide you with excellent care. Hearing back from our patients is one way we can continue to improve our services. Please take a few minutes to complete the written survey that you may receive in the mail after your visit with us. Thank you!             Your Updated Medication List - Protect others around you: Learn how to safely use, store and throw away your medicines at www.disposemymeds.org.          This list is accurate as of 10/16/18 11:59 PM.  Always use your most recent med list.                   Brand Name Dispense Instructions for use Diagnosis    * adalimumab 40 MG/0.8ML pen kit    HUMIRA PEN    2 kit    Inject 40 mg weekly SQ    Hidradenitis suppurativa       * Adalimumab 40 MG/0.4ML pen kit    HUMIRA    4 each    80 mg subcutaneous once weekly    Hidradenitis suppurativa       * Adalimumab 40 MG/0.4ML pen kit     HUMIRA    8 each    80 mg subcutaneous once weekly    Hidradenitis suppurativa       atorvastatin 20 MG tablet    LIPITOR    90 tablet    TAKE ONE TABLET BY MOUTH EVERY DAY    Hyperlipidemia with target LDL less than 100       augmented betamethasone dipropionate 0.05 % ointment    DIPROLENE-AF    45 g    Apply sparingly to affected area twice daily as needed.  Do not apply to face.    Chronic dermatitis       clindamycin 1 % lotion    CLINDAMAX    60 mL    Apply to AA BID    Acne keloidalis       ISOtretinoin 30 MG Caps     60 capsule    Take 1 capsule by mouth 2 times daily (with meals)        losartan-hydrochlorothiazide 50-12.5 MG per tablet    HYZAAR    90 tablet    Take 1 tablet by mouth daily    Benign essential hypertension       metFORMIN 500 MG 24 hr tablet    GLUCOPHAGE-XR    90 tablet    Take 1 tablet (500 mg) by mouth daily (with dinner)    Pre-diabetes, Class 3 severe obesity with body mass index (BMI) of 40.0 to 44.9 in adult, unspecified obesity type, unspecified whether serious comorbidity present (H)       mupirocin 2 % cream    BACTROBAN    30 g    Apply topically 3 times daily    Rash and nonspecific skin eruption       * sulfamethoxazole-trimethoprim 800-160 MG per tablet    BACTRIM DS    14 tablet    1 tab PO BID    Acne keloidalis       * sulfamethoxazole-trimethoprim 800-160 MG per tablet    BACTRIM DS    60 tablet    Take 1 tablet by mouth 2 times daily    Acne nuchae keloidalis       * Notice:  This list has 5 medication(s) that are the same as other medications prescribed for you. Read the directions carefully, and ask your doctor or other care provider to review them with you.

## 2018-10-16 NOTE — NURSING NOTE
"Initial /83  Pulse 85  SpO2 98% Estimated body mass index is 40.75 kg/(m^2) as calculated from the following:    Height as of 10/1/18: 1.727 m (5' 8\").    Weight as of 10/1/18: 121.6 kg (268 lb). .      "

## 2018-10-18 ENCOUNTER — MYC MEDICAL ADVICE (OUTPATIENT)
Dept: DERMATOLOGY | Facility: CLINIC | Age: 38
End: 2018-10-18

## 2018-10-18 NOTE — LETTER
To whom it may concern,    This letter is written on behalf of the  Dru Woodard who has a chronically inflammatory skin disease. Patient was initially seen in November of 2016 for acne nuchae keloidalis. He was earlier diagnosed this year with Hidradenitis Suppurativa of his axilla and buttocks. He has been on numerous oral antibiotics, antifungals, and intralesional kenalog. He finished a 5 month course of isotretinoin with little improvement earlier this year. He has had the most success with Humira which is FDA approved to treat hidradenitis suppurativa. He has chronic inflamed painful lesion on axilla, neck, and groin with scarring. Since 2011 until 2018 patient has had a significant weight gain which he reports is related to his PTSD with binge eating disorder, which was previously diagnosed by another clinician. Being obese can be a main contributing factor in hidradenitis suppurativa. Please consider covering the 's, Dru Woodard, biologic medication to treat his chronic Hidrandienitis Suppurativa.       Sincerely,      Sabi Bustos PA-C

## 2018-10-19 NOTE — PROGRESS NOTES
Sean Woodard is a 38 year old year old male patient here today for recheck hidradenitis suppurativa on neck, axilla, and buttocks. Patient reports that his HS started in 2015. He feels like his is most likely related his weight gain. Patient reports that he came back from [place] in 2011 and was diagnosed with PTSD. He notes that he was diagnosed with PTSD with binge eating disorder about 2-3 years ago. Recently within the past month start on 80 mg weekly of Humira. He denies any side effects of Humira. He reports that areas on axilla, posterior neck, and bottom are still inflamed and weeping. He reports still improved. Patient has no other skin complaints today.  Remainder of the HPI, Meds, PMH, Allergies, FH, and SH was reviewed in chart.    Pertinent Hx:  Hidradenitis Suppurativa   Past Medical History:   Diagnosis Date     NO ACTIVE PROBLEMS        Past Surgical History:   Procedure Laterality Date     ORTHOPEDIC SURGERY  1/1/2005    RT knee arthroscopic menisectomy     Thumb Surgery  2009    Mass removal neuroma        Family History   Problem Relation Age of Onset     Heart Defect Father      valve replacement     Diabetes Maternal Grandmother      Cancer Maternal Grandfather      stomach     Hypertension Brother      Diabetes Sister      Cerebrovascular Disease Brother 38     Hypertension Brother        Social History     Social History     Marital status:      Spouse name: Joycelyn Stokes     Number of children: 1     Years of education: 12+     Occupational History      [employer]     Social History Main Topics     Smoking status: Former Smoker     Packs/day: 0.50     Years: 5.00     Types: Cigarettes     Quit date: 1/1/2005     Smokeless tobacco: Never Used     Alcohol use Yes      Comment: Social. 1 drink per month     Drug use: No     Sexual activity: Yes     Partners: Female     Other Topics Concern     Parent/Sibling W/ Cabg, Mi Or Angioplasty Before 65f 55m? No      Social History Narrative       Outpatient Encounter Prescriptions as of 10/16/2018   Medication Sig Dispense Refill     adalimumab (HUMIRA PEN) 40 MG/0.8ML pen kit Inject 40 mg weekly SQ 2 kit 2     atorvastatin (LIPITOR) 20 MG tablet TAKE ONE TABLET BY MOUTH EVERY DAY 90 tablet 1     augmented betamethasone dipropionate (DIPROLENE-AF) 0.05 % ointment Apply sparingly to affected area twice daily as needed.  Do not apply to face. 45 g 1     clindamycin (CLINDAMAX) 1 % lotion Apply to AA BID 60 mL 11     ISOtretinoin 30 MG CAPS Take 1 capsule by mouth 2 times daily (with meals) 60 capsule 0     losartan-hydrochlorothiazide (HYZAAR) 50-12.5 MG per tablet Take 1 tablet by mouth daily 90 tablet 3     metFORMIN (GLUCOPHAGE-XR) 500 MG 24 hr tablet Take 1 tablet (500 mg) by mouth daily (with dinner) 90 tablet 1     mupirocin (BACTROBAN) 2 % cream Apply topically 3 times daily 30 g 0     sulfamethoxazole-trimethoprim (BACTRIM DS) 800-160 MG per tablet Take 1 tablet by mouth 2 times daily 60 tablet 2     sulfamethoxazole-trimethoprim (BACTRIM DS) 800-160 MG per tablet 1 tab PO BID 14 tablet 0     Adalimumab (HUMIRA) 40 MG/0.4ML pen kit 80 mg subcutaneous once weekly (Patient not taking: Reported on 10/16/2018) 8 each 5     Adalimumab (HUMIRA) 40 MG/0.4ML pen kit 80 mg subcutaneous once weekly (Patient not taking: Reported on 10/16/2018) 4 each 5     No facility-administered encounter medications on file as of 10/16/2018.              Review Of Systems  Skin: As above  Eyes: negative  Ears/Nose/Throat: negative  Respiratory: No shortness of breath, dyspnea on exertion, cough, or hemoptysis  Cardiovascular: negative  Gastrointestinal: negative  Genitourinary: negative  Musculoskeletal: negative  Neurologic: negative  Psychiatric: negative  Hematologic/Lymphatic/Immunologic: negative  Endocrine: negative      O:   NAD, WDWN, Alert & Oriented, Mood & Affect wnl, Vitals stable   Here today alone   /83  Pulse 85  SpO2  98%   General appearance normal   Vitals stable   Alert, oriented and in no acute distress     Scar on neck, left axilla  Healing inflammatory papule on right axilla  inflamed nodule on left neck and left buttock        Eyes: Conjunctivae/lids:Normal     ENT: Lips: normal    MSK:Normal    Pulm: Breathing Normal    Neuro/Psych: Orientation:Normal; Mood/Affect:Normal  A/P:  1. Hidradenitis Suppurativa   Check CBC and CMP.   Continue Humira 80 mg weekly.   Patient would like neck injected today.   IL TAC: PGACAC discussed.  Risks including but not limited to injection site reaction, bruising, no resolution.  All questions answered and entertained to patient s satisfaction.  Informed consent obtained.  IL TAC in concentration of 10mg/ml was injected ID to inflamed cyst on neck.  Total injected was  0.1 ml.  Patient tolerated without complications and given wound care instructions, including not to move product around.  Return in 4 weeks for follow-up and possible additional IL TAC.      Patient to follow up with Primary Care provider regarding elevated blood pressure.

## 2018-10-22 ENCOUNTER — TELEPHONE (OUTPATIENT)
Dept: DERMATOLOGY | Facility: CLINIC | Age: 38
End: 2018-10-22

## 2018-10-22 NOTE — TELEPHONE ENCOUNTER
"Spoke to patient and offered cancellations on both W 10-24 and Th 10-25-19 but neither work for his schedule as he has to work and has today off. I did advise we have a very full schedule today and have > 70 patients on the wait list, so we would need to find a time that works for our Provider. He did state: \"I will just see what happens and wait it out...\"     Advised to call back if he changes his mind as hold spots fill quickly. Patient verbalized understanding. Elsa Linn RN    "

## 2018-10-22 NOTE — TELEPHONE ENCOUNTER
Reason for call:  Patient reporting a symptom    Symptom or request: States he needs a steroid shot due to flair up on his skin.  Has had this before and was told any time it flairs up to just call and see if he can be worked in    Duration (how long have symptoms been present): 3-4 days    Have you been treated for this before? Yes    Additional comments:     Phone Number patient can be reached at:  Home number on file [phone] (home)    Best Time:  any    Can we leave a detailed message on this number:  YES    Call taken on 10/22/2018 at 8:30 AM by Anita Valles

## 2018-10-29 NOTE — TELEPHONE ENCOUNTER
Letter is written.   Also I filled out his forms they are in my basket on top of the desk in my office.   Please double check letter and forms, since if patient wants us to fax and send a copy to him, or if he wants to read letter prior to us sending.

## 2018-11-07 ENCOUNTER — TELEPHONE (OUTPATIENT)
Dept: FAMILY MEDICINE | Facility: CLINIC | Age: 38
End: 2018-11-07

## 2018-11-07 NOTE — TELEPHONE ENCOUNTER
Reason for Call:  Other Letter    Detailed comments: Sean LEFT MESSAGE that he is wondering if letter he requested has been done yet.  Please review and advise. Thank you..Liat Beasley    Phone Number Patient can be reached at: Home number on file [phone] (home)      Call taken on 11/7/2018 at 8:48 AM by Liat Beasley

## 2018-11-08 PROBLEM — F43.10 PTSD (POST-TRAUMATIC STRESS DISORDER): Status: ACTIVE | Noted: 2018-11-08

## 2018-11-08 NOTE — TELEPHONE ENCOUNTER
Patient notified of provider's note for letter to be ready by 11/9/18 by noon to  at the clinic  Patient verbalized understanding and report he will  at the  11/9/18    Dilia HERZOG Rn

## 2018-12-06 ENCOUNTER — OFFICE VISIT (OUTPATIENT)
Dept: FAMILY MEDICINE | Facility: CLINIC | Age: 38
End: 2018-12-06
Payer: COMMERCIAL

## 2018-12-06 VITALS
BODY MASS INDEX: 41.07 KG/M2 | TEMPERATURE: 98.7 F | HEART RATE: 85 BPM | DIASTOLIC BLOOD PRESSURE: 76 MMHG | HEIGHT: 68 IN | OXYGEN SATURATION: 98 % | SYSTOLIC BLOOD PRESSURE: 124 MMHG | WEIGHT: 271 LBS

## 2018-12-06 DIAGNOSIS — F43.10 PTSD (POST-TRAUMATIC STRESS DISORDER): ICD-10-CM

## 2018-12-06 DIAGNOSIS — R73.03 PREDIABETES: ICD-10-CM

## 2018-12-06 DIAGNOSIS — I10 BENIGN ESSENTIAL HYPERTENSION: Primary | ICD-10-CM

## 2018-12-06 PROCEDURE — 99214 OFFICE O/P EST MOD 30 MIN: CPT | Performed by: PHYSICIAN ASSISTANT

## 2018-12-06 ASSESSMENT — PAIN SCALES - GENERAL: PAINLEVEL: NO PAIN (0)

## 2018-12-06 NOTE — PROGRESS NOTES
SUBJECTIVE:   Sean Woodard is a 38 year old male who presents to clinic today for the following health issues:      Hypertension Follow-up      Outpatient blood pressures are not being checked.    Low Salt Diet: no added salt      Amount of exercise or physical activity: None    Problems taking medications regularly: No    Medication side effects: none    Diet: low salt    Doing wel  No concerns or issues with medication  Has taken it at home and notes he has had a few that have been elevated    Tolerating metformin  Started as patient pre diabetic and overweight.           Problem list and histories reviewed & adjusted, as indicated.  Additional history: as documented    Current Outpatient Prescriptions   Medication Sig Dispense Refill     adalimumab (HUMIRA PEN) 40 MG/0.8ML pen kit Inject 40 mg weekly SQ 2 kit 2     atorvastatin (LIPITOR) 20 MG tablet TAKE ONE TABLET BY MOUTH EVERY DAY 90 tablet 1     augmented betamethasone dipropionate (DIPROLENE-AF) 0.05 % ointment Apply sparingly to affected area twice daily as needed.  Do not apply to face. 45 g 1     clindamycin (CLINDAMAX) 1 % lotion Apply to AA BID 60 mL 11     ISOtretinoin 30 MG CAPS Take 1 capsule by mouth 2 times daily (with meals) 60 capsule 0     losartan-hydrochlorothiazide (HYZAAR) 50-12.5 MG per tablet Take 1 tablet by mouth daily 90 tablet 3     metFORMIN (GLUCOPHAGE-XR) 500 MG 24 hr tablet Take 1 tablet (500 mg) by mouth daily (with dinner) 90 tablet 1     mupirocin (BACTROBAN) 2 % cream Apply topically 3 times daily 30 g 0     sulfamethoxazole-trimethoprim (BACTRIM DS) 800-160 MG per tablet Take 1 tablet by mouth 2 times daily 60 tablet 2     sulfamethoxazole-trimethoprim (BACTRIM DS) 800-160 MG per tablet 1 tab PO BID 14 tablet 0     [DISCONTINUED] Adalimumab (HUMIRA) 40 MG/0.4ML pen kit 80 mg subcutaneous once weekly (Patient not taking: Reported on 10/16/2018) 8 each 5     [DISCONTINUED] Adalimumab (HUMIRA) 40 MG/0.4ML pen kit 80 mg  "subcutaneous once weekly (Patient not taking: Reported on 10/16/2018) 4 each 5     Allergies   Allergen Reactions     Lisinopril Cough     BP Readings from Last 3 Encounters:   12/06/18 124/76   10/16/18 146/83   10/01/18 128/90    Wt Readings from Last 3 Encounters:   12/06/18 271 lb (122.9 kg)   10/01/18 268 lb (121.6 kg)   04/30/18 271 lb (122.9 kg)                    Reviewed and updated as needed this visit by clinical staff       Reviewed and updated as needed this visit by Provider         ROS:  Remainder of ROS obtained and found to be negative other than that which was documented above      OBJECTIVE:     /76  Pulse 85  Temp 98.7  F (37.1  C) (Tympanic)  Ht 5' 8\" (1.727 m)  Wt 271 lb (122.9 kg)  SpO2 98%  BMI 41.21 kg/m2  Body mass index is 41.21 kg/(m^2).  GENERAL: healthy, alert and no distress  EYES: Eyes grossly normal to inspection  RESP: lungs clear to auscultation - no rales, rhonchi or wheezes  CV: regular rates and rhythm, normal S1 S2, no S3 or S4 and no murmur, click or rub    Diagnostic Test Results:  none     ASSESSMENT/PLAN:   (I10) Benign essential hypertension  (primary encounter diagnosis)  Comment: well controlled. Doing well on medications  Plan: continue meds - no change in dose    (R73.03) Prediabetes  Comment: started on metformin at last visit - tolerating well. Will recheck A1c in a few weeks  Plan: **A1C FUTURE anytime            (F43.10) PTSD (post-traumatic stress disorder)  Comment:   Plan: previously diagnosed. Updated letter for AMEYA Alberto PA-C  [facility]    "

## 2018-12-06 NOTE — MR AVS SNAPSHOT
After Visit Summary   12/6/2018    Sean Woodard    MRN: [id number]           Patient Information     Date Of Birth          1980        Visit Information        Provider Department      12/6/2018 4:20 PM Susanne Alberto PA-C [facility]        Today's Diagnoses     Prediabetes    -  1       Follow-ups after your visit        Your next 10 appointments already scheduled     Jan 16, 2019  3:45 PM CST   LAB with [facility] ([facility])    [address] [address]   [phone]           Please do not eat 10-12 hours before your appointment if you are coming in fasting for labs on lipids, cholesterol, or glucose (sugar). This does not apply to pregnant women. Water, hot tea and black coffee (with nothing added) are okay. Do not drink other fluids, diet soda or chew gum.            Jan 17, 2019  3:40 PM CST   Return Visit with Sabi Bustos PA-C   [facility] ([facility])    [address] [place] [address] [phone]              Future tests that were ordered for you today     Open Future Orders        Priority Expected Expires Ordered    **A1C FUTURE anytime Routine 12/6/2018 12/6/2019 12/6/2018            Who to contact     Normal or non-critical lab and imaging results will be communicated to you by MyChart, letter or phone within 4 business days after the clinic has received the results. If you do not hear from us within 7 days, please contact the clinic through MyChart or phone. If you have a critical or abnormal lab result, we will notify you by phone as soon as possible.  Submit refill requests through [employer] or call your pharmacy and they will forward the refill request to us. Please allow 3 business days for your refill to be completed.          If you need to speak with a  for additional information , please call: [phone]             Additional  "Information About Your Visit        MyChart Information     [employer] gives you secure access to your electronic health record. If you see a primary care provider, you can also send messages to your care team and make appointments. If you have questions, please call your primary care clinic.  If you do not have a primary care provider, please call [phone] and they will assist you.        Care EveryWhere ID     This is your Care EveryWhere ID. This could be used by other organizations to access your [place] medical records  [id number]        Your Vitals Were     Pulse Temperature Height Pulse Oximetry BMI (Body Mass Index)       85 98.7  F (37.1  C) (Tympanic) 5' 8\" (1.727 m) 98% 41.21 kg/m2        Blood Pressure from Last 3 Encounters:   12/06/18 124/76   10/16/18 146/83   10/01/18 128/90    Weight from Last 3 Encounters:   12/06/18 271 lb (122.9 kg)   10/01/18 268 lb (121.6 kg)   04/30/18 271 lb (122.9 kg)               Primary Care Provider Office Phone # Fax #    Susanne Alberto PA-C [phone] [address] [address]        Equal Access to Services     VINNIE MAYEN AH: Hadii aad ku hadasho Soomaali, waaxda luqadaha, qaybta kaalmada adeegyada, waxay jamesin hayfernyn aaron meyer lasadia . So [facility] [phone].    ATENCIÓN: Si habla español, tiene a camacho disposición servicios gratuitos de asistencia lingüística. Llesperanza al [phone].    We comply with applicable federal civil rights laws and [place] laws. We do not discriminate on the basis of race, color, national origin, age, disability, sex, sexual orientation, or gender identity.            Thank you!     Thank you for choosing [facility]  for your care. Our goal is always to provide you with excellent care. Hearing back from our patients is one way we can continue to improve our services. Please take a few minutes to complete the written survey that you may receive in the mail after your visit with " us. Thank you!             Your Updated Medication List - Protect others around you: Learn how to safely use, store and throw away your medicines at www.disposemymeds.org.          This list is accurate as of 12/6/18  4:45 PM.  Always use your most recent med list.                   Brand Name Dispense Instructions for use Diagnosis    adalimumab 40 MG/0.8ML pen kit    HUMIRA PEN    2 kit    Inject 40 mg weekly SQ    Hidradenitis suppurativa       atorvastatin 20 MG tablet    LIPITOR    90 tablet    TAKE ONE TABLET BY MOUTH EVERY DAY    Hyperlipidemia with target LDL less than 100       augmented betamethasone dipropionate 0.05 % external ointment    DIPROLENE-AF    45 g    Apply sparingly to affected area twice daily as needed.  Do not apply to face.    Chronic dermatitis       clindamycin 1 % external lotion    CLINDAMAX    60 mL    Apply to AA BID    Acne keloidalis       ISOtretinoin 30 MG capsule    ABSORICA    60 capsule    Take 1 capsule by mouth 2 times daily (with meals)        losartan-hydrochlorothiazide 50-12.5 MG tablet    HYZAAR    90 tablet    Take 1 tablet by mouth daily    Benign essential hypertension       metFORMIN 500 MG 24 hr tablet    GLUCOPHAGE-XR    90 tablet    Take 1 tablet (500 mg) by mouth daily (with dinner)    Pre-diabetes, Class 3 severe obesity with body mass index (BMI) of 40.0 to 44.9 in adult, unspecified obesity type, unspecified whether serious comorbidity present (H)       mupirocin 2 % external cream    BACTROBAN    30 g    Apply topically 3 times daily    Rash and nonspecific skin eruption       * sulfamethoxazole-trimethoprim 800-160 MG tablet    BACTRIM DS    14 tablet    1 tab PO BID    Acne keloidalis       * sulfamethoxazole-trimethoprim 800-160 MG tablet    BACTRIM DS    60 tablet    Take 1 tablet by mouth 2 times daily    Acne nuchae keloidalis       * Notice:  This list has 2 medication(s) that are the same as other medications prescribed for you. Read the directions  carefully, and ask your doctor or other care provider to review them with you.

## 2018-12-06 NOTE — LETTER
[facility]  [address] [place] [address]  Phone: [phone]    December 6, 2018        Sean Woodard  [address] [facility] [address]          To whom it may concern:    RE: Sean Woodard    This  is followed closely in this clinic for the following history of hypertension, obstructive sleep apnea, hyperlipidemia, GERD, class 3 obesity, and PTSD.     He was diagnosed with PTSD on 11/9/2011 by a Dr. 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 He was later diagnosed with a binge eating disorder which was felt to be connected to his PTSD as that was his method of coping. Since establishing care within our system his weight has increased from 229 in 2011 to a high of 271 earlier this year (2018). With this increase in weight, we have seen the development of obstructive sleep apnea, prediabetes, and most recently essential hypertension. As such, it is least as likely as not that all of the above conditions are due to the binge eating as a coping strategy for PTSD.     Please contact me for questions or concerns.      Sincerely,        Susanne Alberto PA-C

## 2019-01-08 DIAGNOSIS — E78.5 HYPERLIPIDEMIA WITH TARGET LDL LESS THAN 100: ICD-10-CM

## 2019-01-08 RX ORDER — ATORVASTATIN CALCIUM 20 MG/1
TABLET, FILM COATED ORAL
Qty: 90 TABLET | Refills: 0 | Status: SHIPPED | OUTPATIENT
Start: 2019-01-08 | End: 2019-04-24

## 2019-01-08 NOTE — TELEPHONE ENCOUNTER
Medication is being filled for 1 time refill only due to:   Over due for office visit and/or labs   LEILANI Kerr  RN/Garry Arrieta

## 2019-01-08 NOTE — TELEPHONE ENCOUNTER
"ATORVASTATIN CALCIUM 20MG TABS      Last Written Prescription Date:  7/16/18  Last Fill Quantity: 90,   # refills: 1  Last Office Visit: 12/6/18  Future Office visit:    Next 5 appointments (look out 90 days)    Jan 17, 2019  3:40 PM CST  Return Visit with Sabi Bustos PA-C  [facility] [address] [facility] [address]  [phone]           Requested Prescriptions   Pending Prescriptions Disp Refills     atorvastatin (LIPITOR) 20 MG tablet [Pharmacy Med Name: ATORVASTATIN CALCIUM 20MG TABS] 90 tablet 1     Sig: TAKE ONE TABLET BY MOUTH EVERY DAY    Statins Protocol Passed - 1/8/2019  8:05 AM       Passed - LDL on file in past 12 months    Recent Labs   Lab Test 03/05/18  0832   *            Passed - No abnormal creatine kinase in past 12 months    No lab results found.            Passed - Recent (12 mo) or future (30 days) visit within the authorizing provider's specialty    Patient had office visit in the last 12 months or has a visit in the next 30 days with authorizing provider or within the authorizing provider's specialty.  See \"Patient Info\" tab in inbasket, or \"Choose Columns\" in Meds & Orders section of the refill encounter.             Passed - Medication is active on med list       Passed - Patient is age 18 or older          "

## 2019-01-16 DIAGNOSIS — R73.03 PREDIABETES: ICD-10-CM

## 2019-01-16 LAB — HBA1C MFR BLD: 6.8 % (ref 0–5.6)

## 2019-01-16 PROCEDURE — 83036 HEMOGLOBIN GLYCOSYLATED A1C: CPT | Performed by: PHYSICIAN ASSISTANT

## 2019-01-16 PROCEDURE — 36415 COLL VENOUS BLD VENIPUNCTURE: CPT | Performed by: PHYSICIAN ASSISTANT

## 2019-01-17 ENCOUNTER — OFFICE VISIT (OUTPATIENT)
Dept: DERMATOLOGY | Facility: CLINIC | Age: 39
End: 2019-01-17
Payer: COMMERCIAL

## 2019-01-17 VITALS — OXYGEN SATURATION: 98 % | SYSTOLIC BLOOD PRESSURE: 132 MMHG | HEART RATE: 87 BPM | DIASTOLIC BLOOD PRESSURE: 80 MMHG

## 2019-01-17 DIAGNOSIS — L73.2 HIDRADENITIS SUPPURATIVA: ICD-10-CM

## 2019-01-17 DIAGNOSIS — Z51.81 THERAPEUTIC DRUG MONITORING: Primary | ICD-10-CM

## 2019-01-17 LAB
ALBUMIN SERPL-MCNC: 4 G/DL (ref 3.4–5)
ALP SERPL-CCNC: 79 U/L (ref 40–150)
ALT SERPL W P-5'-P-CCNC: 65 U/L (ref 0–70)
ANION GAP SERPL CALCULATED.3IONS-SCNC: 4 MMOL/L (ref 3–14)
AST SERPL W P-5'-P-CCNC: 39 U/L (ref 0–45)
BILIRUB SERPL-MCNC: 0.7 MG/DL (ref 0.2–1.3)
BUN SERPL-MCNC: 13 MG/DL (ref 7–30)
CALCIUM SERPL-MCNC: 9.2 MG/DL (ref 8.5–10.1)
CHLORIDE SERPL-SCNC: 103 MMOL/L (ref 94–109)
CO2 SERPL-SCNC: 28 MMOL/L (ref 20–32)
CREAT SERPL-MCNC: 0.96 MG/DL (ref 0.66–1.25)
ERYTHROCYTE [DISTWIDTH] IN BLOOD BY AUTOMATED COUNT: 12.2 % (ref 10–15)
GFR SERPL CREATININE-BSD FRML MDRD: >90 ML/MIN/{1.73_M2}
GLUCOSE SERPL-MCNC: 88 MG/DL (ref 70–99)
HCT VFR BLD AUTO: 44.8 % (ref 40–53)
HGB BLD-MCNC: 15.4 G/DL (ref 13.3–17.7)
MCH RBC QN AUTO: 30.6 PG (ref 26.5–33)
MCHC RBC AUTO-ENTMCNC: 34.4 G/DL (ref 31.5–36.5)
MCV RBC AUTO: 89 FL (ref 78–100)
PLATELET # BLD AUTO: 259 10E9/L (ref 150–450)
POTASSIUM SERPL-SCNC: 3.5 MMOL/L (ref 3.4–5.3)
PROT SERPL-MCNC: 8.5 G/DL (ref 6.8–8.8)
RBC # BLD AUTO: 5.04 10E12/L (ref 4.4–5.9)
SODIUM SERPL-SCNC: 135 MMOL/L (ref 133–144)
WBC # BLD AUTO: 7.9 10E9/L (ref 4–11)

## 2019-01-17 PROCEDURE — 85027 COMPLETE CBC AUTOMATED: CPT | Performed by: PHYSICIAN ASSISTANT

## 2019-01-17 PROCEDURE — 99213 OFFICE O/P EST LOW 20 MIN: CPT | Performed by: PHYSICIAN ASSISTANT

## 2019-01-17 PROCEDURE — 36415 COLL VENOUS BLD VENIPUNCTURE: CPT | Performed by: PHYSICIAN ASSISTANT

## 2019-01-17 PROCEDURE — 80053 COMPREHEN METABOLIC PANEL: CPT | Performed by: PHYSICIAN ASSISTANT

## 2019-01-17 NOTE — LETTER
To whom it may concern,    This letter is written on behalf of the  Dru Woodard who has a chronically inflammatory skin disease. Patient was initially seen in November of 2016 for acne nuchae keloidalis. He was earlier diagnosed this year with Hidradenitis Suppurativa of his axilla and buttocks. He has been on numerous oral antibiotics, antifungals, and intralesional kenalog. He finished a 5 month course of isotretinoin with little improvement earlier this year. He has had great success with Humira which is FDA approved to treat hidradenitis suppurativa. He has chronic inflamed painful, bleeding lesions on axilla, neck, and buttock with scarring. Since 2011 until 2018 patient has had a significant weight gain which he reports is related to his PTSD with binge eating disorder, which was previously diagnosed by another clinician. Being obese can be a main contributing factor in hidradenitis suppurativa. Please consider covering the 's, Dru Woodard, Humira to treat his chronic Hidrandienitis Suppurativa.       Sincerely,      Sabi Bustos PA-C

## 2019-01-17 NOTE — NURSING NOTE
"Initial /80   Pulse 87   SpO2 98%  Estimated body mass index is 41.21 kg/m  as calculated from the following:    Height as of 12/6/18: 1.727 m (5' 8\").    Weight as of 12/6/18: 122.9 kg (271 lb). .    "

## 2019-01-17 NOTE — LETTER
To whom it may concern,    This letter is written on behalf of the  Dru Woodard who has a chronically inflammatory skin disease. Patient was initially seen in November of 2016 for acne nuchae keloidalis. He was earlier diagnosed this year with hidradenitis suppurativa of his axilla and buttocks. He has been on numerous oral antibiotics, antifungals, and intralesional kenalog. He finished a 5 month course of isotretinoin with little improvement earlier this year. He has had great success with Humira which is FDA approved to treat hidradenitis suppurativa. He has chronic inflamed painful, bleeding lesions on axilla, neck, and buttock with scarring. Since 2011 until 2018 patient has had a significant weight gain which he reports is related to his PTSD with binge eating disorder, which was previously diagnosed by another clinician. Being obese can be a main contributing factor in hidradenitis suppurativa. As such, it is least as likely as not that his hidradenitis suppurativa is related to his service connected PTSD eating disorder. Please consider covering the 's, Dru Woodard, Humira to treat his chronic hidrandienitis suppurativa.       Sincerely,      Sabi Bustos PA-C

## 2019-01-17 NOTE — LETTER
2019         RE: Sean Woodard  [address] [facility] [address]        Dear Colleague,    Thank you for referring your patient, Sean Woodard, to the [facility]. Please see a copy of my visit note below.    Sean Woodard is a 39 year old year old male patient here today for recheck hidradenitis suppurativa. He reports for the past two weeks his skin has been doing well. He denies any drainage. He is on humira 80 mg once weekly. He also started metformin in October for prediabetes. Patient has no other skin complaints today.  Remainder of the HPI, Meds, PMH, Allergies, FH, and SH was reviewed in chart.    Pertinent Hx:   HS  Past Medical History:   Diagnosis Date     NO ACTIVE PROBLEMS        Past Surgical History:   Procedure Laterality Date     ORTHOPEDIC SURGERY  2005    RT knee arthroscopic menisectomy     Thumb Surgery  2009    Mass removal neuroma        Family History   Problem Relation Age of Onset     Heart Defect Father         valve replacement     Diabetes Maternal Grandmother      Cancer Maternal Grandfather         stomach     Hypertension Brother      Diabetes Sister      Cerebrovascular Disease Brother 38     Hypertension Brother        Social History     Socioeconomic History     Marital status:      Spouse name: Joycelyn Stokes     Number of children: 1     Years of education: 12+     Highest education level: Not on file   Social Needs     Financial resource strain: Not on file     Food insecurity - worry: Not on file     Food insecurity - inability: Not on file     Transportation needs - medical: Not on file     Transportation needs - non-medical: Not on file   Occupational History     Employer: [employer]   Tobacco Use     Smoking status: Former Smoker     Packs/day: 0.50     Years: 5.00     Pack years: 2.50     Types: Cigarettes     Last attempt to quit: 2005     Years since quittin.0     Smokeless tobacco: Never Used    Substance and Sexual Activity     Alcohol use: Yes     Comment: Social. 1 drink per month     Drug use: No     Sexual activity: Yes     Partners: Female   Other Topics Concern     Parent/sibling w/ CABG, MI or angioplasty before 65F 55M? No   Social History Narrative     Not on file       Outpatient Encounter Medications as of 1/17/2019   Medication Sig Dispense Refill     adalimumab (HUMIRA PEN) 40 MG/0.8ML pen kit Inject 40 mg weekly SQ 2 kit 2     atorvastatin (LIPITOR) 20 MG tablet TAKE ONE TABLET BY MOUTH EVERY DAY 90 tablet 0     augmented betamethasone dipropionate (DIPROLENE-AF) 0.05 % ointment Apply sparingly to affected area twice daily as needed.  Do not apply to face. 45 g 1     clindamycin (CLINDAMAX) 1 % lotion Apply to AA BID 60 mL 11     losartan-hydrochlorothiazide (HYZAAR) 50-12.5 MG per tablet Take 1 tablet by mouth daily 90 tablet 3     metFORMIN (GLUCOPHAGE-XR) 500 MG 24 hr tablet Take 1 tablet (500 mg) by mouth daily (with dinner) 90 tablet 1     mupirocin (BACTROBAN) 2 % cream Apply topically 3 times daily (Patient not taking: Reported on 1/17/2019) 30 g 0     sulfamethoxazole-trimethoprim (BACTRIM DS) 800-160 MG per tablet Take 1 tablet by mouth 2 times daily (Patient not taking: Reported on 1/17/2019) 60 tablet 2     sulfamethoxazole-trimethoprim (BACTRIM DS) 800-160 MG per tablet 1 tab PO BID (Patient not taking: Reported on 1/17/2019) 14 tablet 0     [DISCONTINUED] ISOtretinoin 30 MG CAPS Take 1 capsule by mouth 2 times daily (with meals) (Patient not taking: Reported on 1/17/2019) 60 capsule 0     No facility-administered encounter medications on file as of 1/17/2019.              Review Of Systems  Skin: As above  Eyes: negative  Ears/Nose/Throat: negative  Respiratory: No shortness of breath, dyspnea on exertion, cough, or hemoptysis  Cardiovascular: negative  Gastrointestinal: negative  Genitourinary: negative  Musculoskeletal: negative  Neurologic: negative  Psychiatric:  negative  Hematologic/Lymphatic/Immunologic: negative  Endocrine: negative      O:   NAD, WDWN, Alert & Oriented, Mood & Affect wnl, Vitals stable   Here today alone   /80   Pulse 87   SpO2 98%    General appearance normal   Vitals stable   Alert, oriented and in no acute distress     Healing inflammatory nodules on neck, axilla, and buttock    Eyes: Conjunctivae/lids:Normal     ENT: Lips: normal    MSK:Normal    Pulm: Breathing Normal    Neuro/Psych: Orientation:Normal; Mood/Affect:Normal  A/P:  1. Hidradenitis Suppurativa   Will check CBC and CMP.   Continue Humira 80 mg once weekly.   Discussed with patient most likely metformin is helping as well.   Recheck in 3-4 months or sooner if needed.     Again, thank you for allowing me to participate in the care of your patient.        Sincerely,        Sabi Christie PA-C

## 2019-01-19 NOTE — PROGRESS NOTES
Sean Woodard is a 39 year old year old male patient here today for recheck hidradenitis suppurativa. He reports for the past two weeks his skin has been doing well. He denies any drainage. He is on humira 80 mg once weekly. He also started metformin in October for prediabetes. Patient has no other skin complaints today.  Remainder of the HPI, Meds, PMH, Allergies, FH, and SH was reviewed in chart.    Pertinent Hx:   HS  Past Medical History:   Diagnosis Date     NO ACTIVE PROBLEMS        Past Surgical History:   Procedure Laterality Date     ORTHOPEDIC SURGERY  2005    RT knee arthroscopic menisectomy     Thumb Surgery  2009    Mass removal neuroma        Family History   Problem Relation Age of Onset     Heart Defect Father         valve replacement     Diabetes Maternal Grandmother      Cancer Maternal Grandfather         stomach     Hypertension Brother      Diabetes Sister      Cerebrovascular Disease Brother 38     Hypertension Brother        Social History     Socioeconomic History     Marital status:      Spouse name: Joycelyn Stokes     Number of children: 1     Years of education: 12+     Highest education level: Not on file   Social Needs     Financial resource strain: Not on file     Food insecurity - worry: Not on file     Food insecurity - inability: Not on file     Transportation needs - medical: Not on file     Transportation needs - non-medical: Not on file   Occupational History     Employer: [employer]   Tobacco Use     Smoking status: Former Smoker     Packs/day: 0.50     Years: 5.00     Pack years: 2.50     Types: Cigarettes     Last attempt to quit: 2005     Years since quittin.0     Smokeless tobacco: Never Used   Substance and Sexual Activity     Alcohol use: Yes     Comment: Social. 1 drink per month     Drug use: No     Sexual activity: Yes     Partners: Female   Other Topics Concern     Parent/sibling w/ CABG, MI or angioplasty before 65F 55M?  No   Social History Narrative     Not on file       Outpatient Encounter Medications as of 1/17/2019   Medication Sig Dispense Refill     adalimumab (HUMIRA PEN) 40 MG/0.8ML pen kit Inject 40 mg weekly SQ 2 kit 2     atorvastatin (LIPITOR) 20 MG tablet TAKE ONE TABLET BY MOUTH EVERY DAY 90 tablet 0     augmented betamethasone dipropionate (DIPROLENE-AF) 0.05 % ointment Apply sparingly to affected area twice daily as needed.  Do not apply to face. 45 g 1     clindamycin (CLINDAMAX) 1 % lotion Apply to AA BID 60 mL 11     losartan-hydrochlorothiazide (HYZAAR) 50-12.5 MG per tablet Take 1 tablet by mouth daily 90 tablet 3     metFORMIN (GLUCOPHAGE-XR) 500 MG 24 hr tablet Take 1 tablet (500 mg) by mouth daily (with dinner) 90 tablet 1     mupirocin (BACTROBAN) 2 % cream Apply topically 3 times daily (Patient not taking: Reported on 1/17/2019) 30 g 0     sulfamethoxazole-trimethoprim (BACTRIM DS) 800-160 MG per tablet Take 1 tablet by mouth 2 times daily (Patient not taking: Reported on 1/17/2019) 60 tablet 2     sulfamethoxazole-trimethoprim (BACTRIM DS) 800-160 MG per tablet 1 tab PO BID (Patient not taking: Reported on 1/17/2019) 14 tablet 0     [DISCONTINUED] ISOtretinoin 30 MG CAPS Take 1 capsule by mouth 2 times daily (with meals) (Patient not taking: Reported on 1/17/2019) 60 capsule 0     No facility-administered encounter medications on file as of 1/17/2019.              Review Of Systems  Skin: As above  Eyes: negative  Ears/Nose/Throat: negative  Respiratory: No shortness of breath, dyspnea on exertion, cough, or hemoptysis  Cardiovascular: negative  Gastrointestinal: negative  Genitourinary: negative  Musculoskeletal: negative  Neurologic: negative  Psychiatric: negative  Hematologic/Lymphatic/Immunologic: negative  Endocrine: negative      O:   NAD, WDWN, Alert & Oriented, Mood & Affect wnl, Vitals stable   Here today alone   /80   Pulse 87   SpO2 98%    General appearance normal   Vitals  stable   Alert, oriented and in no acute distress     Healing inflammatory nodules on neck, axilla, and buttock    Eyes: Conjunctivae/lids:Normal     ENT: Lips: normal    MSK:Normal    Pulm: Breathing Normal    Neuro/Psych: Orientation:Normal; Mood/Affect:Normal  A/P:  1. Hidradenitis Suppurativa   Will check CBC and CMP.   Continue Humira 80 mg once weekly.   Discussed with patient most likely metformin is helping as well.   Recheck in 3-4 months or sooner if needed.

## 2019-01-25 ENCOUNTER — OFFICE VISIT (OUTPATIENT)
Dept: FAMILY MEDICINE | Facility: CLINIC | Age: 39
End: 2019-01-25
Payer: COMMERCIAL

## 2019-01-25 VITALS
BODY MASS INDEX: 41.68 KG/M2 | TEMPERATURE: 99.3 F | HEART RATE: 74 BPM | HEIGHT: 68 IN | WEIGHT: 275 LBS | SYSTOLIC BLOOD PRESSURE: 128 MMHG | DIASTOLIC BLOOD PRESSURE: 76 MMHG

## 2019-01-25 DIAGNOSIS — E66.01 CLASS 3 SEVERE OBESITY WITH BODY MASS INDEX (BMI) OF 40.0 TO 44.9 IN ADULT, UNSPECIFIED OBESITY TYPE, UNSPECIFIED WHETHER SERIOUS COMORBIDITY PRESENT (H): ICD-10-CM

## 2019-01-25 DIAGNOSIS — L73.2 HIDRADENITIS SUPPURATIVA: ICD-10-CM

## 2019-01-25 DIAGNOSIS — E11.9 TYPE 2 DIABETES MELLITUS WITHOUT COMPLICATION, WITHOUT LONG-TERM CURRENT USE OF INSULIN (H): Primary | ICD-10-CM

## 2019-01-25 DIAGNOSIS — E66.813 CLASS 3 SEVERE OBESITY WITH BODY MASS INDEX (BMI) OF 40.0 TO 44.9 IN ADULT, UNSPECIFIED OBESITY TYPE, UNSPECIFIED WHETHER SERIOUS COMORBIDITY PRESENT (H): ICD-10-CM

## 2019-01-25 PROCEDURE — 11900 INJECT SKIN LESIONS </W 7: CPT | Performed by: PHYSICIAN ASSISTANT

## 2019-01-25 PROCEDURE — 99214 OFFICE O/P EST MOD 30 MIN: CPT | Mod: 25 | Performed by: PHYSICIAN ASSISTANT

## 2019-01-25 RX ORDER — TRIAMCINOLONE ACETONIDE 40 MG/ML
12 INJECTION, SUSPENSION INTRA-ARTICULAR; INTRAMUSCULAR ONCE
Status: COMPLETED | OUTPATIENT
Start: 2019-01-25 | End: 2019-01-25

## 2019-01-25 RX ORDER — METFORMIN HCL 500 MG
1000 TABLET, EXTENDED RELEASE 24 HR ORAL
Qty: 90 TABLET | Refills: 1 | Status: SHIPPED | OUTPATIENT
Start: 2019-01-25 | End: 2019-03-08

## 2019-01-25 RX ADMIN — TRIAMCINOLONE ACETONIDE 12 MG: 40 INJECTION, SUSPENSION INTRA-ARTICULAR; INTRAMUSCULAR at 17:36

## 2019-01-25 ASSESSMENT — PAIN SCALES - GENERAL: PAINLEVEL: EXTREME PAIN (8)

## 2019-01-25 ASSESSMENT — MIFFLIN-ST. JEOR: SCORE: 2141.89

## 2019-01-25 NOTE — PROGRESS NOTES
SUBJECTIVE:   Sean Woodard is a 39 year old male who presents to clinic today for the following health issues:      Patient is here today to go over his recent A1C results.   Last A1c increased to 6.8  Wondering if that means he is diabetic now  Taking 500mg of metformin once daily - no side effects  Admits diet has been a struggle the past few months but was still hoping that the addition of metformin would have brought his numbers down versus up    -He also is wondering if he can get an injection today that will help with inflammation for the sores he gets at times. He is having a flare up right now and it is very painful for him. He said he would get them at the dermatology clinic before and does not know what they are called.          Problem list and histories reviewed & adjusted, as indicated.  Additional history: as documented    Current Outpatient Medications   Medication Sig Dispense Refill     adalimumab (HUMIRA *CF*) 40 MG/0.4ML pen kit Inject 0.8 mLs (80 mg) Subcutaneous every 7 days 8 each 2     adalimumab (HUMIRA PEN) 40 MG/0.8ML pen kit Inject 40 mg weekly SQ 2 kit 2     atorvastatin (LIPITOR) 20 MG tablet TAKE ONE TABLET BY MOUTH EVERY DAY 90 tablet 0     augmented betamethasone dipropionate (DIPROLENE-AF) 0.05 % ointment Apply sparingly to affected area twice daily as needed.  Do not apply to face. 45 g 1     clindamycin (CLINDAMAX) 1 % lotion Apply to AA BID 60 mL 11     losartan-hydrochlorothiazide (HYZAAR) 50-12.5 MG per tablet Take 1 tablet by mouth daily 90 tablet 3     metFORMIN (GLUCOPHAGE-XR) 500 MG 24 hr tablet Take 1 tablet (500 mg) by mouth daily (with dinner) 90 tablet 1     mupirocin (BACTROBAN) 2 % cream Apply topically 3 times daily (Patient not taking: Reported on 1/17/2019) 30 g 0     sulfamethoxazole-trimethoprim (BACTRIM DS) 800-160 MG per tablet Take 1 tablet by mouth 2 times daily (Patient not taking: Reported on 1/17/2019) 60 tablet 2     sulfamethoxazole-trimethoprim (BACTRIM  "DS) 800-160 MG per tablet 1 tab PO BID (Patient not taking: Reported on 1/17/2019) 14 tablet 0     Allergies   Allergen Reactions     Lisinopril Cough     BP Readings from Last 3 Encounters:   01/25/19 128/76   01/17/19 132/80   12/06/18 124/76    Wt Readings from Last 3 Encounters:   01/25/19 124.7 kg (275 lb)   12/06/18 122.9 kg (271 lb)   10/01/18 121.6 kg (268 lb)                    Reviewed and updated as needed this visit by clinical staff       Reviewed and updated as needed this visit by Provider         ROS:  A 10 point ROS is done and negative except as noted above.      OBJECTIVE:     /76   Pulse 74   Temp 99.3  F (37.4  C) (Tympanic)   Ht 1.727 m (5' 8\")   Wt 124.7 kg (275 lb)   BMI 41.81 kg/m    Body mass index is 41.81 kg/m .  GENERAL: healthy, alert and no distress  EYES: Eyes grossly normal to inspection  RESP: lungs clear to auscultation - no rales, rhonchi or wheezes  CV: regular rates and rhythm, normal S1 S2, no S3 or S4 and no murmur, click or rub  SKIN: large cystic  Lesions under both armpits, R>L    Diagnostic Test Results:  none     ASSESSMENT/PLAN:     (E11.9) Type 2 diabetes mellitus without complication, without long-term current use of insulin (H)  (primary encounter diagnosis)  Comment: increase metformin and have him meet with diabetic educator. Follow up in 4-6 weeks for labs and visit  Plan: metFORMIN (GLUCOPHAGE-XR) 500 MG 24 hr tablet,         DIABETES EDUCATOR REFERRAL            (E66.01,  Z68.41) Class 3 severe obesity with body mass index (BMI) of 40.0 to 44.9 in adult, unspecified obesity type, unspecified whether serious comorbidity present (H)  Comment:   Plan: metFORMIN (GLUCOPHAGE-XR) 500 MG 24 hr tablet            (L73.2) Hidradenitis suppurativa  Comment: followed closely by dermatology who has done these injections in the past which have significantly helped with the pain. He remains on Humira as well   Plan: triamcinolone (KENALOG-40) injection 12 mg        "   Procedure: intralesional steroid injection   Consent: Risks discussed with patient and all questions answered. Patient familiar with procedure  Description: Kenalog 40mg/mL was injected into each inflamed cystic lesion under armpits - 4 under right armpit and 1 under the left armpit. Total amount injected was 0.3mL  Complications: none. Patient tolerated well        Susanne Alberto PA-C  [facility]

## 2019-01-25 NOTE — NURSING NOTE
Prior to injection, verified patient identity using patient's name and date of birth.  Due to injection administration, patient instructed to remain in clinic for 15 minutes  afterwards, and to report any adverse reaction to me immediately.    Kenalog 400mg per 10ML    Drug Amount Wasted:  None.  Vial/Syringe: Multi dose vial  Expiration Date:  01/01/2020     Travis Batres CMA

## 2019-01-28 PROBLEM — G47.33 OSA (OBSTRUCTIVE SLEEP APNEA): Status: ACTIVE | Noted: 2019-01-28

## 2019-02-08 ENCOUNTER — ALLIED HEALTH/NURSE VISIT (OUTPATIENT)
Dept: EDUCATION SERVICES | Facility: CLINIC | Age: 39
End: 2019-02-08
Payer: COMMERCIAL

## 2019-02-08 DIAGNOSIS — E11.9 TYPE 2 DIABETES MELLITUS WITHOUT COMPLICATION, WITHOUT LONG-TERM CURRENT USE OF INSULIN (H): Primary | ICD-10-CM

## 2019-02-08 PROCEDURE — G0108 DIAB MANAGE TRN  PER INDIV: HCPCS

## 2019-02-08 NOTE — PATIENT INSTRUCTIONS
"Goals:     1.  Aim for 4-5 carb servings at meals and 0-2 carb servings at snacks    Grams of Carbohydrate \"Carb Choices\"    15 1   30 2   45 3   60 4   75 5    Total grams of carbohydrate ÷ 15  =  carb choices  Example:  39 grams of carbohydrate ÷  15 =  2.6 carb choices    2.  Aim for 30 minutes of exercise 5 weekly.      3.  Continue to check your blood sugar and please bring meter and logbook with you to all doctor and follow-up appointments.      mychart which glucometer is covered by your insurance once the pharmacy       [facility] the [facility]:  For Your Diabetes Education Appointments Call:  [phone]    For Diabetes Education Related Questions:    Phone: [phone]      Yvette Ya RD, LD, CDE             "

## 2019-02-08 NOTE — PROGRESS NOTES
"Diabetes Self-Management Education & Support    Diabetes Education Self Management & Training    SUBJECTIVE/OBJECTIVE:  Presents for: Initial Assessment for new diagnosis  Accompanied by: Self  Diabetes education in the past 24mo: No  Focus of Visit: Monitoring, Reducing Risks, Healthy Eating  Date of diagnosis: 1/16/2019  Disease course: Improving  Transportation concerns: No  Other concerns:: None  Cultural Influences/Ethnic Background:  Chinese    Diabetes Symptoms & Complications  No signs/symptoms     Patient Problem List and Family Medical History reviewed for relevant medical history, current medical status, and diabetes risk factors.    Vitals:  Wt Readings from Last 5 Encounters:   01/25/19 124.7 kg (275 lb)   12/06/18 122.9 kg (271 lb)   10/01/18 121.6 kg (268 lb)   04/30/18 122.9 kg (271 lb)   04/23/18 122.9 kg (271 lb)       Estimated body mass index is 41.81 kg/m  as calculated from the following:    Height as of 1/25/19: 1.727 m (5' 8\").    Weight as of 1/25/19: 124.7 kg (275 lb).   Last 3 BP:   BP Readings from Last 3 Encounters:   01/25/19 128/76   01/17/19 132/80   12/06/18 124/76       History   Smoking Status     Former Smoker     Packs/day: 0.50     Years: 5.00     Types: Cigarettes     Quit date: 1/1/2005   Smokeless Tobacco     Never Used       Labs:  Lab Results   Component Value Date    A1C 6.8 01/16/2019     Lab Results   Component Value Date    GLC 88 01/17/2019     Lab Results   Component Value Date     03/05/2018     HDL Cholesterol   Date Value Ref Range Status   03/05/2018 42 >39 mg/dL Final   ]  GFR Estimate   Date Value Ref Range Status   01/17/2019 >90 >60 mL/min/[1.73_m2] Final     Comment:     Non  GFR Calc  Starting 12/18/2018, serum creatinine based estimated GFR (eGFR) will be   calculated using the Chronic Kidney Disease Epidemiology Collaboration   (CKD-EPI) equation.       GFR Estimate If Black   Date Value Ref Range Status   01/17/2019 >90 >60 " mL/min/[1.73_m2] Final     Comment:      GFR Calc  Starting 12/18/2018, serum creatinine based estimated GFR (eGFR) will be   calculated using the Chronic Kidney Disease Epidemiology Collaboration   (CKD-EPI) equation.       Lab Results   Component Value Date    CR 0.96 01/17/2019     No results found for: MICROALBUMIN    Healthy Eating  Healthy Eating Assessed Today: Yes  Cultural/Voodoo diet restrictions?: Yes( diet - more rice, rice noodles)  Patient on a regular basis: Eats 3 meals a day, Eats out more than once a week, Has a high intake of carbohydrates  Meal planning: None  Has patient met with a dietitian in the past?: No    Being Active  Being Active Assessed Today: Yes  Exercise:: (has a room set up with exercise equipment and wants to start using)  Barrier to exercise: Time    Monitoring  Monitoring Assessed Today: Yes      Taking Medications  Diabetes Medication(s)     Biguanides Sig    metFORMIN (GLUCOPHAGE-XR) 500 MG 24 hr tablet Take 2 tablets (1,000 mg) by mouth daily (with dinner)        Started on 1 tab 10/5/18 after an A1c of 6.3, A1c increased to 6.8 on 1/16 and Metformin was increased by PCP on 1/25/19.   No issues with tolerance.     Taking Medication Assessed Today: Yes  Current Treatments: Oral Agent (monotherapy)  Problems taking diabetes medications regularly?: No  Diabetes medication side effects?: No  Treatment Compliance: All of the time    Problem Solving  Problem Solving Assessed Today: No    Reducing Risks  Reducing Risks Assessed Today: No    Healthy Coping  Healthy Coping Assessed Today: Yes  Emotional response to diabetes: Ready to learn  Stage of change: ACTION (Actively working towards change)  Difficulty affording diabetes management supplies?: No  Support resources: In-person Offerings  Patient Activation Measure Survey Score:  RUBIO Score (Last Two) 6/11/2012   RUBIO Raw Score 52   Activation Score 100   RUBIO Level 4       Patient's most recent   Lab Results    Component Value Date    A1C 6.8 01/16/2019    is meeting goal of <7.0     Lab Results   Component Value Date    A1C 6.8 01/16/2019    A1C 6.3 10/01/2018    A1C 6.1 05/22/2017    A1C 6.3 01/30/2017    A1C 5.8 08/16/2016         ASSESSMENT/INTERVENTION:   New diagnosis; reviewed pathophysiology, metformin, diet, activity etc.  Patient has long term goals for weight loss down to 180#. Reviewed carbohydrate counting.     Unsure which glucometer is covered on patient's formulary; added strips to be run; patient is going to let Diabetes ed know which ones are covered. Has an accu chek guide right now with 20strips.     Diabetes knowledge and skills assessment:   Patient is knowledgeable in diabetes management concepts related to: Healthy Eating, Being Active, Monitoring and Taking Medication  Patient needs further education on the following diabetes management concepts: Healthy Eating, Being Active, Problem Solving, Reducing Risks and Healthy Coping  Based on learning assessment above, most appropriate setting for further diabetes education would be: Individual setting.    Education provided today on:  AADE Self-Care Behaviors:  Diabetes Pathophysiology  Healthy Eating: carbohydrate counting, consistency in amount, composition, and timing of food intake, weight reduction, portion control and label reading  Being Active: relationship to blood glucose, describe appropriate activity program and precautions to take  Monitoring: purpose, proper technique, log and interpret results, individual blood glucose targets, frequency of monitoring, use of glucose control solution and proper sharps disposal  Taking Medication: action of prescribed medication    Patient was instructed on Accu-Chek Guide meter and was able to provide an accurate return demonstration. Patient's blood glucose reading today was 162 mg/dL nafter eating.    Opportunities for ongoing education and support in diabetes-self management were discussed.  Pt  verbalized understanding of concepts discussed and recommendations provided today.       Education Materials Provided:  BG Log Sheet and Healthy Living with Diabetes     PLAN:  See Patient Instructions for co-developed, patient-stated behavior change goals.  AVS printed and provided to patient today. See Follow-Up section for recommended follow-up.  04/12/2019    Yvette Ya RD, LD, CDE  Diabetes Education    Time Spent: 90 minutes  Encounter Type: Individual

## 2019-02-08 NOTE — Clinical Note
Ryan Skinner,  (FYI only) I saw Sean for the first Diabetes ed visit.  We discussed being aggressive about diet and activity changes.  He is trying to lose weight and does enjoy working out when time allows.   I will see him again in April.  Thanks!Megan

## 2019-02-11 ENCOUNTER — MYC MEDICAL ADVICE (OUTPATIENT)
Dept: EDUCATION SERVICES | Facility: CLINIC | Age: 39
End: 2019-02-11

## 2019-02-13 NOTE — TELEPHONE ENCOUNTER
Diabetes Education follow up     Accu chek testing supplies covered; removed other prescriptions for supplies.     Yvette Ya RD, LD, CDE  Diabetes Education

## 2019-02-27 ENCOUNTER — MEDICAL CORRESPONDENCE (OUTPATIENT)
Dept: HEALTH INFORMATION MANAGEMENT | Facility: CLINIC | Age: 39
End: 2019-02-27

## 2019-03-08 ENCOUNTER — OFFICE VISIT (OUTPATIENT)
Dept: FAMILY MEDICINE | Facility: CLINIC | Age: 39
End: 2019-03-08
Payer: COMMERCIAL

## 2019-03-08 VITALS
TEMPERATURE: 99.2 F | BODY MASS INDEX: 40.92 KG/M2 | RESPIRATION RATE: 16 BRPM | HEIGHT: 68 IN | WEIGHT: 270 LBS | DIASTOLIC BLOOD PRESSURE: 88 MMHG | SYSTOLIC BLOOD PRESSURE: 137 MMHG | HEART RATE: 86 BPM

## 2019-03-08 DIAGNOSIS — E66.813 CLASS 3 SEVERE OBESITY WITH BODY MASS INDEX (BMI) OF 40.0 TO 44.9 IN ADULT, UNSPECIFIED OBESITY TYPE, UNSPECIFIED WHETHER SERIOUS COMORBIDITY PRESENT (H): ICD-10-CM

## 2019-03-08 DIAGNOSIS — E66.01 CLASS 3 SEVERE OBESITY WITH BODY MASS INDEX (BMI) OF 40.0 TO 44.9 IN ADULT, UNSPECIFIED OBESITY TYPE, UNSPECIFIED WHETHER SERIOUS COMORBIDITY PRESENT (H): ICD-10-CM

## 2019-03-08 DIAGNOSIS — E11.9 TYPE 2 DIABETES MELLITUS WITHOUT COMPLICATION, WITHOUT LONG-TERM CURRENT USE OF INSULIN (H): Primary | ICD-10-CM

## 2019-03-08 LAB — HBA1C MFR BLD: 6.3 % (ref 0–5.6)

## 2019-03-08 PROCEDURE — 36415 COLL VENOUS BLD VENIPUNCTURE: CPT | Performed by: PHYSICIAN ASSISTANT

## 2019-03-08 PROCEDURE — 83036 HEMOGLOBIN GLYCOSYLATED A1C: CPT | Performed by: PHYSICIAN ASSISTANT

## 2019-03-08 PROCEDURE — 99213 OFFICE O/P EST LOW 20 MIN: CPT | Performed by: PHYSICIAN ASSISTANT

## 2019-03-08 RX ORDER — METFORMIN HCL 500 MG
1000 TABLET, EXTENDED RELEASE 24 HR ORAL
Qty: 90 TABLET | Refills: 1 | Status: SHIPPED | OUTPATIENT
Start: 2019-03-08 | End: 2019-06-14

## 2019-03-08 ASSESSMENT — MIFFLIN-ST. JEOR: SCORE: 2119.21

## 2019-03-08 ASSESSMENT — PAIN SCALES - GENERAL: PAINLEVEL: NO PAIN (0)

## 2019-03-08 NOTE — PROGRESS NOTES
SUBJECTIVE:   Sean Woodard is a 39 year old male who presents to clinic today for the following health issues:      Diabetes Follow-up      Patient is checking blood sugars: not at all    Diabetic concerns: None     Symptoms of hypoglycemia (low blood sugar): none     Paresthesias (numbness or burning in feet) or sores: No     Date of last diabetic eye exam: He has not had one yet     BP Readings from Last 2 Encounters:   01/25/19 128/76   01/17/19 132/80     Hemoglobin A1C (%)   Date Value   03/08/2019 6.3 (H)   01/16/2019 6.8 (H)     LDL Cholesterol Calculated (mg/dL)   Date Value   03/05/2018 111 (H)   02/05/2018 96       Diabetes Management Resources    Amount of exercise or physical activity: None    Problems taking medications regularly: No    Medication side effects: none    Diet: regular (no restrictions)            Problem list and histories reviewed & adjusted, as indicated.  Additional history: as documented    Current Outpatient Medications   Medication Sig Dispense Refill     adalimumab (HUMIRA *CF*) 40 MG/0.4ML pen kit Inject 0.8 mLs (80 mg) Subcutaneous every 7 days 8 each 2     adalimumab (HUMIRA PEN) 40 MG/0.8ML pen kit Inject 40 mg weekly SQ 2 kit 2     atorvastatin (LIPITOR) 20 MG tablet TAKE ONE TABLET BY MOUTH EVERY DAY 90 tablet 0     augmented betamethasone dipropionate (DIPROLENE-AF) 0.05 % ointment Apply sparingly to affected area twice daily as needed.  Do not apply to face. 45 g 1     blood glucose (ACCU-CHEK GUIDE) test strip Use to test blood sugar 2 times daily 100 strip 11     clindamycin (CLINDAMAX) 1 % lotion Apply to AA BID 60 mL 11     losartan-hydrochlorothiazide (HYZAAR) 50-12.5 MG per tablet Take 1 tablet by mouth daily 90 tablet 3     metFORMIN (GLUCOPHAGE-XR) 500 MG 24 hr tablet Take 2 tablets (1,000 mg) by mouth daily (with dinner) 90 tablet 1     mupirocin (BACTROBAN) 2 % cream Apply topically 3 times daily 30 g 0     sulfamethoxazole-trimethoprim (BACTRIM DS) 800-160 MG  "per tablet Take 1 tablet by mouth 2 times daily 60 tablet 2     sulfamethoxazole-trimethoprim (BACTRIM DS) 800-160 MG per tablet 1 tab PO BID 14 tablet 0     Allergies   Allergen Reactions     Lisinopril Cough     BP Readings from Last 3 Encounters:   03/08/19 137/88   01/25/19 128/76   01/17/19 132/80    Wt Readings from Last 3 Encounters:   03/08/19 122.5 kg (270 lb)   01/25/19 124.7 kg (275 lb)   12/06/18 122.9 kg (271 lb)                    Reviewed and updated as needed this visit by clinical staff       Reviewed and updated as needed this visit by Provider         ROS:  Remainder of ROS obtained and found to be negative other than that which was documented above      OBJECTIVE:     /88   Pulse 86   Temp 99.2  F (37.3  C) (Tympanic)   Resp 16   Ht 1.727 m (5' 8\")   Wt 122.5 kg (270 lb)   BMI 41.05 kg/m    Body mass index is 41.05 kg/m .  GENERAL: healthy, alert and no distress  EYES: Eyes grossly normal to inspection  RESP: lungs clear to auscultation - no rales, rhonchi or wheezes  CV: regular rate and rhythm, normal S1 S2, no S3 or S4, no murmur, click or rub, no peripheral edema and peripheral pulses strong    Diagnostic Test Results:  HGB A1c: 6.3    ASSESSMENT/PLAN:     (E11.9) Type 2 diabetes mellitus without complication, without long-term current use of insulin (H)  (primary encounter diagnosis)  Comment:   Plan: Hemoglobin A1c, metFORMIN (GLUCOPHAGE-XR) 500         MG 24 hr tablet            (E66.01,  Z68.41) Class 3 severe obesity with body mass index (BMI) of 40.0 to 44.9 in adult, unspecified obesity type, unspecified whether serious comorbidity present (H)  Comment:   Plan: metFORMIN (GLUCOPHAGE-XR) 500 MG 24 hr tablet              Susanne Alberto PA-C  [facility]    "

## 2019-03-08 NOTE — LETTER
[facility]  [address] [address] [address]  Phone: [phone]    March 8, 2019        Sean Woodard  [address] [facility] [address]          To whom it may concern:    RE: Sean Woodard    This  is followed closely in this clinic for the following history of hypertension, obstructive sleep apnea, hyperlipidemia, GERD, class 3 obesity, and PTSD.     He was diagnosed with PTSD on 11/9/2011 by a Dr. 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 He was later diagnosed with a binge eating disorder which was felt to be connected to his PTSD as that was his method of coping. Since establishing care within our system his weight has increased from 229 in 2011 to a high of 275 on 1/25/2019. With this increase in weight, we have seen the development of obstructive sleep apnea, diabetes (based on A1c from 1/16/2019) , and most recently essential hypertension. As such, it is least as likely as not that all of the above conditions are due to the binge eating as a coping strategy for PTSD.     Please contact me for questions or concerns.      Sincerely,        Susanne Alberto PA-C

## 2019-03-13 ENCOUNTER — TELEPHONE (OUTPATIENT)
Dept: FAMILY MEDICINE | Facility: CLINIC | Age: 39
End: 2019-03-13

## 2019-03-13 NOTE — TELEPHONE ENCOUNTER
Reason for Call:  Form, our goal is to have forms completed with 72 hours, however, some forms may require a visit or additional information.    Type of letter, form or note:  [facility] opinion - [facility]    Who is the form from?: Patient    Where did the form come from: Patient or family brought in       What clinic location was the form placed at?: [facility]     Where the form was placed: 's Box    What number is listed as a contact on the form?: [phone]       Additional comments: He wants form mailed.  He would also like to be called when the form has been completed.      Call taken on 3/13/2019 at 1:48 PM by Liat Beasley

## 2019-03-14 ENCOUNTER — MYC MEDICAL ADVICE (OUTPATIENT)
Dept: FAMILY MEDICINE | Facility: CLINIC | Age: 39
End: 2019-03-14

## 2019-03-15 NOTE — TELEPHONE ENCOUNTER
Sean calling to see if form was going to be completed before end of day today.  [facility] get to [facility] on [facility]18[facility].  Please review and advise.  Thank you..Liat Beasley

## 2019-03-15 NOTE — TELEPHONE ENCOUNTER
"There is no way I can review all of this information by the end of the day today and make a well thought out decision.     I appreciate the letter he typed up and I did start reviewing his chart but I ran into a few things that I have questions about, in particular where he discontinued treatment in 2011 because it was \"too intense\"     While I can offer an opinion that he has used binge eating to cope with PTSD and this has led to obesity, I also see indication that he did not seek out help and actually stopped treatment when that in fact could have also potentially prevented this so I'm not sure I cannot exclude that as part of my \"opinion\"     Finally, what exactly is he trying to attain or prove by doing this? Is this a lawsuit or he is trying to get them to cover something?     Has he had a psychiatric evaluation recently? That could also be stronger evidence in his case    Susanne      "

## 2019-03-20 NOTE — TELEPHONE ENCOUNTER
Sean notified of message from Susanne.  He would like forms mailed back to him, which I have done.  He thanks Susanne for your opinion and will just give the [facility] what he has.  Thank you..Liat Beasley

## 2019-03-26 ENCOUNTER — OFFICE VISIT (OUTPATIENT)
Dept: ALLERGY | Facility: CLINIC | Age: 39
End: 2019-03-26
Payer: COMMERCIAL

## 2019-03-26 VITALS
OXYGEN SATURATION: 98 % | DIASTOLIC BLOOD PRESSURE: 84 MMHG | BODY MASS INDEX: 40.69 KG/M2 | HEART RATE: 101 BPM | RESPIRATION RATE: 18 BRPM | WEIGHT: 267.64 LBS | SYSTOLIC BLOOD PRESSURE: 125 MMHG | TEMPERATURE: 97.7 F

## 2019-03-26 DIAGNOSIS — J31.0 CHRONIC RHINITIS: Primary | ICD-10-CM

## 2019-03-26 DIAGNOSIS — H10.89 OTHER CONJUNCTIVITIS OF BOTH EYES: ICD-10-CM

## 2019-03-26 PROCEDURE — 99204 OFFICE O/P NEW MOD 45 MIN: CPT | Performed by: ALLERGY & IMMUNOLOGY

## 2019-03-26 RX ORDER — DIPHENHYDRAMINE HCL 25 MG
25 CAPSULE ORAL EVERY 6 HOURS PRN
COMMUNITY

## 2019-03-26 RX ORDER — FLUTICASONE PROPIONATE 50 MCG
2 SPRAY, SUSPENSION (ML) NASAL DAILY
Qty: 16 G | Refills: 3 | Status: SHIPPED | OUTPATIENT
Start: 2019-03-26 | End: 2021-08-16

## 2019-03-26 RX ORDER — CETIRIZINE HYDROCHLORIDE 10 MG/1
10 TABLET ORAL DAILY
COMMUNITY
End: 2022-09-16

## 2019-03-26 RX ORDER — AZELASTINE HYDROCHLORIDE 0.5 MG/ML
1 SOLUTION/ DROPS OPHTHALMIC 2 TIMES DAILY
Qty: 6 ML | Refills: 3 | Status: SHIPPED | OUTPATIENT
Start: 2019-03-26 | End: 2024-06-11

## 2019-03-26 RX ORDER — AZELASTINE 1 MG/ML
2 SPRAY, METERED NASAL 2 TIMES DAILY PRN
Qty: 30 ML | Refills: 3 | Status: SHIPPED | OUTPATIENT
Start: 2019-03-26 | End: 2021-08-16

## 2019-03-26 ASSESSMENT — ENCOUNTER SYMPTOMS
VOMITING: 0
WHEEZING: 0
SINUS PRESSURE: 1
COUGH: 0
HEADACHES: 0
SHORTNESS OF BREATH: 0
FATIGUE: 0
FEVER: 0
JOINT SWELLING: 0
MYALGIAS: 0
RHINORRHEA: 1
ARTHRALGIAS: 0
ADENOPATHY: 0
EYE REDNESS: 0
DIARRHEA: 0
CHEST TIGHTNESS: 0
EYE ITCHING: 1
ACTIVITY CHANGE: 0
FACIAL SWELLING: 0
EYE DISCHARGE: 0
NAUSEA: 0

## 2019-03-26 NOTE — LETTER
3/26/2019         RE: Sean Woodard  [address] [address]        Dear Colleague,    Thank you for referring your patient, Sean Woodard, to the [facility]. Please see a copy of my visit note below.    SUBJECTIVE:                                                               Sean Woodard presents today to our Allergy Clinic at [facility] for a new patient.    He is a 39-year-old male with possible environmental allergies.  In 2010, he began to have seasonally-exacerbated (Spring and Summer) chronic nasal symptoms (itch, clear rhinorrhea, stuffiness, and sneezing) and ocular symptoms (itching and watering).  He doesn't think that his symptoms are worse around dogs/cats.   He tried intranasal fluticasone in the past, and it was effective. Last time he used it > 1 year ago.   Oral antihistamines (cetirizine and diphenhydramine) have been used. Cetirizine wasn't helpful. Diphenhydramine was helpful but made him drowsy.   There is no history of PE tubes, sinus surgeries, or tonsillectomy/adenoidectomy.    Patient Active Problem List   Diagnosis     GERD (gastroesophageal reflux disease)     Health Care Home     Hyperlipidemia with target LDL less than 100     Benign essential hypertension     PTSD (post-traumatic stress disorder)     Class 3 severe obesity due to excess calories with serious comorbidity in adult (H)     Diabetes mellitus, type 2 (H)     FER (obstructive sleep apnea)       Past Medical History:   Diagnosis Date     NO ACTIVE PROBLEMS       Problem (# of Occurrences) Relation (Name,Age of Onset)    Cancer (1) Maternal Grandfather: stomach    Cerebrovascular Disease (1) Brother (38)    Diabetes (2) Maternal Grandmother, Sister    Heart Defect (1) Father: valve replacement    Hypertension (2) Brother (39), Brother        Past Surgical History:   Procedure Laterality Date     ORTHOPEDIC SURGERY  1/1/2005    RT knee arthroscopic menisectomy     Thumb  Surgery  2009    Mass removal neuroma     Social History     Socioeconomic History     Marital status:      Spouse name: Joycelyn Stokes     Number of children: 1     Years of education: 12+     Highest education level: None   Occupational History     Occupation: Adjucations officer     Employer: [employer]   Social Needs     Financial resource strain: None     Food insecurity:     Worry: None     Inability: None     Transportation needs:     Medical: None     Non-medical: None   Tobacco Use     Smoking status: Former Smoker     Packs/day: 0.50     Years: 5.00     Pack years: 2.50     Types: Cigarettes     Last attempt to quit: 2005     Years since quittin.2     Smokeless tobacco: Never Used   Substance and Sexual Activity     Alcohol use: Yes     Comment: Social. 1 drink per month     Drug use: No     Sexual activity: Yes     Partners: Female   Lifestyle     Physical activity:     Days per week: None     Minutes per session: None     Stress: None   Relationships     Social connections:     Talks on phone: None     Gets together: None     Attends Taoism service: None     Active member of club or organization: None     Attends meetings of clubs or organizations: None     Relationship status: None     Intimate partner violence:     Fear of current or ex partner: None     Emotionally abused: None     Physically abused: None     Forced sexual activity: None   Other Topics Concern     Parent/sibling w/ CABG, MI or angioplasty before 65F 55M? No   Social History Narrative    2019    ENVIRONMENTAL HISTORY: The family [place] in a [place] year [place]. The home is heated with a forced air. They do have central air conditioning. The patient's bedroom is furnished with carpeting in bedroom. No pets inside the house. There is no history of cockroach or mice infestation. There are no smokers in the house.  The house does not have a damp basement.             Review of Systems   Constitutional: Negative for activity change, fatigue and fever.   HENT: Positive for congestion, rhinorrhea, sinus pressure and sneezing. Negative for dental problem, ear pain, facial swelling, nosebleeds and postnasal drip.    Eyes: Positive for itching. Negative for discharge and redness.   Respiratory: Negative for cough, chest tightness, shortness of breath and wheezing.    Cardiovascular: Negative for chest pain.   Gastrointestinal: Negative for diarrhea, nausea and vomiting.   Musculoskeletal: Negative for arthralgias, joint swelling and myalgias.   Skin: Negative for rash.   Neurological: Negative for headaches.   Hematological: Negative for adenopathy.   Psychiatric/Behavioral: Negative for behavioral problems and self-injury.           Current Outpatient Medications:      adalimumab (HUMIRA *CF*) 40 MG/0.4ML pen kit, Inject 0.8 mLs (80 mg) Subcutaneous every 7 days, Disp: 8 each, Rfl: 2     adalimumab (HUMIRA PEN) 40 MG/0.8ML pen kit, Inject 40 mg weekly SQ, Disp: 2 kit, Rfl: 2     atorvastatin (LIPITOR) 20 MG tablet, TAKE ONE TABLET BY MOUTH EVERY DAY, Disp: 90 tablet, Rfl: 0     augmented betamethasone dipropionate (DIPROLENE-AF) 0.05 % ointment, Apply sparingly to affected area twice daily as needed.  Do not apply to face., Disp: 45 g, Rfl: 1     azelastine (ASTELIN) 0.1 % nasal spray, Spray 2 sprays into both nostrils 2 times daily as needed for rhinitis, Disp: 30 mL, Rfl: 3     azelastine (OPTIVAR) 0.05 % ophthalmic solution, Apply 1 drop to eye 2 times daily, Disp: 6 mL, Rfl: 3     blood glucose (ACCU-CHEK GUIDE) test strip, Use to test blood sugar 2 times daily, Disp: 100 strip, Rfl: 11     cetirizine (ZYRTEC) 10 MG tablet, Take 10 mg by mouth daily, Disp: , Rfl:      clindamycin (CLINDAMAX) 1 % lotion, Apply to AA BID, Disp: 60 mL, Rfl: 11     diphenhydrAMINE (BENADRYL) 25 MG capsule, Take 25 mg by mouth every 6 hours as needed for itching or allergies, Disp: , Rfl:       fluticasone (FLONASE) 50 MCG/ACT nasal spray, Spray 2 sprays into both nostrils daily, Disp: 16 g, Rfl: 3     losartan-hydrochlorothiazide (HYZAAR) 50-12.5 MG per tablet, Take 1 tablet by mouth daily, Disp: 90 tablet, Rfl: 3     metFORMIN (GLUCOPHAGE-XR) 500 MG 24 hr tablet, Take 2 tablets (1,000 mg) by mouth daily (with dinner), Disp: 90 tablet, Rfl: 1     mupirocin (BACTROBAN) 2 % cream, Apply topically 3 times daily, Disp: 30 g, Rfl: 0     sulfamethoxazole-trimethoprim (BACTRIM DS) 800-160 MG per tablet, Take 1 tablet by mouth 2 times daily (Patient not taking: Reported on 3/26/2019), Disp: 60 tablet, Rfl: 2     sulfamethoxazole-trimethoprim (BACTRIM DS) 800-160 MG per tablet, 1 tab PO BID, Disp: 14 tablet, Rfl: 0  Immunization History   Administered Date(s) Administered     Influenza (IIV3) PF 10/08/2012, 09/20/2013     Influenza Vaccine IM 3yrs+ 4 Valent IIV4 10/17/2014, 11/12/2015     Mantoux Tuberculin Skin Test 05/30/2018     TD (ADULT, 7+) 04/09/2018     Tdap (Adacel,Boostrix) 06/01/2007     Allergies   Allergen Reactions     Lisinopril Cough     OBJECTIVE:                                                                 /84 (BP Location: Left arm, Patient Position: Sitting, Cuff Size: Adult Regular)   Pulse 101   Temp 97.7  F (36.5  C) (Oral)   Resp 18   Wt 121.4 kg (267 lb 10.2 oz)   SpO2 98%   BMI 40.69 kg/m           Physical Exam   Constitutional: He is oriented to person, place, and time. No distress.   HENT:   Head: Normocephalic and atraumatic.   Right Ear: Tympanic membrane, external ear and ear canal normal.   Left Ear: Tympanic membrane, external ear and ear canal normal.   Nose: No mucosal edema or rhinorrhea.   Mouth/Throat: Oropharynx is clear and moist. No oropharyngeal exudate, posterior oropharyngeal edema or posterior oropharyngeal erythema.   Eyes: Conjunctivae are normal. Right eye exhibits no discharge. Left eye exhibits no discharge.   Neck: Normal range of motion.    Cardiovascular: Normal rate, regular rhythm and normal heart sounds.   No murmur heard.  Pulmonary/Chest: Effort normal and breath sounds normal. No respiratory distress. He has no wheezes. He has no rales.   Musculoskeletal: Normal range of motion.   Lymphadenopathy:     He has no cervical adenopathy.   Neurological: He is alert and oriented to person, place, and time.   Skin: Skin is warm. He is not diaphoretic.   Psychiatric: He has a normal mood and affect. His behavior is normal.   Nursing note and vitals reviewed.      ASSESSMENT/PLAN:    Visit Diagnoses     1. Chronic rhinitis    -  Primary  Unable to perform percutaneous skin puncture testing for aeroallergens today because the patient took diphenhydramine yesterday.  -Start intranasal fluticasone 2 sprays in each nostril once daily.  -Start azelastine 2 sprays in each nostril twice daily as needed.     Consider percutaneous skin puncture testing for aeroallergens at the next office visit if possible.    Relevant Medications            fluticasone (FLONASE) 50 MCG/ACT nasal spray    azelastine (ASTELIN) 0.1 % nasal spray    2. Other conjunctivitis of both eyes      Optivar 1 drop in each eye twice daily as needed.    Relevant Medications    azelastine (OPTIVAR) 0.05 % ophthalmic solution            Return in about 6 weeks (around 5/7/2019), or if symptoms worsen or fail to improve.    Thank you for allowing us to participate in the care of this patient. Please feel free to contact us if there are any questions or concerns about the patient.    Disclaimer: This note consists of symbols derived from keyboarding, dictation and/or voice recognition software. As a result, there may be errors in the script that have gone undetected. Please consider this when interpreting information found in this chart.    Da Manrique MD, FACI  Allergy, Asthma and Immunology  [place], [place] and [place]      Again, thank you for allowing me to participate in  the care of your patient.        Sincerely,        Da Manrique MD

## 2019-03-26 NOTE — PATIENT INSTRUCTIONS
-start Flonase 2 sprays/each nostril once a day.  -Use azelastine 2 sprays in each nostril twice a day when necessary. Stop it 3 days before the skin test.    Optivar 1 drop in each eye twice daily as needed.    You have to stop all oral antihistamines for 7 days before the skin test.

## 2019-03-26 NOTE — PROGRESS NOTES
SUBJECTIVE:                                                               Sean Woodard presents today to our Allergy Clinic at [facility] for a new patient.    He is a 39-year-old male with possible environmental allergies.  In 2010, he began to have seasonally-exacerbated (Spring and Summer) chronic nasal symptoms (itch, clear rhinorrhea, stuffiness, and sneezing) and ocular symptoms (itching and watering).  He doesn't think that his symptoms are worse around dogs/cats.   He tried intranasal fluticasone in the past, and it was effective. Last time he used it > 1 year ago.   Oral antihistamines (cetirizine and diphenhydramine) have been used. Cetirizine wasn't helpful. Diphenhydramine was helpful but made him drowsy.   There is no history of PE tubes, sinus surgeries, or tonsillectomy/adenoidectomy.    Patient Active Problem List   Diagnosis     GERD (gastroesophageal reflux disease)     Health Care Home     Hyperlipidemia with target LDL less than 100     Benign essential hypertension     PTSD (post-traumatic stress disorder)     Class 3 severe obesity due to excess calories with serious comorbidity in adult (H)     Diabetes mellitus, type 2 (H)     FER (obstructive sleep apnea)       Past Medical History:   Diagnosis Date     NO ACTIVE PROBLEMS       Problem (# of Occurrences) Relation (Name,Age of Onset)    Cancer (1) Maternal Grandfather: stomach    Cerebrovascular Disease (1) Brother (38)    Diabetes (2) Maternal Grandmother, Sister    Heart Defect (1) Father: valve replacement    Hypertension (2) Brother (39), Brother        Past Surgical History:   Procedure Laterality Date     ORTHOPEDIC SURGERY  1/1/2005    RT knee arthroscopic menisectomy     Thumb Surgery  2009    Mass removal neuroma     Social History     Socioeconomic History     Marital status:      Spouse name: Joycelyn Stokes     Number of children: 1     Years of education: 12+     Highest education level: None   Occupational  History     Occupation: Adjucations officer     Employer: [employer]   Social Needs     Financial resource strain: None     Food insecurity:     Worry: None     Inability: None     Transportation needs:     Medical: None     Non-medical: None   Tobacco Use     Smoking status: Former Smoker     Packs/day: 0.50     Years: 5.00     Pack years: 2.50     Types: Cigarettes     Last attempt to quit: 2005     Years since quittin.2     Smokeless tobacco: Never Used   Substance and Sexual Activity     Alcohol use: Yes     Comment: Social. 1 drink per month     Drug use: No     Sexual activity: Yes     Partners: Female   Lifestyle     Physical activity:     Days per week: None     Minutes per session: None     Stress: None   Relationships     Social connections:     Talks on phone: None     Gets together: None     Attends Scientology service: None     Active member of club or organization: None     Attends meetings of clubs or organizations: None     Relationship status: None     Intimate partner violence:     Fear of current or ex partner: None     Emotionally abused: None     Physically abused: None     Forced sexual activity: None   Other Topics Concern     Parent/sibling w/ CABG, MI or angioplasty before 65F 55M? No   Social History Narrative    2019    ENVIRONMENTAL HISTORY: The family [place] 9 year [place]. The home is heated with a forced air. They do have central air conditioning. The patient's bedroom is furnished with carpeting in bedroom. No pets inside the house. There is no history of cockroach or mice infestation. There are no smokers in the house.  The house does not have a damp basement.            Review of Systems   Constitutional: Negative for activity change, fatigue and fever.   HENT: Positive for congestion, rhinorrhea, sinus pressure and sneezing. Negative for dental problem, ear pain, facial swelling, nosebleeds and postnasal  drip.    Eyes: Positive for itching. Negative for discharge and redness.   Respiratory: Negative for cough, chest tightness, shortness of breath and wheezing.    Cardiovascular: Negative for chest pain.   Gastrointestinal: Negative for diarrhea, nausea and vomiting.   Musculoskeletal: Negative for arthralgias, joint swelling and myalgias.   Skin: Negative for rash.   Neurological: Negative for headaches.   Hematological: Negative for adenopathy.   Psychiatric/Behavioral: Negative for behavioral problems and self-injury.           Current Outpatient Medications:      adalimumab (HUMIRA *CF*) 40 MG/0.4ML pen kit, Inject 0.8 mLs (80 mg) Subcutaneous every 7 days, Disp: 8 each, Rfl: 2     adalimumab (HUMIRA PEN) 40 MG/0.8ML pen kit, Inject 40 mg weekly SQ, Disp: 2 kit, Rfl: 2     atorvastatin (LIPITOR) 20 MG tablet, TAKE ONE TABLET BY MOUTH EVERY DAY, Disp: 90 tablet, Rfl: 0     augmented betamethasone dipropionate (DIPROLENE-AF) 0.05 % ointment, Apply sparingly to affected area twice daily as needed.  Do not apply to face., Disp: 45 g, Rfl: 1     azelastine (ASTELIN) 0.1 % nasal spray, Spray 2 sprays into both nostrils 2 times daily as needed for rhinitis, Disp: 30 mL, Rfl: 3     azelastine (OPTIVAR) 0.05 % ophthalmic solution, Apply 1 drop to eye 2 times daily, Disp: 6 mL, Rfl: 3     blood glucose (ACCU-CHEK GUIDE) test strip, Use to test blood sugar 2 times daily, Disp: 100 strip, Rfl: 11     cetirizine (ZYRTEC) 10 MG tablet, Take 10 mg by mouth daily, Disp: , Rfl:      clindamycin (CLINDAMAX) 1 % lotion, Apply to AA BID, Disp: 60 mL, Rfl: 11     diphenhydrAMINE (BENADRYL) 25 MG capsule, Take 25 mg by mouth every 6 hours as needed for itching or allergies, Disp: , Rfl:      fluticasone (FLONASE) 50 MCG/ACT nasal spray, Spray 2 sprays into both nostrils daily, Disp: 16 g, Rfl: 3     losartan-hydrochlorothiazide (HYZAAR) 50-12.5 MG per tablet, Take 1 tablet by mouth daily, Disp: 90 tablet, Rfl: 3     metFORMIN  (GLUCOPHAGE-XR) 500 MG 24 hr tablet, Take 2 tablets (1,000 mg) by mouth daily (with dinner), Disp: 90 tablet, Rfl: 1     mupirocin (BACTROBAN) 2 % cream, Apply topically 3 times daily, Disp: 30 g, Rfl: 0     sulfamethoxazole-trimethoprim (BACTRIM DS) 800-160 MG per tablet, Take 1 tablet by mouth 2 times daily (Patient not taking: Reported on 3/26/2019), Disp: 60 tablet, Rfl: 2     sulfamethoxazole-trimethoprim (BACTRIM DS) 800-160 MG per tablet, 1 tab PO BID, Disp: 14 tablet, Rfl: 0  Immunization History   Administered Date(s) Administered     Influenza (IIV3) PF 10/08/2012, 09/20/2013     Influenza Vaccine IM 3yrs+ 4 Valent IIV4 10/17/2014, 11/12/2015     Mantoux Tuberculin Skin Test 05/30/2018     TD (ADULT, 7+) 04/09/2018     Tdap (Adacel,Boostrix) 06/01/2007     Allergies   Allergen Reactions     Lisinopril Cough     OBJECTIVE:                                                                 /84 (BP Location: Left arm, Patient Position: Sitting, Cuff Size: Adult Regular)   Pulse 101   Temp 97.7  F (36.5  C) (Oral)   Resp 18   Wt 121.4 kg (267 lb 10.2 oz)   SpO2 98%   BMI 40.69 kg/m          Physical Exam   Constitutional: He is oriented to person, place, and time. No distress.   HENT:   Head: Normocephalic and atraumatic.   Right Ear: Tympanic membrane, external ear and ear canal normal.   Left Ear: Tympanic membrane, external ear and ear canal normal.   Nose: No mucosal edema or rhinorrhea.   Mouth/Throat: Oropharynx is clear and moist. No oropharyngeal exudate, posterior oropharyngeal edema or posterior oropharyngeal erythema.   Eyes: Conjunctivae are normal. Right eye exhibits no discharge. Left eye exhibits no discharge.   Neck: Normal range of motion.   Cardiovascular: Normal rate, regular rhythm and normal heart sounds.   No murmur heard.  Pulmonary/Chest: Effort normal and breath sounds normal. No respiratory distress. He has no wheezes. He has no rales.   Musculoskeletal: Normal range of  motion.   Lymphadenopathy:     He has no cervical adenopathy.   Neurological: He is alert and oriented to person, place, and time.   Skin: Skin is warm. He is not diaphoretic.   Psychiatric: He has a normal mood and affect. His behavior is normal.   Nursing note and vitals reviewed.      ASSESSMENT/PLAN:    Visit Diagnoses     1. Chronic rhinitis    -  Primary  Unable to perform percutaneous skin puncture testing for aeroallergens today because the patient took diphenhydramine yesterday.  -Start intranasal fluticasone 2 sprays in each nostril once daily.  -Start azelastine 2 sprays in each nostril twice daily as needed.     Consider percutaneous skin puncture testing for aeroallergens at the next office visit if possible.    Relevant Medications            fluticasone (FLONASE) 50 MCG/ACT nasal spray    azelastine (ASTELIN) 0.1 % nasal spray    2. Other conjunctivitis of both eyes      Optivar 1 drop in each eye twice daily as needed.    Relevant Medications    azelastine (OPTIVAR) 0.05 % ophthalmic solution            Return in about 6 weeks (around 5/7/2019), or if symptoms worsen or fail to improve.    Thank you for allowing us to participate in the care of this patient. Please feel free to contact us if there are any questions or concerns about the patient.    Disclaimer: This note consists of symbols derived from keyboarding, dictation and/or voice recognition software. As a result, there may be errors in the script that have gone undetected. Please consider this when interpreting information found in this chart.    Da Manrique MD, [facility]  Allergy, Asthma and Immunology  [place], [place] and [place]

## 2019-04-10 ENCOUNTER — TELEPHONE (OUTPATIENT)
Dept: DERMATOLOGY | Facility: CLINIC | Age: 39
End: 2019-04-10

## 2019-04-10 NOTE — TELEPHONE ENCOUNTER
Prior Authorization Approval    Authorization Effective Date: 3/9/2019  Authorization Expiration Date: 10/4/2020  Medication: pa humira approved  Approved Dose/Quantity:40/0.8  Reference #:     Insurance Company: [employer] - Phone [phone] Fax [phone]  Expected CoPay:       CoPay Card Available:      Foundation Assistance Needed:    Which Pharmacy is filling the prescription (Not needed for infusion/clinic administered):    Pharmacy Notified:    Patient Notified:

## 2019-04-15 DIAGNOSIS — L73.2 HIDRADENITIS SUPPURATIVA: ICD-10-CM

## 2019-04-15 NOTE — TELEPHONE ENCOUNTER
"S:  Request for refill of adalimumab (HUMIRA)    B:  Medication last filled 1/20/19 for 32 week supply  LOV 1/17/19  From 1/17/19 OV  \"1. Hidradenitis Suppurativa   Will check CBC and CMP.   Continue Humira 80 mg once weekly.   Discussed with patient most likely metformin is helping as well.   Recheck in 3-4 months or sooner if needed.\"    A:  Requested Prescriptions   Pending Prescriptions Disp Refills     adalimumab (HUMIRA *CF*) 40 MG/0.4ML pen kit 8 each 2     Sig: Inject 0.8 mLs (80 mg) Subcutaneous every 7 days       There is no refill protocol information for this order        R:  Routed to provider:  3-4 month supply given at 1/20/19 consistent with follow up appt  No G refill protocol for this medication    Ryan Streeter RN    "

## 2019-04-15 NOTE — TELEPHONE ENCOUNTER
Reason for Call:  Medication or medication refill:    Do you use a [facility]?  Name of the pharmacy and phone number for the current request:  [facility] (Ph: [phone])    Name of the medication requested: Humira    Other request:   LAST REFILL: 03/29/2019  LOV: 01/17/2019    Can we leave a detailed message on this number? Not Applicable    Phone number patient can be reached at: Home number on file [phone] (home)    Best Time: NA    Call taken on 4/15/2019 at 8:04 AM by Denise Behrendt

## 2019-04-18 ENCOUNTER — OFFICE VISIT (OUTPATIENT)
Dept: DERMATOLOGY | Facility: CLINIC | Age: 39
End: 2019-04-18
Payer: COMMERCIAL

## 2019-04-18 VITALS — HEART RATE: 91 BPM | OXYGEN SATURATION: 98 % | SYSTOLIC BLOOD PRESSURE: 129 MMHG | DIASTOLIC BLOOD PRESSURE: 81 MMHG

## 2019-04-18 DIAGNOSIS — Z51.81 THERAPEUTIC DRUG MONITORING: Primary | ICD-10-CM

## 2019-04-18 DIAGNOSIS — L73.2 HIDRADENITIS SUPPURATIVA: ICD-10-CM

## 2019-04-18 LAB
ALBUMIN SERPL-MCNC: 3.8 G/DL (ref 3.4–5)
ALP SERPL-CCNC: 79 U/L (ref 40–150)
ALT SERPL W P-5'-P-CCNC: 43 U/L (ref 0–70)
ANION GAP SERPL CALCULATED.3IONS-SCNC: 7 MMOL/L (ref 3–14)
AST SERPL W P-5'-P-CCNC: 22 U/L (ref 0–45)
BILIRUB SERPL-MCNC: 0.7 MG/DL (ref 0.2–1.3)
BUN SERPL-MCNC: 17 MG/DL (ref 7–30)
CALCIUM SERPL-MCNC: 9.7 MG/DL (ref 8.5–10.1)
CHLORIDE SERPL-SCNC: 103 MMOL/L (ref 94–109)
CO2 SERPL-SCNC: 25 MMOL/L (ref 20–32)
CREAT SERPL-MCNC: 0.91 MG/DL (ref 0.66–1.25)
ERYTHROCYTE [DISTWIDTH] IN BLOOD BY AUTOMATED COUNT: 12.8 % (ref 10–15)
GFR SERPL CREATININE-BSD FRML MDRD: >90 ML/MIN/{1.73_M2}
GLUCOSE SERPL-MCNC: 153 MG/DL (ref 70–99)
HCT VFR BLD AUTO: 45 % (ref 40–53)
HGB BLD-MCNC: 15.6 G/DL (ref 13.3–17.7)
MCH RBC QN AUTO: 30.7 PG (ref 26.5–33)
MCHC RBC AUTO-ENTMCNC: 34.7 G/DL (ref 31.5–36.5)
MCV RBC AUTO: 89 FL (ref 78–100)
PLATELET # BLD AUTO: 252 10E9/L (ref 150–450)
POTASSIUM SERPL-SCNC: 3.5 MMOL/L (ref 3.4–5.3)
PROT SERPL-MCNC: 8.6 G/DL (ref 6.8–8.8)
RBC # BLD AUTO: 5.08 10E12/L (ref 4.4–5.9)
SODIUM SERPL-SCNC: 135 MMOL/L (ref 133–144)
WBC # BLD AUTO: 8 10E9/L (ref 4–11)

## 2019-04-18 PROCEDURE — 36415 COLL VENOUS BLD VENIPUNCTURE: CPT | Performed by: PHYSICIAN ASSISTANT

## 2019-04-18 PROCEDURE — 86481 TB AG RESPONSE T-CELL SUSP: CPT | Performed by: PHYSICIAN ASSISTANT

## 2019-04-18 PROCEDURE — 80053 COMPREHEN METABOLIC PANEL: CPT | Performed by: PHYSICIAN ASSISTANT

## 2019-04-18 PROCEDURE — 85027 COMPLETE CBC AUTOMATED: CPT | Performed by: PHYSICIAN ASSISTANT

## 2019-04-18 PROCEDURE — 99213 OFFICE O/P EST LOW 20 MIN: CPT | Performed by: PHYSICIAN ASSISTANT

## 2019-04-18 NOTE — PROGRESS NOTES
Sean Woodard is a 39 year old year old male patient here today for recheck HS.  Patient is currently on Humira 80 mg weekly. Denies side effects. He reports that he will get occasional flaring, and notes a few new areas on scalp and groin. He is frustrated that he is getting new spot, new is occasionally painful. Patient has no other skin complaints today.  Remainder of the HPI, Meds, PMH, Allergies, FH, and SH was reviewed in chart.    Pertinent Hx:   HS  Past Medical History:   Diagnosis Date     NO ACTIVE PROBLEMS        Past Surgical History:   Procedure Laterality Date     ORTHOPEDIC SURGERY  2005    RT knee arthroscopic menisectomy     Thumb Surgery  2009    Mass removal neuroma        Family History   Problem Relation Age of Onset     Heart Defect Father         valve replacement     Diabetes Maternal Grandmother      Cancer Maternal Grandfather         stomach     Hypertension Brother      Diabetes Sister      Cerebrovascular Disease Brother 38     Hypertension Brother        Social History     Socioeconomic History     Marital status:      Spouse name: Joycelyn Stokes     Number of children: 1     Years of education: 12+     Highest education level: Not on file   Occupational History     Occupation: Adjucations officer     Employer: [employer]   Social Needs     Financial resource strain: Not on file     Food insecurity:     Worry: Not on file     Inability: Not on file     Transportation needs:     Medical: Not on file     Non-medical: Not on file   Tobacco Use     Smoking status: Former Smoker     Packs/day: 0.50     Years: 5.00     Pack years: 2.50     Types: Cigarettes     Last attempt to quit: 2005     Years since quittin.3     Smokeless tobacco: Never Used   Substance and Sexual Activity     Alcohol use: Yes     Comment: Social. 1 drink per month     Drug use: No     Sexual activity: Yes     Partners: Female   Lifestyle     Physical activity:     Days per  week: Not on file     Minutes per session: Not on file     Stress: Not on file   Relationships     Social connections:     Talks on phone: Not on file     Gets together: Not on file     Attends Nondenominational service: Not on file     Active member of club or organization: Not on file     Attends meetings of clubs or organizations: Not on file     Relationship status: Not on file     Intimate partner violence:     Fear of current or ex partner: Not on file     Emotionally abused: Not on file     Physically abused: Not on file     Forced sexual activity: Not on file   Other Topics Concern     Parent/sibling w/ CABG, MI or angioplasty before 65F 55M? No   Social History Narrative    March 26, 2019    ENVIRONMENTAL HISTORY: The family [place] 9 year old [place]. The home is heated with a forced air. They do have central air conditioning. The patient's bedroom is furnished with carpeting in bedroom. No pets inside the house. There is no history of cockroach or mice infestation. There are no smokers in the house.  The house does not have a damp basement.        Outpatient Encounter Medications as of 4/18/2019   Medication Sig Dispense Refill     adalimumab (HUMIRA *CF*) 40 MG/0.4ML pen kit Inject 0.8 mLs (80 mg) Subcutaneous every 7 days 8 each 2     adalimumab (HUMIRA PEN) 40 MG/0.8ML pen kit Inject 40 mg weekly SQ 2 kit 2     atorvastatin (LIPITOR) 20 MG tablet TAKE ONE TABLET BY MOUTH EVERY DAY 90 tablet 0     augmented betamethasone dipropionate (DIPROLENE-AF) 0.05 % ointment Apply sparingly to affected area twice daily as needed.  Do not apply to face. 45 g 1     azelastine (ASTELIN) 0.1 % nasal spray Spray 2 sprays into both nostrils 2 times daily as needed for rhinitis 30 mL 3     azelastine (OPTIVAR) 0.05 % ophthalmic solution Apply 1 drop to eye 2 times daily 6 mL 3     blood glucose (ACCU-CHEK GUIDE) test strip Use to test blood sugar 2 times daily 100 strip 11     cetirizine (ZYRTEC) 10 MG  tablet Take 10 mg by mouth daily       clindamycin (CLINDAMAX) 1 % lotion Apply to AA BID 60 mL 11     diphenhydrAMINE (BENADRYL) 25 MG capsule Take 25 mg by mouth every 6 hours as needed for itching or allergies       fluticasone (FLONASE) 50 MCG/ACT nasal spray Spray 2 sprays into both nostrils daily 16 g 3     losartan-hydrochlorothiazide (HYZAAR) 50-12.5 MG per tablet Take 1 tablet by mouth daily 90 tablet 3     metFORMIN (GLUCOPHAGE-XR) 500 MG 24 hr tablet Take 2 tablets (1,000 mg) by mouth daily (with dinner) 90 tablet 1     mupirocin (BACTROBAN) 2 % cream Apply topically 3 times daily 30 g 0     sulfamethoxazole-trimethoprim (BACTRIM DS) 800-160 MG per tablet Take 1 tablet by mouth 2 times daily 60 tablet 2     sulfamethoxazole-trimethoprim (BACTRIM DS) 800-160 MG per tablet 1 tab PO BID 14 tablet 0     No facility-administered encounter medications on file as of 4/18/2019.              Review Of Systems  Skin: As above  Eyes: negative  Ears/Nose/Throat: negative  Respiratory: No shortness of breath, dyspnea on exertion, cough, or hemoptysis  Cardiovascular: negative  Gastrointestinal: negative  Genitourinary: negative  Musculoskeletal: negative  Neurologic: negative  Psychiatric: negative  Hematologic/Lymphatic/Immunologic: negative  Endocrine: negative      O:   NAD, WDWN, Alert & Oriented, Mood & Affect wnl, Vitals stable   Here today alone   /81 (BP Location: Right arm, Patient Position: Sitting, Cuff Size: Adult Large)   Pulse 91   SpO2 98%    General appearance normal   Vitals stable   Alert, oriented and in no acute distress     Inflamed subcutaneous nodule on right and left axilla  Left groin  Healing nodule on groin   Healed nodules with scarring on neck  Few healing nodules on scalp       Eyes: Conjunctivae/lids:Normal     ENT: Lips: normal    MSK:Normal    Pulm: Breathing Normal    Neuro/Psych: Orientation:Normal; Mood/Affect:[place]  A/P:  1. Hidradenitis Suppurativa   Patient feels Humira  has helped some but continuing to get flares. Denies any side effects with Humira.   Would like to consider alternatives at this time.   Discussed Enbrel vs Stelara, these are not FDA approved. These may or may not work or be covered by insurance.   Will check CBC, CMP, Quantiferon.   Discussed with Dr. Hill who suggested he try a combination of humira and doxycycline for next 1-2 month before changing biologic.

## 2019-04-18 NOTE — NURSING NOTE
"Initial /81 (BP Location: Right arm, Patient Position: Sitting, Cuff Size: Adult Large)   Pulse 91   SpO2 98%  Estimated body mass index is 40.69 kg/m  as calculated from the following:    Height as of 3/8/19: 1.727 m (5' 8\").    Weight as of 3/26/19: 121.4 kg (267 lb 10.2 oz). .      "

## 2019-04-18 NOTE — LETTER
4/18/2019         RE: Sean Woodard  [address] [facility] [address]        Dear Colleague,    Thank you for referring your patient, Sean Woodard, to the [facility]. Please see a copy of my visit note below.    Sean Woodard is a 39 year old year old male patient here today for recheck HS.  Patient is currently on Humira 80 mg weekly. Denies side effects. He reports that he will get occasional flaring, and notes a few new areas on scalp and groin. He is frustrated that he is getting new spot, new is occasionally painful. Patient has no other skin complaints today.  Remainder of the HPI, Meds, PMH, Allergies, FH, and SH was reviewed in chart.    Pertinent Hx:   HS  Past Medical History:   Diagnosis Date     NO ACTIVE PROBLEMS        Past Surgical History:   Procedure Laterality Date     ORTHOPEDIC SURGERY  1/1/2005    RT knee arthroscopic menisectomy     Thumb Surgery  2009    Mass removal neuroma        Family History   Problem Relation Age of Onset     Heart Defect Father         valve replacement     Diabetes Maternal Grandmother      Cancer Maternal Grandfather         stomach     Hypertension Brother      Diabetes Sister      Cerebrovascular Disease Brother 38     Hypertension Brother        Social History     Socioeconomic History     Marital status:      Spouse name: Joycelyn Stokes     Number of children: 1     Years of education: 12+     Highest education level: Not on file   Occupational History     Occupation: Adjucations officer     Employer: [employer]   Social Needs     Financial resource strain: Not on file     Food insecurity:     Worry: Not on file     Inability: Not on file     Transportation needs:     Medical: Not on file     Non-medical: Not on file   Tobacco Use     Smoking status: Former Smoker     Packs/day: 0.50     Years: 5.00     Pack years: 2.50     Types: Cigarettes     Last attempt to quit: 1/1/2005     Years since quitting:  14.3     Smokeless tobacco: Never Used   Substance and Sexual Activity     Alcohol use: Yes     Comment: Social. 1 drink per month     Drug use: No     Sexual activity: Yes     Partners: Female   Lifestyle     Physical activity:     Days per week: Not on file     Minutes per session: Not on file     Stress: Not on file   Relationships     Social connections:     Talks on phone: Not on file     Gets together: Not on file     Attends Congregational service: Not on file     Active member of club or organization: Not on file     Attends meetings of clubs or organizations: Not on file     Relationship status: Not on file     Intimate partner violence:     Fear of current or ex partner: Not on file     Emotionally abused: Not on file     Physically abused: Not on file     Forced sexual activity: Not on file   Other Topics Concern     Parent/sibling w/ CABG, MI or angioplasty before 65F 55M? No   Social History Narrative    March 26, 2019    ENVIRONMENTAL HISTORY: The family [place] a [place]. The home is heated with a forced air. They do have central air conditioning. The patient's bedroom is furnished with carpeting in bedroom. No pets inside the house. There is no history of cockroach or mice infestation. There are no smokers in the house.  The house does not have a damp basement.        Outpatient Encounter Medications as of 4/18/2019   Medication Sig Dispense Refill     adalimumab (HUMIRA *CF*) 40 MG/0.4ML pen kit Inject 0.8 mLs (80 mg) Subcutaneous every 7 days 8 each 2     adalimumab (HUMIRA PEN) 40 MG/0.8ML pen kit Inject 40 mg weekly SQ 2 kit 2     atorvastatin (LIPITOR) 20 MG tablet TAKE ONE TABLET BY MOUTH EVERY DAY 90 tablet 0     augmented betamethasone dipropionate (DIPROLENE-AF) 0.05 % ointment Apply sparingly to affected area twice daily as needed.  Do not apply to face. 45 g 1     azelastine (ASTELIN) 0.1 % nasal spray Spray 2 sprays into both nostrils 2 times daily as needed for  rhinitis 30 mL 3     azelastine (OPTIVAR) 0.05 % ophthalmic solution Apply 1 drop to eye 2 times daily 6 mL 3     blood glucose (ACCU-CHEK GUIDE) test strip Use to test blood sugar 2 times daily 100 strip 11     cetirizine (ZYRTEC) 10 MG tablet Take 10 mg by mouth daily       clindamycin (CLINDAMAX) 1 % lotion Apply to AA BID 60 mL 11     diphenhydrAMINE (BENADRYL) 25 MG capsule Take 25 mg by mouth every 6 hours as needed for itching or allergies       fluticasone (FLONASE) 50 MCG/ACT nasal spray Spray 2 sprays into both nostrils daily 16 g 3     losartan-hydrochlorothiazide (HYZAAR) 50-12.5 MG per tablet Take 1 tablet by mouth daily 90 tablet 3     metFORMIN (GLUCOPHAGE-XR) 500 MG 24 hr tablet Take 2 tablets (1,000 mg) by mouth daily (with dinner) 90 tablet 1     mupirocin (BACTROBAN) 2 % cream Apply topically 3 times daily 30 g 0     sulfamethoxazole-trimethoprim (BACTRIM DS) 800-160 MG per tablet Take 1 tablet by mouth 2 times daily 60 tablet 2     sulfamethoxazole-trimethoprim (BACTRIM DS) 800-160 MG per tablet 1 tab PO BID 14 tablet 0     No facility-administered encounter medications on file as of 4/18/2019.              Review Of Systems  Skin: As above  Eyes: negative  Ears/Nose/Throat: negative  Respiratory: No shortness of breath, dyspnea on exertion, cough, or hemoptysis  Cardiovascular: negative  Gastrointestinal: negative  Genitourinary: negative  Musculoskeletal: negative  Neurologic: negative  Psychiatric: negative  Hematologic/Lymphatic/Immunologic: negative  Endocrine: negative      O:   NAD, WDWN, Alert & Oriented, Mood & Affect wnl, Vitals stable   Here today alone   /81 (BP Location: Right arm, Patient Position: Sitting, Cuff Size: Adult Large)   Pulse 91   SpO2 98%    General appearance normal   Vitals stable   Alert, oriented and in no acute distress     Inflamed subcutaneous nodule on right and left axilla  Left groin  Healing nodule on groin   Healed nodules with scarring on neck  Few  healing nodules on scalp       Eyes: Conjunctivae/lids:Normal     ENT: Lips: normal    MSK:Normal    Pulm: Breathing Normal    Neuro/Psych: Orientation:Normal; Mood/Affect:Concetta  A/P:  1. Hidradenitis Suppurativa   Patient feels Humira has helped some but continuing to get flares. Denies any side effects with Humira.   Would like to consider alternatives at this time.   Discussed Enbrel vs Stelara, these are not FDA approved. These may or may not work or be covered by insurance.   Will check CBC, CMP, Quantiferon.   Discussed with Dr. Hill who suggested he try a combination of humira and doxycycline for next 1-2 month before changing biologic.       Again, thank you for allowing me to participate in the care of your patient.        Sincerely,        Sabi Christie PA-C

## 2019-04-19 RX ORDER — DOXYCYCLINE 100 MG/1
100 CAPSULE ORAL 2 TIMES DAILY WITH MEALS
Qty: 180 CAPSULE | Refills: 1 | Status: SHIPPED | OUTPATIENT
Start: 2019-04-19 | End: 2020-01-21

## 2019-04-22 LAB
GAMMA INTERFERON BACKGROUND BLD IA-ACNC: 0.03 IU/ML
M TB IFN-G BLD-IMP: NEGATIVE
M TB IFN-G CD4+ BCKGRND COR BLD-ACNC: >10 IU/ML
MITOGEN IGNF BCKGRD COR BLD-ACNC: 0 IU/ML
MITOGEN IGNF BCKGRD COR BLD-ACNC: 0 IU/ML

## 2019-04-24 DIAGNOSIS — E78.5 HYPERLIPIDEMIA WITH TARGET LDL LESS THAN 100: ICD-10-CM

## 2019-04-24 RX ORDER — ATORVASTATIN CALCIUM 20 MG/1
20 TABLET, FILM COATED ORAL DAILY
Qty: 30 TABLET | Refills: 0 | Status: SHIPPED | OUTPATIENT
Start: 2019-04-24 | End: 2019-06-14

## 2019-04-24 NOTE — TELEPHONE ENCOUNTER
"ATORVASTATIN CALCIUM 20MG TABS      Last Written Prescription Date:  1/8/19  Last Fill Quantity: 90,   # refills: 0  Last Office Visit: 3/8/19  Future Office visit:    Next 5 appointments (look out 90 days)    Jun 07, 2019  4:20 PM CDT  [facility] [address] [address]  [phone]   Jul 09, 2019  2:00 PM CDT  Return Visit with Sabi Bustos PA-C  [facility] [address] [facility] [address] [phone]           Requested Prescriptions   Pending Prescriptions Disp Refills     atorvastatin (LIPITOR) 20 MG tablet [Pharmacy Med Name: ATORVASTATIN CALCIUM 20MG TABS] 90 tablet 0     Sig: TAKE ONE TABLET BY MOUTH ONCE DAILY (DUE IN MARCH FOR FASTING LABS AND OFFICE VISIT)       Statins Protocol Failed - 4/24/2019  9:54 AM        Failed - LDL on file in past 12 months     Recent Labs   Lab Test 03/05/18  0832   *             Passed - No abnormal creatine kinase in past 12 months     No lab results found.             Passed - Recent (12 mo) or future (30 days) visit within the authorizing provider's specialty     Patient had office visit in the last 12 months or has a visit in the next 30 days with authorizing provider or within the authorizing provider's specialty.  See \"Patient Info\" tab in inbasket, or \"Choose Columns\" in Meds & Orders section of the refill encounter.              Passed - Medication is active on med list        Passed - Patient is age 18 or older          "

## 2019-05-01 ENCOUNTER — OFFICE VISIT (OUTPATIENT)
Dept: FAMILY MEDICINE | Facility: CLINIC | Age: 39
End: 2019-05-01
Payer: COMMERCIAL

## 2019-05-01 VITALS
WEIGHT: 268.5 LBS | SYSTOLIC BLOOD PRESSURE: 117 MMHG | DIASTOLIC BLOOD PRESSURE: 75 MMHG | TEMPERATURE: 98 F | BODY MASS INDEX: 40.69 KG/M2 | HEART RATE: 66 BPM | HEIGHT: 68 IN | RESPIRATION RATE: 12 BRPM

## 2019-05-01 DIAGNOSIS — H69.92 DYSFUNCTION OF LEFT EUSTACHIAN TUBE: Primary | ICD-10-CM

## 2019-05-01 DIAGNOSIS — E78.5 HYPERLIPIDEMIA WITH TARGET LDL LESS THAN 100: ICD-10-CM

## 2019-05-01 LAB
CHOLEST SERPL-MCNC: 197 MG/DL
HDLC SERPL-MCNC: 42 MG/DL
LDLC SERPL CALC-MCNC: 123 MG/DL
NONHDLC SERPL-MCNC: 155 MG/DL
TRIGL SERPL-MCNC: 158 MG/DL

## 2019-05-01 PROCEDURE — 80061 LIPID PANEL: CPT | Performed by: PHYSICIAN ASSISTANT

## 2019-05-01 PROCEDURE — 99213 OFFICE O/P EST LOW 20 MIN: CPT | Performed by: FAMILY MEDICINE

## 2019-05-01 PROCEDURE — 36415 COLL VENOUS BLD VENIPUNCTURE: CPT | Performed by: PHYSICIAN ASSISTANT

## 2019-05-01 ASSESSMENT — MIFFLIN-ST. JEOR: SCORE: 2112.41

## 2019-05-01 ASSESSMENT — PAIN SCALES - GENERAL: PAINLEVEL: NO PAIN (0)

## 2019-05-01 NOTE — PROGRESS NOTES
"SUBJECTIVE:                                                    Sean Woodard is a 39 year old male who presents to clinic today for the following health issues:    ENT Symptoms             Symptoms: cc Present Absent Comment   Fever/Chills   x    Fatigue   x    Muscle Aches   x    Eye Irritation   x    Sneezing   x    Nasal Jim/Drg   x    Sinus Pressure/Pain   x    Loss of smell   x    Dental pain   x    Sore Throat   x    Swollen Glands   x    Ear Pain/Fullness x x  He has been hearing a whooshing sound   Cough   x    Wheeze   x    Chest Pain   x    Shortness of breath   x    Rash   x    Other   x      Symptom duration:  since last week   Symptom severity:  moderate   Treatments tried:  none   Contacts:  Not that he is aware of         Problem list and histories reviewed & adjusted, as indicated.  Additional history: has allergies and recently saw allergist.  Was prescribed flonase and astelin.  Using these only prn and not daily.        ROS:  Constitutional, HEENT, cardiovascular, pulmonary, gi and gu systems are negative, except as otherwise noted.    OBJECTIVE:                                                    /75 (BP Location: Right arm, Patient Position: Sitting, Cuff Size: Adult Large)   Pulse 66   Temp 98  F (36.7  C) (Tympanic)   Resp 12   Ht 1.727 m (5' 8\")   Wt 121.8 kg (268 lb 8 oz)   BMI 40.83 kg/m   There is no height or weight on file to calculate BMI.   GENERAL: healthy, alert and no distress  EYES: Eyes grossly normal to inspection, extraocular movements - intact, and PERRL  HENT: nose and mouth without ulcers or lesions and TMs retracted bilaterally, left worse than right.  Some clear fluid trapped behind the left TM.  Nares with boggy turbinates and clear drainage  NECK: no tenderness, no adenopathy, no asymmetry, no masses, no stiffness; thyroid- normal to palpation  RESP: lungs clear to auscultation - no rales, no rhonchi, no wheezes  CV: regular rates and rhythm, normal S1 S2, no S3 " or S4 and no murmur, no click or rub -  ABDOMEN: soft, no tenderness, no  hepatosplenomegaly, no masses, normal bowel sounds  MS: extremities- no gross deformities noted, no edema       ASSESSMENT/PLAN:                                                      ASSESSMENT/PLAN:    (H69.82) Dysfunction of left eustachian tube  (primary encounter diagnosis)  Comment: likely related to allergies  Plan: recommend he use flonase daily for next 2 weeks minimum, longer if helping.      (E78.5) Hyperlipidemia with target LDL less than 100  Comment: due for FLP  Plan: order released and blood drawn,  Recently had LFTs drawn and were normal      F/U in clinic if issues arise    Assessment and plan reviewed. Questions answered    Angelina Santizo MD   [facility]

## 2019-05-01 NOTE — PATIENT INSTRUCTIONS
Use the flonase every day for the next 2 weeks for sure and I would recommend staying on that for the allergy season.

## 2019-06-14 ENCOUNTER — OFFICE VISIT (OUTPATIENT)
Dept: FAMILY MEDICINE | Facility: CLINIC | Age: 39
End: 2019-06-14
Payer: COMMERCIAL

## 2019-06-14 VITALS
BODY MASS INDEX: 41.22 KG/M2 | HEIGHT: 68 IN | TEMPERATURE: 98 F | DIASTOLIC BLOOD PRESSURE: 78 MMHG | HEART RATE: 76 BPM | SYSTOLIC BLOOD PRESSURE: 126 MMHG | WEIGHT: 272 LBS

## 2019-06-14 DIAGNOSIS — E66.813 CLASS 3 SEVERE OBESITY WITH BODY MASS INDEX (BMI) OF 40.0 TO 44.9 IN ADULT, UNSPECIFIED OBESITY TYPE, UNSPECIFIED WHETHER SERIOUS COMORBIDITY PRESENT (H): ICD-10-CM

## 2019-06-14 DIAGNOSIS — E11.9 TYPE 2 DIABETES MELLITUS WITHOUT COMPLICATION, WITHOUT LONG-TERM CURRENT USE OF INSULIN (H): ICD-10-CM

## 2019-06-14 DIAGNOSIS — E11.9 TYPE 2 DIABETES MELLITUS WITHOUT COMPLICATION, WITHOUT LONG-TERM CURRENT USE OF INSULIN (H): Primary | ICD-10-CM

## 2019-06-14 DIAGNOSIS — E78.5 HYPERLIPIDEMIA WITH TARGET LDL LESS THAN 100: ICD-10-CM

## 2019-06-14 DIAGNOSIS — E66.01 CLASS 3 SEVERE OBESITY WITH BODY MASS INDEX (BMI) OF 40.0 TO 44.9 IN ADULT, UNSPECIFIED OBESITY TYPE, UNSPECIFIED WHETHER SERIOUS COMORBIDITY PRESENT (H): ICD-10-CM

## 2019-06-14 DIAGNOSIS — I10 BENIGN ESSENTIAL HYPERTENSION: ICD-10-CM

## 2019-06-14 DIAGNOSIS — G56.01 CARPAL TUNNEL SYNDROME OF RIGHT WRIST: ICD-10-CM

## 2019-06-14 LAB
CREAT UR-MCNC: 200 MG/DL
HBA1C MFR BLD: 6.2 % (ref 0–5.6)
MICROALBUMIN UR-MCNC: 13 MG/L
MICROALBUMIN/CREAT UR: 6.7 MG/G CR (ref 0–17)

## 2019-06-14 PROCEDURE — 99214 OFFICE O/P EST MOD 30 MIN: CPT | Performed by: PHYSICIAN ASSISTANT

## 2019-06-14 PROCEDURE — 83036 HEMOGLOBIN GLYCOSYLATED A1C: CPT | Performed by: PHYSICIAN ASSISTANT

## 2019-06-14 PROCEDURE — 36415 COLL VENOUS BLD VENIPUNCTURE: CPT | Performed by: PHYSICIAN ASSISTANT

## 2019-06-14 PROCEDURE — 82043 UR ALBUMIN QUANTITATIVE: CPT | Performed by: PHYSICIAN ASSISTANT

## 2019-06-14 RX ORDER — ATORVASTATIN CALCIUM 20 MG/1
20 TABLET, FILM COATED ORAL DAILY
Qty: 90 TABLET | Refills: 3 | Status: SHIPPED | OUTPATIENT
Start: 2019-06-14 | End: 2020-07-03

## 2019-06-14 RX ORDER — METFORMIN HCL 500 MG
1000 TABLET, EXTENDED RELEASE 24 HR ORAL
Qty: 180 TABLET | Refills: 3 | Status: SHIPPED | OUTPATIENT
Start: 2019-06-14 | End: 2020-06-17

## 2019-06-14 RX ORDER — LOSARTAN POTASSIUM AND HYDROCHLOROTHIAZIDE 12.5; 5 MG/1; MG/1
1 TABLET ORAL DAILY
Qty: 90 TABLET | Refills: 3 | Status: SHIPPED | OUTPATIENT
Start: 2019-06-14 | End: 2020-06-17

## 2019-06-14 ASSESSMENT — MIFFLIN-ST. JEOR: SCORE: 2128.28

## 2019-06-14 NOTE — TELEPHONE ENCOUNTER
ATORVASTATIN CALCIUM 20MG TABS      Last Written Prescription Date:  4/24/19  Last Fill Quantity: 30,   # refills: 0  Last Office Visit: 5/1/19  Future Office visit:    Next 5 appointments (look out 90 days)    Jun 14, 2019  4:20 PM CDT  Office Visit with Susanne Alberto PA-C  [facility]) [address] [address]  [phone]   Jul 09, 2019  2:00 PM CDT  Return Visit with Sabi Bustos PA-C  [facility] [address] [facility] [address]  [phone]           metFORMIN (GLUCOPHAGE-XR) 500 MG 24 hr tablet      Last Written Prescription Date:  3/8/19  Last Fill Quantity: 90,   # refills: 1  Last Office Visit: 5/1/19  Future Office visit:    Next 5 appointments (look out 90 days)    Jun 14, 2019  4:20 PM CDT  Office Visit with Susanne Alberto PA-C  [facility]) [address] [address]  [phone]   Jul 09, 2019  2:00 PM CDT  Return Visit with Sabi Bustos PA-C  [facility] [address] [address]  [phone]           Requested Prescriptions   Pending Prescriptions Disp Refills     atorvastatin (LIPITOR) 20 MG tablet [Pharmacy Med Name: ATORVASTATIN CALCIUM 20MG TABS] 90 tablet 0     Sig: TAKE ONE TABLET BY MOUTH ONCE DAILY (DUE IN MARCH FOR FASTING LABS AND OFFICE VISIT)       Statins Protocol Passed - 6/14/2019 12:56 PM        Passed - LDL on file in past 12 months     Recent Labs   Lab Test 05/01/19  0931   *             Passed - No abnormal creatine kinase in past 12 months     No lab results found.             Passed - Recent (12 mo) or future (30 days) visit within the authorizing provider's specialty     Patient had office visit in the last 12 months or has a visit in the next 30 days with authorizing provider or within the authorizing  "provider's specialty.  See \"Patient Info\" tab in inbasket, or \"Choose Columns\" in Meds & Orders section of the refill encounter.              Passed - Medication is active on med list        Passed - Patient is age 18 or older        metFORMIN (GLUCOPHAGE-XR) 500 MG 24 hr tablet [Pharmacy Med Name: METFORMIN HCL ER 500MG TB24] 90 tablet 1     Sig: TAKE TWO TABLETS BY MOUTH ONCE DAILY WITH DINNER       Biguanide Agents Failed - 6/14/2019 12:56 PM        Failed - Patient has had a Microalbumin in the past 15 mos.     No lab results found.          Passed - Blood pressure less than 140/90 in past 6 months     BP Readings from Last 3 Encounters:   05/01/19 117/75   04/18/19 129/81   03/26/19 125/84                 Passed - Patient has documented LDL within the past 12 mos.     Recent Labs   Lab Test 05/01/19  0931   *             Passed - Patient is age 10 or older        Passed - Patient has documented A1c within the specified period of time.     If HgbA1C is 8 or greater, it needs to be on file within the past 3 months.  If less than 8, must be on file within the past 6 months.     Recent Labs   Lab Test 03/08/19  1604   A1C 6.3*             Passed - Patient's CR is NOT>1.4 OR Patient's EGFR is NOT<45 within past 12 mos.     Recent Labs   Lab Test 04/18/19  1424   GFRESTIMATED >90   GFRESTBLACK >90       Recent Labs   Lab Test 04/18/19  1424   CR 0.91             Passed - Patient does NOT have a diagnosis of CHF.        Passed - Medication is active on med list        Passed - Recent (6 mo) or future (30 days) visit within the authorizing provider's specialty     Patient had office visit in the last 6 months or has a visit in the next 30 days with authorizing provider or within the authorizing provider's specialty.  See \"Patient Info\" tab in inbasket, or \"Choose Columns\" in Meds & Orders section of the refill encounter.              "

## 2019-06-14 NOTE — PROGRESS NOTES
Subjective     Sean Woodard is a 39 year old male who presents to clinic today for the following health issues:    HPI   Diabetes Follow-up      How often are you checking your blood sugar? Not at all recently     What time of day are you checking your blood sugars (select all that apply)? Not at all    Have you had any blood sugars above 200?  No, not sure     Have you had any blood sugars below 70?  No, not sure     What symptoms do you notice when your blood sugar is low?  None    What concerns do you have today about your diabetes? None     Do you have any of these symptoms? (Select all that apply)  No numbness or tingling in feet.  No redness, sores or blisters on feet.  No complaints of excessive thirst.  No reports of blurry vision.  No significant changes to weight.     Have you had a diabetic eye exam in the last 12 months? Yes- Date of last eye exam: May 2019    BP Readings from Last 2 Encounters:   06/14/19 126/78   05/01/19 117/75     Hemoglobin A1C (%)   Date Value   06/14/2019 6.2 (H)   03/08/2019 6.3 (H)     LDL Cholesterol Calculated (mg/dL)   Date Value   05/01/2019 123 (H)   03/05/2018 111 (H)       Diabetes Management Resources    Amount of exercise or physical activity: None    Problems taking medications regularly: No    Medication side effects: none    Diet: regular (no restrictions)      Medication Followup of Atorvastatin 20 mg     Taking Medication as prescribed: yes    Side Effects:  None    Medication Helping Symptoms:  yes       Was told he needed to come in for med refill  Did have his cholesterol done a month ago   Not checking sugars    Noticing some occasional numbness in his right hand  Wondering if it's related to diabetes  Can occur randomly throughout the day  Lasts through brief periods of time  No pain  Numbness in hand and last 3 digits - occasionally extends up his forearm  Types all day at work  Reviewed and updated as needed this visit by Provider  Tobacco  Allergies  Meds  " Problems  Med Hx  Surg Hx  Fam Hx         Review of Systems   Remainder of ROS obtained and found to be negative other than that which was documented above        Objective    /78   Pulse 76   Temp 98  F (36.7  C) (Tympanic)   Ht 1.727 m (5' 8\")   Wt 123.4 kg (272 lb)   BMI 41.36 kg/m    Body mass index is 41.36 kg/m .  Physical Exam   GENERAL: healthy, alert and no distress  EYES: Eyes grossly normal to inspection  RESP: lungs clear to auscultation - no rales, rhonchi or wheezes  CV: regular rates and rhythm, normal S1 S2, no S3 or S4 and no murmur, click or rub  MS: no gross musculoskeletal defects noted, no edema  Diabetic foot exam: normal DP and PT pulses, no trophic changes or ulcerative lesions and normal sensory exam  MS: normal strength and ROM in right arm  +Phalen's with reproduction of numbness in right fingers/hand  - Tinel       Diagnostic Test Results:  Labs reviewed in Epic        Assessment & Plan     (E11.9) Type 2 diabetes mellitus without complication, without long-term current use of insulin (H)  (primary encounter diagnosis)  Comment: A1c 6.2 - stable  Plan: metFORMIN (GLUCOPHAGE-XR) 500 MG 24 hr tablet,         Hemoglobin A1c, Albumin Random Urine         Quantitative with Creat Ratio            (E78.5) Hyperlipidemia with target LDL less than 100  Comment: reviewed lipids  Plan: atorvastatin (LIPITOR) 20 MG tablet            (I10) Benign essential hypertension  Comment:   Plan: losartan-hydrochlorothiazide (HYZAAR) 50-12.5         MG tablet            (E66.01,  Z68.41) Class 3 severe obesity with body mass index (BMI) of 40.0 to 44.9 in adult, unspecified obesity type, unspecified whether serious comorbidity present (H)  Comment:   Plan: metFORMIN (GLUCOPHAGE-XR) 500 MG 24 hr tablet            (G56.01) Carpal tunnel syndrome of right wrist  Comment:   Plan: tried fitting him for a night splint but none that fit well in clinic. 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    Susanne Alberto PA-C  [facility]

## 2019-06-17 RX ORDER — METFORMIN HCL 500 MG
TABLET, EXTENDED RELEASE 24 HR ORAL
Qty: 90 TABLET | Refills: 1 | OUTPATIENT
Start: 2019-06-17

## 2019-06-17 RX ORDER — ATORVASTATIN CALCIUM 20 MG/1
TABLET, FILM COATED ORAL
Qty: 90 TABLET | Refills: 0 | OUTPATIENT
Start: 2019-06-17

## 2019-06-17 NOTE — TELEPHONE ENCOUNTER
metFORMIN (GLUCOPHAGE-XR) 500 MG 24 hr tablet 180 tablet 3 6/14/2019  No   Sig - Route: Take 2 tablets (1,000 mg) by mouth daily (with dinner) - Oral   Sent to pharmacy as: metFORMIN (GLUCOPHAGE-XR) 500 MG 24 hr tablet   Class: E-Prescribe   Order: [id number]   E-Prescribing Status: Receipt confirmed by pharmacy (6/14/2019  4:55 PM CDT)   Printout Tracking     External Result Report   Pharmacy     [facility] [address] [id number]     atorvastatin (LIPITOR) 20 MG tablet 90 tablet 3 6/14/2019  No   Sig - Route: Take 1 tablet (20 mg) by mouth daily - Oral   Sent to pharmacy as: atorvastatin (LIPITOR) 20 MG tablet   Class: E-Prescribe   Order: [id number]   E-Prescribing Status: Receipt confirmed by pharmacy (6/14/2019  4:55 PM CDT)   Printout Tracking     External Result Report   Pharmacy     [facility] [id number]

## 2019-07-29 DIAGNOSIS — L73.2 HIDRADENITIS SUPPURATIVA: ICD-10-CM

## 2019-09-19 DIAGNOSIS — L73.2 HIDRADENITIS SUPPURATIVA: ICD-10-CM

## 2019-09-19 NOTE — TELEPHONE ENCOUNTER
Refill request received for Humira.     LOV: 4/18/19 (no show to 9/5/19 appointment)   1. Hidradenitis Suppurativa   Patient feels Humira has helped some but continuing to get flares. Denies any side effects with Humira.   Would like to consider alternatives at this time.   Discussed Enbrel vs Stelara, these are not FDA approved. These may or may not work or be covered by insurance.   Will check CBC, CMP, Quantiferon.   Discussed with Dr. Hill who suggested he try a combination of humira and doxycycline for next 1-2 month before changing biologic.      Message sent to patient to evaluate effectiveness of Humira. Patient also advised to make appointment.     Shankar ALEXANDER RN   [facility]

## 2019-09-23 ENCOUNTER — TELEPHONE (OUTPATIENT)
Dept: PHARMACY | Facility: CLINIC | Age: 39
End: 2019-09-23

## 2019-11-05 ENCOUNTER — IMMUNIZATION (OUTPATIENT)
Dept: FAMILY MEDICINE | Facility: CLINIC | Age: 39
End: 2019-11-05
Payer: COMMERCIAL

## 2019-11-05 PROCEDURE — 90471 IMMUNIZATION ADMIN: CPT

## 2019-11-05 PROCEDURE — 90686 IIV4 VACC NO PRSV 0.5 ML IM: CPT

## 2019-11-06 ENCOUNTER — OFFICE VISIT (OUTPATIENT)
Dept: DERMATOLOGY | Facility: CLINIC | Age: 39
End: 2019-11-06
Payer: COMMERCIAL

## 2019-11-06 VITALS — SYSTOLIC BLOOD PRESSURE: 126 MMHG | DIASTOLIC BLOOD PRESSURE: 79 MMHG | OXYGEN SATURATION: 99 % | HEART RATE: 71 BPM

## 2019-11-06 DIAGNOSIS — Z51.81 THERAPEUTIC DRUG MONITORING: Primary | ICD-10-CM

## 2019-11-06 DIAGNOSIS — L73.2 HIDRADENITIS SUPPURATIVA: ICD-10-CM

## 2019-11-06 LAB
ALBUMIN SERPL-MCNC: 4 G/DL (ref 3.4–5)
ALP SERPL-CCNC: 76 U/L (ref 40–150)
ALT SERPL W P-5'-P-CCNC: 65 U/L (ref 0–70)
ANION GAP SERPL CALCULATED.3IONS-SCNC: 3 MMOL/L (ref 3–14)
AST SERPL W P-5'-P-CCNC: 40 U/L (ref 0–45)
BILIRUB SERPL-MCNC: 0.6 MG/DL (ref 0.2–1.3)
BUN SERPL-MCNC: 14 MG/DL (ref 7–30)
CALCIUM SERPL-MCNC: 9.1 MG/DL (ref 8.5–10.1)
CHLORIDE SERPL-SCNC: 104 MMOL/L (ref 94–109)
CO2 SERPL-SCNC: 28 MMOL/L (ref 20–32)
CREAT SERPL-MCNC: 0.85 MG/DL (ref 0.66–1.25)
ERYTHROCYTE [DISTWIDTH] IN BLOOD BY AUTOMATED COUNT: 12.7 % (ref 10–15)
GFR SERPL CREATININE-BSD FRML MDRD: >90 ML/MIN/{1.73_M2}
GLUCOSE SERPL-MCNC: 97 MG/DL (ref 70–99)
HCT VFR BLD AUTO: 46.7 % (ref 40–53)
HGB BLD-MCNC: 16 G/DL (ref 13.3–17.7)
MCH RBC QN AUTO: 30.8 PG (ref 26.5–33)
MCHC RBC AUTO-ENTMCNC: 34.3 G/DL (ref 31.5–36.5)
MCV RBC AUTO: 90 FL (ref 78–100)
PLATELET # BLD AUTO: 243 10E9/L (ref 150–450)
POTASSIUM SERPL-SCNC: 3.5 MMOL/L (ref 3.4–5.3)
PROT SERPL-MCNC: 8.7 G/DL (ref 6.8–8.8)
RBC # BLD AUTO: 5.2 10E12/L (ref 4.4–5.9)
SODIUM SERPL-SCNC: 135 MMOL/L (ref 133–144)
WBC # BLD AUTO: 6 10E9/L (ref 4–11)

## 2019-11-06 PROCEDURE — 80053 COMPREHEN METABOLIC PANEL: CPT | Performed by: PHYSICIAN ASSISTANT

## 2019-11-06 PROCEDURE — 99213 OFFICE O/P EST LOW 20 MIN: CPT | Performed by: PHYSICIAN ASSISTANT

## 2019-11-06 PROCEDURE — 85027 COMPLETE CBC AUTOMATED: CPT | Performed by: PHYSICIAN ASSISTANT

## 2019-11-06 PROCEDURE — 36415 COLL VENOUS BLD VENIPUNCTURE: CPT | Performed by: PHYSICIAN ASSISTANT

## 2019-11-06 RX ORDER — CLINDAMYCIN AND BENZOYL PEROXIDE 10; 50 MG/G; MG/G
GEL TOPICAL
Qty: 50 G | Refills: 6 | Status: SHIPPED | OUTPATIENT
Start: 2019-11-06 | End: 2019-11-15

## 2019-11-06 NOTE — LETTER
11/6/2019         RE: Sean Woodard  [address] [facility] [address]        Dear Colleague,    Thank you for referring your patient, Sean Woodard, to the [facility]. Please see a copy of my visit note below.    Sean Woodard is a 39 year old year old male patient here today for recheck hidradenitis suppurativa.  Patient is currently on Humira 80 mg weekly, initially this was helping. Now he flaring on armpits. He has tender draining areas. He reports that axilla is worse than when we first started. He is interested in trying something new. Patient has no other skin complaints today.  Remainder of the HPI, Meds, PMH, Allergies, FH, and SH was reviewed in chart.    Pertinent Hx:  HS  Past Medical History:   Diagnosis Date     NO ACTIVE PROBLEMS        Past Surgical History:   Procedure Laterality Date     ORTHOPEDIC SURGERY  1/1/2005    RT knee arthroscopic menisectomy     Thumb Surgery  2009    Mass removal neuroma        Family History   Problem Relation Age of Onset     Heart Defect Father         valve replacement     Diabetes Maternal Grandmother      Cancer Maternal Grandfather         stomach     Hypertension Brother      Diabetes Sister      Cerebrovascular Disease Brother 38     Hypertension Brother        Social History     Socioeconomic History     Marital status:      Spouse name: Joycelyn Stokes     Number of children: 1     Years of education: 12+     Highest education level: Not on file   Occupational History     Occupation: Adjucations officer     Employer: [employer] [place]   Social Needs     Financial resource strain: Not on file     Food insecurity:     Worry: Not on file     Inability: Not on file     Transportation needs:     Medical: Not on file     Non-medical: Not on file   Tobacco Use     Smoking status: Former Smoker     Packs/day: 0.50     Years: 5.00     Pack years: 2.50     Types: Cigarettes     Last attempt to quit: 1/1/2005     Years  since quittin.8     Smokeless tobacco: Never Used   Substance and Sexual Activity     Alcohol use: Yes     Comment: Social. 1 drink per month     Drug use: No     Sexual activity: Yes     Partners: Female   Lifestyle     Physical activity:     Days per week: Not on file     Minutes per session: Not on file     Stress: Not on file   Relationships     Social connections:     Talks on phone: Not on file     Gets together: Not on file     Attends Zoroastrian service: Not on file     Active member of club or organization: Not on file     Attends meetings of clubs or organizations: Not on file     Relationship status: Not on file     Intimate partner violence:     Fear of current or ex partner: Not on file     Emotionally abused: Not on file     Physically abused: Not on file     Forced sexual activity: Not on file   Other Topics Concern     Parent/sibling w/ CABG, MI or angioplasty before 65F 55M? No   Social History Narrative    2019    ENVIRONMENTAL HISTORY: The family [place] The home is heated with a forced air. They do have central air conditioning. The patient's bedroom is furnished with carpeting in bedroom. No pets inside the house. There is no history of cockroach or mice infestation. There are no smokers in the house.  The house does not have a damp basement.        Outpatient Encounter Medications as of 2019   Medication Sig Dispense Refill     adalimumab (HUMIRA *CF*) 40 MG/0.4ML pen kit Inject 0.8 mLs (80 mg) Subcutaneous every 7 days 8 each 1     atorvastatin (LIPITOR) 20 MG tablet Take 1 tablet (20 mg) by mouth daily 90 tablet 3     augmented betamethasone dipropionate (DIPROLENE-AF) 0.05 % ointment Apply sparingly to affected area twice daily as needed.  Do not apply to face. 45 g 1     azelastine (ASTELIN) 0.1 % nasal spray Spray 2 sprays into both nostrils 2 times daily as needed for rhinitis 30 mL 3     azelastine (OPTIVAR) 0.05 % ophthalmic  solution Apply 1 drop to eye 2 times daily 6 mL 3     blood glucose (ACCU-CHEK GUIDE) test strip Use to test blood sugar 2 times daily 100 strip 11     cetirizine (ZYRTEC) 10 MG tablet Take 10 mg by mouth daily       clindamycin (CLINDAMAX) 1 % lotion Apply to AA BID 60 mL 11     clindamycin-benzoyl peroxide (BENZACLIN) 1-5 % external gel Apply twice daily to affected area on skin. 50 g 6     diphenhydrAMINE (BENADRYL) 25 MG capsule Take 25 mg by mouth every 6 hours as needed for itching or allergies       doxycycline monohydrate (MONODOX) 100 MG capsule Take 1 capsule (100 mg) by mouth 2 times daily (with meals) 180 capsule 1     fluticasone (FLONASE) 50 MCG/ACT nasal spray Spray 2 sprays into both nostrils daily 16 g 3     losartan-hydrochlorothiazide (HYZAAR) 50-12.5 MG tablet Take 1 tablet by mouth daily 90 tablet 3     metFORMIN (GLUCOPHAGE-XR) 500 MG 24 hr tablet Take 2 tablets (1,000 mg) by mouth daily (with dinner) 180 tablet 3     [] ustekinumab (STELARA) 90 MG/ML Inject 1 mL (90 mg) Subcutaneous once for 1 dose 1 mL 0     ustekinumab (STELARA) 90 MG/ML Inject 1 mL (90 mg) Subcutaneous every 3 months 1 mL 0     adalimumab (HUMIRA PEN) 40 MG/0.8ML pen kit Inject 40 mg weekly SQ (Patient not taking: Reported on 2019) 2 kit 2     [DISCONTINUED] adalimumab (HUMIRA *CF*) 40 MG/0.4ML pen kit Inject 0.8 mLs (80 mg) Subcutaneous every 7 days 8 each 1     No facility-administered encounter medications on file as of 2019.              Review Of Systems  Skin: As above  Eyes: negative  Ears/Nose/Throat: negative  Respiratory: No shortness of breath, dyspnea on exertion, cough, or hemoptysis  Cardiovascular: negative  Gastrointestinal: negative  Genitourinary: negative  Musculoskeletal: negative  Neurologic: negative  Psychiatric: negative  Hematologic/Lymphatic/Immunologic: negative  Endocrine: negative      O:   NAD, WDWN, Alert & Oriented, Mood & Affect wnl, Vitals stable   Here today alone   BP  126/79   Pulse 71   SpO2 99%    General appearance normal   Vitals stable   Alert, oriented and in no acute distress     Scarring on neck, buttock, axilla  inflamed nodule on bilateral axilla     Eyes: Conjunctivae/lids:Normal     ENT: Lips: normal    MSK:Normal    Pulm: Breathing Normal    Neuro/Psych: Orientation:Normal; Mood/Affect:Normal  A/P:  1. Hidradenitis Suppurativa   Patient feels Humira has helped some but continuing to get flares. Denies any side effects with Humira.   Will check CBC, CMP.  He has tried and failed antibiotic, intralesional steroid injection, isotretinoin.   Would like to do intralesional steroid injections today to help with some pain on axilla  IL TAC: PGACAC discussed.  Risks including but not limited to injection site reaction, bruising, no resolution.  All questions answered and entertained to patient s satisfaction.  Informed consent obtained.  IL TAC in concentration of 10mg/ml was injected ID to inflamed cysts.  Total injected was 0.5 ml.  Patient tolerated without complications and given wound care instructions, including not to move product around.  Return in 4 weeks for follow-up and possible additional IL TAC.      Patient would like to start Stelara to treat HS. Study in 2016 showed majority of patients on Stelara had their HS symptoms improved.     Br J Dermatol. 2016 Apr;174(4):839-46. doi: 10.1111/bjd.94544. Epub 2016 Jan 27. Ustekinumab in hidradenitis suppurativa: clinical results and a search for potential biomarkers in serum.    The AAD also has an article: Hidradenitis Suppurativa Disease Amelioration with Ustekinumab therapy, this article from 2011 showed significant improvement within 2 months of treatment.     Again, thank you for allowing me to participate in the care of your patient.        Sincerely,        Sabi Christie PA-C

## 2019-11-07 ENCOUNTER — TELEPHONE (OUTPATIENT)
Dept: PHARMACY | Facility: CLINIC | Age: 39
End: 2019-11-07

## 2019-11-11 NOTE — PROGRESS NOTES
Sean Woodard is a 39 year old year old male patient here today for recheck hidradenitis suppurativa.  Patient is currently on Humira 80 mg weekly, initially this was helping. Now he flaring on armpits. He has tender draining areas. He reports that axilla is worse than when we first started. He is interested in trying something new. Patient has no other skin complaints today.  Remainder of the HPI, Meds, PMH, Allergies, FH, and SH was reviewed in chart.    Pertinent Hx:  HS  Past Medical History:   Diagnosis Date     NO ACTIVE PROBLEMS        Past Surgical History:   Procedure Laterality Date     ORTHOPEDIC SURGERY  2005    RT knee arthroscopic menisectomy     Thumb Surgery  2009    Mass removal neuroma        Family History   Problem Relation Age of Onset     Heart Defect Father         valve replacement     Diabetes Maternal Grandmother      Cancer Maternal Grandfather         stomach     Hypertension Brother      Diabetes Sister      Cerebrovascular Disease Brother 38     Hypertension Brother        Social History     Socioeconomic History     Marital status:      Spouse name: Joycelyn Stokes     Number of children: 1     Years of education: 12+     Highest education level: Not on file   Occupational History     Occupation: Adjucations officer     Employer: [employer]   Social Needs     Financial resource strain: Not on file     Food insecurity:     Worry: Not on file     Inability: Not on file     Transportation needs:     Medical: Not on file     Non-medical: Not on file   Tobacco Use     Smoking status: Former Smoker     Packs/day: 0.50     Years: 5.00     Pack years: 2.50     Types: Cigarettes     Last attempt to quit: 2005     Years since quittin.8     Smokeless tobacco: Never Used   Substance and Sexual Activity     Alcohol use: Yes     Comment: Social. 1 drink per month     Drug use: No     Sexual activity: Yes     Partners: Female   Lifestyle     Physical  activity:     Days per week: Not on file     Minutes per session: Not on file     Stress: Not on file   Relationships     Social connections:     Talks on phone: Not on file     Gets together: Not on file     Attends Yarsani service: Not on file     Active member of club or organization: Not on file     Attends meetings of clubs or organizations: Not on file     Relationship status: Not on file     Intimate partner violence:     Fear of current or ex partner: Not on file     Emotionally abused: Not on file     Physically abused: Not on file     Forced sexual activity: Not on file   Other Topics Concern     Parent/sibling w/ CABG, MI or angioplasty before 65F 55M? No   Social History Narrative    March 26, 2019    ENVIRONMENTAL HISTORY: The family [place] a [place] year old [place]. The home is heated with a forced air. They do have central air conditioning. The patient's bedroom is furnished with carpeting in bedroom. No pets inside the house. There is no history of cockroach or mice infestation. There are no smokers in the house.  The house does not have a damp basement.        Outpatient Encounter Medications as of 11/6/2019   Medication Sig Dispense Refill     adalimumab (HUMIRA *CF*) 40 MG/0.4ML pen kit Inject 0.8 mLs (80 mg) Subcutaneous every 7 days 8 each 1     atorvastatin (LIPITOR) 20 MG tablet Take 1 tablet (20 mg) by mouth daily 90 tablet 3     augmented betamethasone dipropionate (DIPROLENE-AF) 0.05 % ointment Apply sparingly to affected area twice daily as needed.  Do not apply to face. 45 g 1     azelastine (ASTELIN) 0.1 % nasal spray Spray 2 sprays into both nostrils 2 times daily as needed for rhinitis 30 mL 3     azelastine (OPTIVAR) 0.05 % ophthalmic solution Apply 1 drop to eye 2 times daily 6 mL 3     blood glucose (ACCU-CHEK GUIDE) test strip Use to test blood sugar 2 times daily 100 strip 11     cetirizine (ZYRTEC) 10 MG tablet Take 10 mg by mouth daily       clindamycin  (CLINDAMAX) 1 % lotion Apply to AA BID 60 mL 11     clindamycin-benzoyl peroxide (BENZACLIN) 1-5 % external gel Apply twice daily to affected area on skin. 50 g 6     diphenhydrAMINE (BENADRYL) 25 MG capsule Take 25 mg by mouth every 6 hours as needed for itching or allergies       doxycycline monohydrate (MONODOX) 100 MG capsule Take 1 capsule (100 mg) by mouth 2 times daily (with meals) 180 capsule 1     fluticasone (FLONASE) 50 MCG/ACT nasal spray Spray 2 sprays into both nostrils daily 16 g 3     losartan-hydrochlorothiazide (HYZAAR) 50-12.5 MG tablet Take 1 tablet by mouth daily 90 tablet 3     metFORMIN (GLUCOPHAGE-XR) 500 MG 24 hr tablet Take 2 tablets (1,000 mg) by mouth daily (with dinner) 180 tablet 3     [] ustekinumab (STELARA) 90 MG/ML Inject 1 mL (90 mg) Subcutaneous once for 1 dose 1 mL 0     ustekinumab (STELARA) 90 MG/ML Inject 1 mL (90 mg) Subcutaneous every 3 months 1 mL 0     adalimumab (HUMIRA PEN) 40 MG/0.8ML pen kit Inject 40 mg weekly SQ (Patient not taking: Reported on 2019) 2 kit 2     [DISCONTINUED] adalimumab (HUMIRA *CF*) 40 MG/0.4ML pen kit Inject 0.8 mLs (80 mg) Subcutaneous every 7 days 8 each 1     No facility-administered encounter medications on file as of 2019.              Review Of Systems  Skin: As above  Eyes: negative  Ears/Nose/Throat: negative  Respiratory: No shortness of breath, dyspnea on exertion, cough, or hemoptysis  Cardiovascular: negative  Gastrointestinal: negative  Genitourinary: negative  Musculoskeletal: negative  Neurologic: negative  Psychiatric: negative  Hematologic/Lymphatic/Immunologic: negative  Endocrine: negative      O:   NAD, WDWN, Alert & Oriented, Mood & Affect wnl, Vitals stable   Here today alone   /79   Pulse 71   SpO2 99%    General appearance normal   Vitals stable   Alert, oriented and in no acute distress     Scarring on neck, buttock, axilla  inflamed nodule on bilateral axilla     Eyes: Conjunctivae/lids:Normal      ENT: Lips: normal    MSK:Normal    Pulm: Breathing Normal    Neuro/Psych: Orientation:Normal; Mood/Affect:Normal  A/P:  1. Hidradenitis Suppurativa   Patient feels Humira has helped some but continuing to get flares. Denies any side effects with Humira.   Will check CBC, CMP.  He has tried and failed antibiotic, intralesional steroid injection, isotretinoin.   Would like to do intralesional steroid injections today to help with some pain on axilla  IL TAC: PGACAC discussed.  Risks including but not limited to injection site reaction, bruising, no resolution.  All questions answered and entertained to patient s satisfaction.  Informed consent obtained.  IL TAC in concentration of 10mg/ml was injected ID to inflamed cysts.  Total injected was 0.5 ml.  Patient tolerated without complications and given wound care instructions, including not to move product around.  Return in 4 weeks for follow-up and possible additional IL TAC.      Patient would like to start Stelara to treat HS. Study in 2016 showed majority of patients on Stelara had their HS symptoms improved.     Br J Dermatol. 2016 Apr;174(4):839-46. doi: 10.1111/bjd.45342. Epub 2016 Jan 27. Ustekinumab in hidradenitis suppurativa: clinical results and a search for potential biomarkers in serum.    The AAD also has an article: Hidradenitis Suppurativa Disease Amelioration with Ustekinumab therapy, this article from 2011 showed significant improvement within 2 months of treatment.

## 2019-11-15 DIAGNOSIS — L73.2 HIDRADENITIS SUPPURATIVA: ICD-10-CM

## 2019-11-15 RX ORDER — CLINDAMYCIN AND BENZOYL PEROXIDE 10; 50 MG/G; MG/G
GEL TOPICAL
Qty: 50 G | Refills: 6 | Status: SHIPPED | OUTPATIENT
Start: 2019-11-15 | End: 2021-06-16

## 2019-11-18 DIAGNOSIS — L73.2 HIDRADENITIS SUPPURATIVA: ICD-10-CM

## 2019-11-18 NOTE — TELEPHONE ENCOUNTER
Reason for Call:  Medication or medication refill:    Do you use a [facility]?  Name of the pharmacy and phone number for the current request:  [facility]    Name of the medication requested: Humira    Other request:   LAST REFILL: 11/01/2019  LOV: 11/06/2019    Can we leave a detailed message on this number? Not Applicable    Phone number patient can be reached at: Home number on file [phone] (home)    Best Time: NA    Call taken on 11/18/2019 at 8:11 AM by Denise Behrendt

## 2019-11-25 ENCOUNTER — OFFICE VISIT (OUTPATIENT)
Dept: FAMILY MEDICINE | Facility: CLINIC | Age: 39
End: 2019-11-25
Payer: COMMERCIAL

## 2019-11-25 VITALS
HEART RATE: 80 BPM | TEMPERATURE: 98.3 F | HEIGHT: 68 IN | BODY MASS INDEX: 41.83 KG/M2 | SYSTOLIC BLOOD PRESSURE: 130 MMHG | DIASTOLIC BLOOD PRESSURE: 84 MMHG | WEIGHT: 276 LBS

## 2019-11-25 DIAGNOSIS — L73.2 HIDRADENITIS SUPPURATIVA: ICD-10-CM

## 2019-11-25 DIAGNOSIS — E11.9 TYPE 2 DIABETES MELLITUS WITHOUT COMPLICATION, WITHOUT LONG-TERM CURRENT USE OF INSULIN (H): Primary | ICD-10-CM

## 2019-11-25 LAB — HBA1C MFR BLD: 6.2 % (ref 0–5.6)

## 2019-11-25 PROCEDURE — 96372 THER/PROPH/DIAG INJ SC/IM: CPT | Performed by: PHYSICIAN ASSISTANT

## 2019-11-25 PROCEDURE — 36415 COLL VENOUS BLD VENIPUNCTURE: CPT | Performed by: PHYSICIAN ASSISTANT

## 2019-11-25 PROCEDURE — 83036 HEMOGLOBIN GLYCOSYLATED A1C: CPT | Performed by: PHYSICIAN ASSISTANT

## 2019-11-25 PROCEDURE — 99214 OFFICE O/P EST MOD 30 MIN: CPT | Mod: 25 | Performed by: PHYSICIAN ASSISTANT

## 2019-11-25 ASSESSMENT — PAIN SCALES - GENERAL: PAINLEVEL: MODERATE PAIN (5)

## 2019-11-25 ASSESSMENT — MIFFLIN-ST. JEOR: SCORE: 2146.43

## 2019-11-25 NOTE — PROGRESS NOTES
Subjective     Sean Woodard is a 39 year old male who presents to clinic today for the following health issues:    HPI   Diabetes Follow-up      How often are you checking your blood sugar? Not at all    What concerns do you have today about your diabetes? None     Do you have any of these symptoms? (Select all that apply)  No numbness or tingling in feet.  No redness, sores or blisters on feet.  No complaints of excessive thirst.  No reports of blurry vision.  No significant changes to weight.     Have you had a diabetic eye exam in the last 12 months? Yes- Date of last eye exam: May or June 2019    BP Readings from Last 2 Encounters:   11/25/19 130/84   11/06/19 126/79     Hemoglobin A1C (%)   Date Value   11/25/2019 6.2 (H)   06/14/2019 6.2 (H)     LDL Cholesterol Calculated (mg/dL)   Date Value   05/01/2019 123 (H)   03/05/2018 111 (H)       Diabetes Management Resources      How many servings of fruits and vegetables do you eat daily?  0-1    On average, how many sweetened beverages do you drink each day (Examples: soda, juice, sweet tea, etc.  Do NOT count diet or artificially sweetened beverages)?   0    How many days per week do you miss taking your medication? 0    -He was wondering if you could give him a kenalog shot again for a flare up on his arm.     Current Outpatient Medications   Medication Sig Dispense Refill     adalimumab (HUMIRA *CF*) 40 MG/0.4ML pen kit Inject 0.8 mLs (80 mg) Subcutaneous every 7 days 8 each 1     adalimumab (HUMIRA PEN) 40 MG/0.8ML pen kit Inject 40 mg weekly SQ 2 kit 2     atorvastatin (LIPITOR) 20 MG tablet Take 1 tablet (20 mg) by mouth daily 90 tablet 3     augmented betamethasone dipropionate (DIPROLENE-AF) 0.05 % ointment Apply sparingly to affected area twice daily as needed.  Do not apply to face. 45 g 1     azelastine (ASTELIN) 0.1 % nasal spray Spray 2 sprays into both nostrils 2 times daily as needed for rhinitis 30 mL 3     azelastine (OPTIVAR) 0.05 % ophthalmic  "solution Apply 1 drop to eye 2 times daily 6 mL 3     blood glucose (ACCU-CHEK GUIDE) test strip Use to test blood sugar 2 times daily 100 strip 11     cetirizine (ZYRTEC) 10 MG tablet Take 10 mg by mouth daily       clindamycin (CLINDAMAX) 1 % lotion Apply to AA BID 60 mL 11     clindamycin-benzoyl peroxide (BENZACLIN) 1-5 % external gel Apply twice daily to affected area on skin. 50 g 6     diphenhydrAMINE (BENADRYL) 25 MG capsule Take 25 mg by mouth every 6 hours as needed for itching or allergies       doxycycline monohydrate (MONODOX) 100 MG capsule Take 1 capsule (100 mg) by mouth 2 times daily (with meals) 180 capsule 1     fluticasone (FLONASE) 50 MCG/ACT nasal spray Spray 2 sprays into both nostrils daily 16 g 3     losartan-hydrochlorothiazide (HYZAAR) 50-12.5 MG tablet Take 1 tablet by mouth daily 90 tablet 3     metFORMIN (GLUCOPHAGE-XR) 500 MG 24 hr tablet Take 2 tablets (1,000 mg) by mouth daily (with dinner) 180 tablet 3     ustekinumab (STELARA) 90 MG/ML Inject 1 mL (90 mg) Subcutaneous every 3 months 1 mL 0     BP Readings from Last 3 Encounters:   11/25/19 130/84   11/06/19 126/79   06/14/19 126/78    Wt Readings from Last 3 Encounters:   11/25/19 125.2 kg (276 lb)   06/14/19 123.4 kg (272 lb)   05/01/19 121.8 kg (268 lb 8 oz)                  Reviewed and updated as needed this visit by Provider         Review of Systems   Remainder of ROS obtained and found to be negative other than that which was documented above        Objective    /84   Pulse 80   Temp 98.3  F (36.8  C) (Tympanic)   Ht 1.727 m (5' 8\")   Wt 125.2 kg (276 lb)   BMI 41.97 kg/m    Body mass index is 41.97 kg/m .  Physical Exam   GENERAL: healthy, alert and no distress  EYES: Eyes grossly normal to inspection  RESP: lungs clear to auscultation - no rales, rhonchi or wheezes  CV: regular rates and rhythm, normal S1 S2, no S3 or S4 and no murmur, click or rub  SKIN: 4-5 boils located underneath left armpit as well as " previously  [facility]    Diagnostic Test Results:  Results for orders placed or performed in visit on 11/25/19   Hemoglobin A1c     Status: Abnormal   Result Value Ref Range    Hemoglobin A1C 6.2 (H) 0 - 5.6 %           Assessment & Plan     (E11.9) Type 2 diabetes mellitus without complication, without long-term current use of insulin (H)  (primary encounter diagnosis)  Comment: A1c stable.   Plan: Hemoglobin A1c            (L73.2) Hidradenitis suppurativa  Comment:   Plan: triamcinolone acetonide (KENALOG-10) injection         10 mg    Procedure note:   Procedure: intralesional steroid injection   Consent: Risks discussed with patient and all questions answered. Patient familiar with procedure  Description: Kenalog 40mg/mL was injected into each inflamed cystic lesion under armpits - 4 under left armpit. Total amount injected was 0.5mL  Complications: none. Patient tolerated well          Return in about 6 months (around 5/25/2020) for diabetic follow up.    Susanne Alberto PA-C  [facility]

## 2019-11-26 NOTE — NURSING NOTE
Clinic Administered Medication Documentation    MEDICATION LIST:   Injection Documentation     Injection was administered by provider (please see MAR for given by information). Please see MAR and medication order for additional information.     Site: Left Armpit  Medication Used: Kenalog 10 mg/ 1 ml     Exp: 2/2021     Travis Batres CMA

## 2020-01-21 DIAGNOSIS — L73.2 HIDRADENITIS SUPPURATIVA: ICD-10-CM

## 2020-01-21 RX ORDER — DOXYCYCLINE 100 MG/1
100 CAPSULE ORAL 2 TIMES DAILY WITH MEALS
Qty: 180 CAPSULE | Refills: 0 | Status: SHIPPED | OUTPATIENT
Start: 2020-01-21 | End: 2020-04-15

## 2020-01-21 NOTE — TELEPHONE ENCOUNTER
When do you want to see him again?...    Please advise. Has no future appt scheduled at this time. Elsa Linn RN

## 2020-01-21 NOTE — TELEPHONE ENCOUNTER
Requested Prescriptions   Pending Prescriptions Disp Refills     doxycycline monohydrate (MONODOX) 100 MG capsule 180 capsule 1     Sig: Take 1 capsule (100 mg) by mouth 2 times daily (with meals)       There is no refill protocol information for this order        Last office visit: 11/6/2019 with prescribing provider:  ABRIL Bustos   Future Office Visit:      Denise Behrendt  Specialty CSS

## 2020-01-22 NOTE — TELEPHONE ENCOUNTER
"Spoke to Patient who did not get Stelara approved. He is currently on Humira but would like to try Enbrel as \"Humira doesn't seem to be helping much.\"     Last note in chart from 11-7-19 PA, is that patient needs to use [facility] which his insurance requires he use for Specialty medications.    Patient states he is on Humira \"since he got a message from [facility] that they do not dispense Stelara....\"    Elsa Linn RN        "

## 2020-01-23 ENCOUNTER — TELEPHONE (OUTPATIENT)
Dept: DERMATOLOGY | Facility: CLINIC | Age: 40
End: 2020-01-23

## 2020-01-23 NOTE — TELEPHONE ENCOUNTER
PA Initiation    Medication: enbrel pa pending   Insurance Company: [employer] - Phone [phone] Fax [phone]  Pharmacy Filling the Rx: [facility] [place] [place] - [address] [place] [address]  Filling Pharmacy Phone:    Filling Pharmacy Fax:    Start Date: 1/23/2020

## 2020-01-23 NOTE — TELEPHONE ENCOUNTER
PRIOR AUTHORIZATION DENIED    Medication: enbrel pa denied    Denial Date: 1/23/2020    Denial Rational: off label-Humira is the only biologic FDA approved for HS

## 2020-01-24 NOTE — TELEPHONE ENCOUNTER
We can try but I have not been able to locate any studies online that show that Enbrel is effective at treating HS and therefore would have no rationale to submit for reconsideration. Looks like you may be able to try Remicade based on some case studies but it may also be denied for off-label use. The infusion finance specialists should be able to look into it for you.

## 2020-01-28 NOTE — TELEPHONE ENCOUNTER
Medication Appeal Initiation    We have initiated an appeal for the requested medication:  Medication: enbrel pa denied  Appeal Start Date:     Insurance Company:    Comments: appeal letter for stelara faxed to plan

## 2020-01-29 DIAGNOSIS — L73.2 HIDRADENITIS SUPPURATIVA: ICD-10-CM

## 2020-01-29 NOTE — TELEPHONE ENCOUNTER
Routing refill request to provider for review/approval because:  Drug not on the [facility] refill protocol   Marlene BILL RN BSN PHN  [facility]

## 2020-01-29 NOTE — TELEPHONE ENCOUNTER
Requested Prescriptions   Pending Prescriptions Disp Refills     adalimumab (HUMIRA *CF*) 40 MG/0.4ML pen kit 8 each 1     Sig: Inject 0.8 mLs (80 mg) Subcutaneous every 7 days       There is no refill protocol information for this order        Last Written Prescription Date:    Last Fill Quantity: ,  # refills:    Last office visit: 11/6/2019 with prescribing provider:  Sabi Drummond Office Visit:   Next 5 appointments (look out 90 days)    Apr 21, 2020  4:20 PM CDT  Return Visit with Sabi Bustos PA-C  [facility] ([facility]) [address] [facility] [facility] [address]  [phone]

## 2020-01-31 ENCOUNTER — TELEPHONE (OUTPATIENT)
Dept: DERMATOLOGY | Facility: CLINIC | Age: 40
End: 2020-01-31

## 2020-01-31 DIAGNOSIS — L73.2 HIDRADENITIS SUPPURATIVA: Primary | ICD-10-CM

## 2020-01-31 NOTE — TELEPHONE ENCOUNTER
PRIOR AUTHORIZATION DENIED    Medication: stelara    Denial Date: 1/31/2020    Denial Rational:

## 2020-01-31 NOTE — TELEPHONE ENCOUNTER
Looks like patients insurance requires an authorized representative form be signed and sent in by the patient in order for us to be able to appeal on his behalf. Letter sent to pt 01/30    Medication Appeal Initiation    We have initiated an appeal for the requested medication:  Medication: stelara  Appeal Start Date:  1/31/2020  Insurance Company:  [employer]   Comments: denial letter scanned in under letters tab

## 2020-01-31 NOTE — TELEPHONE ENCOUNTER
Looks like patients insurance requires an authorized representative form be signed and sent in by the patient in order for us to be able to appeal on his behalf. Letter sent to pt 01/30

## 2020-02-10 NOTE — TELEPHONE ENCOUNTER
MEDICATION APPEAL DENIED    Medication: stelara    Denial Date: 2/10/2020    Denial Rational: Appeal was denied and bridge program through the  requires the med be prescribed for an approved diagnosis       Second Level Appeal Information:     Second level appeals will be managed by the clinic staff and provider. Please contact the [employer] Prior Authorization Team if additional information about the denial is needed.

## 2020-02-11 NOTE — TELEPHONE ENCOUNTER
Spoke to patient who was driving right now. He asked that I send a My chart message with the Referral information.   That was sent. Elsa Linn RN

## 2020-02-11 NOTE — TELEPHONE ENCOUNTER
Spoke to patient who would like to try [facility] to see if there are any clinical studies he qualifies for.     Elsa Linn RN

## 2020-02-11 NOTE — TELEPHONE ENCOUNTER
Can send to the university to see if they are doing any studies for hidradenitis since other biologic medications are getting denied.  What patient like to go to dermatology at the [facility]?

## 2020-02-18 ENCOUNTER — OFFICE VISIT (OUTPATIENT)
Dept: DERMATOLOGY | Facility: CLINIC | Age: 40
End: 2020-02-18
Payer: COMMERCIAL

## 2020-02-18 VITALS — OXYGEN SATURATION: 97 % | HEART RATE: 88 BPM | SYSTOLIC BLOOD PRESSURE: 135 MMHG | DIASTOLIC BLOOD PRESSURE: 84 MMHG

## 2020-02-18 DIAGNOSIS — L73.2 HIDRADENITIS SUPPURATIVA: ICD-10-CM

## 2020-02-18 DIAGNOSIS — Z51.81 THERAPEUTIC DRUG MONITORING: Primary | ICD-10-CM

## 2020-02-18 LAB
ALBUMIN SERPL-MCNC: 3.8 G/DL (ref 3.4–5)
ALP SERPL-CCNC: 77 U/L (ref 40–150)
ALT SERPL W P-5'-P-CCNC: 56 U/L (ref 0–70)
ANION GAP SERPL CALCULATED.3IONS-SCNC: 5 MMOL/L (ref 3–14)
AST SERPL W P-5'-P-CCNC: 32 U/L (ref 0–45)
BASOPHILS # BLD AUTO: 0 10E9/L (ref 0–0.2)
BASOPHILS NFR BLD AUTO: 0.6 %
BILIRUB SERPL-MCNC: 0.7 MG/DL (ref 0.2–1.3)
BUN SERPL-MCNC: 10 MG/DL (ref 7–30)
CALCIUM SERPL-MCNC: 8.9 MG/DL (ref 8.5–10.1)
CHLORIDE SERPL-SCNC: 101 MMOL/L (ref 94–109)
CO2 SERPL-SCNC: 27 MMOL/L (ref 20–32)
CREAT SERPL-MCNC: 1.02 MG/DL (ref 0.66–1.25)
DIFFERENTIAL METHOD BLD: NORMAL
EOSINOPHIL # BLD AUTO: 0.1 10E9/L (ref 0–0.7)
EOSINOPHIL NFR BLD AUTO: 2.1 %
ERYTHROCYTE [DISTWIDTH] IN BLOOD BY AUTOMATED COUNT: 12.5 % (ref 10–15)
GFR SERPL CREATININE-BSD FRML MDRD: >90 ML/MIN/{1.73_M2}
GLUCOSE SERPL-MCNC: 170 MG/DL (ref 70–99)
HCT VFR BLD AUTO: 44.7 % (ref 40–53)
HGB BLD-MCNC: 15.5 G/DL (ref 13.3–17.7)
LYMPHOCYTES # BLD AUTO: 1.3 10E9/L (ref 0.8–5.3)
LYMPHOCYTES NFR BLD AUTO: 19.4 %
MCH RBC QN AUTO: 30.5 PG (ref 26.5–33)
MCHC RBC AUTO-ENTMCNC: 34.7 G/DL (ref 31.5–36.5)
MCV RBC AUTO: 88 FL (ref 78–100)
MONOCYTES # BLD AUTO: 0.6 10E9/L (ref 0–1.3)
MONOCYTES NFR BLD AUTO: 8.4 %
NEUTROPHILS # BLD AUTO: 4.6 10E9/L (ref 1.6–8.3)
NEUTROPHILS NFR BLD AUTO: 69.5 %
PLATELET # BLD AUTO: 226 10E9/L (ref 150–450)
POTASSIUM SERPL-SCNC: 3.3 MMOL/L (ref 3.4–5.3)
PROT SERPL-MCNC: 8.5 G/DL (ref 6.8–8.8)
RBC # BLD AUTO: 5.08 10E12/L (ref 4.4–5.9)
SODIUM SERPL-SCNC: 133 MMOL/L (ref 133–144)
WBC # BLD AUTO: 6.6 10E9/L (ref 4–11)

## 2020-02-18 PROCEDURE — 86803 HEPATITIS C AB TEST: CPT | Performed by: PHYSICIAN ASSISTANT

## 2020-02-18 PROCEDURE — 80053 COMPREHEN METABOLIC PANEL: CPT | Performed by: PHYSICIAN ASSISTANT

## 2020-02-18 PROCEDURE — 86481 TB AG RESPONSE T-CELL SUSP: CPT | Performed by: PHYSICIAN ASSISTANT

## 2020-02-18 PROCEDURE — 36415 COLL VENOUS BLD VENIPUNCTURE: CPT | Performed by: PHYSICIAN ASSISTANT

## 2020-02-18 PROCEDURE — 11900 INJECT SKIN LESIONS </W 7: CPT | Performed by: PHYSICIAN ASSISTANT

## 2020-02-18 PROCEDURE — 85025 COMPLETE CBC W/AUTO DIFF WBC: CPT | Performed by: PHYSICIAN ASSISTANT

## 2020-02-18 PROCEDURE — 99213 OFFICE O/P EST LOW 20 MIN: CPT | Mod: 25 | Performed by: PHYSICIAN ASSISTANT

## 2020-02-18 PROCEDURE — 87340 HEPATITIS B SURFACE AG IA: CPT | Performed by: PHYSICIAN ASSISTANT

## 2020-02-18 NOTE — TELEPHONE ENCOUNTER
FUTURE VISIT INFORMATION      FUTURE VISIT INFORMATION:    Date: 4.2.20    Time: 1:30    Location: [facility]  REFERRAL INFORMATION:    Referring provider:  Sabi Bustos PA-C    Referring providers clinic:  [facility]    Reason for visit/diagnosis  HS    RECORDS REQUESTED FROM:       Clinic name Comments Records Status Imaging Status   [facility] 11.6.19, 4.18.19, 1.17.19 + more with Sabi Bustos Epic N/A

## 2020-02-18 NOTE — LETTER
2/18/2020         RE: Sean Woodard  [address] [address]        Dear Colleague,    Thank you for referring your patient, Sean Woodard, to the [facility]. Please see a copy of my visit note below.    Sean Woodard is a 40 year old year old male patient here today to recheck HS. He notes he is getting some painful areas on axilla. He is currently still taking doxycycline, humira 80 mg weekly. He would intralesional kenalog injections since these have helped in the past. Patient has no other skin complaints today.  Remainder of the HPI, Meds, PMH, Allergies, FH, and SH was reviewed in chart.    Pertinent Hx:   HS  Past Medical History:   Diagnosis Date     NO ACTIVE PROBLEMS        Past Surgical History:   Procedure Laterality Date     ORTHOPEDIC SURGERY  1/1/2005    RT knee arthroscopic menisectomy     Thumb Surgery  2009    Mass removal neuroma        Family History   Problem Relation Age of Onset     Heart Defect Father         valve replacement     Diabetes Maternal Grandmother      Cancer Maternal Grandfather         stomach     Hypertension Brother      Diabetes Sister      Cerebrovascular Disease Brother 38     Hypertension Brother        Social History     Socioeconomic History     Marital status:      Spouse name: Joycelyn Stokes     Number of children: 1     Years of education: 12+     Highest education level: Not on file   Occupational History     Occupation: Adjucations officer     Employer: [employer]   Social Needs     Financial resource strain: Not on file     Food insecurity:     Worry: Not on file     Inability: Not on file     Transportation needs:     Medical: Not on file     Non-medical: Not on file   Tobacco Use     Smoking status: Former Smoker     Packs/day: 0.50     Years: 5.00     Pack years: 2.50     Types: Cigarettes     Last attempt to quit: 1/1/2005     Years since quitting: 15.1     Smokeless tobacco: Never Used    Substance and Sexual Activity     Alcohol use: Yes     Comment: Social. 1 drink per month     Drug use: No     Sexual activity: Yes     Partners: Female   Lifestyle     Physical activity:     Days per week: Not on file     Minutes per session: Not on file     Stress: Not on file   Relationships     Social connections:     Talks on phone: Not on file     Gets together: Not on file     Attends Mormon service: Not on file     Active member of club or organization: Not on file     Attends meetings of clubs or organizations: Not on file     Relationship status: Not on file     Intimate partner violence:     Fear of current or ex partner: Not on file     Emotionally abused: Not on file     Physically abused: Not on file     Forced sexual activity: Not on file   Other Topics Concern     Parent/sibling w/ CABG, MI or angioplasty before 65F 55M? No   Social History Narrative    March 26, 2019    ENVIRONMENTAL HISTORY: The family [place] The home is heated with a forced air. They do have central air conditioning. The patient's bedroom is furnished with carpeting in bedroom. No pets inside the house. There is no history of cockroach or mice infestation. There are no smokers in the house.  The house does not have a damp basement.        Outpatient Encounter Medications as of 2/18/2020   Medication Sig Dispense Refill     adalimumab (HUMIRA *CF*) 40 MG/0.4ML pen kit Inject 0.8 mLs (80 mg) Subcutaneous every 7 days 8 each 1     adalimumab (HUMIRA PEN) 40 MG/0.8ML pen kit Inject 40 mg weekly SQ 2 kit 2     atorvastatin (LIPITOR) 20 MG tablet Take 1 tablet (20 mg) by mouth daily 90 tablet 3     augmented betamethasone dipropionate (DIPROLENE-AF) 0.05 % ointment Apply sparingly to affected area twice daily as needed.  Do not apply to face. 45 g 1     azelastine (ASTELIN) 0.1 % nasal spray Spray 2 sprays into both nostrils 2 times daily as needed for rhinitis 30 mL 3     azelastine  (OPTIVAR) 0.05 % ophthalmic solution Apply 1 drop to eye 2 times daily 6 mL 3     blood glucose (ACCU-CHEK GUIDE) test strip Use to test blood sugar 2 times daily 100 strip 11     cetirizine (ZYRTEC) 10 MG tablet Take 10 mg by mouth daily       clindamycin (CLINDAMAX) 1 % lotion Apply to AA BID 60 mL 11     clindamycin-benzoyl peroxide (BENZACLIN) 1-5 % external gel Apply twice daily to affected area on skin. 50 g 6     diphenhydrAMINE (BENADRYL) 25 MG capsule Take 25 mg by mouth every 6 hours as needed for itching or allergies       doxycycline monohydrate (MONODOX) 100 MG capsule Take 1 capsule (100 mg) by mouth 2 times daily (with meals) 180 capsule 0     fluticasone (FLONASE) 50 MCG/ACT nasal spray Spray 2 sprays into both nostrils daily 16 g 3     losartan-hydrochlorothiazide (HYZAAR) 50-12.5 MG tablet Take 1 tablet by mouth daily 90 tablet 3     metFORMIN (GLUCOPHAGE-XR) 500 MG 24 hr tablet Take 2 tablets (1,000 mg) by mouth daily (with dinner) 180 tablet 3     [] ustekinumab (STELARA) 90 MG/ML Inject 1 mL (90 mg) Subcutaneous once for 1 dose 1 mL 0     ustekinumab (STELARA) 90 MG/ML Inject 1 mL (90 mg) Subcutaneous every 3 months (Patient not taking: Reported on 2020) 1 mL 0     No facility-administered encounter medications on file as of 2020.              Review Of Systems  Skin: As above  Eyes: negative  Ears/Nose/Throat: negative  Respiratory: No shortness of breath, dyspnea on exertion, cough, or hemoptysis  Cardiovascular: negative  Gastrointestinal: negative  Genitourinary: negative  Musculoskeletal: negative  Neurologic: negative  Psychiatric: negative  Hematologic/Lymphatic/Immunologic: negative  Endocrine: negative      O:   NAD, WDWN, Alert & Oriented, Mood & Affect wnl, Vitals stable   Here today alone   /84   Pulse 88   SpO2 97%    General appearance normal   Vitals stable   Alert, oriented and in no acute distress   Scarring on neck   Inflammatory nodules on axilla      Eyes: Conjunctivae/lids:Normal     ENT: Lips: normal    MSK:Normal    Pulm: Breathing Normal    Neuro/Psych: Orientation:Alert and Orientedx3 ; Mood/Affect:normal   A/P:  1. Hidradenitis Suppurativa  Neck, groin, and buttock are stable.   Flaring on axilla.   Insurance will not allow nonFDA approved medications.   Continue Humira 80 mg once weekly.   Continue Doxycycline twice daily.   Check labs today.  IL TAC: PGACAC discussed.  Risks including but not limited to injection site reaction, bruising, no resolution.  All questions answered and entertained to patient s satisfaction.  Informed consent obtained.  IL TAC in concentration of 10mg/ml was injected ID to inflamed cysts.  Total injected was  1 ml.  Patient tolerated without complications and given wound care instructions, including not to move product around.  Return in 4 weeks for follow-up and possible additional IL TAC.    Follow-up with dermatologist at the [facility] to see if they recommend other treatment plans.     Again, thank you for allowing me to participate in the care of your patient.        Sincerely,        Sabi Christie PA-C

## 2020-02-19 LAB
HBV SURFACE AG SERPL QL IA: NONREACTIVE
HCV AB SERPL QL IA: NONREACTIVE

## 2020-02-20 LAB
GAMMA INTERFERON BACKGROUND BLD IA-ACNC: 0.02 IU/ML
M TB IFN-G BLD-IMP: NEGATIVE
M TB IFN-G CD4+ BCKGRND COR BLD-ACNC: >10 IU/ML
MITOGEN IGNF BCKGRD COR BLD-ACNC: 0 IU/ML
MITOGEN IGNF BCKGRD COR BLD-ACNC: 0 IU/ML

## 2020-02-24 NOTE — PROGRESS NOTES
Sean Woodard is a 40 year old year old male patient here today to recheck HS. He notes he is getting some painful areas on axilla. He is currently still taking doxycycline, humira 80 mg weekly. He would intralesional kenalog injections since these have helped in the past. Patient has no other skin complaints today.  Remainder of the HPI, Meds, PMH, Allergies, FH, and SH was reviewed in chart.    Pertinent Hx:   HS  Past Medical History:   Diagnosis Date     NO ACTIVE PROBLEMS        Past Surgical History:   Procedure Laterality Date     ORTHOPEDIC SURGERY  1/1/2005    RT knee arthroscopic menisectomy     Thumb Surgery  2009    Mass removal neuroma        Family History   Problem Relation Age of Onset     Heart Defect Father         valve replacement     Diabetes Maternal Grandmother      Cancer Maternal Grandfather         stomach     Hypertension Brother      Diabetes Sister      Cerebrovascular Disease Brother 38     Hypertension Brother        Social History     Socioeconomic History     Marital status:      Spouse name: Joycelyn Stokes     Number of children: 1     Years of education: 12+     Highest education level: Not on file   Occupational History     Occupation: Adjucations officer     Employer: [employer]   Social Needs     Financial resource strain: Not on file     Food insecurity:     Worry: Not on file     Inability: Not on file     Transportation needs:     Medical: Not on file     Non-medical: Not on file   Tobacco Use     Smoking status: Former Smoker     Packs/day: 0.50     Years: 5.00     Pack years: 2.50     Types: Cigarettes     Last attempt to quit: 1/1/2005     Years since quitting: 15.1     Smokeless tobacco: Never Used   Substance and Sexual Activity     Alcohol use: Yes     Comment: Social. 1 drink per month     Drug use: No     Sexual activity: Yes     Partners: Female   Lifestyle     Physical activity:     Days per week: Not on file     Minutes per  session: Not on file     Stress: Not on file   Relationships     Social connections:     Talks on phone: Not on file     Gets together: Not on file     Attends Evangelical service: Not on file     Active member of club or organization: Not on file     Attends meetings of clubs or organizations: Not on file     Relationship status: Not on file     Intimate partner violence:     Fear of current or ex partner: Not on file     Emotionally abused: Not on file     Physically abused: Not on file     Forced sexual activity: Not on file   Other Topics Concern     Parent/sibling w/ CABG, MI or angioplasty before 65F 55M? No   Social History Narrative    March 26, 2019    ENVIRONMENTAL HISTORY: The family [place] in a [place] year old [place]. The home is heated with a forced air. They do have central air conditioning. The patient's bedroom is furnished with carpeting in bedroom. No pets inside the house. There is no history of cockroach or mice infestation. There are no smokers in the house.  The house does not have a damp basement.        Outpatient Encounter Medications as of 2/18/2020   Medication Sig Dispense Refill     adalimumab (HUMIRA *CF*) 40 MG/0.4ML pen kit Inject 0.8 mLs (80 mg) Subcutaneous every 7 days 8 each 1     adalimumab (HUMIRA PEN) 40 MG/0.8ML pen kit Inject 40 mg weekly SQ 2 kit 2     atorvastatin (LIPITOR) 20 MG tablet Take 1 tablet (20 mg) by mouth daily 90 tablet 3     augmented betamethasone dipropionate (DIPROLENE-AF) 0.05 % ointment Apply sparingly to affected area twice daily as needed.  Do not apply to face. 45 g 1     azelastine (ASTELIN) 0.1 % nasal spray Spray 2 sprays into both nostrils 2 times daily as needed for rhinitis 30 mL 3     azelastine (OPTIVAR) 0.05 % ophthalmic solution Apply 1 drop to eye 2 times daily 6 mL 3     blood glucose (ACCU-CHEK GUIDE) test strip Use to test blood sugar 2 times daily 100 strip 11     cetirizine (ZYRTEC) 10 MG tablet Take 10 mg by mouth daily        clindamycin (CLINDAMAX) 1 % lotion Apply to AA BID 60 mL 11     clindamycin-benzoyl peroxide (BENZACLIN) 1-5 % external gel Apply twice daily to affected area on skin. 50 g 6     diphenhydrAMINE (BENADRYL) 25 MG capsule Take 25 mg by mouth every 6 hours as needed for itching or allergies       doxycycline monohydrate (MONODOX) 100 MG capsule Take 1 capsule (100 mg) by mouth 2 times daily (with meals) 180 capsule 0     fluticasone (FLONASE) 50 MCG/ACT nasal spray Spray 2 sprays into both nostrils daily 16 g 3     losartan-hydrochlorothiazide (HYZAAR) 50-12.5 MG tablet Take 1 tablet by mouth daily 90 tablet 3     metFORMIN (GLUCOPHAGE-XR) 500 MG 24 hr tablet Take 2 tablets (1,000 mg) by mouth daily (with dinner) 180 tablet 3     [] ustekinumab (STELARA) 90 MG/ML Inject 1 mL (90 mg) Subcutaneous once for 1 dose 1 mL 0     ustekinumab (STELARA) 90 MG/ML Inject 1 mL (90 mg) Subcutaneous every 3 months (Patient not taking: Reported on 2020) 1 mL 0     No facility-administered encounter medications on file as of 2020.              Review Of Systems  Skin: As above  Eyes: negative  Ears/Nose/Throat: negative  Respiratory: No shortness of breath, dyspnea on exertion, cough, or hemoptysis  Cardiovascular: negative  Gastrointestinal: negative  Genitourinary: negative  Musculoskeletal: negative  Neurologic: negative  Psychiatric: negative  Hematologic/Lymphatic/Immunologic: negative  Endocrine: negative      O:   NAD, WDWN, Alert & Oriented, Mood & Affect wnl, Vitals stable   Here today alone   /84   Pulse 88   SpO2 97%    General appearance normal   Vitals stable   Alert, oriented and in no acute distress   Scarring on neck   Inflammatory nodules on axilla     Eyes: Conjunctivae/lids:Normal     ENT: Lips: normal    MSK:Normal    Pulm: Breathing Normal    Neuro/Psych: Orientation:Alert and Orientedx3 ; Mood/Affect:normal   A/P:  1. Hidradenitis Suppurativa  Neck, groin, and buttock are stable.    Flaring on axilla.   Insurance will not allow nonFDA approved medications.   Continue Humira 80 mg once weekly.   Continue Doxycycline twice daily.   Check labs today.  IL TAC: PGACAC discussed.  Risks including but not limited to injection site reaction, bruising, no resolution.  All questions answered and entertained to patient s satisfaction.  Informed consent obtained.  IL TAC in concentration of 10mg/ml was injected ID to inflamed cysts.  Total injected was  1 ml.  Patient tolerated without complications and given wound care instructions, including not to move product around.  Return in 4 weeks for follow-up and possible additional IL TAC.    Follow-up with dermatologist at the [facility] to see if they recommend other treatment plans.

## 2020-03-01 ENCOUNTER — HEALTH MAINTENANCE LETTER (OUTPATIENT)
Age: 40
End: 2020-03-01

## 2020-04-02 ENCOUNTER — PRE VISIT (OUTPATIENT)
Dept: DERMATOLOGY | Facility: CLINIC | Age: 40
End: 2020-04-02

## 2020-04-07 DIAGNOSIS — L73.2 HIDRADENITIS SUPPURATIVA: ICD-10-CM

## 2020-04-07 NOTE — TELEPHONE ENCOUNTER
Requested Prescriptions   Pending Prescriptions Disp Refills     adalimumab (HUMIRA *CF*) 40 MG/0.4ML pen kit 8 each 1     Sig: Inject 0.8 mLs (80 mg) Subcutaneous every 7 days       There is no refill protocol information for this order        Last office visit: 2/18/2020 with prescribing provider:  ABRIL Bustos   Future Office Visit:   Next 5 appointments (look out 90 days)    Apr 21, 2020  4:20 PM CDT  [facility] with Sabi [facility]os PA-C  [facility] [address] [address] [phone]           Denise Behrendt  Specialty CSS

## 2020-04-15 DIAGNOSIS — L73.2 HIDRADENITIS SUPPURATIVA: ICD-10-CM

## 2020-04-15 RX ORDER — DOXYCYCLINE 100 MG/1
100 CAPSULE ORAL 2 TIMES DAILY WITH MEALS
Qty: 180 CAPSULE | Refills: 1 | Status: SHIPPED | OUTPATIENT
Start: 2020-04-15 | End: 2020-09-08

## 2020-04-15 NOTE — TELEPHONE ENCOUNTER
Requested Prescriptions   Pending Prescriptions Disp Refills     doxycycline monohydrate (MONODOX) 100 MG capsule 180 capsule 0     Sig: Take 1 capsule (100 mg) by mouth 2 times daily (with meals)       There is no refill protocol information for this order        Last Written Prescription Date:    Last Fill Quantity: ,  # refills:    Last office visit: 2/18/2020 with prescribing provider:  Sabi Drummond Office Visit:   Next 5 appointments (look out 90 days)    Jul 01, 2020  4:20 PM CDT  [facility] Visit with Sabi [facility]ustos PA-C  [facility] [address] [address] [phone]

## 2020-05-04 ENCOUNTER — MYC MEDICAL ADVICE (OUTPATIENT)
Dept: DERMATOLOGY | Facility: CLINIC | Age: 40
End: 2020-05-04

## 2020-05-05 NOTE — TELEPHONE ENCOUNTER
"Teledermatology Nurse Call for NEW Patients (not seen in last 3 years):     The patient was contacted by phone and we reviewed, \"Due to the coronavirus pandemic, we are calling to reschedule your upcoming visit and offer you a teledermatology visit. This would be assessed by an MD or PANIMISHA;  Importantly, \"a teledermatology visit may not be as thorough as an in-person visit, and the quality of the photograph and/or video sent may not be of the same quality as that taken by the dermatology clinic.\" After discussing the risks, benefits, and alternatives, the patient would like to proceed with teledermatology because of National Emergency Regarding Coronavirus disease (COVID 19) Outbreak.\"    This video visit will be conducted via a call between you and your physician/provider via [employer]. We have found that certain health care needs can be provided without the need for an in-person physical exam.  This service lets us provide the care you need with a video conversation.  If a prescription is necessary we can send it directly to your pharmacy.  If lab work is needed we can place an order for that and you can then stop by our lab to have the test done at a later time.If during the course of the call the physician/provider feels a video visit is not appropriate, you will not be charged for this service.    Patient would like the video invitation sent by: Send to e-mail at: [email or web address]     The patient will also send photographs via eTect for review.       The patient verified the location of the photo/video to be their home address.      For photos, patient told nursing it will be uploaded within 3 business days of appointment     The patient was instructed to take photos of all areas of concern and all areas of any rash.       The patient denied skin pain, fever, mucosal symptoms(lesions, blisters, sores in the mouth, nose, eyes, or genitals)  IF PATIENT ENDORSES ANY OF THESE STOP AND PAGE ON CALL " ATTENDING    Additional notes:  Patient summary of issue:HS  Location of problem on body:armpits  How long has area/symptoms been present:years  What makes it better?:NA  What makes it worse?:NA  Other symptoms include the following:None  Which mediations have been tried, for how long, and did they make it better or worse (ex. Triamcinolone, used twice daily for 2 weeks, not improved):  The patient has seen a dermatologist in the past for HS.   The patient hasno past medical history of skin cancer  ROS: The patient is generally feeling well.       Nursing tasks completed  -Pharmacy preference was updated.  -The nurse has dropped in the AVS information *(For adults the phrase is umdermhteleavs and for pediatrics it is their own) for the physician to route in the AVS.                                                                                                                                                                                                                         -The patient was told to contact the clinic if they have not received correspondence within 72 hours.

## 2020-05-07 ENCOUNTER — TELEPHONE (OUTPATIENT)
Dept: DERMATOLOGY | Facility: CLINIC | Age: 40
End: 2020-05-07

## 2020-05-07 ENCOUNTER — VIRTUAL VISIT (OUTPATIENT)
Dept: DERMATOLOGY | Facility: CLINIC | Age: 40
End: 2020-05-07
Payer: COMMERCIAL

## 2020-05-07 DIAGNOSIS — L73.2 HIDRADENITIS SUPPURATIVA: Primary | ICD-10-CM

## 2020-05-07 RX ORDER — ACETAMINOPHEN 325 MG/1
650 TABLET ORAL ONCE
Status: CANCELLED
Start: 2020-05-07

## 2020-05-07 RX ORDER — CLINDAMYCIN HCL 300 MG
300 CAPSULE ORAL 2 TIMES DAILY
Qty: 60 CAPSULE | Refills: 3 | Status: SHIPPED | OUTPATIENT
Start: 2020-05-07 | End: 2020-10-11

## 2020-05-07 RX ORDER — METHYLPREDNISOLONE SODIUM SUCCINATE 125 MG/2ML
40 INJECTION, POWDER, LYOPHILIZED, FOR SOLUTION INTRAMUSCULAR; INTRAVENOUS ONCE
Status: CANCELLED | OUTPATIENT
Start: 2020-05-07

## 2020-05-07 RX ORDER — HEPARIN SODIUM (PORCINE) LOCK FLUSH IV SOLN 100 UNIT/ML 100 UNIT/ML
5 SOLUTION INTRAVENOUS
Status: CANCELLED | OUTPATIENT
Start: 2020-05-07

## 2020-05-07 RX ORDER — HEPARIN SODIUM,PORCINE 10 UNIT/ML
5 VIAL (ML) INTRAVENOUS
Status: CANCELLED | OUTPATIENT
Start: 2020-05-07

## 2020-05-07 RX ORDER — DIPHENHYDRAMINE HCL 25 MG
25 CAPSULE ORAL ONCE
Status: CANCELLED
Start: 2020-05-07

## 2020-05-07 ASSESSMENT — PAIN SCALES - GENERAL: PAINLEVEL: SEVERE PAIN (6)

## 2020-05-07 NOTE — TELEPHONE ENCOUNTER
Ok, sometimes the locatin matters as far as where we get the approval for. I have requested this for the [facility] and will keep you updated.

## 2020-05-07 NOTE — PATIENT INSTRUCTIONS
[facility] Teledermatology Visit    Thank you for allowing us to participate in your care. Your findings, instructions and follow-up plan are as follows:    Continue Humira while we try to get Remicade covered by your insurance  Start clindamycin 300 mg twice daily and stop doxycycline  Talk to you primary care provider about a 3 month break from atorvastatin so we can use rifampin (another oral antibiotic that you have not tried)  Start benzoyl peroxide wash daily  Continue clindamycin lotion once to twice daily, focusing on active areas  Once we get better medical control of your disease, we can work with Dr. Lj Couch about deroofing troublesome areas    When should I call my doctor?    If you are worsening or not improving, please, contact us or seek urgent care as noted below.     Who should I call with questions (adults)?    [facility] (adult and pediatric): [phone]     [facility] (adult): [phone]    For urgent needs outside of business hours call the [facility] at [phone] and ask for the dermatology resident on call    If this is a medical emergency and you are unable to reach an ER, Call [phone]      Who should I call with questions (pediatric)?  [facility]- Pediatric Dermatology  Dr. Alisa Roque, Dr. Marisol Romero, Dr. Zuleika Greenberg, Kaya Oneill, JONNY Ocampo, Dr. Kyleigh Carlson & Dr. Derrick Segal  Non Urgent  Nurse Triage Line; [phone]- Emma and Gabrielle CHAUDHARY Care Coordinators   Piedad (/Complex ) [phone]    If you need a prescription refill, please contact your pharmacy. Refills are approved or denied by our Physicians during normal business hours, Monday through Fridays  Per office policy, refills will not be granted if you have not been seen within the past year (or sooner depending on your child's condition)    Scheduling  Information:  Pediatric Appointment Scheduling and Call Center [phone]  Radiology Scheduling- [phone]  Sedation Unit Scheduling- [phone]  [facility] Scheduling- General [phone]; Pediatric Dermatology [phone]  Main  Services: [phone]  Polish: [phone]  Kosovan: [phone]  Hmong/Srinivas/Divehi: [phone]  Preadmission Nursing Department Fax Number: [phone] (Fax all pre-operative paperwork to this number)    For urgent matters arising during evenings, weekends, or holidays that cannot wait for normal business hours please call [phone] and ask for the Dermatology Resident On-Call to be paged.

## 2020-05-07 NOTE — TELEPHONE ENCOUNTER
Hello,    I have submitted a prior auth for Remicade. Will this be done at [facility]?    I will update you with the determination.      Vane Duffy    Infusion  - [place] [address], [address]  [facility] [address]  Phone [phone] Fax [phone]  [email or web address]   [email or web address]

## 2020-05-07 NOTE — PROGRESS NOTES
[facility] Record:  Store and Forward      Impression and Recommendations (Patient Counseled on the Following):  1. Severe Hamilton stage III hidradenitis suppurativa: with majority of activity in the axillae, but also present on the neck and groin area. Has tried and failed numerous topicals and systemic therapies, including multiple antibiotics, isotretinoin, and adalimumab 80 mg weekly. We discussed treatment options, including risks/benefits and likelihood of insurance coverage. I strongly recommend infliximab, which we can weight-base dose and has quite a bit of evidence of use in hidradenitis. While we work to get this approved by his insurance, we'll try switching from oral doxycycline to oral clindamycin (atorvastatin interacts with rifampin - he will discuss a short break from atorvastatin with his PCP) and start benzoyl peroxide wash. I do not expect these changes to make a significant impact on his disease, but they may help somewhat. The patient was also interested in deroofing troublesome areas. We discussed that this can be an effective treatment for localized areas, but we should optimize medical control of disease beforehand.  -Start infliximab 5 mg/kg q8 weeks after load to replace adalimumab 80 mg weekly  -Start clindamycin 300 mg BID to replace doxycycline  -Continue topical clindamycin and start benzoyl peroxide wash  -To consider deroofing following improved medical control of disease      Follow-up:   6-8 weeks     Staff only:  Derrick Avina MD   of Dermatology  Department of Dermatology  [facility] [employer]      _____________________________________________________________________________    Dermatology Problem List:  1. Hidradenitis suppurativa - Hamilton stage III involving axillae > neck, groin   - current treatment: adalimumab 80 mg weekly, clindamycin 300 mg BID, topical clindamycin/benzoyl peroxide   - past treatment: doxycycline,  other oral antibiotics, isotretinoin; ustekinumab denied by insurance   - trying to obtain infliximab    Encounter Date: May 7, 2020    CC:   Chief Complaint   Patient presents with     Derm Problem     HS - Sean has an active area in his armpits       History of Present Illness:  I have reviewed the teledermatology information and the nursing intake corresponding to this issue. Sean Woodard is a 40 year old male who presents via teledermatology for hidradenitis suppurativa.    Hidradenitis suppurativa   - continuing to worsen   - on adalimumab nearly 2 years    - initially helpful    - after about 6 months, activity has increased    - currently taking 80 mg weekly   - flare ups still present    Primarily axillae - started 2 years    - groin area comes and goes    Posterior neck - this is where started, but now more stabilized here    Also taking doxycycline, using clindamycin lotion, metformin    Past treatments - initially on oral antibiotics (doxycycline, a few others over the years), then isotretinoin (no effect), then adalimumab    ROS:   General: no fevers, chills, or weight loss  Skin: as per HPI    Physical Examination:  General: Well-appearing, appropriately-developed individual. Alert and oriented in a pleasant mood.  Skin: Focused examination within the teledermatology photograph(s) including axillae was performed.   -numerous erythematous subcutaneous nodules with sanguinopurulent drainage and scarring with sinus tract formation in the axillae    Labs:  Negative quantiferon 2/2020    Past Medical History:   Patient Active Problem List   Diagnosis     GERD (gastroesophageal reflux disease)     Health Care Home     Hyperlipidemia with target LDL less than 100     Benign essential hypertension     PTSD (post-traumatic stress disorder)     Class 3 severe obesity due to excess calories with serious comorbidity in adult (H)     Diabetes mellitus, type 2 (H)     FER (obstructive sleep apnea)     Past Medical  History:   Diagnosis Date     NO ACTIVE PROBLEMS      Past Surgical History:   Procedure Laterality Date     ORTHOPEDIC SURGERY  1/1/2005    RT knee arthroscopic menisectomy     Thumb Surgery  2009    Mass removal neuroma       Social History:  Patient reports that he quit smoking about 15 years ago. His smoking use included cigarettes. He has a 2.50 pack-year smoking history. He has never used smokeless tobacco. He reports current alcohol use. He reports that he does not use drugs.    Family History:  Family History   Problem Relation Age of Onset     Heart Defect Father         valve replacement     Diabetes Maternal Grandmother      Cancer Maternal Grandfather         stomach     Hypertension Brother      Diabetes Sister      Cerebrovascular Disease Brother 38     Hypertension Brother      Melanoma No family hx of      Skin Cancer No family hx of        Medications:  Current Outpatient Medications   Medication     adalimumab (HUMIRA *CF*) 40 MG/0.4ML pen kit     adalimumab (HUMIRA PEN) 40 MG/0.8ML pen kit     atorvastatin (LIPITOR) 20 MG tablet     augmented betamethasone dipropionate (DIPROLENE-AF) 0.05 % ointment     azelastine (ASTELIN) 0.1 % nasal spray     azelastine (OPTIVAR) 0.05 % ophthalmic solution     blood glucose (ACCU-CHEK GUIDE) test strip     cetirizine (ZYRTEC) 10 MG tablet     clindamycin (CLINDAMAX) 1 % lotion     clindamycin-benzoyl peroxide (BENZACLIN) 1-5 % external gel     diphenhydrAMINE (BENADRYL) 25 MG capsule     doxycycline monohydrate (MONODOX) 100 MG capsule     fluticasone (FLONASE) 50 MCG/ACT nasal spray     losartan-hydrochlorothiazide (HYZAAR) 50-12.5 MG tablet     metFORMIN (GLUCOPHAGE-XR) 500 MG 24 hr tablet     ustekinumab (STELARA) 90 MG/ML     No current facility-administered medications for this visit.           Allergies   Allergen Reactions     Lisinopril Cough         _____________________________________________________________________________    Teledermatology  information:  - Location of patient: Home  - Patient presented as: return  - Location of teledermatologist:  ([facility] )  - Reason teledermatology is appropriate:  of National Emergency Regarding Coronavirus disease (COVID 19) Outbreak  - Image quality and interpretability: acceptable  - Physician has received verbal consent for a Video/Photos Visit from the patient? Yes  - In-person dermatology visit recommendation: no  - Date of images: 5/6/2020  - Service start time: 11:01  - Service end time: 11:30  - Date of report: 5/7/2020

## 2020-05-18 ENCOUNTER — INFUSION THERAPY VISIT (OUTPATIENT)
Dept: INFUSION THERAPY | Facility: CLINIC | Age: 40
End: 2020-05-18
Attending: DERMATOLOGY
Payer: COMMERCIAL

## 2020-05-18 VITALS
WEIGHT: 272.8 LBS | OXYGEN SATURATION: 95 % | HEART RATE: 89 BPM | RESPIRATION RATE: 16 BRPM | TEMPERATURE: 98.7 F | DIASTOLIC BLOOD PRESSURE: 72 MMHG | BODY MASS INDEX: 41.48 KG/M2 | SYSTOLIC BLOOD PRESSURE: 113 MMHG

## 2020-05-18 DIAGNOSIS — L73.2 HIDRADENITIS SUPPURATIVA: Primary | ICD-10-CM

## 2020-05-18 PROCEDURE — 96415 CHEMO IV INFUSION ADDL HR: CPT

## 2020-05-18 PROCEDURE — 25800030 ZZH RX IP 258 OP 636: Mod: ZF | Performed by: DERMATOLOGY

## 2020-05-18 PROCEDURE — 25000128 H RX IP 250 OP 636: Mod: ZF | Performed by: DERMATOLOGY

## 2020-05-18 PROCEDURE — 96413 CHEMO IV INFUSION 1 HR: CPT

## 2020-05-18 RX ORDER — HEPARIN SODIUM (PORCINE) LOCK FLUSH IV SOLN 100 UNIT/ML 100 UNIT/ML
5 SOLUTION INTRAVENOUS
Status: CANCELLED | OUTPATIENT
Start: 2020-06-01

## 2020-05-18 RX ORDER — DIPHENHYDRAMINE HCL 25 MG
25 CAPSULE ORAL ONCE
Status: CANCELLED
Start: 2020-06-01

## 2020-05-18 RX ORDER — HEPARIN SODIUM,PORCINE 10 UNIT/ML
5 VIAL (ML) INTRAVENOUS
Status: CANCELLED | OUTPATIENT
Start: 2020-06-01

## 2020-05-18 RX ORDER — ACETAMINOPHEN 325 MG/1
650 TABLET ORAL ONCE
Status: CANCELLED
Start: 2020-06-01

## 2020-05-18 RX ORDER — METHYLPREDNISOLONE SODIUM SUCCINATE 125 MG/2ML
40 INJECTION, POWDER, LYOPHILIZED, FOR SOLUTION INTRAMUSCULAR; INTRAVENOUS ONCE
Status: CANCELLED | OUTPATIENT
Start: 2020-06-01

## 2020-05-18 RX ADMIN — SODIUM CHLORIDE 50 ML: 9 INJECTION, SOLUTION INTRAVENOUS at 11:34

## 2020-05-18 RX ADMIN — INFLIXIMAB 600 MG: 100 INJECTION, POWDER, LYOPHILIZED, FOR SOLUTION INTRAVENOUS at 09:34

## 2020-05-18 NOTE — PROGRESS NOTES
Nursing Note  Sean Woodard [facility] for:   Chief Complaint   Patient presents with     Infusion     Remicade     During today's [facility] appointment, orders from Dr. Derrick Avina were completed.  Frequency: every 2 weeks, week 0, 2 then q8 weeks.     Progress note:  Patient identification verified by name and date of birth.  Assessment completed.  Vitals recorded in Doc Flowsheets.  Patient was provided with education regarding medication/procedure and possible side effects.  Patient verbalized understanding.     present during visit today: Not Applicable.    Treatment Conditions: ~~~ NOTE: If the patient answers yes to any of the questions below, hold the infusion and contact ordering provider or on-call provider.    1. Have you recently had an elevated temperature, fever, chills, productive cough, coughing for 3 weeks or longer or hemoptysis, abnormal vital signs, night sweats,  chest pain or have you noticed a decrease in your appetite, unexplained weight loss or fatigue? No  2. Do you have any open wounds or new incisions? Yes, patient being seen for HS. ordering MD aware.  No s/s of infection. Patient states the wounds look the same as when  Provider saw them last.   3. Do you have any recent or upcoming hospitalizations, surgeries or dental procedures? No  4. Do you currently have or recently have had any signs of illness or infection or are you on any antibiotics? Yes, patient usually on abx for his HS. Ordered by Dr. Avina (the uremica ordering provider)   5. Have you had any new, sudden or worsening abdominal pain? No  6. Have you or anyone in your household received a live vaccination in the past 4 weeks? Please note:  No live vaccines while on biologic/chemotherapy until 6 months after the last treatment.  Patient can receive the flu vaccine (shot only) and the pneumovax.  It is optimal for the patient to get  these vaccines mid cycle, but they can be given at any time as long as it is not on the day of the infusion. No  7. Have you recently been diagnosed with any new nervous system diseases (ie. Multiple sclerosis, Guillain [place], seizures, neurological changes) or cancer diagnosis? No  8. Are you on any form of radiation or chemotherapy? No  9. Are you pregnant or breast feeding or do you have plans of pregnancy in the future? No  10. Have you been having any signs of worsening depression or suicidal ideations?  (benlysta only) No  11. Have there been any other new onset medical symptoms? No    Premedications: were not ordered. No hx of reaction     Drug Waste Record: No    Infusion length and rate:  infusion given over approximately 2 hours    Labs: were not ordered for this appointment.    Vascular access: peripheral IV placed today.    POST-INFUSION OF BIOLOGICAL MEDICATION:     Reviewed with patient.  Given biologic medication or medication hand-out. Inform patient if any fever, chills or signs of infection, new symptoms, abdominal pain, heart palpitations, shortness of breath, reaction, weakness, neurological changes, seek medical attention immediately and should not receive infusions. No live virus vaccines prior to or during treatment or up to 6 months post infusion. If the patient has an upcoming procedure or surgery, this should be discussed with the rheumatologist and surgeon or provider.    Post Infusion Assessment:  Patient tolerated infusion without incident. Blood return noted. Site free from redness, swelling and pain at end of infusion.      Discharge Plan:   Follow up plan of care with: ongoing infusions at [facility].  Discharge instructions were reviewed with patient. AVS printed and given to patient with medication information.   Patient/representative verbalized understanding of discharge instructions and all questions answered.  [facility] in stable condition.    Kaela Canela RN       Administrations This Visit     0.9% sodium chloride BOLUS     Admin Date  05/18/2020 Action  New Bag Dose  50 mL Route  Intravenous Administered By  Kaela Canela RN          inFLIXimab (REMICADE) 600 mg in sodium chloride 0.9 % 335 mL infusion     Admin Date  05/18/2020 Action  New Bag Dose  600 mg Rate  167.5 mL/hr Route  Intravenous Administered By  Kaela Canela RN                /78   Temp 98.7  F (37.1  C) (Oral)   Resp 16   Wt 123.7 kg (272 lb 12.8 oz)   SpO2 95%   BMI 41.48 kg/m

## 2020-05-18 NOTE — PATIENT INSTRUCTIONS
Dear Sean Woodard    Thank you for choosing [facility] ([facility]) for your infusion.  The following information is a summary of our appointment as well as important reminders.      EDUCATION POST BIOLOGICAL/CHEMOTHERAPY INFUSION  Call the triage nurse at your clinic or seek medical attention if you have chills and/or temperature greater than or equal to 100.5, uncontrolled nausea/vomiting, diarrhea, constipation, dizziness, shortness of breath, chest pain, heart palpitations, weakness or any other new or concerning symptoms, questions or concerns.  You can not have any live virus vaccines prior to or during treatment or up to 6 months post infusion.  If you have an upcoming surgery, medical procedure or dental procedure during treatment, this should be discussed with your ordering physician and your surgeon/dentist.  If you are having any concerning symptom, if you are unsure if you should get your next infusion or wish to speak to a provider before your next infusion, please call your care coordinator or triage nurse at your clinic to notify them so we can adequately serve you.    Patient Education     Infliximab Solution for injection  What is this medicine?  INFLIXIMAB (in FLIX i mab) is used to treat Crohn's disease and ulcerative colitis. It is also used to treat ankylosing spondylitis, psoriasis, and some forms of arthritis.  This medicine may be used for other purposes; ask your health care provider or pharmacist if you have questions.  What should I tell my health care provider before I take this medicine?  They need to know if you have any of these conditions:    diabetes    exposure to tuberculosis    heart failure    hepatitis or liver disease    immune system problems    infection    lung or breathing disease, like COPD    multiple sclerosis    current or past resident of [place]    seizure disorder    an unusual or  allergic reaction to infliximab, mouse proteins, other medicines, foods, dyes, or preservatives    pregnant or trying to get pregnant    breast-feeding  How should I use this medicine?  This medicine is for injection into a vein. It is usually given by a health care professional in a hospital or clinic setting.  A special MedGuide will be given to you by the pharmacist with each prescription and refill. Be sure to read this information carefully each time.  Talk to your pediatrician regarding the use of this medicine in children. Special care may be needed.  Overdosage: If you think you have taken too much of this medicine contact a poison control center or emergency room at once.  NOTE: This medicine is only for you. Do not share this medicine with others.  What if I miss a dose?  It is important not to miss your dose. Call your doctor or health care professional if you are unable to keep an appointment.  What may interact with this medicine?  Do not take this medicine with any of the following medications:    anakinra    rilonacept  This medicine may also interact with the following medications:    vaccines  This list may not describe all possible interactions. Give your health care provider a list of all the medicines, herbs, non-prescription drugs, or dietary supplements you use. Also tell them if you smoke, drink alcohol, or use illegal drugs. Some items may interact with your medicine.  What should I watch for while using this medicine?  Visit your doctor or health care professional for regular checks on your progress.  If you get a cold or other infection while receiving this medicine, call your doctor or health care professional. Do not treat yourself. This medicine may decrease your body's ability to fight infections. Before beginning therapy, your doctor may do a test to see if you have been exposed to tuberculosis.  This medicine may make the symptoms of heart failure worse in some patients. If you notice  symptoms such as increased shortness of breath or swelling of the ankles or legs, contact your health care provider right away.  If you are going to have surgery or dental work, tell your health care professional or dentist that you have received this medicine.  If you take this medicine for plaque psoriasis, stay out of the sun. If you cannot avoid being in the sun, wear protective clothing and use sunscreen. Do not use sun lamps or tanning beds/booths.  What side effects may I notice from receiving this medicine?  Side effects that you should report to your doctor or health care professional as soon as possible:    allergic reactions like skin rash, itching or hives, swelling of the face, lips, or tongue    chest pain    fever or chills, usually related to the infusion    muscle or joint pain    red, scaly patches or raised bumps on the skin    signs of infection - fever or chills, cough, sore throat, pain or difficulty passing urine    swollen lymph nodes in the neck, underarm, or groin areas    unexplained weight loss    unusual bleeding or bruising    unusually weak or tired    yellowing of the eyes or skin  Side effects that usually do not require medical attention (report to your doctor or health care professional if they continue or are bothersome):    headache    heartburn or stomach pain    nausea, vomiting  This list may not describe all possible side effects. Call your doctor for medical advice about side effects. You may report side effects to FDA at 8-436-FDA-9334.  Where should I keep my medicine?  This drug is given in a hospital or clinic and will not be stored at home.  NOTE:This sheet is a summary. It may not cover all possible information. If you have questions about this medicine, talk to your doctor, pharmacist, or health care provider. Copyright  2016 [employer]         We look forward in seeing you on your next appointment here at [facility] ([facility]).  Please  don t hesitate to call us at [phone] to reschedule any of your appointments or to speak with one of the [facility] registered nurses.  It was a pleasure taking care of you today.    Sincerely,    [facility]  [address] [place] [address]  [id number]  Phone:  [phone]

## 2020-06-01 ENCOUNTER — INFUSION THERAPY VISIT (OUTPATIENT)
Dept: INFUSION THERAPY | Facility: CLINIC | Age: 40
End: 2020-06-01
Attending: DERMATOLOGY
Payer: COMMERCIAL

## 2020-06-01 ENCOUNTER — APPOINTMENT (OUTPATIENT)
Dept: LAB | Facility: CLINIC | Age: 40
End: 2020-06-01
Attending: DERMATOLOGY
Payer: COMMERCIAL

## 2020-06-01 VITALS
HEART RATE: 71 BPM | SYSTOLIC BLOOD PRESSURE: 110 MMHG | BODY MASS INDEX: 41.51 KG/M2 | RESPIRATION RATE: 18 BRPM | WEIGHT: 273 LBS | OXYGEN SATURATION: 97 % | DIASTOLIC BLOOD PRESSURE: 73 MMHG | TEMPERATURE: 98.5 F

## 2020-06-01 DIAGNOSIS — L73.2 HIDRADENITIS SUPPURATIVA: Primary | ICD-10-CM

## 2020-06-01 PROCEDURE — 96415 CHEMO IV INFUSION ADDL HR: CPT

## 2020-06-01 PROCEDURE — 25000128 H RX IP 250 OP 636: Mod: ZF | Performed by: DERMATOLOGY

## 2020-06-01 PROCEDURE — 25800030 ZZH RX IP 258 OP 636: Mod: ZF | Performed by: DERMATOLOGY

## 2020-06-01 PROCEDURE — 96413 CHEMO IV INFUSION 1 HR: CPT

## 2020-06-01 RX ORDER — METHYLPREDNISOLONE SODIUM SUCCINATE 125 MG/2ML
40 INJECTION, POWDER, LYOPHILIZED, FOR SOLUTION INTRAMUSCULAR; INTRAVENOUS ONCE
Status: CANCELLED | OUTPATIENT
Start: 2020-06-29

## 2020-06-01 RX ORDER — DIPHENHYDRAMINE HCL 25 MG
25 CAPSULE ORAL ONCE
Status: CANCELLED
Start: 2020-06-29

## 2020-06-01 RX ORDER — HEPARIN SODIUM,PORCINE 10 UNIT/ML
5 VIAL (ML) INTRAVENOUS
Status: CANCELLED | OUTPATIENT
Start: 2020-06-29

## 2020-06-01 RX ORDER — HEPARIN SODIUM (PORCINE) LOCK FLUSH IV SOLN 100 UNIT/ML 100 UNIT/ML
5 SOLUTION INTRAVENOUS
Status: CANCELLED | OUTPATIENT
Start: 2020-06-29

## 2020-06-01 RX ORDER — ACETAMINOPHEN 325 MG/1
650 TABLET ORAL ONCE
Status: CANCELLED
Start: 2020-06-29

## 2020-06-01 RX ADMIN — INFLIXIMAB 600 MG: 100 INJECTION, POWDER, LYOPHILIZED, FOR SOLUTION INTRAVENOUS at 09:52

## 2020-06-01 ASSESSMENT — PAIN SCALES - GENERAL: PAINLEVEL: NO PAIN (0)

## 2020-06-01 NOTE — LETTER
6/1/2020         RE: Sean Woodard  [address] [facility] [id number]        Dear Colleague,    Thank you for referring your patient, Sean Woodard, to the [facility]. Please see a copy of my visit note below.    Nursing Note  Sean Woodard [facility] for:   Chief Complaint   Patient presents with     Infusion     During [facility]'s [facility] appointment, orders from Dr Avina were completed.  Frequency: every 2 weeks    Progress note:  Patient identification verified by name and date of birth.  Assessment completed.  Vitals recorded in Doc Flowsheets.  Patient was provided with education regarding medication/procedure and possible side effects.  Patient verbalized understanding.     present during visit today: Not Applicable.    Treatment Conditions: ~~~ NOTE: If the patient answers yes to any of the questions below, hold the infusion and contact ordering provider or on-call provider.    1. Have you recently had an elevated temperature, fever, chills, productive cough, coughing for 3 weeks or longer or hemoptysis, abnormal vital signs, night sweats,  chest pain or have you noticed a decrease in your appetite, unexplained weight loss or fatigue? No  2. Do you have any open wounds or new incisions? No  3. Do you have any recent or upcoming hospitalizations, surgeries or dental procedures? No  4. Do you currently have or recently have had any signs of illness or infection or are you on any antibiotics? No  5. Have you had any new, sudden or worsening abdominal pain? No  6. Have you or anyone in your household received a live vaccination in the past 4 weeks? Please note:  No live vaccines while on biologic/chemotherapy until 6 months after the last treatment.  Patient can receive the flu vaccine (shot only) and the pneumovax.  It is optimal for the patient to get these vaccines  mid cycle, but they can be given at any time as long as it is not on the day of the infusion. No  7. Have you recently been diagnosed with any new nervous system diseases (ie. Multiple sclerosis, Guillain [place], seizures, neurological changes) or cancer diagnosis? No  8. Are you on any form of radiation or chemotherapy? No  9. Are you pregnant or breast feeding or do you have plans of pregnancy in the future? No  10. Have you been having any signs of worsening depression or suicidal ideations?  (benlysta only) No  11. Have there been any other new onset medical symptoms? No      Premedications: historically patient has not taken pre-medications prior to infusion.    Drug Waste Record: No    Infusion length and rate:  infusion given over approximately 2 hours    Labs: were not ordered for this appointment.    Vascular access: peripheral IV placed today.    Post Infusion Assessment:  Patient tolerated infusion without incident.     Discharge Plan:   Follow up plan of care with: ongoing infusions at [facility].  Discharge instructions were reviewed with patient.  Patient/representative verbalized understanding of discharge instructions and all questions answered.  Patient discharged from [facility] in stable condition.    [facility] POWERS RN    Administrations This Visit     inFLIXimab (REMICADE) 600 mg in sodium chloride 0.9 % 335 mL infusion     Admin Date  06/01/2020 Action  New Bag Dose  600 mg Rate  167.5 mL/hr Route  Intravenous Administered By  Umu Powers RN                /80   Pulse 84   Temp 98.5  F (36.9  C) (Oral)   Wt 123.8 kg (273 lb)   SpO2 97%   BMI 41.51 kg/m          Again, thank you for allowing me to participate in the care of your patient.        Sincerely,        [facility]

## 2020-06-01 NOTE — PROGRESS NOTES
Nursing Note  Sean Woodard presents [facility] for:   Chief Complaint   Patient presents with     Infusion     During today's [facility] appointment, orders from Dr Avina were completed.  Frequency: every 2 weeks    Progress note:  Patient identification verified by name and date of birth.  Assessment completed.  Vitals recorded in Doc Flowsheets.  Patient was provided with education regarding medication/procedure and possible side effects.  Patient verbalized understanding.     present during visit today: Not Applicable.    Treatment Conditions: ~~~ NOTE: If the patient answers yes to any of the questions below, hold the infusion and contact ordering provider or on-call provider.    1. Have you recently had an elevated temperature, fever, chills, productive cough, coughing for 3 weeks or longer or hemoptysis, abnormal vital signs, night sweats,  chest pain or have you noticed a decrease in your appetite, unexplained weight loss or fatigue? No  2. Do you have any open wounds or new incisions? No  3. Do you have any recent or upcoming hospitalizations, surgeries or dental procedures? No  4. Do you currently have or recently have had any signs of illness or infection or are you on any antibiotics? No  5. Have you had any new, sudden or worsening abdominal pain? No  6. Have you or anyone in your household received a live vaccination in the past 4 weeks? Please note:  No live vaccines while on biologic/chemotherapy until 6 months after the last treatment.  Patient can receive the flu vaccine (shot only) and the pneumovax.  It is optimal for the patient to get these vaccines mid cycle, but they can be given at any time as long as it is not on the day of the infusion. No  7. Have you recently been diagnosed with any new nervous system diseases (ie. Multiple sclerosis, Guillain [place], seizures, neurological changes) or cancer diagnosis?  No  8. Are you on any form of radiation or chemotherapy? No  9. Are you pregnant or breast feeding or do you have plans of pregnancy in the future? No  10. Have you been having any signs of worsening depression or suicidal ideations?  (benlysta only) No  11. Have there been any other new onset medical symptoms? No      Premedications: historically patient has not taken pre-medications prior to infusion.    Drug Waste Record: No    Infusion length and rate:  infusion given over approximately 2 hours    Labs: were not ordered for this appointment.    Vascular access: peripheral IV placed today.    Post Infusion Assessment:  Patient tolerated infusion without incident.     Discharge Plan:   Follow up plan of care with: ongoing infusions at [facility].  Discharge instructions were reviewed with patient.  Patient/representative verbalized understanding of discharge instructions and all questions answered.  Patient discharged from [facility] in stable condition.    [facility] POWERS RN    Administrations This Visit     inFLIXimab (REMICADE) 600 mg in sodium chloride 0.9 % 335 mL infusion     Admin Date  06/01/2020 Action  New Bag Dose  600 mg Rate  167.5 mL/hr Route  Intravenous Administered By  Umu Powers RN                /80   Pulse 84   Temp 98.5  F (36.9  C) (Oral)   Wt 123.8 kg (273 lb)   SpO2 97%   BMI 41.51 kg/m

## 2020-06-03 DIAGNOSIS — L73.2 HIDRADENITIS SUPPURATIVA: ICD-10-CM

## 2020-06-03 NOTE — TELEPHONE ENCOUNTER
Requested Prescriptions   Pending Prescriptions Disp Refills     adalimumab (HUMIRA *CF*) 40 MG/0.4ML pen kit 8 each 1     Sig: Inject 0.8 mLs (80 mg) Subcutaneous every 7 days       There is no refill protocol information for this order        Last Written Prescription Date:    Last Fill Quantity: ,  # refills:    Last office visit: 2/18/2020 with prescribing provider:  Sabi Drummond Office Visit:   Next 5 appointments (look out 90 days)    Jul 01, 2020  4:20 PM CDT  Return Visit with Sabi Bustos PA-C  [facility] [address] [address] [phone]

## 2020-06-03 NOTE — TELEPHONE ENCOUNTER
Routing refill request to provider for review/approval because:  Drug not on the G refill protocol   Marlene BILL RN BSN N  [facility]      LOV 2/18/20  A/P:  1. Hidradenitis Suppurativa  Neck, groin, and buttock are stable.   Flaring on axilla.   Insurance will not allow nonFDA approved medications.   Continue Humira 80 mg once weekly.   Continue Doxycycline twice daily.   Check labs today.  IL TAC: PGACAC discussed.  Risks including but not limited to injection site reaction, bruising, no resolution.  All questions answered and entertained to patient s satisfaction.  Informed consent obtained.  IL TAC in concentration of 10mg/ml was injected ID to inflamed cysts.  Total injected was  1 ml.  Patient tolerated without complications and given wound care instructions, including not to move product around.  Return in 4 weeks for follow-up and possible additional IL TAC.    Follow-up with dermatologist at the [facility] to see if they recommend other treatment plans.

## 2020-06-03 NOTE — TELEPHONE ENCOUNTER
Patient no longer using Humira.   Removed from medication list    Shankar ALEXANDER RN   [facility]

## 2020-06-17 DIAGNOSIS — E66.01 CLASS 3 SEVERE OBESITY WITH BODY MASS INDEX (BMI) OF 40.0 TO 44.9 IN ADULT, UNSPECIFIED OBESITY TYPE, UNSPECIFIED WHETHER SERIOUS COMORBIDITY PRESENT (H): ICD-10-CM

## 2020-06-17 DIAGNOSIS — I10 BENIGN ESSENTIAL HYPERTENSION: ICD-10-CM

## 2020-06-17 DIAGNOSIS — E66.813 CLASS 3 SEVERE OBESITY WITH BODY MASS INDEX (BMI) OF 40.0 TO 44.9 IN ADULT, UNSPECIFIED OBESITY TYPE, UNSPECIFIED WHETHER SERIOUS COMORBIDITY PRESENT (H): ICD-10-CM

## 2020-06-17 DIAGNOSIS — E11.9 TYPE 2 DIABETES MELLITUS WITHOUT COMPLICATION, WITHOUT LONG-TERM CURRENT USE OF INSULIN (H): ICD-10-CM

## 2020-06-17 RX ORDER — LOSARTAN POTASSIUM AND HYDROCHLOROTHIAZIDE 12.5; 5 MG/1; MG/1
TABLET ORAL
Qty: 90 TABLET | Refills: 0 | Status: SHIPPED | OUTPATIENT
Start: 2020-06-17 | End: 2020-09-17

## 2020-06-17 RX ORDER — METFORMIN HCL 500 MG
TABLET, EXTENDED RELEASE 24 HR ORAL
Qty: 180 TABLET | Refills: 1 | Status: SHIPPED | OUTPATIENT
Start: 2020-06-17 | End: 2020-07-03

## 2020-06-17 NOTE — TELEPHONE ENCOUNTER
Please have him schedule a virtual visit . He is overdue for diabetic check and needs labs. Deidra Arguello M.D.

## 2020-06-17 NOTE — TELEPHONE ENCOUNTER
Routing refill request to provider for review/approval because:  Labs out of range:  2/18/20 potassium of 3.3  Fuad Worrell RN

## 2020-06-18 ENCOUNTER — VIRTUAL VISIT (OUTPATIENT)
Dept: DERMATOLOGY | Facility: CLINIC | Age: 40
End: 2020-06-18
Payer: COMMERCIAL

## 2020-06-18 DIAGNOSIS — L73.2 HIDRADENITIS SUPPURATIVA: Primary | ICD-10-CM

## 2020-06-18 DIAGNOSIS — Z79.899 ENCOUNTER FOR LONG-TERM (CURRENT) USE OF HIGH-RISK MEDICATION: ICD-10-CM

## 2020-06-18 ASSESSMENT — PAIN SCALES - GENERAL: PAINLEVEL: MILD PAIN (2)

## 2020-06-18 NOTE — PATIENT INSTRUCTIONS
"[facility] Teledermatology Visit    Thank you for allowing us to participate in your care. Your findings, instructions and follow-up plan are as follows:    Look for other infusion centers that will be covered by your insurance  I'll talk to the infusion center about using Inflectra, a biosimilar (generic) version of Remicade  I'll have the financial counselor reach out to you  I placed a referral for home infusions as well to see if this can be covered, as this could reduce the \"facility fee\" expense   In the meantime, continue the rest of the current regimen    When should I call my doctor?    If you are worsening or not improving, please, contact us or seek urgent care as noted below.     Who should I call with questions (adults)?    [facility] (adult and pediatric): [phone]     [facility] (adult): [phone]    For urgent needs outside of business hours call the [facility] at [phone] and ask for the dermatology resident on call    If this is a medical emergency and you are unable to reach an ER, Call [phone]      Who should I call with questions (pediatric)?  [facility]- Pediatric Dermatology  Dr. Alisa Roque, Dr. Marisol Romero, Dr. Zuleika Greenberg, JONNY Wiggins Dr., Dr. Kyleigh Carlson & Dr. Derrick Segal  Non Urgent  Nurse Triage Line; [phone]- Emma and Gabrielle CHAUDHARY Care Coordinators   [place] (/Complex ) [phone]    If you need a prescription refill, please contact your pharmacy. Refills are approved or denied by our Physicians during normal business hours, Monday through Fridays  Per office policy, refills will not be granted if you have not been seen within the past year (or sooner depending on your child's condition)    Scheduling Information:  Pediatric Appointment Scheduling and Call Center [phone]  Radiology " Scheduling- [phone]  Sedation Unit Scheduling- [phone]  [place] Scheduling- General [phone]; Pediatric Dermatology [phone]  Main  Services: [phone]  Dutch: [phone]  Citizen of [place]: [phone]  Hmong/Kiswahili/Jeet: [phone]  Preadmission Nursing Department Fax Number: [phone] (Fax all pre-operative paperwork to this number)    For urgent matters arising during evenings, weekends, or holidays that cannot wait for normal business hours please call [phone] and ask for the Dermatology Resident On-Call to be paged.

## 2020-06-18 NOTE — LETTER
6/18/2020       RE: Sean Woodard  [address] [facility] [address]     Dear Colleague,    Thank you for referring your patient, Sean Woodard, to the [facility] DERMATOLOGY at [facility]. Please see a copy of my visit note below.    [facility]  [facility]      Impression and Recommendations (Patient Counseled on the Following):  1. Severe Hamilton III hidradenitis suppurativa: improved with infliximab though still early in the course of treatment. Having some financial issues - will have him contact financial counselor. It seems the facility fee is the major expense. Will see about home infusions (favored for this immunosuppressed patient during COVID-19 pandemic anyhow) and switching to Inflectra biosimilar to see if this is more affordable, as cost may become prohibitive despite symptomatic improvement. He will also look at getting the infusions done somewhere else.  -Continue infliximab for now  -Continue clindamycin and topicals      Follow-up:   4-5 weeks     Staff only:  Derrick Avina MD   of Dermatology  Department of Dermatology  [facility]      _____________________________________________________________________________    Dermatology Problem List:  1. Hidradenitis suppurativa - Hamilton stage III involving axillae > neck, groin              - current treatment: infliximab 5 mg/kg q8 weeks (started 5/18/2020), clindamycin 300 mg BID, topical clindamycin/benzoyl peroxide              - past treatment: doxycycline, other oral antibiotics, isotretinoin, adalimumab; ustekinumab denied by insurance    Encounter Date: Jun 18, 2020    CC:   Chief Complaint   Patient presents with     Derm Problem     Hidradenitis suppurativa - Sean states there has been improvement.        History of Present Illness:  I have reviewed the teledermatology information and the nursing intake corresponding to  this issue. Sean Woodard is a 40 year old male who presents via teledermatology for hidradenitis suppurativa.    Started infliximab - had first infusions 5/18 and 6/1   - next infusion scheduled for 7/3    Less inflammation, less draining, less pain   - still some persistence with drainage and pain in left axilla   - improved range of motion - significantly decreased since infusion    Taking clindamycin orally and using topicals    ROS:   General: no fevers, chills, or weight loss  Skin: as per HPI    Physical Examination:  General: Well-appearing, appropriately-developed individual. Alert and oriented in a pleasant mood.  Skin: Focused examination within the teledermatology photograph(s) including axillae was performed.   -erythematous subcutaneous nodules with sinus tracts and comedones in the axillae; no active drainage appreciated      Past Medical History:   Patient Active Problem List   Diagnosis     GERD (gastroesophageal reflux disease)     Health Care Home     Hyperlipidemia with target LDL less than 100     Benign essential hypertension     PTSD (post-traumatic stress disorder)     Class 3 severe obesity due to excess calories with serious comorbidity in adult (H)     Diabetes mellitus, type 2 (H)     FER (obstructive sleep apnea)     Hidradenitis suppurativa     Past Medical History:   Diagnosis Date     NO ACTIVE PROBLEMS      Past Surgical History:   Procedure Laterality Date     ORTHOPEDIC SURGERY  1/1/2005    RT knee arthroscopic menisectomy     Thumb Surgery  2009    Mass removal neuroma       Social History:  Patient reports that he quit smoking about 15 years ago. His smoking use included cigarettes. He has a 2.50 pack-year smoking history. He has never used smokeless tobacco. He reports current alcohol use. He reports that he does not use drugs.    Family History:  Family History   Problem Relation Age of Onset     Heart Defect Father         valve replacement     Diabetes Maternal Grandmother       Cancer Maternal Grandfather         stomach     Hypertension Brother      Diabetes Sister      Cerebrovascular Disease Brother 38     Hypertension Brother      Melanoma No family hx of      Skin Cancer No family hx of        Medications:  Current Outpatient Medications   Medication     atorvastatin (LIPITOR) 20 MG tablet     augmented betamethasone dipropionate (DIPROLENE-AF) 0.05 % ointment     azelastine (ASTELIN) 0.1 % nasal spray     azelastine (OPTIVAR) 0.05 % ophthalmic solution     benzoyl peroxide 5 % external liquid     blood glucose (ACCU-CHEK GUIDE) test strip     cetirizine (ZYRTEC) 10 MG tablet     clindamycin (CLEOCIN) 300 MG capsule     clindamycin (CLINDAMAX) 1 % lotion     clindamycin-benzoyl peroxide (BENZACLIN) 1-5 % external gel     diphenhydrAMINE (BENADRYL) 25 MG capsule     doxycycline monohydrate (MONODOX) 100 MG capsule     fluticasone (FLONASE) 50 MCG/ACT nasal spray     losartan-hydrochlorothiazide (HYZAAR) 50-12.5 MG tablet     metFORMIN (GLUCOPHAGE-XR) 500 MG 24 hr tablet     ustekinumab (STELARA) 90 MG/ML     No current facility-administered medications for this visit.           Allergies   Allergen Reactions     Lisinopril Cough         _____________________________________________________________________________    Teledermatology information:  - Location of patient: [place]  - Patient presented as: return  - Location of teledermatologist:  ([facility] DERMATOLOGY )  - Reason teledermatology is appropriate:  of National Emergency Regarding Coronavirus disease (COVID 19) Outbreak  - Image quality and interpretability: acceptable  - Physician has received verbal consent for a Video/Photos Visit from the patient? Yes  - In-person dermatology visit recommendation: no  - Date of images: 6/18/2020  - Service start time: 11:00  - Service end time: 11:22  - Date of report: 6/18/2020     Again, thank you for allowing me to participate in the care of your patient.      Sincerely,    Drerick BILL  MD Fabrice

## 2020-06-18 NOTE — PROGRESS NOTES
[facility]      Impression and Recommendations (Patient Counseled on the Following):  1. Severe Hamilton III hidradenitis suppurativa: improved with infliximab though still early in the course of treatment. Having some financial issues - will have him contact financial counselor. It seems the facility fee is the major expense. Will see about home infusions (favored for this immunosuppressed patient during COVID-19 pandemic anyhow) and switching to Inflectra biosimilar to see if this is more affordable, as cost may become prohibitive despite symptomatic improvement. He will also look at getting the infusions done somewhere else.  -Continue infliximab for now  -Continue clindamycin and topicals      Follow-up:   4-5 weeks     Staff only:  Derrick Avina MD   of Dermatology  Department of Dermatology  [facility]      _____________________________________________________________________________    Dermatology Problem List:  1. Hidradenitis suppurativa - Hamilton stage III involving axillae > neck, groin              - current treatment: infliximab 5 mg/kg q8 weeks (started 5/18/2020), clindamycin 300 mg BID, topical clindamycin/benzoyl peroxide              - past treatment: doxycycline, other oral antibiotics, isotretinoin, adalimumab; ustekinumab denied by insurance    Encounter Date: Jun 18, 2020    CC:   Chief Complaint   Patient presents with     Derm Problem     Hidradenitis suppurativa - Sean states there has been improvement.        History of Present Illness:  I have reviewed the teledermatology information and the nursing intake corresponding to this issue. Sean Woodard is a 40 year old male who presents via teledermatology for hidradenitis suppurativa.    Started infliximab - had first infusions 5/18 and 6/1   - next infusion scheduled for 7/3    Less inflammation, less draining, less pain   - still some persistence  with drainage and pain in left axilla   - improved range of motion - significantly decreased since infusion    Taking clindamycin orally and using topicals    ROS:   General: no fevers, chills, or weight loss  Skin: as per HPI    Physical Examination:  General: Well-appearing, appropriately-developed individual. Alert and oriented in a pleasant mood.  Skin: Focused examination within the teledermatology photograph(s) including axillae was performed.   -erythematous subcutaneous nodules with sinus tracts and comedones in the axillae; no active drainage appreciated      Past Medical History:   Patient Active Problem List   Diagnosis     GERD (gastroesophageal reflux disease)     Health Care Home     Hyperlipidemia with target LDL less than 100     Benign essential hypertension     PTSD (post-traumatic stress disorder)     Class 3 severe obesity due to excess calories with serious comorbidity in adult (H)     Diabetes mellitus, type 2 (H)     FER (obstructive sleep apnea)     Hidradenitis suppurativa     Past Medical History:   Diagnosis Date     NO ACTIVE PROBLEMS      Past Surgical History:   Procedure Laterality Date     ORTHOPEDIC SURGERY  1/1/2005    RT knee arthroscopic menisectomy     Thumb Surgery  2009    Mass removal neuroma       Social History:  Patient reports that he quit smoking about 15 years ago. His smoking use included cigarettes. He has a 2.50 pack-year smoking history. He has never used smokeless tobacco. He reports current alcohol use. He reports that he does not use drugs.    Family History:  Family History   Problem Relation Age of Onset     Heart Defect Father         valve replacement     Diabetes Maternal Grandmother      Cancer Maternal Grandfather         stomach     Hypertension Brother      Diabetes Sister      Cerebrovascular Disease Brother 38     Hypertension Brother      Melanoma No family hx of      Skin Cancer No family hx of        Medications:  Current Outpatient Medications    Medication     atorvastatin (LIPITOR) 20 MG tablet     augmented betamethasone dipropionate (DIPROLENE-AF) 0.05 % ointment     azelastine (ASTELIN) 0.1 % nasal spray     azelastine (OPTIVAR) 0.05 % ophthalmic solution     benzoyl peroxide 5 % external liquid     blood glucose (ACCU-CHEK GUIDE) test strip     cetirizine (ZYRTEC) 10 MG tablet     clindamycin (CLEOCIN) 300 MG capsule     clindamycin (CLINDAMAX) 1 % lotion     clindamycin-benzoyl peroxide (BENZACLIN) 1-5 % external gel     diphenhydrAMINE (BENADRYL) 25 MG capsule     doxycycline monohydrate (MONODOX) 100 MG capsule     fluticasone (FLONASE) 50 MCG/ACT nasal spray     losartan-hydrochlorothiazide (HYZAAR) 50-12.5 MG tablet     metFORMIN (GLUCOPHAGE-XR) 500 MG 24 hr tablet     ustekinumab (STELARA) 90 MG/ML     No current facility-administered medications for this visit.           Allergies   Allergen Reactions     Lisinopril Cough         _____________________________________________________________________________    Teledermatology information:  - Location of patient: [place]  - Patient presented as: return  - Location of teledermatologist:  [facility] DERMATOLOGY )  - Reason teledermatology is appropriate:  of National Emergency Regarding Coronavirus disease (COVID 19) Outbreak  - Image quality and interpretability: acceptable  - Physician has received verbal consent for a Video/Photos Visit from the patient? Yes  - In-person dermatology visit recommendation: no  - Date of images: 6/18/2020  - Service start time: 11:00  - Service end time: 11:22  - Date of report: 6/18/2020

## 2020-06-22 ENCOUNTER — HOME INFUSION (PRE-WILLOW HOME INFUSION) (OUTPATIENT)
Dept: PHARMACY | Facility: CLINIC | Age: 40
End: 2020-06-22

## 2020-06-23 ENCOUNTER — TELEPHONE (OUTPATIENT)
Dept: DERMATOLOGY | Facility: CLINIC | Age: 40
End: 2020-06-23

## 2020-06-23 NOTE — TELEPHONE ENCOUNTER
Phoenix, Ethan  You 27 minutes ago (4:19 PM)       Hi Wendie,     The patient can call the [employer]([phone]) to obtain a cost estimate, however [facility] does not prefer the use of biosimilar Remicade. If we submitted a request, we would need a Letter of Medical Necessity to explain usage. However, that doesn't necessarily mean it will be more/less expensive.     Let me know if you have any questions.     Thank you!

## 2020-06-23 NOTE — TELEPHONE ENCOUNTER
----- Message from Derrick Avina MD sent at 6/18/2020 11:26 AM CDT -----  Ryan Pressley,    Can you put one of the financial counselors into contact with Mr. Woodard?    Can you also check with the infusion finance people if switching from Remicade to a biosimilar (Inflectra) will be cheaper for him/covered by insurance?    Thanks!Awais

## 2020-06-23 NOTE — PROGRESS NOTES
This is a recent snapshot of the patient's [facility] medical record.  For current drug dose and complete information and questions, call [phone] or In Basket pool, fv home infusion ([id number])  CSN Number:  [id number]

## 2020-06-25 NOTE — TELEPHONE ENCOUNTER
Derrick Avina MD [place], Kayrene, [facility]    Caller: Unspecified (2 days ago,  2:26 PM)               Yes, let's see what the patient wants to do after speaking to the PFA. I'm happy to change the prescription and write a LMN if needed.     Thanks,   Awais

## 2020-06-25 NOTE — TELEPHONE ENCOUNTER
Carmelina Collins; Phoenix, Ethan; [facility] 2 hours ago (11:59 AM)       Hi All,   So Remicade is about $600 more per 100mg at cost. Pt is responsible for 30% of the contracted rate, which may be different, unfortunately I don't have access to those contracts. He's already half way through his out of pocket for the year, and will likely meet the rest of the oop with his next infusion, regardless of him being on inflectra or remicade. As noted previously, [facility] prefers remicade over the biosimilars.     My team does have a PFA here, I will have her reach out to Sean to discuss copay assistance, as both remicade and inflectra have those available. If Dr Avina would like to change to inflectra, we would need that LMN as was mentioned before, as well as a new order. Let us know if that's something you would like to switch to, as we just got the approval for his remicade and were going to get him started.     Thanks,   Carmelina

## 2020-06-26 ENCOUNTER — HOME INFUSION (PRE-WILLOW HOME INFUSION) (OUTPATIENT)
Dept: PHARMACY | Facility: CLINIC | Age: 40
End: 2020-06-26

## 2020-06-26 NOTE — PROGRESS NOTES
Referred to [facility]    Sean Woodard, 1980  Medication: Remicade  Start of Care Date: 6/29/2020 (Confirmed with patient)  Infusion location: Patient Home  Skilled Nursing will be provided by: [facility]  @ [phone]    Jordon Hinojosa RN

## 2020-06-29 ENCOUNTER — HOME INFUSION (PRE-WILLOW HOME INFUSION) (OUTPATIENT)
Dept: PHARMACY | Facility: CLINIC | Age: 40
End: 2020-06-29

## 2020-06-29 ENCOUNTER — DOCUMENTATION ONLY (OUTPATIENT)
Dept: PHARMACY | Facility: CLINIC | Age: 40
End: 2020-06-29

## 2020-06-30 ENCOUNTER — DOCUMENTATION ONLY (OUTPATIENT)
Dept: PHARMACY | Facility: CLINIC | Age: 40
End: 2020-06-30

## 2020-06-30 NOTE — PROGRESS NOTES
This is a recent snapshot of the patient's [facility] medical record.  For current drug dose and complete information and questions, call [phone] or In Basket pool, fv home infusion ([id number])  CSN Number:  [id number]

## 2020-06-30 NOTE — PROGRESS NOTES
Skilled Nurse visit in the  patient home/I Infusion Suite to administer Remicade 600 mg IV.  No recent elevated temperature, fever, chills, productive cough, coughing for 3 weeks or longer or hemoptysis, abnormal vital signs, night sweats, chest pain. No  decrease in your appetite, unexplained weight loss or fatigue.  No other new onset medical symptoms.  Current weight 273#.  PIV placed 22ga left AC, 3 attempt/s.  Pre medicated with none. Labs drawn none. Infusion completed with/without complication or reaction. Pt reports therapy is effective in managing symptoms related to therapy.  Coping well.  [email or web address]  [phone]

## 2020-07-01 ENCOUNTER — APPOINTMENT (OUTPATIENT)
Dept: LAB | Facility: CLINIC | Age: 40
End: 2020-07-01
Attending: DERMATOLOGY
Payer: COMMERCIAL

## 2020-07-03 ENCOUNTER — MYC MEDICAL ADVICE (OUTPATIENT)
Dept: FAMILY MEDICINE | Facility: CLINIC | Age: 40
End: 2020-07-03

## 2020-07-03 ENCOUNTER — OFFICE VISIT (OUTPATIENT)
Dept: FAMILY MEDICINE | Facility: CLINIC | Age: 40
End: 2020-07-03
Payer: COMMERCIAL

## 2020-07-03 VITALS
SYSTOLIC BLOOD PRESSURE: 116 MMHG | HEART RATE: 85 BPM | BODY MASS INDEX: 41.49 KG/M2 | HEIGHT: 68 IN | RESPIRATION RATE: 20 BRPM | DIASTOLIC BLOOD PRESSURE: 86 MMHG | TEMPERATURE: 98.6 F | WEIGHT: 273.8 LBS | OXYGEN SATURATION: 98 %

## 2020-07-03 DIAGNOSIS — I10 BENIGN ESSENTIAL HYPERTENSION: ICD-10-CM

## 2020-07-03 DIAGNOSIS — E66.813 CLASS 3 SEVERE OBESITY WITH BODY MASS INDEX (BMI) OF 40.0 TO 44.9 IN ADULT, UNSPECIFIED OBESITY TYPE, UNSPECIFIED WHETHER SERIOUS COMORBIDITY PRESENT (H): ICD-10-CM

## 2020-07-03 DIAGNOSIS — E11.9 TYPE 2 DIABETES MELLITUS WITHOUT COMPLICATION, WITHOUT LONG-TERM CURRENT USE OF INSULIN (H): Primary | ICD-10-CM

## 2020-07-03 DIAGNOSIS — E66.01 CLASS 3 SEVERE OBESITY WITH BODY MASS INDEX (BMI) OF 40.0 TO 44.9 IN ADULT, UNSPECIFIED OBESITY TYPE, UNSPECIFIED WHETHER SERIOUS COMORBIDITY PRESENT (H): ICD-10-CM

## 2020-07-03 DIAGNOSIS — E78.5 HYPERLIPIDEMIA WITH TARGET LDL LESS THAN 100: ICD-10-CM

## 2020-07-03 LAB
ANION GAP SERPL CALCULATED.3IONS-SCNC: 4 MMOL/L (ref 3–14)
BUN SERPL-MCNC: 14 MG/DL (ref 7–30)
CALCIUM SERPL-MCNC: 8.7 MG/DL (ref 8.5–10.1)
CHLORIDE SERPL-SCNC: 107 MMOL/L (ref 94–109)
CHOLEST SERPL-MCNC: 175 MG/DL
CO2 SERPL-SCNC: 28 MMOL/L (ref 20–32)
CREAT SERPL-MCNC: 0.82 MG/DL (ref 0.66–1.25)
CREAT UR-MCNC: 369 MG/DL
GFR SERPL CREATININE-BSD FRML MDRD: >90 ML/MIN/{1.73_M2}
GLUCOSE SERPL-MCNC: 185 MG/DL (ref 70–99)
HBA1C MFR BLD: 7.1 % (ref 0–5.6)
HDLC SERPL-MCNC: 49 MG/DL
LDLC SERPL CALC-MCNC: 85 MG/DL
MICROALBUMIN UR-MCNC: 22 MG/L
MICROALBUMIN/CREAT UR: 5.96 MG/G CR (ref 0–17)
NONHDLC SERPL-MCNC: 126 MG/DL
POTASSIUM SERPL-SCNC: 3.6 MMOL/L (ref 3.4–5.3)
SODIUM SERPL-SCNC: 139 MMOL/L (ref 133–144)
TRIGL SERPL-MCNC: 205 MG/DL

## 2020-07-03 PROCEDURE — 99214 OFFICE O/P EST MOD 30 MIN: CPT | Performed by: NURSE PRACTITIONER

## 2020-07-03 PROCEDURE — 99207 C FOOT EXAM  NO CHARGE: CPT | Performed by: NURSE PRACTITIONER

## 2020-07-03 PROCEDURE — 80048 BASIC METABOLIC PNL TOTAL CA: CPT | Performed by: NURSE PRACTITIONER

## 2020-07-03 PROCEDURE — 83036 HEMOGLOBIN GLYCOSYLATED A1C: CPT | Performed by: NURSE PRACTITIONER

## 2020-07-03 PROCEDURE — 80061 LIPID PANEL: CPT | Performed by: NURSE PRACTITIONER

## 2020-07-03 PROCEDURE — 82043 UR ALBUMIN QUANTITATIVE: CPT | Performed by: NURSE PRACTITIONER

## 2020-07-03 PROCEDURE — 36415 COLL VENOUS BLD VENIPUNCTURE: CPT | Performed by: NURSE PRACTITIONER

## 2020-07-03 RX ORDER — METFORMIN HCL 500 MG
1000 TABLET, EXTENDED RELEASE 24 HR ORAL 2 TIMES DAILY WITH MEALS
Qty: 360 TABLET | Refills: 1 | Status: SHIPPED | OUTPATIENT
Start: 2020-07-03 | End: 2021-01-08

## 2020-07-03 RX ORDER — ATORVASTATIN CALCIUM 20 MG/1
20 TABLET, FILM COATED ORAL DAILY
Qty: 90 TABLET | Refills: 3 | Status: SHIPPED | OUTPATIENT
Start: 2020-07-03 | End: 2021-04-23

## 2020-07-03 ASSESSMENT — MIFFLIN-ST. JEOR: SCORE: 2131.45

## 2020-07-03 NOTE — RESULT ENCOUNTER NOTE
Paulette Estrada    Your urine protein screen (more sensitive kidney test than the blood one) is normal. Please let us know if you have any questions.     Take care,    FALGUNI Brown CNP

## 2020-07-03 NOTE — PROGRESS NOTES
Subjective     Sean Woodard is a 40 year old male who presents to clinic today for the following health issues:    HPI   Diabetes Follow-up      How often are you checking your blood sugar? Not at all    What concerns do you have today about your diabetes? None     Do you have any of these symptoms? (Select all that apply)  Pain in extremities, both hands and feet, intermittent, at night.    Have you had a diabetic eye exam in the last 12 months? No                Hyperlipidemia Follow-Up      Are you regularly taking any medication or supplement to lower your cholesterol?   Yes- atorvastatin    Are you having muscle aches or other side effects that you think could be caused by your cholesterol lowering medication?  No    Hypertension Follow-up      Do you check your blood pressure regularly outside of the clinic? No     Are you following a low salt diet? No    Are your blood pressures ever more than 140 on the top number (systolic) OR more   than 90 on the bottom number (diastolic), for example 140/90? Not checking     BP Readings from Last 2 Encounters:   07/03/20 116/86   06/01/20 110/73     Hemoglobin A1C (%)   Date Value   11/25/2019 6.2 (H)   06/14/2019 6.2 (H)     LDL Cholesterol Calculated (mg/dL)   Date Value   05/01/2019 123 (H)   03/05/2018 111 (H)         How many servings of fruits and vegetables do you eat daily?  0-1    On average, how many sweetened beverages do you drink each day (Examples: soda, juice, sweet tea, etc.  Do NOT count diet or artificially sweetened beverages)?   1    How many days per week do you exercise enough to make your heart beat faster? 3 or less    How many minutes a day do you exercise enough to make your heart beat faster? 9 or less    How many days per week do you miss taking your medication? 0        Patient Active Problem List   Diagnosis     GERD (gastroesophageal reflux disease)     Health Care Home     Hyperlipidemia with target LDL less than 100     Benign essential  "hypertension     PTSD (post-traumatic stress disorder)     Class 3 severe obesity due to excess calories with serious comorbidity in adult (H)     Diabetes mellitus, type 2 (H)     FER (obstructive sleep apnea)     Hidradenitis suppurativa     Past Surgical History:   Procedure Laterality Date     ORTHOPEDIC SURGERY  1/1/2005    RT knee arthroscopic menisectomy     Thumb Surgery  2009    Mass removal neuroma       Social History     Tobacco Use     Smoking status: Former Smoker     Packs/day: 0.50     Years: 5.00     Pack years: 2.50     Types: Cigarettes     Last attempt to quit: 1/1/2005     Years since quitting: 15.5     Smokeless tobacco: Never Used   Substance Use Topics     Alcohol use: Yes     Comment: Social. 1 drink per month     Family History   Problem Relation Age of Onset     Heart Defect Father         valve replacement     Diabetes Maternal Grandmother      Cancer Maternal Grandfather         stomach     Hypertension Brother      Diabetes Sister      Cerebrovascular Disease Brother 38     Hypertension Brother      Melanoma No family hx of      Skin Cancer No family hx of              Reviewed and updated as needed this visit by Provider         Review of Systems   Constitutional, HEENT, cardiovascular, pulmonary, gi and gu systems are negative, except as otherwise noted.      Objective    /86 (BP Location: Right arm, Patient Position: Sitting, Cuff Size: Adult Large)   Pulse 85   Temp 98.6  F (37  C) (Tympanic)   Resp 20   Ht 1.727 m (5' 8\")   Wt 124.2 kg (273 lb 12.8 oz)   SpO2 98%   BMI 41.63 kg/m    Body mass index is 41.63 kg/m .  Physical Exam   GENERAL: alert, no distress and obese  EYES: Eyes grossly normal to inspection, PERRL and conjunctivae and sclerae normal  HENT: normal cephalic/atraumatic, nose and mouth without ulcers or lesions, oropharynx clear and oral mucous membranes moist  NECK: no adenopathy, no asymmetry, masses, or scars and thyroid normal to palpation  RESP: " "lungs clear to auscultation - no rales, rhonchi or wheezes  CV: regular rate and rhythm, normal S1 S2, no S3 or S4, no murmur, click or rub, no peripheral edema and peripheral pulses strong  MS: no gross musculoskeletal defects noted, no edema  NEURO: Normal strength and tone, mentation intact and speech normal  PSYCH: mentation appears normal, affect normal/bright  Diabetic foot exam: normal DP and PT pulses, no trophic changes or ulcerative lesions and normal monofilament exam    Diagnostic Test Results:  Labs reviewed in Epic  Results for orders placed or performed in visit on 07/03/20 (from the past 24 hour(s))   Hemoglobin A1c   Result Value Ref Range    Hemoglobin A1C 7.1 (H) 0 - 5.6 %           Assessment & Plan     1. Type 2 diabetes mellitus without complication, without long-term current use of insulin (H)  Uncontrolled, increase Metformin to 2 tabs BID.  - Hemoglobin A1c  - Basic metabolic panel  (Ca, Cl, CO2, Creat, Gluc, K, Na, BUN)  - Albumin Random Urine Quantitative with Creat Ratio  - FOOT EXAM  - metFORMIN (GLUCOPHAGE-XR) 500 MG 24 hr tablet; Take 2 tablets (1,000 mg) by mouth 2 times daily (with meals)  Dispense: 360 tablet; Refill: 1    2. Hyperlipidemia with target LDL less than 100  Controlled, LDL < 130, continue current  - Lipid panel reflex to direct LDL Fasting  - atorvastatin (LIPITOR) 20 MG tablet; Take 1 tablet (20 mg) by mouth daily  Dispense: 90 tablet; Refill: 3    3. Benign essential hypertension  Controlled, continue current med.    4. Class 3 severe obesity with body mass index (BMI) of 40.0 to 44.9 in adult, unspecified obesity type, unspecified whether serious comorbidity present (H)    - metFORMIN (GLUCOPHAGE-XR) 500 MG 24 hr tablet; Take 2 tablets (1,000 mg) by mouth 2 times daily (with meals)  Dispense: 360 tablet; Refill: 1     BMI:   Estimated body mass index is 41.63 kg/m  as calculated from the following:    Height as of this encounter: 1.727 m (5' 8\").    Weight as of " "this encounter: 124.2 kg (273 lb 12.8 oz).   Weight management plan: Discussed healthy diet and exercise guidelines        FUTURE APPOINTMENTS:       - Follow-up visit in 3 months with PCP.    Patient Instructions     You can visit: [email or web address]  For some information on a healthy diet.             Patient Education   Diabetes ABCs  Work with Your Health Care Team to Manage Diabetes!     A1c (hemoglobin A1c test):  Check at least every 6 months.  Goal without diabetes: Less than 6%  Goal with diabetes: Less than 7%, but your doctor may have a different goal for you.  My Goal: ___________________   BG (blood glucose):  Goals without diabetes:  Before meals: 60 to 99 mg/dL  After meals (2 hours): under 140 mg/dL  Goals with diabetes:   Before meals: 80 to 130 mg/dL  After meals: (2 hours): under 180 mg/dL  My Goals:  Before meals: __________  After meals: ___________   Blood pressure:  Check at every doctor visit.   Goal: Less than 140/90    Cholesterol:   Check at least 1 time a year.  Goals for LDL (\"bad\" cholesterol):  With heart disease: Less than 70 mg/dL  Without heart disease: Less than 100 mg/dL   Eyes:   Have a dilated eye exam every year.   Teeth:   See your dentist twice a year. People with diabetes have a higher risk of gum disease.  Smoking:   If you smoke, it's important to quit. Make a plan with help from your health care team.  Weight:   Work towards a healthy weight with help from your diabetes educator or dietitian.  Kidneys:     Check your urine protein 1 time each year.    Protect your kidneys by keeping your blood pressure normal.  Feet:    Check your feet every day for signs of injury, dry skin, cracks, cuts, swelling, or blisters.    Ask your doctor to check your feet at every visit.    Wear shoes and socks that fit well.    Don't go barefoot.  Sex:   Talk about erectile dysfunction (ED) and " female sexual dysfunction (FSD) with your doctor.  For informational purposes only. Not to replace the advice of your health care provider. Copyright   2018 [employer]. All rights reserved. Illustration ID [id number]   [id number]  [employer]. Clinically reviewed by [facility] [facility]. OpenExchange [id number] - 10/18.           Return in about 3 months (around 10/3/2020) for Routine Visit.    FALGUNI Boudreaux CNP  [facility]

## 2020-07-03 NOTE — PATIENT INSTRUCTIONS
"You can visit: [email or web address]  For some information on a healthy diet.             Patient Education   Diabetes ABCs  Work with Your Health Care Team to Manage Diabetes!     A1c (hemoglobin A1c test):  Check at least every 6 months.  Goal without diabetes: Less than 6%  Goal with diabetes: Less than 7%, but your doctor may have a different goal for you.  My Goal: ___________________   BG (blood glucose):  Goals without diabetes:  Before meals: 60 to 99 mg/dL  After meals (2 hours): under 140 mg/dL  Goals with diabetes:   Before meals: 80 to 130 mg/dL  After meals: (2 hours): under 180 mg/dL  My Goals:  Before meals: __________  After meals: ___________   Blood pressure:  Check at every doctor visit.   Goal: Less than 140/90    Cholesterol:   Check at least 1 time a year.  Goals for LDL (\"bad\" cholesterol):  With heart disease: Less than 70 mg/dL  Without heart disease: Less than 100 mg/dL   Eyes:   Have a dilated eye exam every year.   Teeth:   See your dentist twice a year. People with diabetes have a higher risk of gum disease.  Smoking:   If you smoke, it's important to quit. Make a plan with help from your health care team.  Weight:   Work towards a healthy weight with help from your diabetes educator or dietitian.  Kidneys:     Check your urine protein 1 time each year.    Protect your kidneys by keeping your blood pressure normal.  Feet:    Check your feet every day for signs of injury, dry skin, cracks, cuts, swelling, or blisters.    Ask your doctor to check your feet at every visit.    Wear shoes and socks that fit well.    Don't go barefoot.  Sex:   Talk about erectile dysfunction (ED) and female sexual dysfunction (FSD) with your doctor.  For informational purposes only. Not to replace the advice of your health care provider. Copyright   2018 [employer]. All rights reserved. Illustration ID " [id number]   [id number]  [employer]. Clinically reviewed by [facility]. Go[employer]oll[employer] [id number] - 10/18.

## 2020-07-03 NOTE — RESULT ENCOUNTER NOTE
Paulette Estrada    Your cholesterol numbers are much improved from last year. Check your bottle at home and see what the dose of the Atorvastatin is that you are taking. If you have been taking 40 mg tabs I suggest we up your dose to that. The kidney function and electrolytes are normal. The urine protein test is still pending. Please let us know if you have any questions.     Take care,    FALGUNI Brown CNP

## 2020-07-06 NOTE — TELEPHONE ENCOUNTER
Routing to covering providers for PCP, patient would like someone to look at his lab results on 7-3-2020. Unique Feliciano did result them but patient would like PCP/covering provider to review all labs to see if anything is concerning.    JET Mena

## 2020-07-06 NOTE — TELEPHONE ENCOUNTER
Laboratory results reviewed.  Cholesterol control is good.  Kidney function is normal.  Electrolytes are normal.  However, diabetes control is fair and worse compared to last time (7.1 on 7/3/20 from 6.2 on 11/25/19).  Continue with metformin.  Advise to decrease carbohydrate intake (bread, potato, pasta, and sweets) and encourage 2.5 hours (150 minutes) per week of cardiovascular activities (cycling, swimming, jogging, elliptical, or treadmill).  I noticed this his highest HgA1c compared to his previous results which have been excellent in the past.

## 2020-07-08 ENCOUNTER — OFFICE VISIT (OUTPATIENT)
Dept: DERMATOLOGY | Facility: CLINIC | Age: 40
End: 2020-07-08
Payer: COMMERCIAL

## 2020-07-08 VITALS — HEART RATE: 88 BPM | DIASTOLIC BLOOD PRESSURE: 77 MMHG | SYSTOLIC BLOOD PRESSURE: 129 MMHG | OXYGEN SATURATION: 99 %

## 2020-07-08 DIAGNOSIS — L73.2 HIDRADENITIS SUPPURATIVA: Primary | ICD-10-CM

## 2020-07-08 PROCEDURE — 11900 INJECT SKIN LESIONS </W 7: CPT | Performed by: PHYSICIAN ASSISTANT

## 2020-07-08 NOTE — NURSING NOTE
Chief Complaint   Patient presents with     Derm Problem       Vitals:    07/08/20 1543   BP: 129/77   Pulse: 88   SpO2: 99%     Wt Readings from Last 1 Encounters:   07/03/20 124.2 kg (273 lb 12.8 oz)       Emily Trimble LPN.................7/8/2020

## 2020-07-08 NOTE — LETTER
7/8/2020         RE: Sean Woodard  [address] [id number]        Dear Colleague,    Thank you for referring your patient, Sean Woodard, to the [facility]. Please see a copy of my visit note below.    Sean Woodard is a 40 year old year old male patient here today for recheck hidradentitis suppurativa. He would like to do intralesional steroid injections. He is currently on remicade but having some neuropathy. He is discussing with Dr. Avina about potentially adding in gabapentin or stopping remicade.  Patient has no other skin complaints today.  Remainder of the HPI, Meds, PMH, Allergies, FH, and SH was reviewed in chart.    Pertinent Hx:   HS   Past Medical History:   Diagnosis Date     NO ACTIVE PROBLEMS        Past Surgical History:   Procedure Laterality Date     ORTHOPEDIC SURGERY  1/1/2005    RT knee arthroscopic menisectomy     Thumb Surgery  2009    Mass removal neuroma        Family History   Problem Relation Age of Onset     Heart Defect Father         valve replacement     Diabetes Maternal Grandmother      Cancer Maternal Grandfather         stomach     Hypertension Brother      Diabetes Sister      Cerebrovascular Disease Brother 38     Hypertension Brother      Melanoma No family hx of      Skin Cancer No family hx of        Social History     Socioeconomic History     Marital status:      Spouse name: Joycelyn Stokes     Number of children: 1     Years of education: 12+     Highest education level: Not on file   Occupational History     Occupation: Adjucations officer     Employer: [employer] [place]   Social Needs     Financial resource strain: Not on file     Food insecurity     Worry: Not on file     Inability: Not on file     Transportation needs     Medical: Not on file     Non-medical: Not on file   Tobacco Use     Smoking status: Former Smoker     Packs/day: 0.50     Years: 5.00     Pack years: 2.50     Types: Cigarettes     Last  attempt to quit: 1/1/2005     Years since quitting: 15.5     Smokeless tobacco: Never Used   Substance and Sexual Activity     Alcohol use: Yes     Comment: Social. 1 drink per month     Drug use: No     Sexual activity: Yes     Partners: Female   Lifestyle     Physical activity     Days per week: Not on file     Minutes per session: Not on file     Stress: Not on file   Relationships     Social connections     Talks on phone: Not on file     Gets together: Not on file     Attends Confucianism service: Not on file     Active member of club or organization: Not on file     Attends meetings of clubs or organizations: Not on file     Relationship status: Not on file     Intimate partner violence     Fear of current or ex partner: Not on file     Emotionally abused: Not on file     Physically abused: Not on file     Forced sexual activity: Not on file   Other Topics Concern     Parent/sibling w/ CABG, MI or angioplasty before 65F 55M? No   Social History Narrative    March 26, 2019    ENVIRONMENTAL HISTORY: The family [place] in a [place]. The home is heated with a forced air. They do have central air conditioning. The patient's bedroom is furnished with carpeting in bedroom. No pets inside the house. There is no history of cockroach or mice infestation. There are no smokers in the house.  The house does not have a damp basement.        Outpatient Encounter Medications as of 7/8/2020   Medication Sig Dispense Refill     atorvastatin (LIPITOR) 20 MG tablet Take 1 tablet (20 mg) by mouth daily 90 tablet 3     augmented betamethasone dipropionate (DIPROLENE-AF) 0.05 % ointment Apply sparingly to affected area twice daily as needed.  Do not apply to face. 45 g 1     azelastine (ASTELIN) 0.1 % nasal spray Spray 2 sprays into both nostrils 2 times daily as needed for rhinitis 30 mL 3     azelastine (OPTIVAR) 0.05 % ophthalmic solution Apply 1 drop to eye 2 times daily 6 mL 3     benzoyl peroxide 5 %  external liquid Use daily as directed 226 g 11     blood glucose (ACCU-CHEK GUIDE) test strip Use to test blood sugar 2 times daily 100 strip 11     cetirizine (ZYRTEC) 10 MG tablet Take 10 mg by mouth daily       clindamycin (CLEOCIN) 300 MG capsule Take 1 capsule (300 mg) by mouth 2 times daily 60 capsule 3     clindamycin (CLINDAMAX) 1 % lotion Apply to AA BID 60 mL 11     diphenhydrAMINE (BENADRYL) 25 MG capsule Take 25 mg by mouth every 6 hours as needed for itching or allergies       fluticasone (FLONASE) 50 MCG/ACT nasal spray Spray 2 sprays into both nostrils daily 16 g 3     losartan-hydrochlorothiazide (HYZAAR) 50-12.5 MG tablet TAKE ONE TABLET BY MOUTH ONCE DAILY 90 tablet 0     metFORMIN (GLUCOPHAGE-XR) 500 MG 24 hr tablet Take 2 tablets (1,000 mg) by mouth 2 times daily (with meals) 360 tablet 1     clindamycin-benzoyl peroxide (BENZACLIN) 1-5 % external gel Apply twice daily to affected area on skin. (Patient not taking: Reported on 7/3/2020) 50 g 6     doxycycline monohydrate (MONODOX) 100 MG capsule Take 1 capsule (100 mg) by mouth 2 times daily (with meals) (Patient not taking: Reported on 7/3/2020) 180 capsule 1     ustekinumab (STELARA) 90 MG/ML Inject 1 mL (90 mg) Subcutaneous every 3 months (Patient not taking: Reported on 7/3/2020) 1 mL 0     No facility-administered encounter medications on file as of 7/8/2020.              Review Of Systems  Skin: As above  Eyes: negative  Ears/Nose/Throat: negative  Respiratory: No shortness of breath, dyspnea on exertion, cough, or hemoptysis  Cardiovascular: negative  Gastrointestinal: negative  Genitourinary: negative  Musculoskeletal: negative  Neurologic: negative  Psychiatric: negative  Hematologic/Lymphatic/Immunologic: negative  Endocrine: negative      O:   NAD, WDWN, Alert & Oriented, Mood & Affect wnl, Vitals stable   Here today alone   /77   Pulse 88   SpO2 99%    General appearance normal   Vitals stable   Alert, oriented and in no acute  distress     Inflammatory nodules on axilla       Eyes: Conjunctivae/lids:Normal     ENT: Lips: normal    MSK:Normal    Pulm: Breathing Normal    Neuro/Psych: Orientation:Alert and Orientedx3 ; Mood/Affect:normal     A/P:  1. Hidradenitis Suppurativa on axilla  IL TAC: PGACAC discussed.  Risks including but not limited to injection site reaction, bruising, no resolution.  All questions answered and entertained to patient s satisfaction.  Informed consent obtained.  IL TAC in concentration of 10mg/ml was injected ID to hidradenitis suppurativa on axilla .  Total injected was  1 ml.  Patient tolerated without complications and given wound care instructions, including not to move product around.  Return in 4 weeks for follow-up and possible additional IL TAC.    Lot: [id number]  Exp: 9/20/2021        Again, thank you for allowing me to participate in the care of your patient.        Sincerely,        Sabi Christie PA-C

## 2020-07-08 NOTE — PROGRESS NOTES
Sean Woodard is a 40 year old year old male patient here today for recheck hidradentitis suppurativa. He would like to do intralesional steroid injections. He is currently on remicade but having some neuropathy. He is discussing with Dr. Avina about potentially adding in gabapentin or stopping remicade.  Patient has no other skin complaints today.  Remainder of the HPI, Meds, PMH, Allergies, FH, and SH was reviewed in chart.    Pertinent Hx:   HS   Past Medical History:   Diagnosis Date     NO ACTIVE PROBLEMS        Past Surgical History:   Procedure Laterality Date     ORTHOPEDIC SURGERY  1/1/2005    RT knee arthroscopic menisectomy     Thumb Surgery  2009    Mass removal neuroma        Family History   Problem Relation Age of Onset     Heart Defect Father         valve replacement     Diabetes Maternal Grandmother      Cancer Maternal Grandfather         stomach     Hypertension Brother      Diabetes Sister      Cerebrovascular Disease Brother 38     Hypertension Brother      Melanoma No family hx of      Skin Cancer No family hx of        Social History     Socioeconomic History     Marital status:      Spouse name: Joycelyn Stokes     Number of children: 1     Years of education: 12+     Highest education level: Not on file   Occupational History     Occupation: Adjucations officer     Employer: [employer]   Social Needs     Financial resource strain: Not on file     Food insecurity     Worry: Not on file     Inability: Not on file     Transportation needs     Medical: Not on file     Non-medical: Not on file   Tobacco Use     Smoking status: Former Smoker     Packs/day: 0.50     Years: 5.00     Pack years: 2.50     Types: Cigarettes     Last attempt to quit: 1/1/2005     Years since quitting: 15.5     Smokeless tobacco: Never Used   Substance and Sexual Activity     Alcohol use: Yes     Comment: Social. 1 drink per month     Drug use: No     Sexual activity: Yes     Partners:  Female   Lifestyle     Physical activity     Days per week: Not on file     Minutes per session: Not on file     Stress: Not on file   Relationships     Social connections     Talks on phone: Not on file     Gets together: Not on file     Attends Orthodox service: Not on file     Active member of club or organization: Not on file     Attends meetings of clubs or organizations: Not on file     Relationship status: Not on file     Intimate partner violence     Fear of current or ex partner: Not on file     Emotionally abused: Not on file     Physically abused: Not on file     Forced sexual activity: Not on file   Other Topics Concern     Parent/sibling w/ CABG, MI or angioplasty before 65F 55M? No   Social History Narrative    March 26, 2019    ENVIRONMENTAL HISTORY: The family [place] in a 9 year old [place]. The home is heated with a forced air. They do have central air conditioning. The patient's bedroom is furnished with carpeting in bedroom. No pets inside the house. There is no history of cockroach or mice infestation. There are no smokers in the house.  The house does not have a damp basement.        Outpatient Encounter Medications as of 7/8/2020   Medication Sig Dispense Refill     atorvastatin (LIPITOR) 20 MG tablet Take 1 tablet (20 mg) by mouth daily 90 tablet 3     augmented betamethasone dipropionate (DIPROLENE-AF) 0.05 % ointment Apply sparingly to affected area twice daily as needed.  Do not apply to face. 45 g 1     azelastine (ASTELIN) 0.1 % nasal spray Spray 2 sprays into both nostrils 2 times daily as needed for rhinitis 30 mL 3     azelastine (OPTIVAR) 0.05 % ophthalmic solution Apply 1 drop to eye 2 times daily 6 mL 3     benzoyl peroxide 5 % external liquid Use daily as directed 226 g 11     blood glucose (ACCU-CHEK GUIDE) test strip Use to test blood sugar 2 times daily 100 strip 11     cetirizine (ZYRTEC) 10 MG tablet Take 10 mg by mouth daily       clindamycin (CLEOCIN) 300  MG capsule Take 1 capsule (300 mg) by mouth 2 times daily 60 capsule 3     clindamycin (CLINDAMAX) 1 % lotion Apply to AA BID 60 mL 11     diphenhydrAMINE (BENADRYL) 25 MG capsule Take 25 mg by mouth every 6 hours as needed for itching or allergies       fluticasone (FLONASE) 50 MCG/ACT nasal spray Spray 2 sprays into both nostrils daily 16 g 3     losartan-hydrochlorothiazide (HYZAAR) 50-12.5 MG tablet TAKE ONE TABLET BY MOUTH ONCE DAILY 90 tablet 0     metFORMIN (GLUCOPHAGE-XR) 500 MG 24 hr tablet Take 2 tablets (1,000 mg) by mouth 2 times daily (with meals) 360 tablet 1     clindamycin-benzoyl peroxide (BENZACLIN) 1-5 % external gel Apply twice daily to affected area on skin. (Patient not taking: Reported on 7/3/2020) 50 g 6     doxycycline monohydrate (MONODOX) 100 MG capsule Take 1 capsule (100 mg) by mouth 2 times daily (with meals) (Patient not taking: Reported on 7/3/2020) 180 capsule 1     ustekinumab (STELARA) 90 MG/ML Inject 1 mL (90 mg) Subcutaneous every 3 months (Patient not taking: Reported on 7/3/2020) 1 mL 0     No facility-administered encounter medications on file as of 7/8/2020.              Review Of Systems  Skin: As above  Eyes: negative  Ears/Nose/Throat: negative  Respiratory: No shortness of breath, dyspnea on exertion, cough, or hemoptysis  Cardiovascular: negative  Gastrointestinal: negative  Genitourinary: negative  Musculoskeletal: negative  Neurologic: negative  Psychiatric: negative  Hematologic/Lymphatic/Immunologic: negative  Endocrine: negative      O:   NAD, WDWN, Alert & Oriented, Mood & Affect wnl, Vitals stable   Here today alone   /77   Pulse 88   SpO2 99%    General appearance normal   Vitals stable   Alert, oriented and in no acute distress     Inflammatory nodules on axilla       Eyes: Conjunctivae/lids:Normal     ENT: Lips: normal    MSK:Normal    Pulm: Breathing Normal    Neuro/Psych: Orientation:Alert and Orientedx3 ; Mood/Affect:normal     A/P:  1. Hidradenitis  Suppurativa on axilla  IL TAC: PGACAC discussed.  Risks including but not limited to injection site reaction, bruising, no resolution.  All questions answered and entertained to patient s satisfaction.  Informed consent obtained.  IL TAC in concentration of 10mg/ml was injected ID to hidradenitis suppurativa on axilla .  Total injected was  1 ml.  Patient tolerated without complications and given wound care instructions, including not to move product around.  Return in 4 weeks for follow-up and possible additional IL TAC.    Lot: [id number]  Exp: 9/20/2021

## 2020-07-09 DIAGNOSIS — G62.9 NEUROPATHY: ICD-10-CM

## 2020-07-09 DIAGNOSIS — L73.2 HIDRADENITIS SUPPURATIVA: Primary | ICD-10-CM

## 2020-07-09 RX ORDER — GABAPENTIN 100 MG/1
100 CAPSULE ORAL AT BEDTIME
Qty: 30 CAPSULE | Refills: 1 | Status: SHIPPED | OUTPATIENT
Start: 2020-07-09 | End: 2021-04-23

## 2020-07-09 RX ORDER — HEPARIN SODIUM,PORCINE 10 UNIT/ML
5 VIAL (ML) INTRAVENOUS
Status: CANCELLED | OUTPATIENT
Start: 2020-07-09

## 2020-07-09 RX ORDER — HEPARIN SODIUM (PORCINE) LOCK FLUSH IV SOLN 100 UNIT/ML 100 UNIT/ML
5 SOLUTION INTRAVENOUS
Status: CANCELLED | OUTPATIENT
Start: 2020-07-09

## 2020-07-10 ENCOUNTER — TELEPHONE (OUTPATIENT)
Dept: DERMATOLOGY | Facility: CLINIC | Age: 40
End: 2020-07-10

## 2020-07-10 NOTE — TELEPHONE ENCOUNTER
PA Initiation    Medication: STELARA - PA SUBMITTED  Insurance Company: [employer] - Phone [phone] Fax [phone]  Pharmacy Filling the Rx: [facility] - [place], [place] - [facility] [address]  Filling Pharmacy Phone:    Filling Pharmacy Fax:    Start Date: 7/10/2020

## 2020-07-10 NOTE — LETTER
[facility]  Dermatology  [address] [place] [address]  3rd [place] [address]  Phone: [phone]  Fax: [phone]       July 24, 2020     To whom it may concern:     I am writing to advocate on behalf of my patient, Sean Woodard (1980). Mr. Woodard is followed by the [facility]. He has severe Hamilton stage III hidradenitis suppurativa resulting in persistent, exquisitely painful, scarred, draining sinus tracts in the armpits, groin, and neck.      Mr. Sean Woodard has been treated with numerous  and continues to have persistent severe disease. Most recently he has been treated with topical therapies including clindamycin and benzoyl peroxide, as well as numerous systemic treatments including many rounds of oral antibiotics (doxycycline, clindamycin, and others), metformin, isotretinoin, and adalimumab - none of these agents sufficiently controlled his disease. We then started infliximab infusions which did lead to improvement, however his course was complicated by peripheral neuropathy, necessitating discontinuation of use. There is encouraging emerging data on the use of ustekinumab for hidradenitis suppurativa (see select references, below). I have used this in other patients with refractory, severe disease to good effect, and believe that Mr. Woodard would be a good candidate for this medication. Like adalimumab, the dosing regimen is based on that for inflammatory bowel disease - an initial 520 mg infusion followed by 90 mg every 8 weeks starting 8 weeks after the infusion.     In order to provide adequate care for this patient and treat his severe Hamilton stage III hidradenitis suppurativa, I am writing to request coverage of ustekinumab as medically necessary. Please contact our office ([phone]) with questions.     Sincerely,     Derrick Avina MD   of Dermatology  Department of Dermatology  [facility]  [facility]    Select references:  (1) Carlene EM, Sunday R, Ashlie LM, Kylee A. Effectiveness and safety of ustekinumab in patients with hidradenitis suppurativa using intravenous induction. Dermatol Ther. 2020 Jul 23.    (2) Suzi S, Sanchez D, Felix I, Carmine ME, Obinna M. Effectiveness of ustekinumab for moderate-to-severe hidradenitis suppurativa: a case series. J Dermatolog Treat. 2020 Jun 11:1-4.     (3) Agapito T, Angi T, Alexandro L, Kaushik J, Roberta S, Livan A. Ustekinumab in the treatment of patients with hidradenitis suppurativa: multicenter case series and systematic review. J Dermatolog Treat. 2020 Apr 21;1-6.    (4) Taylor L, Marce S, Calos S, Gareth FG. High-dosage ustekinumab for the treatment of severe hidradenitis suppurativa. Eur J Dermatol. 2019 Dec 1;29(6):659-661.    (5) Josephine CAREY, Tristan E, Mane A, Rudy I, Kaci C, Nakul-Odilon A. Ustekinumab with Intravenous Infusion: Results in Hidradenitis Suppurativa. Dermatology. 2020;236(1):21-24.

## 2020-07-13 NOTE — TELEPHONE ENCOUNTER
Lilly Stokes  You 5 minutes ago (9:21 AM)       Thanks for the update.   Currently working on NEW PA request for patient.     Thank you!     -Lilly

## 2020-07-16 ENCOUNTER — VIRTUAL VISIT (OUTPATIENT)
Dept: DERMATOLOGY | Facility: CLINIC | Age: 40
End: 2020-07-16
Payer: COMMERCIAL

## 2020-07-16 DIAGNOSIS — L73.2 HIDRADENITIS SUPPURATIVA: Primary | ICD-10-CM

## 2020-07-16 ASSESSMENT — PAIN SCALES - GENERAL: PAINLEVEL: MILD PAIN (3)

## 2020-07-16 NOTE — PATIENT INSTRUCTIONS
[facility] Teledermatology Visit    Thank you for allowing us to participate in your care. Your findings, instructions and follow-up plan are as follows:    Continue current regimen with clindamycin and topicals  We will work on getting ustekinumab (Stelara)  Dr. Lj Couch is the dermatology surgeon we usually work with for deroofing procedures    When should I call my doctor?    If you are worsening or not improving, please, contact us or seek urgent care as noted below.     Who should I call with questions (adults)?    [facility] (adult and pediatric): [phone]     [facility] (adult): [phone]    For urgent needs outside of business hours call the [facility] at [phone] and ask for the dermatology resident on call    If this is a medical emergency and you are unable to reach an ER, Call 911      Who should I call with questions (pediatric)?  [facility]- Pediatric Dermatology  Dr. Alisa Roque, Dr. Marisol Romero, Dr. Zuleika Greenberg, Kaya Oneill, JONNY Ocampo, Dr. Kyleigh Carlson & Dr. Derrick Segal  Non Urgent  Nurse Triage Line; [phone]- Emma and Gabrielle CHAUDHARY Care Coordinators   Piedad (/Complex ) [phone]    If you need a prescription refill, please contact your pharmacy. Refills are approved or denied by our Physicians during normal business hours, Monday through Fridays  Per office policy, refills will not be granted if you have not been seen within the past year (or sooner depending on your child's condition)    Scheduling Information:  Pediatric Appointment Scheduling and Call Center [phone]  Radiology Scheduling- [phone]  Sedation Unit Scheduling- [phone]  [place] Scheduling- General [phone]; Pediatric Dermatology [phone]  Main  Services: [phone]  Zambian: [phone]  Grenadian:  [phone]  Roberta/Srinivas/Algerian: [phone]  Preadmission Nursing Department Fax Number: [phone] (Fax all pre-operative paperwork to this number)    For urgent matters arising during evenings, weekends, or holidays that cannot wait for normal business hours please call [phone] and ask for the Dermatology Resident On-Call to be paged.

## 2020-07-16 NOTE — PROGRESS NOTES
[facility]      Impression and Recommendations (Patient Counseled on the Following):  1. Hidradenitis suppurativa: stopped infliximab due of neuropathy; now improving. Will try to obtain coverage for ustekinumab at higher dosing schedule given greater effectiveness in hidradenitis. In the interim, continue clindamycin and topicals. Agree with continued intralesional triamcinolone injections. Will plan for deroofing procedure of persistent nodules in the axillae once able to schedule with derm surg from COVID-19 perspective.      Follow-up:   Follow-up with dermatology in approximately 6 weeks. Earlier for new or changing lesions or rash.      Staff only:    Derrick Avina MD   of Dermatology  Department of Dermatology  [facility]      _____________________________________________________________________________    Dermatology Problem List:  1. Hidradenitis suppurativa - Hamilton stage III involving axillae > neck, groin              - current treatment: clindamycin 300 mg BID, topical clindamycin/benzoyl peroxide, intralesional triamcinolone; trying to get ustekinumab again              - past treatment: doxycycline, other oral antibiotics, isotretinoin, adalimumab, infliximab (neuropathy); ustekinumab denied by insurance    Encounter Date: Jul 16, 2020    CC:   Chief Complaint   Patient presents with     Derm Problem     Hidradenitis suppurativa - Sean states he has been better than he has been in the past. He recently had some ILK injections       History of Present Illness:  I have reviewed the teledermatology information and the nursing intake corresponding to this issue. Sean Woodard is a 40 year old male who presents via teledermatology for follow up hidradenitis.    Hidradenitis itself doing better - had intralesional triamcinolone injections last week which were helpful   - less inflammation   - still some  persistent drainage from one red nodule in each axilla    Neuropathy - has been in hands and feet - has lessened over time   - hasn't started the gabapentin yet    ROS:   General: no fevers, chills, or weight loss  Skin: as per HPI    Physical Examination:  General: Well-appearing, appropriately-developed individual. Alert and oriented in a pleasant mood.  Skin: Focused examination within the teledermatology photograph(s) including axillae was performed.   -erythematous subcutaneous nodules with sinus tracts and comedones in the axillae; no active drainage appreciated; unchanged from prior    Past Medical History:   Patient Active Problem List   Diagnosis     GERD (gastroesophageal reflux disease)     Health Care Home     Hyperlipidemia with target LDL less than 100     Benign essential hypertension     PTSD (post-traumatic stress disorder)     Class 3 severe obesity due to excess calories with serious comorbidity in adult (H)     Diabetes mellitus, type 2 (H)     FER (obstructive sleep apnea)     Hidradenitis suppurativa     Past Medical History:   Diagnosis Date     NO ACTIVE PROBLEMS      Past Surgical History:   Procedure Laterality Date     ORTHOPEDIC SURGERY  1/1/2005    RT knee arthroscopic menisectomy     Thumb Surgery  2009    Mass removal neuroma       Social History:  Patient reports that he quit smoking about 15 years ago. His smoking use included cigarettes. He has a 2.50 pack-year smoking history. He has never used smokeless tobacco. He reports current alcohol use. He reports that he does not use drugs.    Family History:  Family History   Problem Relation Age of Onset     Heart Defect Father         valve replacement     Diabetes Maternal Grandmother      Cancer Maternal Grandfather         stomach     Hypertension Brother      Diabetes Sister      Cerebrovascular Disease Brother 38     Hypertension Brother      Melanoma No family hx of      Skin Cancer No family hx of        Medications:  Current  Outpatient Medications   Medication     atorvastatin (LIPITOR) 20 MG tablet     augmented betamethasone dipropionate (DIPROLENE-AF) 0.05 % ointment     azelastine (ASTELIN) 0.1 % nasal spray     azelastine (OPTIVAR) 0.05 % ophthalmic solution     benzoyl peroxide 5 % external liquid     blood glucose (ACCU-CHEK GUIDE) test strip     cetirizine (ZYRTEC) 10 MG tablet     clindamycin (CLEOCIN) 300 MG capsule     clindamycin (CLINDAMAX) 1 % lotion     diphenhydrAMINE (BENADRYL) 25 MG capsule     fluticasone (FLONASE) 50 MCG/ACT nasal spray     gabapentin (NEURONTIN) 100 MG capsule     losartan-hydrochlorothiazide (HYZAAR) 50-12.5 MG tablet     metFORMIN (GLUCOPHAGE-XR) 500 MG 24 hr tablet     ustekinumab (STELARA) 45 MG/0.5ML SOLN     clindamycin-benzoyl peroxide (BENZACLIN) 1-5 % external gel     doxycycline monohydrate (MONODOX) 100 MG capsule     ustekinumab (STELARA) 90 MG/ML     No current facility-administered medications for this visit.           Allergies   Allergen Reactions     Lisinopril Cough     _____________________________________________________________________________    Teledermatology information:  - Location of patient: [place]  - Patient presented as: return  - Location of teledermatologist:  ([facility] DERMATOLOGY )  - Reason teledermatology is appropriate:  of National Emergency Regarding Coronavirus disease (COVID 19) Outbreak  - Image quality and interpretability: limited  - Physician has received verbal consent for a Video/Photos Visit from the patient? Yes  - In-person dermatology visit recommendation: no  - Date of images: 7/16/2020  - Service start time: 11:03  - Service end time: 11:21  - Date of report: 7/16/2020

## 2020-07-16 NOTE — LETTER
7/16/2020       RE: Sean Woodard  [address] [facility] [address]     Dear Colleague,    Thank you for referring your patient, Sean Woodard, to the [facility]MAT[facility] at [facility]. Please see a copy of my visit note below.    [facility]  [facility]      Impression and Recommendations (Patient Counseled on the Following):  1. Hidradenitis suppurativa: stopped infliximab due of neuropathy; now improving. Will try to obtain coverage for ustekinumab at higher dosing schedule given greater effectiveness in hidradenitis. In the interim, continue clindamycin and topicals. Agree with continued intralesional triamcinolone injections. Will plan for deroofing procedure of persistent nodules in the axillae once able to schedule with derm surg from COVID-19 perspective.      Follow-up:   Follow-up with dermatology in approximately 6 weeks. Earlier for new or changing lesions or rash.      Staff only:    Derrick Avina MD   of Dermatology  Department of Dermatology  [facility]      _____________________________________________________________________________    Dermatology Problem List:  1. Hidradenitis suppurativa - Hamilton stage III involving axillae > neck, groin              - current treatment: clindamycin 300 mg BID, topical clindamycin/benzoyl peroxide, intralesional triamcinolone; trying to get ustekinumab again              - past treatment: doxycycline, other oral antibiotics, isotretinoin, adalimumab, infliximab (neuropathy); ustekinumab denied by insurance    Encounter Date: Jul 16, 2020    CC:   Chief Complaint   Patient presents with     Derm Problem     Hidradenitis suppurativa - Sean states he has been better than he has been in the past. He recently had some ILK injections       History of Present Illness:  I have reviewed the teledermatology information and the nursing intake  corresponding to this issue. Sean Woodard is a 40 year old male who presents via teledermatology for follow up hidradenitis.    Hidradenitis itself doing better - had intralesional triamcinolone injections last week which were helpful   - less inflammation   - still some persistent drainage from one red nodule in each axilla    Neuropathy - has been in hands and feet - has lessened over time   - hasn't started the gabapentin yet    ROS:   General: no fevers, chills, or weight loss  Skin: as per HPI    Physical Examination:  General: Well-appearing, appropriately-developed individual. Alert and oriented in a pleasant mood.  Skin: Focused examination within the teledermatology photograph(s) including axillae was performed.   -erythematous subcutaneous nodules with sinus tracts and comedones in the axillae; no active drainage appreciated; unchanged from prior    Past Medical History:   Patient Active Problem List   Diagnosis     GERD (gastroesophageal reflux disease)     Health Care Home     Hyperlipidemia with target LDL less than 100     Benign essential hypertension     PTSD (post-traumatic stress disorder)     Class 3 severe obesity due to excess calories with serious comorbidity in adult (H)     Diabetes mellitus, type 2 (H)     FER (obstructive sleep apnea)     Hidradenitis suppurativa     Past Medical History:   Diagnosis Date     NO ACTIVE PROBLEMS      Past Surgical History:   Procedure Laterality Date     ORTHOPEDIC SURGERY  1/1/2005    RT knee arthroscopic menisectomy     Thumb Surgery  2009    Mass removal neuroma       Social History:  Patient reports that he quit smoking about 15 years ago. His smoking use included cigarettes. He has a 2.50 pack-year smoking history. He has never used smokeless tobacco. He reports current alcohol use. He reports that he does not use drugs.    Family History:  Family History   Problem Relation Age of Onset     Heart Defect Father         valve replacement     Diabetes  Maternal Grandmother      Cancer Maternal Grandfather         stomach     Hypertension Brother      Diabetes Sister      Cerebrovascular Disease Brother 38     Hypertension Brother      Melanoma No family hx of      Skin Cancer No family hx of        Medications:  Current Outpatient Medications   Medication     atorvastatin (LIPITOR) 20 MG tablet     augmented betamethasone dipropionate (DIPROLENE-AF) 0.05 % ointment     azelastine (ASTELIN) 0.1 % nasal spray     azelastine (OPTIVAR) 0.05 % ophthalmic solution     benzoyl peroxide 5 % external liquid     blood glucose (ACCU-CHEK GUIDE) test strip     cetirizine (ZYRTEC) 10 MG tablet     clindamycin (CLEOCIN) 300 MG capsule     clindamycin (CLINDAMAX) 1 % lotion     diphenhydrAMINE (BENADRYL) 25 MG capsule     fluticasone (FLONASE) 50 MCG/ACT nasal spray     gabapentin (NEURONTIN) 100 MG capsule     losartan-hydrochlorothiazide (HYZAAR) 50-12.5 MG tablet     metFORMIN (GLUCOPHAGE-XR) 500 MG 24 hr tablet     ustekinumab (STELARA) 45 MG/0.5ML SOLN     clindamycin-benzoyl peroxide (BENZACLIN) 1-5 % external gel     doxycycline monohydrate (MONODOX) 100 MG capsule     ustekinumab (STELARA) 90 MG/ML     No current facility-administered medications for this visit.           Allergies   Allergen Reactions     Lisinopril Cough     _____________________________________________________________________________    Teledermatology information:  - Location of patient: [place]  - Patient presented as: return  - Location of teledermatologist:  ([facility] DERMATOLOGY )  - Reason teledermatology is appropriate:  of National Emergency Regarding Coronavirus disease (COVID 19) Outbreak  - Image quality and interpretability: limited  - Physician has received verbal consent for a Video/Photos Visit from the patient? Yes  - In-person dermatology visit recommendation: no  - Date of images: 7/16/2020  - Service start time: 11:03  - Service end time: 11:21  - Date of report: 7/16/2020      Again, thank you for allowing me to participate in the care of your patient.      Sincerely,    Derrick Avina MD

## 2020-07-16 NOTE — NURSING NOTE
Dermatology Rooming Note    Sean Woodard's goals for this visit include:   Chief Complaint   Patient presents with     Derm Problem     Hidradenitis suppurativa - Sean states he has been better than he has been in the past. He recently had some ILK injections     Wendie Dwyer CMA

## 2020-07-17 NOTE — TELEPHONE ENCOUNTER
Prior Authorization Approval    Authorization Effective Date:    Authorization Expiration Date:    Medication: STELARA - [place] APPROVED  Approved Dose/Quantity: 45 MG/0.5ML/1ml  Reference #: [id number]   Insurance Company: [employer] FEDERAL - Phone [phone] Fax [phone]  Expected CoPay:       CoPay Card Available:      Foundation Assistance Needed:    Which Pharmacy is filling the prescription (Not needed for infusion/clinic administered): [facility] - [place] [address]  Pharmacy Notified:    Patient Notified:        Approval Letter:

## 2020-07-21 ENCOUNTER — TELEPHONE (OUTPATIENT)
Dept: DERMATOLOGY | Facility: CLINIC | Age: 40
End: 2020-07-21

## 2020-07-23 NOTE — TELEPHONE ENCOUNTER
Left a voicemail for Sean informing him that we are still waiting to hear from [facility] about insurance coverage for the Stelara infusion and that I am sending him a [employer] message with [facility] information so he can call and schedule. (once the infusion gets scheduled, the team gets prompted to check insurance which may be a faster way to check coverage)

## 2020-07-24 NOTE — TELEPHONE ENCOUNTER
Ryan Pressley,    I wrote the letter of medical necessity - can you send to insurance?    Thanks!  Awais

## 2020-07-27 NOTE — TELEPHONE ENCOUNTER
[place], Ethan You; [facility]  2 minutes ago (9:10 AM)       Ryan Munroe,     Determination is due within 2 weeks. We cannot guarantee we will have an answer by 7/31(patient scheduled), but we can let you know the day before if that will be an issue. Does not meet coverage policy, so we recommend waiting for formal approval.     Thank you!

## 2020-08-11 ENCOUNTER — OFFICE VISIT (OUTPATIENT)
Dept: DERMATOLOGY | Facility: CLINIC | Age: 40
End: 2020-08-11
Payer: COMMERCIAL

## 2020-08-11 VITALS — DIASTOLIC BLOOD PRESSURE: 83 MMHG | RESPIRATION RATE: 20 BRPM | TEMPERATURE: 98.3 F | SYSTOLIC BLOOD PRESSURE: 124 MMHG

## 2020-08-11 DIAGNOSIS — L73.2 HIDRADENITIS SUPPURATIVA: Primary | ICD-10-CM

## 2020-08-11 PROCEDURE — 11900 INJECT SKIN LESIONS </W 7: CPT | Performed by: PHYSICIAN ASSISTANT

## 2020-08-11 NOTE — LETTER
8/11/2020         RE: Sean Woodard  [address] [facility] [id number]        Dear Colleague,    Thank you for referring your patient, Sean Woodard, to the [facility]. Please see a copy of my visit note below.    Sean Woodard is a 40 year old year old male patient here today for recheck HS. He notes that he needs another kenalog injection since his skin is flaring. Patient has no other skin complaints today.  Remainder of the HPI, Meds, PMH, Allergies, FH, and SH was reviewed in chart.    Pertinent Hx:   HS  Past Medical History:   Diagnosis Date     NO ACTIVE PROBLEMS        Past Surgical History:   Procedure Laterality Date     ORTHOPEDIC SURGERY  1/1/2005    RT knee arthroscopic menisectomy     Thumb Surgery  2009    Mass removal neuroma        Family History   Problem Relation Age of Onset     Heart Defect Father         valve replacement     Diabetes Maternal Grandmother      Cancer Maternal Grandfather         stomach     Hypertension Brother      Diabetes Sister      Cerebrovascular Disease Brother 38     Hypertension Brother      Melanoma No family hx of      Skin Cancer No family hx of        Social History     Socioeconomic History     Marital status:      Spouse name: Joycelyn Stokes     Number of children: 1     Years of education: 12+     Highest education level: Not on file   Occupational History     Occupation: Adjucations officer     Employer: [employer]   Social Needs     Financial resource strain: Not on file     Food insecurity     Worry: Not on file     Inability: Not on file     Transportation needs     Medical: Not on file     Non-medical: Not on file   Tobacco Use     Smoking status: Former Smoker     Packs/day: 0.50     Years: 5.00     Pack years: 2.50     Types: Cigarettes     Last attempt to quit: 1/1/2005     Years since quitting: 15.6     Smokeless tobacco: Never Used   Substance and Sexual Activity     Alcohol use: Yes      Comment: Social. 1 drink per month     Drug use: No     Sexual activity: Yes     Partners: Female   Lifestyle     Physical activity     Days per week: Not on file     Minutes per session: Not on file     Stress: Not on file   Relationships     Social connections     Talks on phone: Not on file     Gets together: Not on file     Attends Taoism service: Not on file     Active member of club or organization: Not on file     Attends meetings of clubs or organizations: Not on file     Relationship status: Not on file     Intimate partner violence     Fear of current or ex partner: Not on file     Emotionally abused: Not on file     Physically abused: Not on file     Forced sexual activity: Not on file   Other Topics Concern     Parent/sibling w/ CABG, MI or angioplasty before 65F 55M? No   Social History Narrative    March 26, 2019    ENVIRONMENTAL HISTORY: [place] The home is heated with a forced air. They do have central air conditioning. The patient's bedroom is furnished with carpeting in bedroom. No pets inside the house. There is no history of cockroach or mice infestation. There are no smokers in the house.  The house does not have a damp basement.        Outpatient Encounter Medications as of 8/11/2020   Medication Sig Dispense Refill     atorvastatin (LIPITOR) 20 MG tablet Take 1 tablet (20 mg) by mouth daily 90 tablet 3     augmented betamethasone dipropionate (DIPROLENE-AF) 0.05 % ointment Apply sparingly to affected area twice daily as needed.  Do not apply to face. 45 g 1     azelastine (ASTELIN) 0.1 % nasal spray Spray 2 sprays into both nostrils 2 times daily as needed for rhinitis 30 mL 3     azelastine (OPTIVAR) 0.05 % ophthalmic solution Apply 1 drop to eye 2 times daily 6 mL 3     benzoyl peroxide 5 % external liquid Use daily as directed 226 g 11     blood glucose (ACCU-CHEK GUIDE) test strip Use to test blood sugar 2 times daily 100 strip 11      cetirizine (ZYRTEC) 10 MG tablet Take 10 mg by mouth daily       clindamycin (CLEOCIN) 300 MG capsule Take 1 capsule (300 mg) by mouth 2 times daily 60 capsule 3     clindamycin (CLINDAMAX) 1 % lotion Apply to AA BID 60 mL 11     diphenhydrAMINE (BENADRYL) 25 MG capsule Take 25 mg by mouth every 6 hours as needed for itching or allergies       fluticasone (FLONASE) 50 MCG/ACT nasal spray Spray 2 sprays into both nostrils daily 16 g 3     gabapentin (NEURONTIN) 100 MG capsule Take 1 capsule (100 mg) by mouth At Bedtime 30 capsule 1     losartan-hydrochlorothiazide (HYZAAR) 50-12.5 MG tablet TAKE ONE TABLET BY MOUTH ONCE DAILY 90 tablet 0     metFORMIN (GLUCOPHAGE-XR) 500 MG 24 hr tablet Take 2 tablets (1,000 mg) by mouth 2 times daily (with meals) 360 tablet 1     ustekinumab (STELARA) 45 MG/0.5ML SOLN Inject 1 mL (90 mg) Subcutaneous every 2 months 1 mL 3     clindamycin-benzoyl peroxide (BENZACLIN) 1-5 % external gel Apply twice daily to affected area on skin. (Patient not taking: Reported on 7/3/2020) 50 g 6     doxycycline monohydrate (MONODOX) 100 MG capsule Take 1 capsule (100 mg) by mouth 2 times daily (with meals) (Patient not taking: Reported on 7/3/2020) 180 capsule 1     ustekinumab (STELARA) 90 MG/ML Inject 1 mL (90 mg) Subcutaneous every 3 months (Patient not taking: Reported on 7/3/2020) 1 mL 0     No facility-administered encounter medications on file as of 8/11/2020.              Review Of Systems  Skin: As above  Eyes: negative  Ears/Nose/Throat: negative  Respiratory: No shortness of breath, dyspnea on exertion, cough, or hemoptysis  Cardiovascular: negative  Gastrointestinal: negative  Genitourinary: negative  Musculoskeletal: negative  Neurologic: negative  Psychiatric: negative  Hematologic/Lymphatic/Immunologic: negative  Endocrine: negative      O:   NAD, WDWN, Alert & Oriented, Mood & Affect wnl, Vitals stable   Here today alone   /83 (BP Location: Left arm, Patient Position: Sitting,  Cuff Size: Adult Regular)   Temp 98.3  F (36.8  C) (Tympanic)   Resp 20    General appearance normal   Vitals stable   Alert, oriented and in no acute distress     Inflammatory nodules on bilateral axilla       Eyes: Conjunctivae/lids:Normal     ENT: Lips, buccal mucosa, tongue: normal    MSK:Normal    Cardiovascular: peripheral edema none        Neuro/Psych: Orientation:Alert and Orientedx3 ; Mood/Affect:normal     A/P:  1. Hidradenitis Suppurativa   IL TAC: PGACAC discussed.  Risks including but not limited to injection site reaction, bruising, no resolution.  All questions answered and entertained to patient s satisfaction.  Informed consent obtained.  IL TAC in concentration of 10mg/ml was injected ID to HS.  Total injected was  1.5 ml.  Patient tolerated without complications and given wound care instructions, including not to move product around.  Return in 4 weeks for follow-up and possible additional IL TAC.        Again, thank you for allowing me to participate in the care of your patient.        Sincerely,        Sabi Christie PA-C

## 2020-08-11 NOTE — PROGRESS NOTES
Sean Woodard is a 40 year old year old male patient here today for recheck HS. He notes that he needs another kenalog injection since his skin is flaring. Patient has no other skin complaints today.  Remainder of the HPI, Meds, PMH, Allergies, FH, and SH was reviewed in chart.    Pertinent Hx:   HS  Past Medical History:   Diagnosis Date     NO ACTIVE PROBLEMS        Past Surgical History:   Procedure Laterality Date     ORTHOPEDIC SURGERY  1/1/2005    RT knee arthroscopic menisectomy     Thumb Surgery  2009    Mass removal neuroma        Family History   Problem Relation Age of Onset     Heart Defect Father         valve replacement     Diabetes Maternal Grandmother      Cancer Maternal Grandfather         stomach     Hypertension Brother      Diabetes Sister      Cerebrovascular Disease Brother 38     Hypertension Brother      Melanoma No family hx of      Skin Cancer No family hx of        Social History     Socioeconomic History     Marital status:      Spouse name: Joycelyn Stokes     Number of children: 1     Years of education: 12+     Highest education level: Not on file   Occupational History     Occupation: Adjucations officer     Employer: [employer]   Social Needs     Financial resource strain: Not on file     Food insecurity     Worry: Not on file     Inability: Not on file     Transportation needs     Medical: Not on file     Non-medical: Not on file   Tobacco Use     Smoking status: Former Smoker     Packs/day: 0.50     Years: 5.00     Pack years: 2.50     Types: Cigarettes     Last attempt to quit: 1/1/2005     Years since quitting: 15.6     Smokeless tobacco: Never Used   Substance and Sexual Activity     Alcohol use: Yes     Comment: Social. 1 drink per month     Drug use: No     Sexual activity: Yes     Partners: Female   Lifestyle     Physical activity     Days per week: Not on file     Minutes per session: Not on file     Stress: Not on file   Relationships      Social connections     Talks on phone: Not on file     Gets together: Not on file     Attends Sikhism service: Not on file     Active member of club or organization: Not on file     Attends meetings of clubs or organizations: Not on file     Relationship status: Not on file     Intimate partner violence     Fear of current or ex partner: Not on file     Emotionally abused: Not on file     Physically abused: Not on file     Forced sexual activity: Not on file   Other Topics Concern     Parent/sibling w/ CABG, MI or angioplasty before 65F 55M? No   Social History Narrative    March 26, 2019    ENVIRONMENTAL HISTORY: The family [place]. The home is heated with a forced air. They do have central air conditioning. The patient's bedroom is furnished with carpeting in bedroom. No pets inside the house. There is no history of cockroach or mice infestation. There are no smokers in the house.  The house does not have a damp basement.        Outpatient Encounter Medications as of 8/11/2020   Medication Sig Dispense Refill     atorvastatin (LIPITOR) 20 MG tablet Take 1 tablet (20 mg) by mouth daily 90 tablet 3     augmented betamethasone dipropionate (DIPROLENE-AF) 0.05 % ointment Apply sparingly to affected area twice daily as needed.  Do not apply to face. 45 g 1     azelastine (ASTELIN) 0.1 % nasal spray Spray 2 sprays into both nostrils 2 times daily as needed for rhinitis 30 mL 3     azelastine (OPTIVAR) 0.05 % ophthalmic solution Apply 1 drop to eye 2 times daily 6 mL 3     benzoyl peroxide 5 % external liquid Use daily as directed 226 g 11     blood glucose (ACCU-CHEK GUIDE) test strip Use to test blood sugar 2 times daily 100 strip 11     cetirizine (ZYRTEC) 10 MG tablet Take 10 mg by mouth daily       clindamycin (CLEOCIN) 300 MG capsule Take 1 capsule (300 mg) by mouth 2 times daily 60 capsule 3     clindamycin (CLINDAMAX) 1 % lotion Apply to AA BID 60 mL 11      diphenhydrAMINE (BENADRYL) 25 MG capsule Take 25 mg by mouth every 6 hours as needed for itching or allergies       fluticasone (FLONASE) 50 MCG/ACT nasal spray Spray 2 sprays into both nostrils daily 16 g 3     gabapentin (NEURONTIN) 100 MG capsule Take 1 capsule (100 mg) by mouth At Bedtime 30 capsule 1     losartan-hydrochlorothiazide (HYZAAR) 50-12.5 MG tablet TAKE ONE TABLET BY MOUTH ONCE DAILY 90 tablet 0     metFORMIN (GLUCOPHAGE-XR) 500 MG 24 hr tablet Take 2 tablets (1,000 mg) by mouth 2 times daily (with meals) 360 tablet 1     ustekinumab (STELARA) 45 MG/0.5ML SOLN Inject 1 mL (90 mg) Subcutaneous every 2 months 1 mL 3     clindamycin-benzoyl peroxide (BENZACLIN) 1-5 % external gel Apply twice daily to affected area on skin. (Patient not taking: Reported on 7/3/2020) 50 g 6     doxycycline monohydrate (MONODOX) 100 MG capsule Take 1 capsule (100 mg) by mouth 2 times daily (with meals) (Patient not taking: Reported on 7/3/2020) 180 capsule 1     ustekinumab (STELARA) 90 MG/ML Inject 1 mL (90 mg) Subcutaneous every 3 months (Patient not taking: Reported on 7/3/2020) 1 mL 0     No facility-administered encounter medications on file as of 8/11/2020.              Review Of Systems  Skin: As above  Eyes: negative  Ears/Nose/Throat: negative  Respiratory: No shortness of breath, dyspnea on exertion, cough, or hemoptysis  Cardiovascular: negative  Gastrointestinal: negative  Genitourinary: negative  Musculoskeletal: negative  Neurologic: negative  Psychiatric: negative  Hematologic/Lymphatic/Immunologic: negative  Endocrine: negative      O:   NAD, WDWN, Alert & Oriented, Mood & Affect wnl, Vitals stable   Here today alone   /83 (BP Location: Left arm, Patient Position: Sitting, Cuff Size: Adult Regular)   Temp 98.3  F (36.8  C) (Tympanic)   Resp 20    General appearance normal   Vitals stable   Alert, oriented and in no acute distress     Inflammatory nodules on bilateral axilla       Eyes:  Conjunctivae/lids:Normal     ENT: Lips, buccal mucosa, tongue: normal    MSK:Normal    Cardiovascular: peripheral edema none        Neuro/Psych: Orientation:Alert and Orientedx3 ; Mood/Affect:normal     A/P:  1. Hidradenitis Suppurativa   IL TAC: PGACAC discussed.  Risks including but not limited to injection site reaction, bruising, no resolution.  All questions answered and entertained to patient s satisfaction.  Informed consent obtained.  IL TAC in concentration of 10mg/ml was injected ID to HS.  Total injected was  1.5 ml.  Patient tolerated without complications and given wound care instructions, including not to move product around.  Return in 4 weeks for follow-up and possible additional IL TAC.

## 2020-08-18 DIAGNOSIS — L73.2 HIDRADENITIS SUPPURATIVA: Primary | ICD-10-CM

## 2020-08-19 DIAGNOSIS — L73.2 HIDRADENITIS SUPPURATIVA: Primary | ICD-10-CM

## 2020-09-08 ENCOUNTER — OFFICE VISIT (OUTPATIENT)
Dept: FAMILY MEDICINE | Facility: CLINIC | Age: 40
End: 2020-09-08
Payer: COMMERCIAL

## 2020-09-08 VITALS
HEART RATE: 79 BPM | WEIGHT: 275.38 LBS | OXYGEN SATURATION: 99 % | BODY MASS INDEX: 41.74 KG/M2 | HEIGHT: 68 IN | RESPIRATION RATE: 18 BRPM | DIASTOLIC BLOOD PRESSURE: 85 MMHG | SYSTOLIC BLOOD PRESSURE: 122 MMHG | TEMPERATURE: 98.4 F

## 2020-09-08 DIAGNOSIS — R04.0 RIGHT-SIDED EPISTAXIS: Primary | ICD-10-CM

## 2020-09-08 LAB
ERYTHROCYTE [DISTWIDTH] IN BLOOD BY AUTOMATED COUNT: 12.2 % (ref 10–15)
HCT VFR BLD AUTO: 44.4 % (ref 40–53)
HGB BLD-MCNC: 15.2 G/DL (ref 13.3–17.7)
INR PPP: 0.98 (ref 0.86–1.14)
MCH RBC QN AUTO: 30.6 PG (ref 26.5–33)
MCHC RBC AUTO-ENTMCNC: 34.2 G/DL (ref 31.5–36.5)
MCV RBC AUTO: 90 FL (ref 78–100)
PLATELET # BLD AUTO: 219 10E9/L (ref 150–450)
RBC # BLD AUTO: 4.96 10E12/L (ref 4.4–5.9)
WBC # BLD AUTO: 6.8 10E9/L (ref 4–11)

## 2020-09-08 PROCEDURE — 85027 COMPLETE CBC AUTOMATED: CPT | Performed by: FAMILY MEDICINE

## 2020-09-08 PROCEDURE — 99214 OFFICE O/P EST MOD 30 MIN: CPT | Performed by: FAMILY MEDICINE

## 2020-09-08 PROCEDURE — 36415 COLL VENOUS BLD VENIPUNCTURE: CPT | Performed by: FAMILY MEDICINE

## 2020-09-08 PROCEDURE — 85610 PROTHROMBIN TIME: CPT | Performed by: FAMILY MEDICINE

## 2020-09-08 ASSESSMENT — MIFFLIN-ST. JEOR: SCORE: 2138.59

## 2020-09-08 NOTE — PROGRESS NOTES
Subjective     Sean Woodard is a 40 year old male who presents to clinic today for the following health issues:    HPI       Concern - NOSE BLEEDS  Onset: a week ago  Description: 2-4 nosebleeds daily; they've occurred while patient is showering, or working, random bleeds.  Intensity: moderate  Progression of Symptoms:  same  Accompanying Signs & Symptoms: right nostril only; no large blood clots  Previous history of similar problem: none  Precipitating factors:        Worsened by: nothing  Alleviating factors:        Improved by: putting tissue up right side of nostril stops the bleeding.  Therapies tried and outcome:  none     Patient denies blood dyscrasias or bleeding tendencies.  Denies easy bruising.  Denies nose trauma.    Patient Active Problem List   Diagnosis     GERD (gastroesophageal reflux disease)     Health Care Home     Hyperlipidemia with target LDL less than 100     Benign essential hypertension     PTSD (post-traumatic stress disorder)     Class 3 severe obesity due to excess calories with serious comorbidity in adult (H)     Diabetes mellitus, type 2 (H)     FER (obstructive sleep apnea)     Hidradenitis suppurativa     Past Surgical History:   Procedure Laterality Date     ORTHOPEDIC SURGERY  1/1/2005    RT knee arthroscopic menisectomy     Thumb Surgery  2009    Mass removal neuroma       Social History     Tobacco Use     Smoking status: Former Smoker     Packs/day: 0.50     Years: 5.00     Pack years: 2.50     Types: Cigarettes     Last attempt to quit: 1/1/2005     Years since quitting: 15.6     Smokeless tobacco: Never Used   Substance Use Topics     Alcohol use: Yes     Comment: Social. 1 drink per month     Family History   Problem Relation Age of Onset     Heart Defect Father         valve replacement     Diabetes Maternal Grandmother      Cancer Maternal Grandfather         stomach     Hypertension Brother      Diabetes Sister      Cerebrovascular Disease Brother 38     Hypertension  Brother      Melanoma No family hx of      Skin Cancer No family hx of          Current Outpatient Medications   Medication Sig Dispense Refill     atorvastatin (LIPITOR) 20 MG tablet Take 1 tablet (20 mg) by mouth daily 90 tablet 3     augmented betamethasone dipropionate (DIPROLENE-AF) 0.05 % ointment Apply sparingly to affected area twice daily as needed.  Do not apply to face. 45 g 1     benzoyl peroxide 5 % external liquid Use daily as directed 226 g 11     blood glucose (ACCU-CHEK GUIDE) test strip Use to test blood sugar 2 times daily 100 strip 11     clindamycin (CLEOCIN) 300 MG capsule Take 1 capsule (300 mg) by mouth 2 times daily 60 capsule 3     clindamycin (CLINDAMAX) 1 % lotion Apply to AA BID 60 mL 11     losartan-hydrochlorothiazide (HYZAAR) 50-12.5 MG tablet TAKE ONE TABLET BY MOUTH ONCE DAILY 90 tablet 0     metFORMIN (GLUCOPHAGE-XR) 500 MG 24 hr tablet Take 2 tablets (1,000 mg) by mouth 2 times daily (with meals) 360 tablet 1     ustekinumab (STELARA) 45 MG/0.5ML SOLN Inject 1 mL (90 mg) Subcutaneous every 2 months 1 mL 3     ustekinumab (STELARA) 90 MG/ML Inject 1 mL (90 mg) Subcutaneous every 2 months 1 mL 4     azelastine (ASTELIN) 0.1 % nasal spray Spray 2 sprays into both nostrils 2 times daily as needed for rhinitis (Patient not taking: Reported on 9/8/2020) 30 mL 3     azelastine (OPTIVAR) 0.05 % ophthalmic solution Apply 1 drop to eye 2 times daily (Patient not taking: Reported on 9/8/2020) 6 mL 3     cetirizine (ZYRTEC) 10 MG tablet Take 10 mg by mouth daily       clindamycin-benzoyl peroxide (BENZACLIN) 1-5 % external gel Apply twice daily to affected area on skin. (Patient not taking: Reported on 7/3/2020) 50 g 6     diphenhydrAMINE (BENADRYL) 25 MG capsule Take 25 mg by mouth every 6 hours as needed for itching or allergies       fluticasone (FLONASE) 50 MCG/ACT nasal spray Spray 2 sprays into both nostrils daily (Patient not taking: Reported on 9/8/2020) 16 g 3     gabapentin  "(NEURONTIN) 100 MG capsule Take 1 capsule (100 mg) by mouth At Bedtime (Patient not taking: Reported on 9/8/2020) 30 capsule 1     Allergies   Allergen Reactions     Lisinopril Cough       Review of Systems   C: NEGATIVE for fever, chills, change in weight  I: NEGATIVE for worrisome rashes, moles or lesions  E: NEGATIVE for vision changes or irritation  ENT: NEG for ear or throat symptoms  R: NEGATIVE for significant cough or SOB  CV: NEGATIVE for chest pain, palpitations or peripheral edema  GI: NEGATIVE for nausea, abdominal pain, heartburn, or change in bowel habits  : NEGATIVE for frequency, dysuria, or hematuria  M: NEGATIVE for significant arthralgias or myalgia  N: NEGATIVE for weakness, dizziness or paresthesias  E: NEGATIVE for temperature intolerance, skin/hair changes  H: NEGATIVE for bleeding problems      Objective    /85   Pulse 79   Temp 98.4  F (36.9  C) (Tympanic)   Resp 18   Ht 1.727 m (5' 8\")   Wt 124.9 kg (275 lb 6 oz)   SpO2 99%   BMI 41.87 kg/m    Body mass index is 41.87 kg/m .  Physical Exam   GENERAL: alert and no distress  EYES: pink conjunctivae  HENT: Ears - tympanic membrane intact and nonerythematous; Nose - pink slightlyswollen turbinates, no sinus tenderness bilaterally no active bleeding bilaterally; throat nonerythematous  SKIN:no rashes/echymosis    Results for orders placed or performed in visit on 09/08/20 (from the past 24 hour(s))   CBC with platelets   Result Value Ref Range    WBC 6.8 4.0 - 11.0 10e9/L    RBC Count 4.96 4.4 - 5.9 10e12/L    Hemoglobin 15.2 13.3 - 17.7 g/dL    Hematocrit 44.4 40.0 - 53.0 %    MCV 90 78 - 100 fl    MCH 30.6 26.5 - 33.0 pg    MCHC 34.2 31.5 - 36.5 g/dL    RDW 12.2 10.0 - 15.0 %    Platelet Count 219 150 - 450 10e9/L   INR   Result Value Ref Range    INR 0.98 0.86 - 1.14           Assessment & Plan     Sean was seen today for epistaxis and health maintenance.    Diagnoses and all orders for this visit:    Right-sided epistaxis  - "     CBC with platelets  -     INR  -     OTOLARYNGOLOGY REFERRAL      Non-acute presentation today.  Patient has had recurrent epistaxis multiple times a day daily for the last 2 weeks.  Resolved with pressure.  R/o blood dyscrasias through blood tests.  Discussed could have occult internal nares abnormality. Hence ENT consult. Patient concurred.  Advised primary treatment of epistaxis.  Return precautions discussed and given to patient.  Reasons to go to ER discussed in detail with patient.       Patient Instructions   You will be contacted in 1-2 days for results of your lab tests.    Schedule ENT consult for further evaluation of the internal structures of your nose for sources of bleeding.  Patient Education     Nosebleed (Adult)    Bleeding from the nose most commonly occurs because of injury or drying and cracking of the inner lining of the nose. Most nosebleeds are because of dry air or nose-picking. They can occur during a common cold or an allergy attack. They can also occur on a very hot day, or from dry air in the winter.  If the bleeding site is found, it may be cauterized. This means it is treated to cause a blood clot to form. This may be done with a chemical, heat, or electricity. If the bleeding continues after the site is cauterized, or if the site cannot be found, packing may be put in your nose. This is to apply pressure and stop the bleeding. The packing may be made of gauze or sponge. A small balloon catheter is sometimes used. These must be removed by your healthcare provider. Some types of packing dissolve on their own. If you are taking blood thinning (anticoagulant) medicine, you may have a blood test.  Home care    If packing was put in your nose, unless told otherwise, do not pull on it or try to remove it yourself. You will be given an appointment to have it removed. You may also have been given antibiotics to prevent a sinus infection. If so, finish all of the medicine.    Don't blow  your nose for 12 hours after the bleeding stops. This will allow a strong blood clot to form. Don't pick your nose. This may restart bleeding.    Don't drink alcohol or hot liquids for the next 2 days. Alcohol or hot liquids in your mouth can dilate blood vessels in your nose. This can cause bleeding to start again.    Don't take ibuprofen, naproxen, or medicines that contain aspirin. These thin the blood and may cause your nose to bleed. You may take acetaminophen for pain, unless another pain medicine was prescribed.    If the bleeding starts again, sit up and lean forward to prevent swallowing blood. Pinch your nose tightly on both sides, as shown above, for 10 to 15 minutes. Time yourself. Don t release the pressure on your nose until 10 minutes is up. If bleeding does not stop, continue to pinch your nose and call your healthcare provider or return to this facility.    If you have a cold, allergies, or dry nasal membranes, lubricate the nasal passages. Apply a small amount of petroleum jelly inside the nose with a cotton swab twice a day (morning and night).    Don't overheat your home. This can dry the air and make your condition worse.    Put a humidifier in the room where you sleep. This will add moisture to the air. Clean the humidifier as advised by the .    Use a saline nasal spray to keep nasal passages moist.    Don't pick your nose. Keep fingernails trimmed to decrease risk of bleeds.    Don't smoke.  Follow-up care  Follow up with your healthcare provider, or as advised. Nasal packing should be rechecked or removed within 2 to 3 days.  When to seek medical advice  Call your healthcare provider right away if any of these occur.    You have another nosebleed that you cannot control    Dizziness, weakness, or fainting    You become tired or confused    Fever of 100.4 F (38 C) or higher, or as directed by your healthcare provider    Headache    Sinus or facial pain    Shortness of breath or  trouble breathing  Date Last Reviewed: 11/1/2017 2000-2019 [employer]. [address], [place] [address]. All rights reserved. This information is not intended as a substitute for professional medical care. Always follow your healthcare professional's instructions.               Return in about 1 week (around 9/15/2020) for ENT consult.    Bony Goldman MD  [facility]

## 2020-09-08 NOTE — PATIENT INSTRUCTIONS
You will be contacted in 1-2 days for results of your lab tests.    Schedule ENT consult for further evaluation of the internal structures of your nose for sources of bleeding.  Patient Education     Nosebleed (Adult)    Bleeding from the nose most commonly occurs because of injury or drying and cracking of the inner lining of the nose. Most nosebleeds are because of dry air or nose-picking. They can occur during a common cold or an allergy attack. They can also occur on a very hot day, or from dry air in the winter.  If the bleeding site is found, it may be cauterized. This means it is treated to cause a blood clot to form. This may be done with a chemical, heat, or electricity. If the bleeding continues after the site is cauterized, or if the site cannot be found, packing may be put in your nose. This is to apply pressure and stop the bleeding. The packing may be made of gauze or sponge. A small balloon catheter is sometimes used. These must be removed by your healthcare provider. Some types of packing dissolve on their own. If you are taking blood thinning (anticoagulant) medicine, you may have a blood test.  Home care    If packing was put in your nose, unless told otherwise, do not pull on it or try to remove it yourself. You will be given an appointment to have it removed. You may also have been given antibiotics to prevent a sinus infection. If so, finish all of the medicine.    Don't blow your nose for 12 hours after the bleeding stops. This will allow a strong blood clot to form. Don't pick your nose. This may restart bleeding.    Don't drink alcohol or hot liquids for the next 2 days. Alcohol or hot liquids in your mouth can dilate blood vessels in your nose. This can cause bleeding to start again.    Don't take ibuprofen, naproxen, or medicines that contain aspirin. These thin the blood and may cause your nose to bleed. You may take acetaminophen for pain, unless another pain medicine was  prescribed.    If the bleeding starts again, sit up and lean forward to prevent swallowing blood. Pinch your nose tightly on both sides, as shown above, for 10 to 15 minutes. Time yourself. Don t release the pressure on your nose until 10 minutes is up. If bleeding does not stop, continue to pinch your nose and call your healthcare provider or return to this facility.    If you have a cold, allergies, or dry nasal membranes, lubricate the nasal passages. Apply a small amount of petroleum jelly inside the nose with a cotton swab twice a day (morning and night).    Don't overheat your home. This can dry the air and make your condition worse.    Put a humidifier in the room where you sleep. This will add moisture to the air. Clean the humidifier as advised by the .    Use a saline nasal spray to keep nasal passages moist.    Don't pick your nose. Keep fingernails trimmed to decrease risk of bleeds.    Don't smoke.  Follow-up care  Follow up with your healthcare provider, or as advised. Nasal packing should be rechecked or removed within 2 to 3 days.  When to seek medical advice  Call your healthcare provider right away if any of these occur.    You have another nosebleed that you cannot control    Dizziness, weakness, or fainting    You become tired or confused    Fever of 100.4 F (38 C) or higher, or as directed by your healthcare provider    Headache    Sinus or facial pain    Shortness of breath or trouble breathing  Date Last Reviewed: 11/1/2017 2000-2019 [employer]. [address] [place] [address], [place] [address]. All rights reserved. This information is not intended as a substitute for professional medical care. Always follow your healthcare professional's instructions.

## 2020-09-16 ENCOUNTER — OFFICE VISIT (OUTPATIENT)
Dept: OTOLARYNGOLOGY | Facility: CLINIC | Age: 40
End: 2020-09-16
Attending: FAMILY MEDICINE
Payer: COMMERCIAL

## 2020-09-16 VITALS — SYSTOLIC BLOOD PRESSURE: 110 MMHG | TEMPERATURE: 98.8 F | HEART RATE: 84 BPM | DIASTOLIC BLOOD PRESSURE: 71 MMHG

## 2020-09-16 DIAGNOSIS — R04.0 RECURRENT EPISTAXIS: Primary | ICD-10-CM

## 2020-09-16 PROCEDURE — 99243 OFF/OP CNSLTJ NEW/EST LOW 30: CPT | Mod: 25 | Performed by: OTOLARYNGOLOGY

## 2020-09-16 PROCEDURE — 30901 CONTROL OF NOSEBLEED: CPT | Mod: RT | Performed by: OTOLARYNGOLOGY

## 2020-09-16 NOTE — NURSING NOTE
"Chief Complaint   Patient presents with     Epistaxis     started about 2 weeks 2 to 4 times a day random right side       Initial /71 (BP Location: Left arm, Patient Position: Sitting)   Pulse 84   Temp 98.8  F (37.1  C) (Tympanic)  Estimated body mass index is 41.87 kg/m  as calculated from the following:    Height as of 9/8/20: 1.727 m (5' 8\").    Weight as of 9/8/20: 124.9 kg (275 lb 6 oz).  BP completed using cuff size: large  Medications and allergies reviewed.    SMA NADYA      "

## 2020-09-16 NOTE — LETTER
9/16/2020         RE: Sean Woodard  [address] [id number]        Dear Colleague,    Thank you for referring your patient, Sean Woodard, to the [facility]. Please see a copy of my visit note below.    Chief Complaint   Patient presents with     Epistaxis     started about 2 weeks 2 to 4 times a day random right side     History of Present Illness   Sean Woodard is a 40 year old male presents for nasal evaluation.  I am seeing this patient in consultation for right epistaxis at the request of the provider Dr. Goldman.  The patient presents with approximately 2 week history of epistaxis. It occurs on the right side. It usually only comes out the front of the nose, but it has ran down the back of the throat at times. The bleeding occurs approximately several times per day, couple times per week. The patient can get the bleeding to stop by putting pressure. The patient has never gone to the emergency department or required a blood transfusion due to nose bleeding.  No previous history of nasal packing.  The patient has no personal or family history of bleeding disorders. The patient takes no blood-thinning medication.     Past Medical History  Patient Active Problem List   Diagnosis     GERD (gastroesophageal reflux disease)     Health Care Home     Hyperlipidemia with target LDL less than 100     Benign essential hypertension     PTSD (post-traumatic stress disorder)     Class 3 severe obesity due to excess calories with serious comorbidity in adult (H)     Diabetes mellitus, type 2 (H)     FER (obstructive sleep apnea)     Hidradenitis suppurativa     Current Medications     Current Outpatient Medications:      atorvastatin (LIPITOR) 20 MG tablet, Take 1 tablet (20 mg) by mouth daily, Disp: 90 tablet, Rfl: 3     augmented betamethasone dipropionate (DIPROLENE-AF) 0.05 % ointment, Apply sparingly to affected area twice daily as needed.  Do not apply to face., Disp: 45 g, Rfl: 1      benzoyl peroxide 5 % external liquid, Use daily as directed, Disp: 226 g, Rfl: 11     blood glucose (ACCU-CHEK GUIDE) test strip, Use to test blood sugar 2 times daily, Disp: 100 strip, Rfl: 11     clindamycin (CLEOCIN) 300 MG capsule, Take 1 capsule (300 mg) by mouth 2 times daily, Disp: 60 capsule, Rfl: 3     clindamycin (CLINDAMAX) 1 % lotion, Apply to AA BID, Disp: 60 mL, Rfl: 11     losartan-hydrochlorothiazide (HYZAAR) 50-12.5 MG tablet, TAKE ONE TABLET BY MOUTH ONCE DAILY, Disp: 90 tablet, Rfl: 0     metFORMIN (GLUCOPHAGE-XR) 500 MG 24 hr tablet, Take 2 tablets (1,000 mg) by mouth 2 times daily (with meals), Disp: 360 tablet, Rfl: 1     ustekinumab (STELARA) 45 MG/0.5ML SOLN, Inject 1 mL (90 mg) Subcutaneous every 2 months, Disp: 1 mL, Rfl: 3     ustekinumab (STELARA) 90 MG/ML, Inject 1 mL (90 mg) Subcutaneous every 2 months, Disp: 1 mL, Rfl: 4     azelastine (ASTELIN) 0.1 % nasal spray, Spray 2 sprays into both nostrils 2 times daily as needed for rhinitis (Patient not taking: Reported on 9/8/2020), Disp: 30 mL, Rfl: 3     azelastine (OPTIVAR) 0.05 % ophthalmic solution, Apply 1 drop to eye 2 times daily (Patient not taking: Reported on 9/8/2020), Disp: 6 mL, Rfl: 3     cetirizine (ZYRTEC) 10 MG tablet, Take 10 mg by mouth daily, Disp: , Rfl:      clindamycin-benzoyl peroxide (BENZACLIN) 1-5 % external gel, Apply twice daily to affected area on skin. (Patient not taking: Reported on 7/3/2020), Disp: 50 g, Rfl: 6     diphenhydrAMINE (BENADRYL) 25 MG capsule, Take 25 mg by mouth every 6 hours as needed for itching or allergies, Disp: , Rfl:      fluticasone (FLONASE) 50 MCG/ACT nasal spray, Spray 2 sprays into both nostrils daily (Patient not taking: Reported on 9/8/2020), Disp: 16 g, Rfl: 3     gabapentin (NEURONTIN) 100 MG capsule, Take 1 capsule (100 mg) by mouth At Bedtime (Patient not taking: Reported on 9/8/2020), Disp: 30 capsule, Rfl: 1    Allergies  Allergies   Allergen Reactions     Lisinopril Cough        Social History   Social History     Socioeconomic History     Marital status:      Spouse name: Joycelyn Stokes     Number of children: 1     Years of education: 12+     Highest education level: Not on file   Occupational History     Occupation: Adjucations officer     Employer: [employer]   Social Needs     Financial resource strain: Not on file     Food insecurity     Worry: Not on file     Inability: Not on file     Transportation needs     Medical: Not on file     Non-medical: Not on file   Tobacco Use     Smoking status: Former Smoker     Packs/day: 0.50     Years: 5.00     Pack years: 2.50     Types: Cigarettes     Last attempt to quit: 1/1/2005     Years since quitting: 15.7     Smokeless tobacco: Never Used   Substance and Sexual Activity     Alcohol use: Yes     Comment: Social. 1 drink per month     Drug use: No     Sexual activity: Yes     Partners: Female   Lifestyle     Physical activity     Days per week: Not on file     Minutes per session: Not on file     Stress: Not on file   Relationships     Social connections     Talks on phone: Not on file     Gets together: Not on file     Attends Yazidi service: Not on file     Active member of club or organization: Not on file     Attends meetings of clubs or organizations: Not on file     Relationship status: Not on file     Intimate partner violence     Fear of current or ex partner: Not on file     Emotionally abused: Not on file     Physically abused: Not on file     Forced sexual activity: Not on file   Other Topics Concern     Parent/sibling w/ CABG, MI or angioplasty before 65F 55M? No   Social History Narrative    March 26, 2019    ENVIRONMENTAL HISTORY: The family [place] year [place]. The home is heated with a forced air. They do have central air conditioning. The patient's bedroom is furnished with carpeting in bedroom. No pets inside the house. There is no history of cockroach  or mice infestation. There are no smokers in the house.  The house does not have a damp basement.        Family History  Family History   Problem Relation Age of Onset     Heart Defect Father         valve replacement     Diabetes Maternal Grandmother      Cancer Maternal Grandfather         stomach     Hypertension Brother      Diabetes Sister      Cerebrovascular Disease Brother 38     Hypertension Brother      Melanoma No family hx of      Skin Cancer No family hx of        Review of Systems  As per HPI and PMHx, otherwise 10+ comprehensive system review is negative.    Physical Exam  /71 (BP Location: Left arm, Patient Position: Sitting)   Pulse 84   Temp 98.8  F (37.1  C) (Tympanic)   GENERAL: Patient is a pleasant, cooperative 40 year old male in no acute distress.  HEAD: Normocephalic, atraumatic.  Hair and scalp are normal.  EYES: Pupils are equal, round, reactive to light and accommodation.  Extraocular movements are intact.  The sclera nonicteric without injection.  The extraocular structures are normal.  EARS: Normal shape and symmetry.  No tenderness when palpating the mastoid or tragal areas bilaterally.   NOSE: Nares are patent.  Nasal mucosa is lightly dry.  The patient has a leftward nasal septal deviation.  Anterior anoscopy reveals prominent vasculature in Kiesselbach's plexus on the right.  No nasal cavity masses, polyps, or mucopurulence on anterior rhinoscopy.  NEUROLOGIC: Cranial nerves II through XII are grossly intact.  Voice is strong.  Patient is House-Brackmann I/VI bilaterally.  CARDIOVASCULAR: Extremities are warm and well-perfused.  No significant peripheral edema.  RESPIRATORY: Patient has nonlabored breathing without cough, wheeze, stridor.  PSYCHIATRIC: Patient is alert and oriented.  Mood and affect appear normal.  SKIN: Warm and dry.  No scalp, face, or neck lesions noted.    Procedure: Control simple right anterior nasal hemorrhage  Indication: Recurrent epistaxis      Phenylephrine and lidocaine was applied to the anterior septum.  Using silver nitrate, I addressed several prominent blood vessels in the right anterior Kiesselbach's plexus. Hemostasis was achieved.  Patient tolerated the procedure well.      Assessment and Plan    ICD-10-CM    1. Recurrent epistaxis  R04.0 Nasal Cautery Simple Unilat      It was my pleasure seeing Sean Woodard today in clinic.  The patient presents with epistaxis. This is almost certainly coming from the right anterior septum. We discussed restrictions on manipulation and picking of the nose.  Also, sleeping with the head elevated, and avoidance of strenuous activity, heavy lifting, and bending over until the septum heals. I explained using vaseline twice daily to the anterior septum, nasal saline spray 3-5 times per day, and a humidifier at the bedside at night.    I also explained applying digital pressure to the nasal tip for a minimum of 15-20 minutes to stop a nose bleed. If that doesn't stop the bleeding the patient needs to proceed to the nearest emergency department. I will see the patient back in 2-4 weeks.     Lj Angeles MD  Department of Otolarygology-Head and Neck Surgery  [facility]       Again, thank you for allowing me to participate in the care of your patient.        Sincerely,        Lj Angeles MD

## 2020-09-16 NOTE — PATIENT INSTRUCTIONS
"Epistaxis (Nosebleed) Discharge Instructions     It is normal to have a small amount of pink/blood tinged mucous oozing after cautery. Sleeping with the head elevated, avoidance of strenuous activity, heavy lifting, and bending over for 2-3 days until septum heals. You may use Vaseline/Aquaphor twice daily to the anterior septum and nasal saline spray 3-5 times daily to help with healing.      If you develop a nosebleed, you can spray Afrin (oxymetazoline nasal spray) in the side of his bleeding.  Apply pressure to the outside of the nose (over the flexible end of the nose) for 15 minutes and lean forward so that you do not swallow blood.  Hold constant, firm pressure for the entire duration without \"peeking\" to see if it has stopped as this will likely result in repeated bleeding.  If you continue to have a nose bleed or if the bleeding is very rapid or excessive, please present to the nearest emergency department emergently for medical evaluation.     In order to decrease your risk for future nosebleeds we recommend the following strategies:     1. Avoid aspirin or other similar medications, unless prescribed by your physician  2. If you are taking Coumadin or other blood thinners, we recommend tight management to avoid excessive blood thinning which can cause recurrent bleeding.  3. Avoid trauma to your nose.  This includes irritation from exploring digits (nose picking) and excessive nose blowing.  4. If you have elevated blood pressure, keeping your blood pressure controlled will decrease your risk for future bleeds.  5. Using a room humidifier at night, especially during the dry winter months will keep your nose moist and less susceptible to bleeding.  6. Use Vaseline or Aquaphor in the nose at nighttime to help with moisture.  7. For those with a history of many severe nose bleeds, it is helpful to irrigate the nose twice daily with normal saline (salt water rinses) to keep the nasal mucosa healthy.  You can " also use AYR saline gel in the nose.     Nosebleeds are very common and regularly occur in otherwise healthy people, however frequent or recurrent severe nose bleeds may be a sign of a serious underlying medical condition.  We recommend that you discuss this episode with your primary care physician so that, if warranted, further testing may be performed.     If you have questions or concerns on any instructions given to you by your provider today or if you need to schedule an appointment, you can reach us at [phone].

## 2020-09-17 DIAGNOSIS — I10 BENIGN ESSENTIAL HYPERTENSION: ICD-10-CM

## 2020-09-17 RX ORDER — HYDROCHLOROTHIAZIDE 12.5 MG/1
TABLET ORAL
Qty: 90 TABLET | Refills: 0 | Status: SHIPPED | OUTPATIENT
Start: 2020-09-17 | End: 2020-12-24

## 2020-09-17 RX ORDER — LOSARTAN POTASSIUM 50 MG/1
TABLET ORAL
Qty: 90 TABLET | Refills: 0 | Status: SHIPPED | OUTPATIENT
Start: 2020-09-17 | End: 2020-12-24

## 2020-09-18 ENCOUNTER — VIRTUAL VISIT (OUTPATIENT)
Dept: DERMATOLOGY | Facility: CLINIC | Age: 40
End: 2020-09-18
Payer: COMMERCIAL

## 2020-09-18 DIAGNOSIS — L73.2 HIDRADENITIS SUPPURATIVA: Primary | ICD-10-CM

## 2020-09-18 ASSESSMENT — PAIN SCALES - GENERAL: PAINLEVEL: NO PAIN (0)

## 2020-09-18 NOTE — NURSING NOTE
Dermatology Rooming Note    Sean Woodard's goals for this visit include:   Chief Complaint   Patient presents with     Derm Problem     Sean is following up for HS, states the areas are getting worse.        Jahaira Donahue LPN

## 2020-09-18 NOTE — PROGRESS NOTES
[facility] Dermatology Record:  Store and Forward and Telephone [phone]      Dermatology Problem List:  1. Hidradenitis suppurativa - Hamilton stage III involving axillae > neck, groin              - current treatment: clindamycin 300 mg BID, topical clindamycin/benzoyl peroxide, intralesional triamcinolone; trying to get ustekinumab again              - past treatment: doxycycline, other oral antibiotics, isotretinoin, adalimumab, infliximab (neuropathy); ustekinumab denied by insurance    Encounter Date: Sep 18, 2020    CC:   Chief Complaint   Patient presents with     Derm Problem     Sean is following up for HS, states the areas are getting worse.        History of Present Illness:  Sean Woodard is a 40 year old male who presents for follow up.    3-4 weeks ago - had first ustekinumab injection   - has not noticed significant change   - still getting new lesions in axillae, occipital scalp, posterior neck - draining, scarred nodules   - next ustekinumab would be due in next 7-10 days   - doesn't have any more at present - question from pharmacy on dose so they have not dispensed the medication yet    Had neuropathy with infliximab, but if no improvement with ustekinumab, open to trying again if needed    ROS:   General: no fevers, chills, or weight loss  Skin: as per HPI    Physical Examination:  General: Alert and oriented in a pleasant mood.  Skin: Focused examination including axillae was performed.   -multiple erythematous/hyperpigmented subcutaneous nodules and scarring noted in the axillae    Past Medical History:   Patient Active Problem List   Diagnosis     GERD (gastroesophageal reflux disease)     Health Care Home     Hyperlipidemia with target LDL less than 100     Benign essential hypertension     PTSD (post-traumatic stress disorder)     Class 3 severe obesity due to excess calories with serious comorbidity in adult (H)     Diabetes mellitus, type 2 (H)     FER (obstructive sleep apnea)      Hidradenitis suppurativa     Past Medical History:   Diagnosis Date     NO ACTIVE PROBLEMS      Past Surgical History:   Procedure Laterality Date     ORTHOPEDIC SURGERY  1/1/2005    RT knee arthroscopic menisectomy     Thumb Surgery  2009    Mass removal neuroma       Social History:  Patient reports that he quit smoking about 15 years ago. His smoking use included cigarettes. He has a 2.50 pack-year smoking history. He has never used smokeless tobacco. He reports current alcohol use. He reports that he does not use drugs.    Family History:  Family History   Problem Relation Age of Onset     Heart Defect Father         valve replacement     Diabetes Maternal Grandmother      Cancer Maternal Grandfather         stomach     Hypertension Brother      Diabetes Sister      Cerebrovascular Disease Brother 38     Hypertension Brother      Melanoma No family hx of      Skin Cancer No family hx of        Medications:  Current Outpatient Medications   Medication     atorvastatin (LIPITOR) 20 MG tablet     augmented betamethasone dipropionate (DIPROLENE-AF) 0.05 % ointment     benzoyl peroxide 5 % external liquid     blood glucose (ACCU-CHEK GUIDE) test strip     cetirizine (ZYRTEC) 10 MG tablet     clindamycin (CLEOCIN) 300 MG capsule     clindamycin (CLINDAMAX) 1 % lotion     diphenhydrAMINE (BENADRYL) 25 MG capsule     hydrochlorothiazide (HYDRODIURIL) 12.5 MG tablet     losartan (COZAAR) 50 MG tablet     metFORMIN (GLUCOPHAGE-XR) 500 MG 24 hr tablet     ustekinumab (STELARA) 45 MG/0.5ML SOLN     ustekinumab (STELARA) 90 MG/ML     azelastine (ASTELIN) 0.1 % nasal spray     azelastine (OPTIVAR) 0.05 % ophthalmic solution     clindamycin-benzoyl peroxide (BENZACLIN) 1-5 % external gel     fluticasone (FLONASE) 50 MCG/ACT nasal spray     gabapentin (NEURONTIN) 100 MG capsule     No current facility-administered medications for this visit.           Allergies   Allergen Reactions     Lisinopril Cough           Impression and  Recommendations (Patient Counseled on the Following):  1. Hidradenitis suppurativa: with increased activity after first ustekinumab dose, however not yet on maintenance regimen and still in ramp up period. Recommend continuation of current course; if refractory, we discussed risks/benefits of alternative medications (IL-17 inhibitors) and combination regimens with oral antibiotics/isotretinoin, as well as retrial of infliximab. In either case patient will continue current topical regimen.  -Continue ustekinumab - dosing on day 0, 28, then q8 weeks. Repeat prescription again sent to [facility] pharmacy for maintenance regimen  -Continue clindamycin PO  -Continue topicals - BPO/topical clindamycin      Follow-up:   Follow-up with dermatology in approximately 3 months. Earlier for new or changing lesions or rash.      Staff only    Derrick Avina MD   of Dermatology  Department of Dermatology  [employer] [employer] [facility]    _____________________________________________________________________________    Teledermatology information:  - Location of patient: [place]  - Patient presented as: return  - Location of teledermatologist:  ([facility] DERMATOLOGY )  - Reason teledermatology is appropriate:  of National Emergency Regarding Coronavirus disease (COVID 19) Outbreak  - Image quality and interpretability: acceptable  - Physician has received verbal consent for a Video/Photos Visit from the patient? Yes  - In-person dermatology visit recommendation: no  - Date of images: 9/18/2020  - Service start time:11:52  - Service end time:12:06  - Date of report: 9/18/2020

## 2020-09-18 NOTE — LETTER
9/18/2020       RE: Sean Woodadr  [address] [facility] [address]     Dear Colleague,    Thank you for referring your patient, Sean Woodard, to the [facility] DERMATOLOGY at [facility]. Please see a copy of my visit note below.    [facility] Dermatology Record:  Store and Forward and Telephone [phone]      Dermatology Problem List:  1. Hidradenitis suppurativa - Hamilton stage III involving axillae > neck, groin              - current treatment: clindamycin 300 mg BID, topical clindamycin/benzoyl peroxide, intralesional triamcinolone; trying to get ustekinumab again              - past treatment: doxycycline, other oral antibiotics, isotretinoin, adalimumab, infliximab (neuropathy); ustekinumab denied by insurance    Encounter Date: Sep 18, 2020    CC:   Chief Complaint   Patient presents with     Derm Problem     Sean is following up for HS, states the areas are getting worse.        History of Present Illness:  Sean Woodard is a 40 year old male who presents for follow up.    3-4 weeks ago - had first ustekinumab injection   - has not noticed significant change   - still getting new lesions in axillae, occipital scalp, posterior neck - draining, scarred nodules   - next ustekinumab would be due in next 7-10 days   - doesn't have any more at present - question from pharmacy on dose so they have not dispensed the medication yet    Had neuropathy with infliximab, but if no improvement with ustekinumab, open to trying again if needed    ROS:   General: no fevers, chills, or weight loss  Skin: as per HPI    Physical Examination:  General: Alert and oriented in a pleasant mood.  Skin: Focused examination including axillae was performed.   -multiple erythematous/hyperpigmented subcutaneous nodules and scarring noted in the axillae    Past Medical History:   Patient Active Problem List   Diagnosis     GERD (gastroesophageal reflux disease)     Health Care Home      Hyperlipidemia with target LDL less than 100     Benign essential hypertension     PTSD (post-traumatic stress disorder)     Class 3 severe obesity due to excess calories with serious comorbidity in adult (H)     Diabetes mellitus, type 2 (H)     FER (obstructive sleep apnea)     Hidradenitis suppurativa     Past Medical History:   Diagnosis Date     NO ACTIVE PROBLEMS      Past Surgical History:   Procedure Laterality Date     ORTHOPEDIC SURGERY  1/1/2005    RT knee arthroscopic menisectomy     Thumb Surgery  2009    Mass removal neuroma       Social History:  Patient reports that he quit smoking about 15 years ago. His smoking use included cigarettes. He has a 2.50 pack-year smoking history. He has never used smokeless tobacco. He reports current alcohol use. He reports that he does not use drugs.    Family History:  Family History   Problem Relation Age of Onset     Heart Defect Father         valve replacement     Diabetes Maternal Grandmother      Cancer Maternal Grandfather         stomach     Hypertension Brother      Diabetes Sister      Cerebrovascular Disease Brother 38     Hypertension Brother      Melanoma No family hx of      Skin Cancer No family hx of        Medications:  Current Outpatient Medications   Medication     atorvastatin (LIPITOR) 20 MG tablet     augmented betamethasone dipropionate (DIPROLENE-AF) 0.05 % ointment     benzoyl peroxide 5 % external liquid     blood glucose (ACCU-CHEK GUIDE) test strip     cetirizine (ZYRTEC) 10 MG tablet     clindamycin (CLEOCIN) 300 MG capsule     clindamycin (CLINDAMAX) 1 % lotion     diphenhydrAMINE (BENADRYL) 25 MG capsule     hydrochlorothiazide (HYDRODIURIL) 12.5 MG tablet     losartan (COZAAR) 50 MG tablet     metFORMIN (GLUCOPHAGE-XR) 500 MG 24 hr tablet     ustekinumab (STELARA) 45 MG/0.5ML SOLN     ustekinumab (STELARA) 90 MG/ML     azelastine (ASTELIN) 0.1 % nasal spray     azelastine (OPTIVAR) 0.05 % ophthalmic solution     clindamycin-benzoyl  peroxide (BENZACLIN) 1-5 % external gel     fluticasone (FLONASE) 50 MCG/ACT nasal spray     gabapentin (NEURONTIN) 100 MG capsule     No current facility-administered medications for this visit.           Allergies   Allergen Reactions     Lisinopril Cough           Impression and Recommendations (Patient Counseled on the Following):  1. Hidradenitis suppurativa: with increased activity after first ustekinumab dose, however not yet on maintenance regimen and still in ramp up period. Recommend continuation of current course; if refractory, we discussed risks/benefits of alternative medications (IL-17 inhibitors) and combination regimens with oral antibiotics/isotretinoin, as well as retrial of infliximab. In either case patient will continue current topical regimen.  -Continue ustekinumab - dosing on day 0, 28, then q8 weeks. Repeat prescription again sent to [facility] pharmacy for maintenance regimen  -Continue clindamycin PO  -Continue topicals - BPO/topical clindamycin      Follow-up:   Follow-up with dermatology in approximately 3 months. Earlier for new or changing lesions or rash.      Staff only    Derrick Avina MD   of Dermatology  Department of Dermatology  [facility] [employer] [facility]    _____________________________________________________________________________    Teledermatology information:  - Location of patient: [place]  - Patient presented as: return  - Location of teledermatologist:  ([facility] DERMATOLOGY )  - Reason teledermatology is appropriate:  of National Emergency Regarding Coronavirus disease (COVID 19) Outbreak  - Image quality and interpretability: acceptable  - Physician has received verbal consent for a Video/Photos Visit from the patient? Yes  - In-person dermatology visit recommendation: no  - Date of images: 9/18/2020  - Service start time:11:52  - Service end time:12:06  - Date of report: 9/18/2020

## 2020-09-18 NOTE — PATIENT INSTRUCTIONS
[facility] Dermatology Visit    Thank you for allowing us to participate in your care. Your findings, instructions and follow-up plan are as follows:    Continue ustekinumab every 2 months. Call or send a MyChart if you have any issues getting the medication.    Continue the rest of your regimen.    Follow up in 3 months.    When should I call my doctor?    If you are worsening or not improving, please, contact us or seek urgent care as noted below.     Who should I call with questions (adults)?    [facility] (adult and pediatric): [phone]     [facility] (adult): [phone]    For urgent needs outside of business hours call the [facility] at [phone] and ask for the dermatology resident on call    If this is a medical emergency and you are unable to reach an ER, Call [phone]      Who should I call with questions (pediatric)?  [facility]- Pediatric Dermatology  Dr. Alisa Roque, Dr. Marisol Romero, Dr. Zuleika Greenberg, Kaya Snownhdomingo, JONNY Ocampo, Dr. Kyleigh Carlson & Dr. Derrick Segal  Non Urgent  Nurse Triage Line; [phone]- Emma and Gabrielle CHAUDHARY Care Coordinators   Piedad (/Complex ) [phone]    If you need a prescription refill, please contact your pharmacy. Refills are approved or denied by our Physicians during normal business hours, Monday through Fridays  Per office policy, refills will not be granted if you have not been seen within the past year (or sooner depending on your child's condition)    Scheduling Information:  Pediatric Appointment Scheduling and Call Center [phone]  Radiology Scheduling- [phone]  Sedation Unit Scheduling- [phone]  [place] Scheduling- General [phone]; Pediatric Dermatology [phone]  Main  Services: [phone]  Malawian: [phone]  Malian: [phone]  Hmong/Israeli/Irish:  [phone]  Preadmission Nursing Department Fax Number: [phone] (Fax all pre-operative paperwork to this number)    For urgent matters arising during evenings, weekends, or holidays that cannot wait for normal business hours please call [phone] and ask for the Dermatology Resident On-Call to be paged.

## 2020-09-25 ENCOUNTER — TELEPHONE (OUTPATIENT)
Dept: DERMATOLOGY | Facility: CLINIC | Age: 40
End: 2020-09-25

## 2020-09-25 NOTE — TELEPHONE ENCOUNTER
[facility] Call Center    Phone Message    May a detailed message be left on voicemail: yes     Reason for Call: Medication Question or concern regarding medication   Prescription Clarification  Name of Medication: ustekinumab (STELARA) 90 MG/ML   Prescribing Provider: Dr. Avina   Pharmacy: [facility]   What on the order needs clarification? Pharm called and needs some verification on this. Please call them back. Thanks    Ph: [phone]          Action Taken: Message routed to:  [facility] DERM    Travel Screening: Not Applicable

## 2020-09-25 NOTE — TELEPHONE ENCOUNTER
Pharmacy needed the ICD10 code and the patient's weight. The information was provided: Hidradenitis suppurativa [L73.2] and 275lbs    Pharmacy would like to know if there needs to be a starting dose for the patient or does the patient just follow the sign.    Wendie Dwyer, [facility]

## 2020-09-28 NOTE — TELEPHONE ENCOUNTER
Spoke with pharmacy and verified the sig of the medication.    ustekinumab (STELARA) 90 MG/ML          Sig - Route: Inject 1 mL (90 mg) Subcutaneous every 2 months - Subcutaneous      ICD10 and weight provided as well.      Wendie Dwyer, [facility]

## 2020-09-28 NOTE — TELEPHONE ENCOUNTER
As has been communicated numerous times for this patient, yes, he should take the medication as prescribed. If the pharmacists need me to call them to verbally tell them to dispense the medication as has been prescribed, I'd be happy to do so. Thanks!

## 2020-09-29 ENCOUNTER — MYC MEDICAL ADVICE (OUTPATIENT)
Dept: DERMATOLOGY | Facility: CLINIC | Age: 40
End: 2020-09-29

## 2020-09-29 NOTE — TELEPHONE ENCOUNTER
Me   to Sean Woodard            9/29/20 1:02 PM   Oh gosh! I have talked to them twice but I will call them again. Sorry this has not been an easy process for you.     Wendie Dwyer, [facility]     This Stilnest message has not been read.   Sean Woodard   to Derrick Avina MD            9/29/20 12:53 PM   Dr. Avina or someone from your clinic. I'm still having trouble getting my Stelara from [facility], can someone from your clinic call them and talk to their pharmacist. They will not send my medication, since they need to talk to someone from your office in regards to the off label usage of Stelara for my HS, please call them at [phone].

## 2020-10-05 ENCOUNTER — OFFICE VISIT (OUTPATIENT)
Dept: DERMATOLOGY | Facility: CLINIC | Age: 40
End: 2020-10-05
Payer: COMMERCIAL

## 2020-10-05 VITALS — HEART RATE: 85 BPM | SYSTOLIC BLOOD PRESSURE: 135 MMHG | DIASTOLIC BLOOD PRESSURE: 86 MMHG | OXYGEN SATURATION: 98 %

## 2020-10-05 DIAGNOSIS — L73.2 HIDRADENITIS SUPPURATIVA: Primary | ICD-10-CM

## 2020-10-05 PROCEDURE — 11900 INJECT SKIN LESIONS </W 7: CPT | Performed by: PHYSICIAN ASSISTANT

## 2020-10-05 PROCEDURE — 99207 PR DROP WITH A PROCEDURE: CPT | Performed by: PHYSICIAN ASSISTANT

## 2020-10-05 NOTE — PROGRESS NOTES
HPI:   Chief complaints: Sean Woodard is a 40 year old male who present ILK for inflamed cysts. History of hidradenitis suppurativa currently on Stelara. In the past he has tried isotretinoin x 2, Humira, Remicade (neuropathy).   Condition present for:  years.     Review Of Systems  Eyes: negative  Ears/Nose/Throat: negative  Respiratory: No shortness of breath, dyspnea on exertion, cough, or hemoptysis  Cardiovascular: negative  Gastrointestinal: negative  Genitourinary: negative  Musculoskeletal: negative  Neurologic: negative  Psychiatric: negative  Skin: positive for cysts      PHYSICAL EXAM:    BP (!) 140/93   Pulse 90   SpO2 98%   Skin exam performed as follows: Type 3 skin. Mood appropriate  Alert and Oriented X 3. Well developed, well nourished in no distress.  General appearance: Normal  Head including face: Normal  Eyes: conjunctiva and lids: Normal  Mouth: Lips, teeth, gums: Normal  Neck: Normal  Chest-breast/axillae: Normal  Back: Normal  Spleen and liver: Normal  Cardiovascular: Exam of peripheral vascular system by observation for swelling, varicosities, edema: Normal  Genitalia: groin, buttocks: Normal  Extremities: digits/nails (clubbing): Normal  Eccrine and Apocrine glands: Normal  Right upper extremity: Normal  Left upper extremity: Normal  Right lower extremity: Normal  Left lower extremity: Normal  Skin: Scalp and body hair: See below    1. Inflamed cysts in the left axilla x 4 right axilla x 3    ASSESSMENT/PLAN:     1. Hidradenitis suppurativa here today for ILK. Kenalog 10 mg/cc injected to axillae. Total of 1 cc's used.  Advised on risk of atrophy and failure to respond. Advised on aftercare.   2. Sean to follow up with Primary Care provider regarding elevated blood pressure.        Follow-up: PRN  CC:   Scribed By: Jahaira Garcia, MS, PA-C

## 2020-10-05 NOTE — LETTER
10/5/2020         RE: Sean Woodard  [address] [facility] [address]        Dear Colleague,    Thank you for referring your patient, Sean Woodard, to the [facility]. Please see a copy of my visit note below.    HPI:   Chief complaints: Sean Woodard is a 40 year old male who present ILK for inflamed cysts. History of hidradenitis suppurativa currently on Stelara. In the past he has tried isotretinoin x 2, Humira, Remicade (neuropathy).   Condition present for:  years.     Review Of Systems  Eyes: negative  Ears/Nose/Throat: negative  Respiratory: No shortness of breath, dyspnea on exertion, cough, or hemoptysis  Cardiovascular: negative  Gastrointestinal: negative  Genitourinary: negative  Musculoskeletal: negative  Neurologic: negative  Psychiatric: negative  Skin: positive for cysts      PHYSICAL EXAM:    BP (!) 140/93   Pulse 90   SpO2 98%   Skin exam performed as follows: Type 3 skin. Mood appropriate  Alert and Oriented X 3. Well developed, well nourished in no distress.  General appearance: Normal  Head including face: Normal  Eyes: conjunctiva and lids: Normal  Mouth: Lips, teeth, gums: Normal  Neck: Normal  Chest-breast/axillae: Normal  Back: Normal  Spleen and liver: Normal  Cardiovascular: Exam of peripheral vascular system by observation for swelling, varicosities, edema: Normal  Genitalia: groin, buttocks: Normal  Extremities: digits/nails (clubbing): Normal  Eccrine and Apocrine glands: Normal  Right upper extremity: Normal  Left upper extremity: Normal  Right lower extremity: Normal  Left lower extremity: Normal  Skin: Scalp and body hair: See below    1. Inflamed cysts in the left axilla x 4 right axilla x 3    ASSESSMENT/PLAN:     1. Hidradenitis suppurativa here today for ILK. Kenalog 10 mg/cc injected to axillae. Total of 1 cc's used.  Advised on risk of atrophy and failure to respond. Advised on aftercare.   2. Sean to follow up with Primary Care provider  regarding elevated blood pressure.        Follow-up: PRN  CC:   Scribed By: Jahaira Garcia, MS, ALEJANDRA          Again, thank you for allowing me to participate in the care of your patient.        Sincerely,        Jahaira Garcia PA-C

## 2020-10-08 DIAGNOSIS — L73.2 HIDRADENITIS SUPPURATIVA: ICD-10-CM

## 2020-10-11 RX ORDER — CLINDAMYCIN HCL 300 MG
300 CAPSULE ORAL 2 TIMES DAILY
Qty: 60 CAPSULE | Refills: 2 | Status: SHIPPED | OUTPATIENT
Start: 2020-10-11 | End: 2020-12-30

## 2020-10-12 DIAGNOSIS — L73.2 HIDRADENITIS SUPPURATIVA: ICD-10-CM

## 2020-10-12 RX ORDER — DOXYCYCLINE 100 MG/1
100 CAPSULE ORAL 2 TIMES DAILY WITH MEALS
Qty: 60 CAPSULE | Refills: 1 | Status: SHIPPED | OUTPATIENT
Start: 2020-10-12 | End: 2020-12-30

## 2020-10-12 NOTE — TELEPHONE ENCOUNTER
clindamycin (CLEOCIN) 300 MG capsule    Last Written Prescription Date:  5/7/20  Last Fill Quantity: 60,   # refills: 3  Last Office Visit : 9/18/20  Future Office visit:  12/18/20[place]    Routing refill request to provider for review/approval because:  Refill process #4 DERm> refilled x 3 months

## 2020-12-10 ENCOUNTER — OFFICE VISIT (OUTPATIENT)
Dept: DERMATOLOGY | Facility: CLINIC | Age: 40
End: 2020-12-10
Payer: COMMERCIAL

## 2020-12-10 VITALS — HEART RATE: 91 BPM | RESPIRATION RATE: 16 BRPM | SYSTOLIC BLOOD PRESSURE: 133 MMHG | DIASTOLIC BLOOD PRESSURE: 83 MMHG

## 2020-12-10 DIAGNOSIS — L73.2 HIDRADENITIS SUPPURATIVA: Primary | ICD-10-CM

## 2020-12-10 PROCEDURE — 11900 INJECT SKIN LESIONS </W 7: CPT | Performed by: PHYSICIAN ASSISTANT

## 2020-12-10 PROCEDURE — 99207 PR NO CHARGE LOS: CPT | Performed by: PHYSICIAN ASSISTANT

## 2020-12-10 NOTE — LETTER
12/10/2020         RE: Sean Woodard  [address] [facility] [address]        Dear Colleague,    Thank you for referring your patient, Sean Woodard, to the [facility]. Please see a copy of my visit note below.    HPI:   Chief complaints: Sean Woodard is a 40 year old male who present ILK for inflamed cysts. History of hidradenitis suppurativa currently on Stelara every 2 months. In the past he has tried isotretinoin x 2, Humira, Remicade (neuropathy).   Condition present for:  years.     Review Of Systems  Eyes: negative  Ears/Nose/Throat: negative  Respiratory: No shortness of breath, dyspnea on exertion, cough, or hemoptysis  Cardiovascular: negative  Gastrointestinal: negative  Genitourinary: negative  Musculoskeletal: negative  Neurologic: negative  Psychiatric: negative  Skin: positive for cysts      PHYSICAL EXAM:    /83 (BP Location: Right arm, Patient Position: Sitting, Cuff Size: Adult Large)   Pulse 91   Resp 16   Skin exam performed as follows: Type 3 skin. Mood appropriate  Alert and Oriented X 3. Well developed, well nourished in no distress.  General appearance: Normal  Head including face: Normal  Eyes: conjunctiva and lids: Normal  Mouth: Lips, teeth, gums: Normal  Neck: Normal  Chest-breast/axillae: Normal  Back: Normal  Spleen and liver: Normal  Cardiovascular: Exam of peripheral vascular system by observation for swelling, varicosities, edema: Normal  Genitalia: groin, buttocks: Normal  Extremities: digits/nails (clubbing): Normal  Eccrine and Apocrine glands: Normal  Right upper extremity: Normal  Left upper extremity: Normal  Right lower extremity: Normal  Left lower extremity: Normal  Skin: Scalp and body hair: See below    1. Inflamed cysts in the left axilla x 6 right axilla x 6    ASSESSMENT/PLAN:     1. Hidradenitis suppurativa here today for ILK. Kenalog 10 mg/cc injected to axillae. Total of 2 cc's used.  Advised on risk of atrophy and failure  to respond. Advised on aftercare.           Follow-up: PRN  CC:   Scribed By: Jahaira Garcia MS, PANIMISHA          Again, thank you for allowing me to participate in the care of your patient.        Sincerely,        Jahaira Garcia PA-C

## 2020-12-10 NOTE — PROGRESS NOTES
HPI:   Chief complaints: Sean Woodard is a 40 year old male who present ILK for inflamed cysts. History of hidradenitis suppurativa currently on Stelara every 2 months. In the past he has tried isotretinoin x 2, Humira, Remicade (neuropathy).   Condition present for:  years.     Review Of Systems  Eyes: negative  Ears/Nose/Throat: negative  Respiratory: No shortness of breath, dyspnea on exertion, cough, or hemoptysis  Cardiovascular: negative  Gastrointestinal: negative  Genitourinary: negative  Musculoskeletal: negative  Neurologic: negative  Psychiatric: negative  Skin: positive for cysts      PHYSICAL EXAM:    /83 (BP Location: Right arm, Patient Position: Sitting, Cuff Size: Adult Large)   Pulse 91   Resp 16   Skin exam performed as follows: Type 3 skin. Mood appropriate  Alert and Oriented X 3. Well developed, well nourished in no distress.  General appearance: Normal  Head including face: Normal  Eyes: conjunctiva and lids: Normal  Mouth: Lips, teeth, gums: Normal  Neck: Normal  Chest-breast/axillae: Normal  Back: Normal  Spleen and liver: Normal  Cardiovascular: Exam of peripheral vascular system by observation for swelling, varicosities, edema: Normal  Genitalia: groin, buttocks: Normal  Extremities: digits/nails (clubbing): Normal  Eccrine and Apocrine glands: Normal  Right upper extremity: Normal  Left upper extremity: Normal  Right lower extremity: Normal  Left lower extremity: Normal  Skin: Scalp and body hair: See below    1. Inflamed cysts in the left axilla x 6 right axilla x 6    ASSESSMENT/PLAN:     1. Hidradenitis suppurativa here today for ILK. Kenalog 10 mg/cc injected to axillae. Total of 2 cc's used.  Advised on risk of atrophy and failure to respond. Advised on aftercare.           Follow-up: PRN  CC:   Scribed By: Jahaira Garcia, MS, PA-C

## 2020-12-10 NOTE — NURSING NOTE
"Initial /83 (BP Location: Right arm, Patient Position: Sitting, Cuff Size: Adult Large)   Pulse 91   Resp 16  Estimated body mass index is 41.87 kg/m  as calculated from the following:    Height as of 9/8/20: 1.727 m (5' 8\").    Weight as of 9/8/20: 124.9 kg (275 lb 6 oz). .    Akua Negro, [facility]    "

## 2020-12-24 DIAGNOSIS — I10 BENIGN ESSENTIAL HYPERTENSION: ICD-10-CM

## 2020-12-24 RX ORDER — LOSARTAN POTASSIUM 50 MG/1
TABLET ORAL
Qty: 90 TABLET | Refills: 0 | Status: SHIPPED | OUTPATIENT
Start: 2020-12-24 | End: 2021-03-25

## 2020-12-24 RX ORDER — HYDROCHLOROTHIAZIDE 12.5 MG/1
TABLET ORAL
Qty: 90 TABLET | Refills: 0 | Status: SHIPPED | OUTPATIENT
Start: 2020-12-24 | End: 2021-03-25

## 2020-12-30 ENCOUNTER — VIRTUAL VISIT (OUTPATIENT)
Dept: DERMATOLOGY | Facility: CLINIC | Age: 40
End: 2020-12-30
Payer: COMMERCIAL

## 2020-12-30 ENCOUNTER — TELEPHONE (OUTPATIENT)
Dept: DERMATOLOGY | Facility: CLINIC | Age: 40
End: 2020-12-30

## 2020-12-30 DIAGNOSIS — L73.2 HIDRADENITIS SUPPURATIVA: ICD-10-CM

## 2020-12-30 PROCEDURE — 99213 OFFICE O/P EST LOW 20 MIN: CPT | Mod: GQ | Performed by: DERMATOLOGY

## 2020-12-30 ASSESSMENT — PAIN SCALES - GENERAL: PAINLEVEL: MODERATE PAIN (5)

## 2020-12-30 NOTE — NURSING NOTE
Chief Complaint   Patient presents with     Derm Problem     HS axilla follow up same as last time.      Amy AMATO CMA

## 2020-12-30 NOTE — TELEPHONE ENCOUNTER
Visit Disposition    Dispositions Check-out Note   0 Return in about 3 months (around 3/30/2021). Telephone visit photos.

## 2020-12-30 NOTE — TELEPHONE ENCOUNTER
[facility] Call Center    Phone Message    May a detailed message be left on voicemail: yes     Reason for Call: Other: Pt calling back to schedule for 3 month Appt, Per Dx- send TE to schedule, Please call Pt back to schedule    Thank you,    Action Taken: Message routed to:  [facility] Derm    Travel Screening: Not Applicable

## 2020-12-30 NOTE — LETTER
12/30/2020       RE: Sean Woodard  [address] [facility] [address]     Dear Colleague,    Thank you for referring your patient, Sean Woodard, to the [facility] DERMATOLOGY CLINIC [place] [facility]. Please see a copy of my visit note below.    [facility] Dermatology Record:  Store and Forward and Telephone [phone]    Dermatology Problem List:  1. Hidradenitis suppurativa - Hamilton stage III involving axillae > neck, groin              - current treatment: ustekinumab, topical clindamycin/benzoyl peroxide, intralesional triamcinolone    - past treatment: doxycycline, other oral antibiotics, isotretinoin, adalimumab, infliximab (neuropathy)    Encounter Date: Dec 30, 2020    CC:   Chief Complaint   Patient presents with     Derm Problem     HS axilla follow up same as last time.      History of Present Illness:  Sean Woodard is a 40 year old male who presents as follow-up for HS.   Last seen by Dr. Avina in 9/2020 when continued on ustekinumab every 2 month treatment, clindamycin 300 mg PO BID, and topical therapies including BP wash/topical clindamycin.   He tells me that he started ustekinumab in August 2020, and has since gotten 4 doses. He reports that he has not seen much improvement as yet in his lesions. He has lesions in bilateral axillae, groin, at back of the neck as well. Reports that the axillae lesions waxes and wane,  intermittently get inflamed, red, painful and ooze bloody or clear discharge. As such , he has gone to derm clinic in [place] on 10/5 and 12/10 to get ILK injections which improved his pain and inflammation but no effect on the oozing.   Reports that since he saw no improvement on antibiotics, he discontinued them and currently only on stelara, topical steroids and ILK.     ROS: Patient is generally feeling well today.     Physical Examination:  General: Well-appearing male, appropriately-developed individual.  Skin:   Exam was performed by photo review: refer to photos  Significant for multiple erythematous/hyperpigmented subcutaneous nodules and scarring noted in the axillae    Labs:  None    Past Medical History:   Patient Active Problem List   Diagnosis     GERD (gastroesophageal reflux disease)     Health Care Home     Hyperlipidemia with target LDL less than 100     Benign essential hypertension     PTSD (post-traumatic stress disorder)     Class 3 severe obesity due to excess calories with serious comorbidity in adult (H)     Diabetes mellitus, type 2 (H)     FER (obstructive sleep apnea)     Hidradenitis suppurativa     Past Medical History:   Diagnosis Date     NO ACTIVE PROBLEMS      Past Surgical History:   Procedure Laterality Date     ORTHOPEDIC SURGERY  1/1/2005    RT knee arthroscopic menisectomy     Thumb Surgery  2009    Mass removal neuroma       Social History:  Patient reports that he quit smoking about 16 years ago. His smoking use included cigarettes. He has a 2.50 pack-year smoking history. He has never used smokeless tobacco. He reports current alcohol use. He reports that he does not use drugs.    Family History:  Family History   Problem Relation Age of Onset     Heart Defect Father         valve replacement     Diabetes Maternal Grandmother      Cancer Maternal Grandfather         stomach     Hypertension Brother      Diabetes Sister      Cerebrovascular Disease Brother 38     Hypertension Brother      Melanoma No family hx of      Skin Cancer No family hx of        Medications:  Current Outpatient Medications   Medication     atorvastatin (LIPITOR) 20 MG tablet     augmented betamethasone dipropionate (DIPROLENE-AF) 0.05 % ointment     azelastine (ASTELIN) 0.1 % nasal spray     azelastine (OPTIVAR) 0.05 % ophthalmic solution     benzoyl peroxide 5 % external liquid     blood glucose (ACCU-CHEK GUIDE) test strip     clindamycin (CLEOCIN) 300 MG capsule     clindamycin (CLINDAMAX) 1 % lotion      clindamycin-benzoyl peroxide (BENZACLIN) 1-5 % external gel     fluticasone (FLONASE) 50 MCG/ACT nasal spray     hydrochlorothiazide (HYDRODIURIL) 12.5 MG tablet     losartan (COZAAR) 50 MG tablet     metFORMIN (GLUCOPHAGE-XR) 500 MG 24 hr tablet     ustekinumab (STELARA) 90 MG/ML     cetirizine (ZYRTEC) 10 MG tablet     diphenhydrAMINE (BENADRYL) 25 MG capsule     doxycycline monohydrate (MONODOX) 100 MG capsule     gabapentin (NEURONTIN) 100 MG capsule     Current Facility-Administered Medications   Medication     triamcinolone acetonide (KENALOG-10) injection 10 mg     triamcinolone acetonide (KENALOG-10) injection 10 mg          Allergies   Allergen Reactions     Lisinopril Cough       Impression and Recommendations (Patient Counseled on the Following):    1. Hidradenitis suppurativa: with reports of increased activity after first ustekinumab dose, however now stabilized on maintenance dose.   - Discussed with patient, improvement with ustekinumab will likely be noticeable after 3-6 months of therapy; so recommend continuation of current course; on maintenance regimen. In the past, has failed or intolerant to prior therapies including oral antibiotics, isotretinoin, adalimumab and infliximab (neuropathy). If disease remains refractory, discussed possibility of exploring surgical options for axilla specifically, given this area has been the most recalcitrant to treatment, to which patient is open to consult with plastic surgery.  - Continue ustekinumab q8 weeks; due for quant gold at next visit  - Continue topicals - BPO/topical clindamycin  - Continue Metformin  - Plastic surgical referral placed    Follow-up:   Follow-up with dermatology in approximately 3 months. Earlier for new or changing lesions or rash.      Staff and resident    Resident: Maira Henderson MD PGY-3 [phone]    Staff Physician Comments:   I saw and evaluated the patient with the resident and I agree with the assessment and plan.  I was  present for the entire phone visit.    Chintan Saxena MD  Pronouns: he/him/his    Department of Dermatology  [facility]: Phone: [phone], Fax:[phone]  [facility]: Phone: [phone] Fax: [phone]           ___________________________________________________________________________    Teledermatology information:  - Location of teledermatologist:  [facility] DERMATOLOGY CLINIC [place] )  - Image quality and interpretability: acceptable  - Date of images: 12/29/20  - Service start time:10:34 PM  - Service end time:10:56 PM  - Date of report: 12/30/2020

## 2020-12-30 NOTE — PROGRESS NOTES
[facility] Dermatology Record:  Store and Forward and Telephone [phone]    Dermatology Problem List:  1. Hidradenitis suppurativa - Hamilton stage III involving axillae > neck, groin              - current treatment: ustekinumab, topical clindamycin/benzoyl peroxide, intralesional triamcinolone    - past treatment: doxycycline, other oral antibiotics, isotretinoin, adalimumab, infliximab (neuropathy)    Encounter Date: Dec 30, 2020    CC:   Chief Complaint   Patient presents with     Derm Problem     HS axilla follow up same as last time.      History of Present Illness:  Sean Woodard is a 40 year old male who presents as follow-up for HS.   Last seen by Dr. Avina in 9/2020 when continued on ustekinumab every 2 month treatment, clindamycin 300 mg PO BID, and topical therapies including BP wash/topical clindamycin.   He tells me that he started ustekinumab in August 2020, and has since gotten 4 doses. He reports that he has not seen much improvement as yet in his lesions. He has lesions in bilateral axillae, groin, at back of the neck as well. Reports that the axillae lesions waxes and wane,  intermittently get inflamed, red, painful and ooze bloody or clear discharge. As such , he has gone to derm clinic in [place] on 10/5 and 12/10 to get ILK injections which improved his pain and inflammation but no effect on the oozing.   Reports that since he saw no improvement on antibiotics, he discontinued them and currently only on stelara, topical steroids and ILK.     ROS: Patient is generally feeling well today.     Physical Examination:  General: Well-appearing male, appropriately-developed individual.  Skin:  Exam was performed by photo review: refer to photos  Significant for multiple erythematous/hyperpigmented subcutaneous nodules and scarring noted in the axillae    Labs:  None    Past Medical History:   Patient Active Problem List   Diagnosis     GERD (gastroesophageal reflux disease)     Health Care Home      Hyperlipidemia with target LDL less than 100     Benign essential hypertension     PTSD (post-traumatic stress disorder)     Class 3 severe obesity due to excess calories with serious comorbidity in adult (H)     Diabetes mellitus, type 2 (H)     FER (obstructive sleep apnea)     Hidradenitis suppurativa     Past Medical History:   Diagnosis Date     NO ACTIVE PROBLEMS      Past Surgical History:   Procedure Laterality Date     ORTHOPEDIC SURGERY  1/1/2005    RT knee arthroscopic menisectomy     Thumb Surgery  2009    Mass removal neuroma       Social History:  Patient reports that he quit smoking about 16 years ago. His smoking use included cigarettes. He has a 2.50 pack-year smoking history. He has never used smokeless tobacco. He reports current alcohol use. He reports that he does not use drugs.    Family History:  Family History   Problem Relation Age of Onset     Heart Defect Father         valve replacement     Diabetes Maternal Grandmother      Cancer Maternal Grandfather         stomach     Hypertension Brother      Diabetes Sister      Cerebrovascular Disease Brother 38     Hypertension Brother      Melanoma No family hx of      Skin Cancer No family hx of        Medications:  Current Outpatient Medications   Medication     atorvastatin (LIPITOR) 20 MG tablet     augmented betamethasone dipropionate (DIPROLENE-AF) 0.05 % ointment     azelastine (ASTELIN) 0.1 % nasal spray     azelastine (OPTIVAR) 0.05 % ophthalmic solution     benzoyl peroxide 5 % external liquid     blood glucose (ACCU-CHEK GUIDE) test strip     clindamycin (CLEOCIN) 300 MG capsule     clindamycin (CLINDAMAX) 1 % lotion     clindamycin-benzoyl peroxide (BENZACLIN) 1-5 % external gel     fluticasone (FLONASE) 50 MCG/ACT nasal spray     hydrochlorothiazide (HYDRODIURIL) 12.5 MG tablet     losartan (COZAAR) 50 MG tablet     metFORMIN (GLUCOPHAGE-XR) 500 MG 24 hr tablet     ustekinumab (STELARA) 90 MG/ML     cetirizine (ZYRTEC) 10 MG tablet      diphenhydrAMINE (BENADRYL) 25 MG capsule     doxycycline monohydrate (MONODOX) 100 MG capsule     gabapentin (NEURONTIN) 100 MG capsule     Current Facility-Administered Medications   Medication     triamcinolone acetonide (KENALOG-10) injection 10 mg     triamcinolone acetonide (KENALOG-10) injection 10 mg          Allergies   Allergen Reactions     Lisinopril Cough       Impression and Recommendations (Patient Counseled on the Following):    1. Hidradenitis suppurativa: with reports of increased activity after first ustekinumab dose, however now stabilized on maintenance dose.   - Discussed with patient, improvement with ustekinumab will likely be noticeable after 3-6 months of therapy; so recommend continuation of current course; on maintenance regimen. In the past, has failed or intolerant to prior therapies including oral antibiotics, isotretinoin, adalimumab and infliximab (neuropathy). If disease remains refractory, discussed possibility of exploring surgical options for axilla specifically, given this area has been the most recalcitrant to treatment, to which patient is open to consult with plastic surgery.  - Continue ustekinumab q8 weeks; due for quant gold at next visit  - Continue topicals - BPO/topical clindamycin  - Continue Metformin  - Plastic surgical referral placed    Follow-up:   Follow-up with dermatology in approximately 3 months. Earlier for new or changing lesions or rash.      Staff and resident    Resident: Maira Henderson MD PGY-3 [phone]    Staff Physician Comments:   I saw and evaluated the patient with the resident and I agree with the assessment and plan.  I was present for the entire phone visit.    Chintan Saxena MD  Pronouns: he/him/his    Department of Dermatology  [facility]: Phone: [phone], Fax:[phone]  [facility]: Phone: [phone] Fax:  [phone]           ___________________________________________________________________________    Teledermatology information:  - Location of teledermatologist:  [facility] DERMATOLOGY [facility] )  - Image quality and interpretability: acceptable  - Date of images: 12/29/20  - Service start time:10:34 PM  - Service end time:10:56 PM  - Date of report: 12/30/2020

## 2020-12-30 NOTE — PATIENT INSTRUCTIONS
[facility] Dermatology Visit    Thank you for allowing us to participate in your care. Your findings, instructions and follow-up plan are as follows:     - Continue your current prescriptions including Stelara and topical ointments and creams  - Will refer you to plastic surgery to discuss surgical treatment options available for you.  - Plan to follow up in clinic in 3 months    When should I call my doctor?    If you are worsening or not improving, please, contact us or seek urgent care as noted below.     Who should I call with questions (adults)?    [facility] (adult and pediatric): [phone]     [facility] (adult): [phone]    For urgent needs outside of business hours call the [facility] at [phone] and ask for the dermatology resident on call    If this is a medical emergency and you are unable to reach an ER, Call [phone]      Who should I call with questions (pediatric)?  [facility]- Pediatric Dermatology  Dr. Alisa Roque, Dr. Marisol Romero, Dr. Zuleika Greenberg, Kaya Oneill, PA  Dr. Verena Ocampo, Dr. Kyleigh Carlson & Dr. Derrick Segal  Non Urgent  Nurse Triage Line; [phone]- Emma and Gabrielle RN Care Coordinators   [place] (/Complex ) [phone]    If you need a prescription refill, please contact your pharmacy. Refills are approved or denied by our Physicians during normal business hours, Monday through Fridays  Per office policy, refills will not be granted if you have not been seen within the past year (or sooner depending on your child's condition)    Scheduling Information:  Pediatric Appointment Scheduling and Call Center [phone]  Radiology Scheduling- [phone]  Sedation Unit Scheduling- [phone]  [place] Scheduling- General [phone]; Pediatric Dermatology [phone]  Main  Services:  [phone]  Maori: [phone]  Maltese: [phone]  Hmong/Welsh/Malay: [phone]  Preadmission Nursing Department Fax Number: [phone] (Fax all pre-operative paperwork to this number)    For urgent matters arising during evenings, weekends, or holidays that cannot wait for normal business hours please call [phone] and ask for the Dermatology Resident On-Call to be paged.

## 2021-01-04 DIAGNOSIS — E11.9 TYPE 2 DIABETES MELLITUS WITHOUT COMPLICATION, WITHOUT LONG-TERM CURRENT USE OF INSULIN (H): ICD-10-CM

## 2021-01-04 DIAGNOSIS — E66.01 CLASS 3 SEVERE OBESITY WITH BODY MASS INDEX (BMI) OF 40.0 TO 44.9 IN ADULT, UNSPECIFIED OBESITY TYPE, UNSPECIFIED WHETHER SERIOUS COMORBIDITY PRESENT (H): ICD-10-CM

## 2021-01-04 DIAGNOSIS — E66.813 CLASS 3 SEVERE OBESITY WITH BODY MASS INDEX (BMI) OF 40.0 TO 44.9 IN ADULT, UNSPECIFIED OBESITY TYPE, UNSPECIFIED WHETHER SERIOUS COMORBIDITY PRESENT (H): ICD-10-CM

## 2021-01-04 NOTE — TELEPHONE ENCOUNTER
"Requested Prescriptions   Pending Prescriptions Disp Refills     metFORMIN (GLUCOPHAGE-XR) 500 MG 24 hr tablet [Pharmacy Med Name: METFORMIN HCL ER 500MG TB24] 360 tablet 1     Sig: TAKE TWO TABLETS BY MOUTH TWICE A DAY WITH MEALS       Biguanide Agents Failed - 1/4/2021  5:01 AM        Failed - Patient has documented A1c within the specified period of time.     If HgbA1C is 8 or greater, it needs to be on file within the past 3 months.  If less than 8, must be on file within the past 6 months.     Recent Labs   Lab Test 07/03/20  1002   A1C 7.1*             Passed - Patient is age 10 or older        Passed - Patient's CR is NOT>1.4 OR Patient's EGFR is NOT<45 within past 12 mos.     Recent Labs   Lab Test 07/03/20  1002   GFRESTIMATED >90   GFRESTBLACK >90       Recent Labs   Lab Test 07/03/20  1002   CR 0.82             Passed - Patient does NOT have a diagnosis of CHF.        Passed - Medication is active on med list        Passed - Recent (6 mo) or future (30 days) visit within the authorizing provider's specialty     Patient had office visit in the last 6 months or has a visit in the next 30 days with authorizing provider or within the authorizing provider's specialty.  See \"Patient Info\" tab in inbasket, or \"Choose Columns\" in Meds & Orders section of the refill encounter.                 "

## 2021-01-07 NOTE — TELEPHONE ENCOUNTER
Routing refill request to provider for review/approval because:  Labs out of range:  A1C  Patient was to follow up in Oct 2020    Kenna De La Cruz RN

## 2021-01-08 RX ORDER — METFORMIN HCL 500 MG
TABLET, EXTENDED RELEASE 24 HR ORAL
Qty: 360 TABLET | Refills: 1 | Status: SHIPPED | OUTPATIENT
Start: 2021-01-08 | End: 2021-04-23

## 2021-01-15 ENCOUNTER — HEALTH MAINTENANCE LETTER (OUTPATIENT)
Age: 41
End: 2021-01-15

## 2021-01-21 ENCOUNTER — OFFICE VISIT (OUTPATIENT)
Dept: DERMATOLOGY | Facility: CLINIC | Age: 41
End: 2021-01-21
Payer: COMMERCIAL

## 2021-01-21 VITALS — DIASTOLIC BLOOD PRESSURE: 74 MMHG | SYSTOLIC BLOOD PRESSURE: 136 MMHG | RESPIRATION RATE: 20 BRPM | HEART RATE: 76 BPM

## 2021-01-21 DIAGNOSIS — L73.2 HIDRADENITIS SUPPURATIVA: Primary | ICD-10-CM

## 2021-01-21 PROCEDURE — 99207 PR NO CHARGE LOS: CPT | Performed by: PHYSICIAN ASSISTANT

## 2021-01-21 PROCEDURE — 11900 INJECT SKIN LESIONS </W 7: CPT | Performed by: PHYSICIAN ASSISTANT

## 2021-01-21 NOTE — PROGRESS NOTES
Chief Complaint   Patient presents with     RECHECK     Hidradenitis suppurativa        Vitals:    01/21/21 1618   BP: 136/74   BP Location: Left arm   Patient Position: Sitting   Cuff Size: Adult Large   Pulse: 76   Resp: 20     Wt Readings from Last 1 Encounters:   09/08/20 124.9 kg (275 lb 6 oz)       Shankar ALEXANDER RN   Specialty Clinics

## 2021-01-21 NOTE — PROGRESS NOTES
HPI:   Chief complaints: Sean Woodard is a 41 year old male who present ILK for inflamed cysts. History of hidradenitis suppurativa currently on Stelara every 2 months. In the past he has tried isotretinoin x 2, Humira, Remicade (neuropathy).   Condition present for:  years.     Review Of Systems  Eyes: negative  Ears/Nose/Throat: negative  Respiratory: No shortness of breath, dyspnea on exertion, cough, or hemoptysis  Cardiovascular: negative  Gastrointestinal: negative  Genitourinary: negative  Musculoskeletal: negative  Neurologic: negative  Psychiatric: negative  Skin: positive for cysts      PHYSICAL EXAM:    /74 (BP Location: Left arm, Patient Position: Sitting, Cuff Size: Adult Large)   Pulse 76   Resp 20   Skin exam performed as follows: Type 3 skin. Mood appropriate  Alert and Oriented X 3. Well developed, well nourished in no distress.  General appearance: Normal  Head including face: Normal  Eyes: conjunctiva and lids: Normal  Mouth: Lips, teeth, gums: Normal  Neck: Normal  Chest-breast/axillae: Normal  Back: Normal  Spleen and liver: Normal  Cardiovascular: Exam of peripheral vascular system by observation for swelling, varicosities, edema: Normal  Genitalia: groin, buttocks: Normal  Extremities: digits/nails (clubbing): Normal  Eccrine and Apocrine glands: Normal  Right upper extremity: Normal  Left upper extremity: Normal  Right lower extremity: Normal  Left lower extremity: Normal  Skin: Scalp and body hair: See below    1. Inflamed cysts in the left axilla x 6 right axilla x 6    ASSESSMENT/PLAN:     1. Hidradenitis suppurativa here today for ILK. Kenalog 10 mg/cc injected to axillae. Total of 2 cc's used.  Advised on risk of atrophy and failure to respond. Advised on aftercare.           Follow-up: PRN  CC:   Scribed By: Jahaira Garcia, MS, PA-C

## 2021-01-21 NOTE — LETTER
1/21/2021         RE: Sean Woodard  [address] [facility] [address]        Dear Colleague,    Thank you for referring your patient, Sean Woodard, to the [facility]. Please see a copy of my visit note below.    Chief Complaint   Patient presents with     RECHECK     Hidradenitis suppurativa        Vitals:    01/21/21 1618   BP: 136/74   BP Location: Left arm   Patient Position: Sitting   Cuff Size: Adult Large   Pulse: 76   Resp: 20     Wt Readings from Last 1 Encounters:   09/08/20 124.9 kg (275 lb 6 oz)       Shankar ALEXANDER RN   Specialty Clinics       HPI:   Chief complaints: Sean Woodard is a 41 year old male who present ILK for inflamed cysts. History of hidradenitis suppurativa currently on Stelara every 2 months. In the past he has tried isotretinoin x 2, Humira, Remicade (neuropathy).   Condition present for:  years.     Review Of Systems  Eyes: negative  Ears/Nose/Throat: negative  Respiratory: No shortness of breath, dyspnea on exertion, cough, or hemoptysis  Cardiovascular: negative  Gastrointestinal: negative  Genitourinary: negative  Musculoskeletal: negative  Neurologic: negative  Psychiatric: negative  Skin: positive for cysts      PHYSICAL EXAM:    /74 (BP Location: Left arm, Patient Position: Sitting, Cuff Size: Adult Large)   Pulse 76   Resp 20   Skin exam performed as follows: Type 3 skin. Mood appropriate  Alert and Oriented X 3. Well developed, well nourished in no distress.  General appearance: Normal  Head including face: Normal  Eyes: conjunctiva and lids: Normal  Mouth: Lips, teeth, gums: Normal  Neck: Normal  Chest-breast/axillae: Normal  Back: Normal  Spleen and liver: Normal  Cardiovascular: Exam of peripheral vascular system by observation for swelling, varicosities, edema: Normal  Genitalia: groin, buttocks: Normal  Extremities: digits/nails (clubbing): Normal  Eccrine and Apocrine glands: Normal  Right upper extremity: Normal  Left upper  extremity: Normal  Right lower extremity: Normal  Left lower extremity: Normal  Skin: Scalp and body hair: See below    1. Inflamed cysts in the left axilla x 6 right axilla x 6    ASSESSMENT/PLAN:     1. Hidradenitis suppurativa here today for ILK. Kenalog 10 mg/cc injected to axillae. Total of 2 cc's used.  Advised on risk of atrophy and failure to respond. Advised on aftercare.           Follow-up: PRN  CC:   Scribed By: Jahaira Garcia MS, PAMasonC          Again, thank you for allowing me to participate in the care of your patient.        Sincerely,        Jahaira Garcia PA-C

## 2021-02-24 ENCOUNTER — TELEPHONE (OUTPATIENT)
Dept: WOUND CARE | Facility: CLINIC | Age: 41
End: 2021-02-24

## 2021-02-24 NOTE — TELEPHONE ENCOUNTER
Sean called as months ago he received a referral to come see Dr. Garibay for his armpit wound.   Referral was from the Dermatology Clinic at [facility].    Spoke to patient that Dr. Garibay does not take new patients and he has to start with Karen Coates and he has agreed so he was given an appointment with Karen Coates.

## 2021-03-16 ENCOUNTER — HOSPITAL ENCOUNTER (OUTPATIENT)
Dept: WOUND CARE | Facility: CLINIC | Age: 41
Discharge: HOME | End: 2021-03-16
Attending: PHYSICIAN ASSISTANT | Admitting: PHYSICIAN ASSISTANT
Payer: COMMERCIAL

## 2021-03-16 VITALS — HEART RATE: 90 BPM | SYSTOLIC BLOOD PRESSURE: 156 MMHG | DIASTOLIC BLOOD PRESSURE: 93 MMHG | TEMPERATURE: 99.1 F

## 2021-03-16 DIAGNOSIS — L73.2 HIDRADENITIS SUPPURATIVA: Primary | ICD-10-CM

## 2021-03-16 PROCEDURE — G0463 HOSPITAL OUTPT CLINIC VISIT: HCPCS | Mod: 25

## 2021-03-16 PROCEDURE — 97602 WOUND(S) CARE NON-SELECTIVE: CPT

## 2021-03-16 PROCEDURE — 99203 OFFICE O/P NEW LOW 30 MIN: CPT | Performed by: PHYSICIAN ASSISTANT

## 2021-03-16 NOTE — DISCHARGE INSTRUCTIONS
[facility]  [address] [address]    Call us at [phone] if you have any questions about your wounds, have redness or swelling around your wound, have a fever of 101 or greater or if you have any other problems or concerns. We answer the phone Monday through Friday 8 am to 4 pm, please leave a message as we check the voicemail frequently throughout the day.     Sean Woodard      1980    Wound care recommendations to bilateral axilla wounds  Cleanse with soap and water in the shower. Apply any type of nonstick dressings to the area and change as needed.     discuss excision of hidradenitis suppurativa      Ginger Coates PA-C. March 16, 2021    Wound Clinic follow up with Dr. Garibay discuss excision of hidradenitis suppurativa     If you had a positive experience please indicate that on your patient satisfaction survey form that [facility] will be sending you.

## 2021-03-16 NOTE — PROGRESS NOTES
Patient arrived for wound care visit. Certified Wound Care Nurse time spent evaluating patient record, completed a full evaluation and documented wound(s) & david-wound skin; provided recommendation based on treatment plan. Applied dressing, reviewed discharge instructions, patient education, and discussed plan of care with appropriate medical team staff members and patient and/or family members.

## 2021-03-16 NOTE — PROGRESS NOTES
[facility]    ASSESSMENT:   1. (L73.2) Hidradenitis suppurativa  (primary encounter diagnosis)  2. Acanthosis nigricans   3. Type 2 DM    PLAN/DISCUSSION:   1. Discussed surgical excision, will set up consult with Dr. Garibay  2. Continue topicals, ILK and Stelara per derm team    HISTORY OF PRESENT ILLNESS:   Sean Woodard is a 41 year old male with hidradenitis suppurativa, obesity and type 2 DM who presents today to discuss options for treating his HS. Has been working with the [facility] derm team and currently has him on Stelara injections, topical clinda, BPO and PRN ILK. He has failed isotretinoin, Humira and Remicade in the past.      VITALS: BP (!) 156/93   Pulse 90   Temp 99.1  F (37.3  C) (Skin)      PHYSICAL EXAM:  GENERAL: Patient is alert and oriented and in no acute distress  INTEGUMENTARY:   Wound (used by OP [facility] only) 03/16/21 0923 Left other (see comments) (Active)   Thickness/Stage full thickness 03/16/21 0939   Dressing Appearance moist drainage 03/16/21 0939   Base granulating 03/16/21 0939   Periwound intact 03/16/21 0939   Periwound Temperature warm 03/16/21 0939   Length (cm) 4 03/16/21 0939   Width (cm) 3 03/16/21 0939   Depth (cm) 0.2 03/16/21 0939   Wound (cm^2) 12 cm^2 03/16/21 0939   Wound Volume (cm^3) 2.4 cm^3 03/16/21 0939   Drainage Characteristics/Odor serosanguineous 03/16/21 0939   Drainage Amount moderate 03/16/21 0939   Care, Wound non-select wound debridement performed 03/16/21 0939       Wound (used by OP [facility] only) 03/16/21 0923 Right other (see comments) (Active)   Thickness/Stage full thickness 03/16/21 0939   Dressing Appearance moist drainage 03/16/21 0939   Base granulating 03/16/21 0939   Periwound intact 03/16/21 0939   Periwound Temperature warm 03/16/21 0939   Length (cm) 5 03/16/21 0939   Width (cm) 4 03/16/21 0939   Depth (cm) 0.2 03/16/21 0939   Wound (cm^2) 20 cm^2 03/16/21 0939   Wound Volume (cm^3) 4 cm^3 03/16/21 0939   Drainage  Characteristics/Odor serosanguineous 03/16/21 0939   Drainage Amount copious 03/16/21 0939   Care, Wound non-select wound debridement performed 03/16/21 0939             MDM: 41 was spent on Mar 16, 2021 reviewing previous chart notes, labs, imaging, assessing the patient, developing the plan of care and education.    JENY OTERO PA-C

## 2021-03-16 NOTE — ADDENDUM NOTE
Encounter addended by: Akua Acevedo RN on: 3/16/2021 10:02 AM   Actions taken: Charge Capture section accepted

## 2021-04-01 ENCOUNTER — OFFICE VISIT (OUTPATIENT)
Dept: DERMATOLOGY | Facility: CLINIC | Age: 41
End: 2021-04-01
Payer: COMMERCIAL

## 2021-04-01 VITALS — HEART RATE: 90 BPM | DIASTOLIC BLOOD PRESSURE: 81 MMHG | OXYGEN SATURATION: 98 % | SYSTOLIC BLOOD PRESSURE: 125 MMHG

## 2021-04-01 DIAGNOSIS — L73.2 HIDRADENITIS SUPPURATIVA: Primary | ICD-10-CM

## 2021-04-01 PROCEDURE — 99207 PR DROP WITH A PROCEDURE: CPT | Performed by: PHYSICIAN ASSISTANT

## 2021-04-01 PROCEDURE — 11900 INJECT SKIN LESIONS </W 7: CPT | Performed by: PHYSICIAN ASSISTANT

## 2021-04-01 NOTE — NURSING NOTE
"Initial /81 (BP Location: Left arm)   Pulse 90   SpO2 98%  Estimated body mass index is 41.87 kg/m  as calculated from the following:    Height as of 9/8/20: 1.727 m (5' 8\").    Weight as of 9/8/20: 124.9 kg (275 lb 6 oz). .      "

## 2021-04-01 NOTE — LETTER
4/1/2021         RE: Sean Woodard  [address] [facility] [address]        Dear Colleague,    Thank you for referring your patient, Sean Woodard, to the [facility]. Please see a copy of my visit note below.    HPI:   Chief complaints: Sean Woodard is a 41 year old male who present ILK for inflamed cysts. History of hidradenitis suppurativa currently on Stelara every 2 months. In the past he has tried isotretinoin x 2, Humira, Remicade (neuropathy).   Condition present for:  years.       PHYSICAL EXAM:    /81 (BP Location: Left arm)   Pulse 90   SpO2 98%   Skin exam performed as follows: Type 3 skin. Mood appropriate  Alert and Oriented X 3. Well developed, well nourished in no distress.  General appearance: Normal  Head including face: Normal  Eyes: conjunctiva and lids: Normal  Mouth: Lips, teeth, gums: Normal  Neck: Normal  Chest-breast/axillae: Normal  Back: Normal  Spleen and liver: Normal  Cardiovascular: Exam of peripheral vascular system by observation for swelling, varicosities, edema: Normal  Genitalia: groin, buttocks: Normal  Extremities: digits/nails (clubbing): Normal  Eccrine and Apocrine glands: Normal  Right upper extremity: Normal  Left upper extremity: Normal  Right lower extremity: Normal  Left lower extremity: Normal  Skin: Scalp and body hair: See below    1. Inflamed cysts in the left axilla x 4 right axilla x 1, left buttocks x 1    ASSESSMENT/PLAN:     1. Hidradenitis suppurativa here today for ILK. Kenalog 10 mg/cc injected to axillae, buttocks. Total of 2 cc's used.  Advised on risk of atrophy and failure to respond. Advised on aftercare.           Follow-up: PRN  CC:   Scribed By: Jahaira Garcia, MS, PANIMISHA          Again, thank you for allowing me to participate in the care of your patient.        Sincerely,        Jahaira Garcia PA-C

## 2021-04-01 NOTE — PROGRESS NOTES
HPI:   Chief complaints: Sean Woodard is a 41 year old male who present ILK for inflamed cysts. History of hidradenitis suppurativa currently on Stelara every 2 months. In the past he has tried isotretinoin x 2, Humira, Remicade (neuropathy).   Condition present for:  years.       PHYSICAL EXAM:    /81 (BP Location: Left arm)   Pulse 90   SpO2 98%   Skin exam performed as follows: Type 3 skin. Mood appropriate  Alert and Oriented X 3. Well developed, well nourished in no distress.  General appearance: Normal  Head including face: Normal  Eyes: conjunctiva and lids: Normal  Mouth: Lips, teeth, gums: Normal  Neck: Normal  Chest-breast/axillae: Normal  Back: Normal  Spleen and liver: Normal  Cardiovascular: Exam of peripheral vascular system by observation for swelling, varicosities, edema: Normal  Genitalia: groin, buttocks: Normal  Extremities: digits/nails (clubbing): Normal  Eccrine and Apocrine glands: Normal  Right upper extremity: Normal  Left upper extremity: Normal  Right lower extremity: Normal  Left lower extremity: Normal  Skin: Scalp and body hair: See below    1. Inflamed cysts in the left axilla x 4 right axilla x 1, left buttocks x 1    ASSESSMENT/PLAN:     1. Hidradenitis suppurativa here today for ILK. Kenalog 10 mg/cc injected to axillae, buttocks. Total of 2 cc's used.  Advised on risk of atrophy and failure to respond. Advised on aftercare.           Follow-up: PRN  CC:   Scribed By: Jahaira Garcia, MS, PAMasonC

## 2021-04-08 ENCOUNTER — HOSPITAL ENCOUNTER (OUTPATIENT)
Dept: WOUND CARE | Facility: CLINIC | Age: 41
Discharge: HOME | End: 2021-04-08
Attending: SURGERY | Admitting: SURGERY
Payer: COMMERCIAL

## 2021-04-08 VITALS
HEART RATE: 98 BPM | TEMPERATURE: 98.1 F | DIASTOLIC BLOOD PRESSURE: 83 MMHG | RESPIRATION RATE: 18 BRPM | SYSTOLIC BLOOD PRESSURE: 142 MMHG

## 2021-04-08 DIAGNOSIS — L73.2 HIDRADENITIS SUPPURATIVA: ICD-10-CM

## 2021-04-08 DIAGNOSIS — L73.2 HIDRADENITIS AXILLARIS: Primary | ICD-10-CM

## 2021-04-08 PROCEDURE — 99202 OFFICE O/P NEW SF 15 MIN: CPT | Performed by: SURGERY

## 2021-04-08 PROCEDURE — 97602 WOUND(S) CARE NON-SELECTIVE: CPT

## 2021-04-08 RX ORDER — CEFAZOLIN SODIUM IN 0.9 % NACL 3 G/100 ML
3 INTRAVENOUS SOLUTION, PIGGYBACK (ML) INTRAVENOUS
Status: CANCELLED | OUTPATIENT
Start: 2021-04-08

## 2021-04-08 RX ORDER — CEFAZOLIN SODIUM 1 G/50ML
1 INJECTION, SOLUTION INTRAVENOUS SEE ADMIN INSTRUCTIONS
Status: CANCELLED | OUTPATIENT
Start: 2021-04-08

## 2021-04-08 NOTE — PROGRESS NOTES
Visit Date:   04/08/2021      [facility]      This is a 41-year-old  male who was originally referred by Dr. Avina at [facility] to our [facility].  He was seen initially by Ginger Coates, our physician's assistant.  She felt that we should consider him for surgical resection.  He has tried a lot of different systemic and topical therapies with Dermatology, including Humira and Remicade, which have failed.  Currently, he is on Stelara and intralesional Kenalog injections.  He also uses topical benzoyl peroxide and clindamycin combination gel.  He has been suffering from HS of the bilateral axillae for quite some time and that is certainly the most bothersome to him.  He has most recently been starting to develop some lesions in his crotch region and also received some Kenalog injection for a left buttock lesion most recently.        His past medical history is significant for obesity and diabetes, controlled on metformin.  His last A1c was 7.1 during the summer 2020.  He is a nonsmoker.  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      On exam, he has a very large area of both axillae involved with HS, both active lesions as well as the scarred, burnt out lesions.  The left side appears to have more of kind of rolled scars in the axillary crease with some extension inferiorly.  On the right side, there is less that extends onto the upper inner arm and more down onto the chest wall.  Both of these are at least 10 cm in length and width.  He is right-hand dominant and even though both axillae are equally bothersome, he thinks that we should start with his left side.        He had a lot of good questions with regard to various options for healing and reconstruction.  I told him I no longer do any of the transposition flaps from the back due to donor morbidity.  We have been seeing very good results with either the use  of a skin substitute such as Integra or Myriad with VAC, or even just the more traditional route with healing by secondary intention and daily dressing changes.  I think we will not address any of the new groin lesions since this might respond well to ILK.  The one on his left buttock actually appears to be scarred and burnt out.  He is eager to proceed since medical management has tapped out.  We will send in the prior authorization and then get him scheduled for left axillary excision of hidradenitis plus or minus skin substitute plus or minus back.  He understands that he will have to get a history and physical from his primary and a COVID test prior to that.  Please see nursing notes for dimensions of the wounds as well as photos.  His vitals today were within normal limits other than a heart rate of 98.   Labs:   Recent Labs   Lab Test 09/08/20  1013 07/03/20  1002 02/18/20  1522 10/01/18  1801 10/01/18  1801   ALBUMIN  --   --  3.8   < > 4.1   HGB 15.2  --  15.5   < > 16.3   INR 0.98  --   --   --   --    WBC 6.8  --  6.6   < > 7.4   A1C  --  7.1*  --    < > 6.3*   CRP  --   --   --   --  <2.9    < > = values in this interval not displayed.     Nutrition requirements were discussed with patient today.  Vitals:  BP (!) 142/83 (BP Location: Left arm)   Pulse 98   Temp 98.1  F (36.7  C) (Temporal)   Resp 18   Wound:   Wound (used by OP [facility]I only) 03/16/21 0923 Left other (see comments) (Active)   Thickness/Stage full thickness 04/08/21 0917   Dressing Appearance moist drainage 04/08/21 0900   Base granulating 04/08/21 0917   Periwound intact 04/08/21 0917   Periwound Temperature warm 04/08/21 0917   Length (cm) 7.6 04/08/21 0900   Width (cm) 10 04/08/21 0900   Depth (cm) 0 04/08/21 0900   Wound (cm^2) 76 cm^2 04/08/21 0900   Wound Volume (cm^3) 0 cm^3 04/08/21 0900   Wound healing % -533.33 04/08/21 0900   Drainage Characteristics/Odor serosanguineous 04/08/21 0900   Drainage Amount moderate 04/08/21 0900    Care, Wound non-select wound debridement performed 21       Wound (used by OP [facility] only) 21 Right other (see comments) (Active)   Thickness/Stage full thickness 21   Dressing Appearance moist drainage 21   Base granulating 21   Periwound intact 21   Periwound Temperature warm 21   Length (cm) 3 21   Width (cm) 7.5 21   Depth (cm) 0.1 21   Wound (cm^2) 22.5 cm^2 21   Wound Volume (cm^3) 2.25 cm^3 21   Wound healing % -12.5 21   Drainage Characteristics/Odor serosanguineous 21   Drainage Amount moderate 21   Care, Wound non-select wound debridement performed 21      Photo:           KRISTIN STRONG MD             D: 2021   T: 2021   MT: CRISTOBAL      Name:     SEB SMITH   MRN:      [id number]        Account:      [id number]   :      1980           Visit Date:   2021      Document: [id number]

## 2021-04-08 NOTE — DISCHARGE INSTRUCTIONS
[facility]  [address] [address]    Call us at [phone] if you have any questions about your wounds, have redness or  swelling around your wound, have a fever of 101 or greater or if you have any other  problems or concerns. We answer the phone Monday through Friday 8 am to 4 pm,  please leave a message as we check the voicemail frequently throughout the day.    Sean Woodard 1980    Wound care recommendations to bilateral axilla wounds  Cleanse with soap and water in the shower. Apply any type of nonstick dressings to the area and change as needed.    Plan for excision of HS area starting with the left axilla at the [facility].     Dr. Shahnaz Garibay, April 8, 2021    If you had a positive experience please indicate that on your patient satisfaction survey form that [facility] will be sending you.

## 2021-04-14 ENCOUNTER — MYC MEDICAL ADVICE (OUTPATIENT)
Dept: SURGERY | Facility: CLINIC | Age: 41
End: 2021-04-14

## 2021-04-14 ENCOUNTER — TELEPHONE (OUTPATIENT)
Dept: SURGERY | Facility: CLINIC | Age: 41
End: 2021-04-14

## 2021-04-14 NOTE — TELEPHONE ENCOUNTER
I contacted the patient via phone and spoke with the patient to confirm the scheduled dates and provide the following information:     Surgeon/surgery date/location:  Dr. Garibay on 5/21 at [place].  Arrival:   11:00 AM   Pre-op consult:   Mark patient  Pre-op physical with:   PCP  COVID-19 test:   5/18.  Post-op:   Mark patient.    The surgery packet was provided via [employer].

## 2021-04-17 ENCOUNTER — HEALTH MAINTENANCE LETTER (OUTPATIENT)
Age: 41
End: 2021-04-17

## 2021-04-23 ENCOUNTER — OFFICE VISIT (OUTPATIENT)
Dept: FAMILY MEDICINE | Facility: CLINIC | Age: 41
End: 2021-04-23
Payer: COMMERCIAL

## 2021-04-23 VITALS
TEMPERATURE: 98.1 F | SYSTOLIC BLOOD PRESSURE: 128 MMHG | WEIGHT: 262 LBS | HEART RATE: 100 BPM | OXYGEN SATURATION: 98 % | BODY MASS INDEX: 39.84 KG/M2 | DIASTOLIC BLOOD PRESSURE: 74 MMHG

## 2021-04-23 DIAGNOSIS — I10 BENIGN ESSENTIAL HYPERTENSION: ICD-10-CM

## 2021-04-23 DIAGNOSIS — E78.5 HYPERLIPIDEMIA WITH TARGET LDL LESS THAN 100: ICD-10-CM

## 2021-04-23 DIAGNOSIS — L73.2 HIDRADENITIS AXILLARIS: ICD-10-CM

## 2021-04-23 DIAGNOSIS — E11.9 TYPE 2 DIABETES MELLITUS WITHOUT COMPLICATION, WITHOUT LONG-TERM CURRENT USE OF INSULIN (H): ICD-10-CM

## 2021-04-23 DIAGNOSIS — Z01.818 PREOP GENERAL PHYSICAL EXAM: Primary | ICD-10-CM

## 2021-04-23 DIAGNOSIS — E66.01 CLASS 3 SEVERE OBESITY WITH BODY MASS INDEX (BMI) OF 40.0 TO 44.9 IN ADULT, UNSPECIFIED OBESITY TYPE, UNSPECIFIED WHETHER SERIOUS COMORBIDITY PRESENT (H): ICD-10-CM

## 2021-04-23 DIAGNOSIS — E66.813 CLASS 3 SEVERE OBESITY WITH BODY MASS INDEX (BMI) OF 40.0 TO 44.9 IN ADULT, UNSPECIFIED OBESITY TYPE, UNSPECIFIED WHETHER SERIOUS COMORBIDITY PRESENT (H): ICD-10-CM

## 2021-04-23 LAB
ALBUMIN SERPL-MCNC: 3.9 G/DL (ref 3.4–5)
ALP SERPL-CCNC: 72 U/L (ref 40–150)
ALT SERPL W P-5'-P-CCNC: 59 U/L (ref 0–70)
ANION GAP SERPL CALCULATED.3IONS-SCNC: 8 MMOL/L (ref 3–14)
AST SERPL W P-5'-P-CCNC: 25 U/L (ref 0–45)
BASOPHILS # BLD AUTO: 0.1 10E9/L (ref 0–0.2)
BASOPHILS NFR BLD AUTO: 0.6 %
BILIRUB SERPL-MCNC: 0.6 MG/DL (ref 0.2–1.3)
BUN SERPL-MCNC: 16 MG/DL (ref 7–30)
CALCIUM SERPL-MCNC: 9.5 MG/DL (ref 8.5–10.1)
CHLORIDE SERPL-SCNC: 101 MMOL/L (ref 94–109)
CO2 SERPL-SCNC: 26 MMOL/L (ref 20–32)
CREAT SERPL-MCNC: 0.97 MG/DL (ref 0.66–1.25)
DIFFERENTIAL METHOD BLD: NORMAL
EOSINOPHIL # BLD AUTO: 0.2 10E9/L (ref 0–0.7)
EOSINOPHIL NFR BLD AUTO: 2.1 %
ERYTHROCYTE [DISTWIDTH] IN BLOOD BY AUTOMATED COUNT: 12.7 % (ref 10–15)
GFR SERPL CREATININE-BSD FRML MDRD: >90 ML/MIN/{1.73_M2}
GLUCOSE SERPL-MCNC: 118 MG/DL (ref 70–99)
HBA1C MFR BLD: 5.8 % (ref 0–5.6)
HCT VFR BLD AUTO: 46.9 % (ref 40–53)
HGB BLD-MCNC: 16 G/DL (ref 13.3–17.7)
LYMPHOCYTES # BLD AUTO: 1.3 10E9/L (ref 0.8–5.3)
LYMPHOCYTES NFR BLD AUTO: 14.3 %
MCH RBC QN AUTO: 30.7 PG (ref 26.5–33)
MCHC RBC AUTO-ENTMCNC: 34.1 G/DL (ref 31.5–36.5)
MCV RBC AUTO: 90 FL (ref 78–100)
MONOCYTES # BLD AUTO: 0.7 10E9/L (ref 0–1.3)
MONOCYTES NFR BLD AUTO: 7.2 %
NEUTROPHILS # BLD AUTO: 6.9 10E9/L (ref 1.6–8.3)
NEUTROPHILS NFR BLD AUTO: 75.8 %
PLATELET # BLD AUTO: 235 10E9/L (ref 150–450)
POTASSIUM SERPL-SCNC: 3.6 MMOL/L (ref 3.4–5.3)
PROT SERPL-MCNC: 8.7 G/DL (ref 6.8–8.8)
RBC # BLD AUTO: 5.21 10E12/L (ref 4.4–5.9)
SODIUM SERPL-SCNC: 135 MMOL/L (ref 133–144)
WBC # BLD AUTO: 9.1 10E9/L (ref 4–11)

## 2021-04-23 PROCEDURE — 99214 OFFICE O/P EST MOD 30 MIN: CPT | Performed by: PHYSICIAN ASSISTANT

## 2021-04-23 PROCEDURE — 36415 COLL VENOUS BLD VENIPUNCTURE: CPT | Performed by: PHYSICIAN ASSISTANT

## 2021-04-23 PROCEDURE — 83036 HEMOGLOBIN GLYCOSYLATED A1C: CPT | Performed by: PHYSICIAN ASSISTANT

## 2021-04-23 PROCEDURE — 80053 COMPREHEN METABOLIC PANEL: CPT | Performed by: PHYSICIAN ASSISTANT

## 2021-04-23 PROCEDURE — 85025 COMPLETE CBC W/AUTO DIFF WBC: CPT | Performed by: PHYSICIAN ASSISTANT

## 2021-04-23 RX ORDER — METFORMIN HCL 500 MG
TABLET, EXTENDED RELEASE 24 HR ORAL
Qty: 360 TABLET | Refills: 3 | Status: SHIPPED | OUTPATIENT
Start: 2021-04-23 | End: 2022-06-13

## 2021-04-23 RX ORDER — ATORVASTATIN CALCIUM 20 MG/1
20 TABLET, FILM COATED ORAL DAILY
Qty: 90 TABLET | Refills: 3 | Status: SHIPPED | OUTPATIENT
Start: 2021-04-23 | End: 2021-07-02

## 2021-04-23 RX ORDER — LOSARTAN POTASSIUM AND HYDROCHLOROTHIAZIDE 12.5; 5 MG/1; MG/1
1 TABLET ORAL DAILY
Qty: 90 TABLET | Refills: 3 | Status: SHIPPED | OUTPATIENT
Start: 2021-04-23 | End: 2022-04-12

## 2021-04-23 ASSESSMENT — PAIN SCALES - GENERAL: PAINLEVEL: MILD PAIN (3)

## 2021-04-23 NOTE — PATIENT INSTRUCTIONS
HOLD (Do not take) metformin the morning of surgery      Preparing for Your Surgery  Getting started  A nurse will call you to review your health history and instructions. They will give you an arrival time based on your scheduled surgery time.  Please be ready to share the following:    Your doctor's clinic name and phone number    Your medical, surgical and anesthesia history    A list of allergies and sensitivities    A list of medicines, including herbal treatments and over-the-counter drugs    Whether the patient has a legal guardian (ask how to send us the papers in advance)  If you have a child who's having surgery, please ask for a copy of Preparing for Your Child's Surgery.    Preparing for surgery    Within 30 days of surgery: Have a pre-op exam (sometimes called an H&P, or History and Physical). This can be done at a clinic or pre-operative center.  ? If you're having a , you may not need this exam. Talk to your care team    At your pre-op exam, talk to your care team about all medicines you take. If you need to stop any medicines before surgery, ask when to start taking them again.  ? We do this for your safety. Many medicines can make you bleed too much during surgery. Some change how well surgery (anesthesia) drugs work.    Call your insurance company to let them know you're having surgery. (If you don't have insurance, call [phone].)    Call your clinic if there's any change in your health. This includes signs of a cold or flu (sore throat, runny nose, cough, rash, fever). It also includes a scrape or scratch near the surgery site.    If you have questions on the day of surgery, call your hospital or surgery center.  Eating and drinking guidelines  For your safety: Unless your surgeon tells you otherwise, follow the guidelines below.    Eat and drink as usual until 8 hours before surgery. After that, no food or milk.    Drink clear liquids until 2 hours before surgery. These are liquids  you can see through, like water, Gatorade and Propel Water. You may also have black coffee and tea (no cream or milk).    Nothing by mouth within 2 hours of surgery. This includes gum, candy and breath mints.    If you drink, stop drinking alcohol the night before surgery.    If your care team tells you to take medicine on the morning of surgery, it's okay to take it with a sip of water.  Preventing infection    Shower or bathe the night before and morning of your surgery. Follow the instructions your clinic gave you. (If no instructions, use regular soap.)    Don't shave or clip hair near your surgery site. We'll remove the hair if needed.    Don't smoke or vape the morning of surgery. You may chew nicotine gum up to 2 hours before surgery. A nicotine patch is okay.  ? Note: Some surgeries require you to completely quit smoking and nicotine. Check with your surgeon.    Your care team will make every effort to keep you safe from infection. We will:  ? Clean our hands often with soap and water (or an alcohol-based hand rub).  ? Clean the skin at your surgery site with a special soap that kills germs.  ? Give you a special gown to keep you warm. (Cold raises the risk of infection.)  ? Wear special hair covers, masks, gowns and gloves during surgery.  ? Give antibiotic medicine, if prescribed. Not all surgeries need antibiotics.  What to bring on the day of surgery    Photo ID and insurance card    Copy of your health care directive, if you have one    Glasses and hearing aides (bring cases)  ? You can't wear contacts during surgery    Inhaler and eye drops, if you use them (tell us about these when you arrive)    CPAP machine or breathing device, if you use them    A few personal items, if spending the night    If you have . . .  ? A pacemaker or ICD (cardiac defibrillator): Bring the ID card.  ? An implanted stimulator: Bring the remote control.  ? A legal guardian: Bring a copy of the certified (court-stamped)  guardianship papers.  Please remove any jewelry, including body piercings. Leave jewelry and other valuables at home.  If you're going home the day of surgery  Important: If you don't follow the rules below, we must cancel your surgery.     Arrange for someone to drive you home after surgery. You may not drive, take a taxi or take public transportation by yourself (unless you'll have local anesthesia only).    Arrange for a responsible adult to stay with you overnight. If you don't, we may keep you in the hospital overnight, and you may need to pay the costs yourself.  Questions?   If you have any questions for your care team, list them here: _________________________________________________________________________________________________________________________________________________________________________________________________________________________________________________________________________________________________________________________  For informational purposes only. Not to replace the advice of your health care provider. Copyright   2003, 2019 [employer]. All rights reserved. Clinically reviewed by Emmanuelle Ashford MD. [employer] 392333 - REV 4/20.

## 2021-04-23 NOTE — PROGRESS NOTES
[facility]  [address] [facility] [address]  Phone: [phone]  Primary Provider: Susanne Chung  Pre-op Performing Provider: SUSANNE CHUNG      PREOPERATIVE EVALUATION:  Today's date: 4/23/2021    Sean Woodard is a 41 year old male who presents for a preoperative evaluation.    Surgical Information:  Surgery/Procedure: EXCISION, HIDRADENITIS, left axilla, possible Myriad or Integra, possible VAC  Surgery Location: [facility]  Surgeon: Dr. Garibay  Surgery Date: 5/21/2021  Time of Surgery: 1:00pm  Where patient plans to recover: At home with family  Fax number for surgical facility: Note does not need to be faxed, will be available electronically in Epic.    Type of Anesthesia Anticipated: General    Assessment & Plan     The proposed surgical procedure is considered INTERMEDIATE risk.    (Z01.818) Preop general physical exam  (primary encounter diagnosis)  Comment:   Plan: Hemoglobin A1c, Comprehensive metabolic panel         (BMP + Alb, Alk Phos, ALT, AST, Total. Bili,         TP), CBC with platelets and differential,         CANCELED: Basic metabolic panel  (Ca, Cl, CO2,         Creat, Gluc, K, Na, BUN)            (L73.2) Hidradenitis axillaris  Comment:   Plan: Hemoglobin A1c, Comprehensive metabolic panel         (BMP + Alb, Alk Phos, ALT, AST, Total. Bili,         TP), CBC with platelets and differential,         CANCELED: Basic metabolic panel  (Ca, Cl, CO2,         Creat, Gluc, K, Na, BUN)            (E11.9) Type 2 diabetes mellitus without complication, without long-term current use of insulin (H)  Comment:   Plan: Hemoglobin A1c, metFORMIN (GLUCOPHAGE-XR) 500         MG 24 hr tablet            (I10) Benign essential hypertension  Comment:   Plan: Comprehensive metabolic panel (BMP + Alb, Alk         Phos, ALT, AST, Total. Bili, TP),         losartan-hydrochlorothiazide (HYZAAR) 50-12.5         MG  tablet, CANCELED: Basic metabolic panel          (Ca, Cl, CO2, Creat, Gluc, K, Na, BUN)            (E66.01,  Z68.41) Class 3 severe obesity with body mass index (BMI) of 40.0 to 44.9 in adult, unspecified obesity type, unspecified whether serious comorbidity present (H)  Comment:   Plan: metFORMIN (GLUCOPHAGE-XR) 500 MG 24 hr tablet            (E78.5) Hyperlipidemia with target LDL less than 100  Comment:   Plan: atorvastatin (LIPITOR) 20 MG tablet                     Risks and Recommendations:  The patient has the following additional risks and recommendations for perioperative complications:   - No identified additional risk factors other than previously addressed    Medication Instructions:  HOLD metformin the day of surgery    RECOMMENDATION:  APPROVAL GIVEN to proceed with proposed procedure, without further diagnostic evaluation.      Subjective     HPI related to upcoming procedure: persistent hidradenitis axillaris failing therapies      Preop Questions 4/23/2021   1. Have you ever had a heart attack or stroke? No   2. Have you ever had surgery on your heart or blood vessels, such as a stent placement, a coronary artery bypass, or surgery on an artery in your head, neck, heart, or legs? No   3. Do you have chest pain with activity? No   4. Do you have a history of  heart failure? No   5. Do you currently have a cold, bronchitis or symptoms of other infection? No   6. Do you have a cough, shortness of breath, or wheezing? No   7. Do you or anyone in your family have previous history of blood clots? No   8. Do you or does anyone in your family have a serious bleeding problem such as prolonged bleeding following surgeries or cuts? No   9. Have you ever had problems with anemia or been told to take iron pills? No   10. Have you had any abnormal blood loss such as black, tarry or bloody stools? No   11. Have you ever had a blood transfusion? No   12. Are you willing to have a blood transfusion if it is medically  needed before, during, or after your surgery? Yes   13. Have you or any of your relatives ever had problems with anesthesia? No   14. Do you have sleep apnea, excessive snoring or daytime drowsiness? YES - Sleep apnea    14a. Do you have a CPAP machine? Yes   15. Do you have any artifical heart valves or other implanted medical devices like a pacemaker, defibrillator, or continuous glucose monitor? No   16. Do you have artificial joints? No   17. Are you allergic to latex? No       Status of Chronic Conditions:  See problem list for active medical problems.  Problems all longstanding and stable, except as noted/documented.  See ROS for pertinent symptoms related to these conditions.      Review of Systems  CONSTITUTIONAL: NEGATIVE for fever, chills, change in weight  INTEGUMENTARY/SKIN: NEGATIVE for worrisome rashes, moles or lesions  EYES: NEGATIVE for vision changes or irritation  ENT/MOUTH: NEGATIVE for ear, mouth and throat problems  RESP: NEGATIVE for significant cough or SOB  BREAST: NEGATIVE for masses, tenderness or discharge  CV: NEGATIVE for chest pain, palpitations or peripheral edema  GI: NEGATIVE for nausea, abdominal pain, heartburn, or change in bowel habits  : NEGATIVE for frequency, dysuria, or hematuria  MUSCULOSKELETAL: NEGATIVE for significant arthralgias or myalgia  NEURO: NEGATIVE for weakness, dizziness or paresthesias  ENDOCRINE: NEGATIVE for temperature intolerance, skin/hair changes  HEME: NEGATIVE for bleeding problems  PSYCHIATRIC: NEGATIVE for changes in mood or affect    Patient Active Problem List    Diagnosis Date Noted     Hidradenitis axillaris 04/08/2021     Priority: Medium     Added automatically from request for surgery [id number]       Hidradenitis suppurativa 05/07/2020     Priority: Medium     FER (obstructive sleep apnea) 01/28/2019     Priority: Medium     Diabetes mellitus, type 2 (H) 01/25/2019     Priority: Medium     PTSD (post-traumatic stress disorder) 11/08/2018      Priority: Medium     , served in [place]  Diagnosed with PTSD by Dr. Darlene Jeffery on 11/9/2010       Class 3 severe obesity due to excess calories with serious comorbidity in adult (H) 11/08/2018     Priority: Medium     Benign essential hypertension 07/28/2017     Priority: Medium     Hyperlipidemia with target LDL less than 100 08/14/2014     Priority: Medium     Diagnosis updated by automated process. Provider to review and confirm.       Health Care Home 03/28/2014     Priority: Medium     EMERGENCY CARE PLAN  March 28, 2014: No current Care Coordination follow up planned. Please refer if Care Coordination services are needed.    Presenting Problem Signs and Symptoms Treatment Plan   Questions or concerns   during clinic hours   I will call my clinic directly:  [address] [address]  [phone].    Questions or concerns outside clinic hours   I will call the 24 hour nurse line at   [phone] or [facility].   Need to schedule an appointment   I will call the 24 hour scheduling team at [phone] or my clinic directly at [phone].    Same day treatment     I will call my clinic first, nurse line if after hours, urgent care and express care if needed.   Clinic care coordination services (regular clinic hours)     I will call a clinic care coordinator directly:     Marlo Talley RN  Mon, Tues, Fri - [phone]  Wed, Thurs - [phone]    Anat Wall :    [phone]    Or call my clinic at [phone] and ask to speak with care coordination.   Crisis Services: Behavioral or Mental Health  [facility] 24 Hour Phone Line  [phone]    [facility] 24 Hour Crisis Services  [phone]    [facility] [phone]    [facility]   [phone]       Emergency treatment -- Immediately    [facility] 911          GERD (gastroesophageal reflux disease) 05/13/2011     Priority: Medium      Past  Medical History:   Diagnosis Date     NO ACTIVE PROBLEMS      Past Surgical History:   Procedure Laterality Date     ORTHOPEDIC SURGERY  1/1/2005    RT knee arthroscopic menisectomy     Thumb Surgery  2009    Mass removal neuroma     Current Outpatient Medications   Medication Sig Dispense Refill     atorvastatin (LIPITOR) 20 MG tablet Take 1 tablet (20 mg) by mouth daily 90 tablet 3     augmented betamethasone dipropionate (DIPROLENE-AF) 0.05 % ointment Apply sparingly to affected area twice daily as needed.  Do not apply to face. 45 g 1     azelastine (ASTELIN) 0.1 % nasal spray Spray 2 sprays into both nostrils 2 times daily as needed for rhinitis 30 mL 3     azelastine (OPTIVAR) 0.05 % ophthalmic solution Apply 1 drop to eye 2 times daily 6 mL 3     benzoyl peroxide 5 % external liquid Use daily as directed 226 g 11     blood glucose (ACCU-CHEK GUIDE) test strip Use to test blood sugar 2 times daily 100 strip 11     cetirizine (ZYRTEC) 10 MG tablet Take 10 mg by mouth daily       clindamycin (CLINDAMAX) 1 % lotion Apply to AA BID 60 mL 11     clindamycin-benzoyl peroxide (BENZACLIN) 1-5 % external gel Apply twice daily to affected area on skin. 50 g 6     diphenhydrAMINE (BENADRYL) 25 MG capsule Take 25 mg by mouth every 6 hours as needed for itching or allergies       fluticasone (FLONASE) 50 MCG/ACT nasal spray Spray 2 sprays into both nostrils daily 16 g 3     gabapentin (NEURONTIN) 100 MG capsule Take 1 capsule (100 mg) by mouth At Bedtime 30 capsule 1     hydrochlorothiazide (HYDRODIURIL) 12.5 MG tablet TAKE ONE TABLET BY MOUTH ONCE DAILY 90 tablet 1     ISOtretinoin (ACCUTANE PO)        losartan (COZAAR) 50 MG tablet TAKE ONE TABLET BY MOUTH ONCE DAILY 90 tablet 1     metFORMIN (GLUCOPHAGE-XR) 500 MG 24 hr tablet TAKE TWO TABLETS BY MOUTH TWICE A DAY WITH MEALS 360 tablet 1     ustekinumab (STELARA) 90 MG/ML Inject 1 mL (90 mg) Subcutaneous every 2 months 1 mL 3       Allergies   Allergen Reactions      Lisinopril Cough        Social History     Tobacco Use     Smoking status: Former Smoker     Packs/day: 0.50     Years: 5.00     Pack years: 2.50     Types: Cigarettes     Quit date: 2005     Years since quittin.3     Smokeless tobacco: Never Used   Substance Use Topics     Alcohol use: Yes     Comment: Social. 1 drink per month     Family History   Problem Relation Age of Onset     Heart Defect Father         valve replacement     Diabetes Maternal Grandmother      Cancer Maternal Grandfather         stomach     Hypertension Brother      Diabetes Sister      Cerebrovascular Disease Brother 38     Hypertension Brother      Melanoma No family hx of      Skin Cancer No family hx of      History   Drug Use No         Objective     /74   Pulse 100   Temp 98.1  F (36.7  C) (Tympanic)   Wt 118.8 kg (262 lb)   SpO2 98%   BMI 39.84 kg/m      Physical Exam    GENERAL APPEARANCE: healthy, alert and no distress     EYES: EOMI,  PERRL     HENT: ear canals and TM's normal and nose and mouth without ulcers or lesions     NECK: no adenopathy, no asymmetry, masses, or scars and thyroid normal to palpation     RESP: lungs clear to auscultation - no rales, rhonchi or wheezes     CV: regular rates and rhythm, normal S1 S2, no S3 or S4 and no murmur, click or rub     ABDOMEN:  soft, nontender, no HSM or masses and bowel sounds normal     MS: extremities normal- no gross deformities noted, no evidence of inflammation in joints, FROM in all extremities.     SKIN: no suspicious lesions or rashes     NEURO: Normal strength and tone, sensory exam grossly normal, mentation intact and speech normal     PSYCH: mentation appears normal. and affect normal/bright     LYMPHATICS: No cervical adenopathy    Recent Labs   Lab Test 20  1013 20  1002 20  1522 19  1822   HGB 15.2  --  15.5  --      --  226  --    INR 0.98  --   --   --    NA  --  139 133  --    POTASSIUM  --  3.6 3.3*  --    CR  --   0.82 1.02  --    A1C  --  7.1*  --  6.2*        Diagnostics:  Recent Results (from the past 168 hour(s))   Hemoglobin A1c    Collection Time: 04/23/21  2:31 PM   Result Value Ref Range    Hemoglobin A1C 5.8 (H) 0 - 5.6 %   Comprehensive metabolic panel (BMP + Alb, Alk Phos, ALT, AST, Total. Bili, TP)    Collection Time: 04/23/21  2:31 PM   Result Value Ref Range    Sodium 135 133 - 144 mmol/L    Potassium 3.6 3.4 - 5.3 mmol/L    Chloride 101 94 - 109 mmol/L    Carbon Dioxide 26 20 - 32 mmol/L    Anion Gap 8 3 - 14 mmol/L    Glucose 118 (H) 70 - 99 mg/dL    Urea Nitrogen 16 7 - 30 mg/dL    Creatinine 0.97 0.66 - 1.25 mg/dL    GFR Estimate >90 >60 mL/min/[1.73_m2]    GFR Estimate If Black >90 >60 mL/min/[1.73_m2]    Calcium 9.5 8.5 - 10.1 mg/dL    Bilirubin Total 0.6 0.2 - 1.3 mg/dL    Albumin 3.9 3.4 - 5.0 g/dL    Protein Total 8.7 6.8 - 8.8 g/dL    Alkaline Phosphatase 72 40 - 150 U/L    ALT 59 0 - 70 U/L    AST 25 0 - 45 U/L   CBC with platelets and differential    Collection Time: 04/23/21  2:31 PM   Result Value Ref Range    WBC 9.1 4.0 - 11.0 10e9/L    RBC Count 5.21 4.4 - 5.9 10e12/L    Hemoglobin 16.0 13.3 - 17.7 g/dL    Hematocrit 46.9 40.0 - 53.0 %    MCV 90 78 - 100 fl    MCH 30.7 26.5 - 33.0 pg    MCHC 34.1 31.5 - 36.5 g/dL    RDW 12.7 10.0 - 15.0 %    Platelet Count 235 150 - 450 10e9/L    % Neutrophils 75.8 %    % Lymphocytes 14.3 %    % Monocytes 7.2 %    % Eosinophils 2.1 %    % Basophils 0.6 %    Absolute Neutrophil 6.9 1.6 - 8.3 10e9/L    Absolute Lymphocytes 1.3 0.8 - 5.3 10e9/L    Absolute Monocytes 0.7 0.0 - 1.3 10e9/L    Absolute Eosinophils 0.2 0.0 - 0.7 10e9/L    Absolute Basophils 0.1 0.0 - 0.2 10e9/L    Diff Method Automated Method       No EKG required, no history of coronary heart disease, significant arrhythmia, peripheral arterial disease or other structural heart disease.    Revised Cardiac Risk Index (RCRI):  The patient has the following serious cardiovascular risks for perioperative  complications:   - No serious cardiac risks = 0 points     RCRI Interpretation: 0 points: Class I (very low risk - 0.4% complication rate)           Signed Electronically by: Susanne Alberto PA-C  Copy of this evaluation report is provided to requesting physician.

## 2021-05-09 DIAGNOSIS — Z11.59 ENCOUNTER FOR SCREENING FOR OTHER VIRAL DISEASES: ICD-10-CM

## 2021-05-18 DIAGNOSIS — Z11.59 ENCOUNTER FOR SCREENING FOR OTHER VIRAL DISEASES: ICD-10-CM

## 2021-05-18 LAB
LABORATORY COMMENT REPORT: NORMAL
SARS-COV-2 RNA RESP QL NAA+PROBE: NEGATIVE
SARS-COV-2 RNA RESP QL NAA+PROBE: NORMAL
SPECIMEN SOURCE: NORMAL
SPECIMEN SOURCE: NORMAL

## 2021-05-18 PROCEDURE — U0005 INFEC AGEN DETEC AMPLI PROBE: HCPCS | Performed by: SURGERY

## 2021-05-18 PROCEDURE — U0003 INFECTIOUS AGENT DETECTION BY NUCLEIC ACID (DNA OR RNA); SEVERE ACUTE RESPIRATORY SYNDROME CORONAVIRUS 2 (SARS-COV-2) (CORONAVIRUS DISEASE [COVID-19]), AMPLIFIED PROBE TECHNIQUE, MAKING USE OF HIGH THROUGHPUT TECHNOLOGIES AS DESCRIBED BY CMS-2020-01-R: HCPCS | Performed by: SURGERY

## 2021-05-21 ENCOUNTER — ANESTHESIA (OUTPATIENT)
Dept: SURGERY | Facility: CLINIC | Age: 41
End: 2021-05-21
Payer: COMMERCIAL

## 2021-05-21 ENCOUNTER — HOSPITAL ENCOUNTER (OUTPATIENT)
Facility: CLINIC | Age: 41
Discharge: HOME | End: 2021-05-21
Attending: SURGERY | Admitting: SURGERY
Payer: COMMERCIAL

## 2021-05-21 ENCOUNTER — ANESTHESIA EVENT (OUTPATIENT)
Dept: SURGERY | Facility: CLINIC | Age: 41
End: 2021-05-21
Payer: COMMERCIAL

## 2021-05-21 VITALS
TEMPERATURE: 98 F | BODY MASS INDEX: 39.19 KG/M2 | WEIGHT: 258.6 LBS | OXYGEN SATURATION: 97 % | RESPIRATION RATE: 18 BRPM | DIASTOLIC BLOOD PRESSURE: 86 MMHG | HEIGHT: 68 IN | SYSTOLIC BLOOD PRESSURE: 136 MMHG | HEART RATE: 81 BPM

## 2021-05-21 DIAGNOSIS — L73.2 HIDRADENITIS AXILLARIS: ICD-10-CM

## 2021-05-21 LAB
GLUCOSE BLDC GLUCOMTR-MCNC: 105 MG/DL (ref 70–99)
GLUCOSE BLDC GLUCOMTR-MCNC: 127 MG/DL (ref 70–99)
POTASSIUM SERPL-SCNC: 3.7 MMOL/L (ref 3.4–5.3)

## 2021-05-21 PROCEDURE — 250N000011 HC RX IP 250 OP 636: Performed by: NURSE ANESTHETIST, CERTIFIED REGISTERED

## 2021-05-21 PROCEDURE — 250N000025 HC SEVOFLURANE, PER MIN: Performed by: SURGERY

## 2021-05-21 PROCEDURE — 87070 CULTURE OTHR SPECIMN AEROBIC: CPT | Performed by: SURGERY

## 2021-05-21 PROCEDURE — 87186 SC STD MICRODIL/AGAR DIL: CPT | Performed by: SURGERY

## 2021-05-21 PROCEDURE — 370N000017 HC ANESTHESIA TECHNICAL FEE, PER MIN: Performed by: SURGERY

## 2021-05-21 PROCEDURE — 250N000009 HC RX 250: Performed by: NURSE ANESTHETIST, CERTIFIED REGISTERED

## 2021-05-21 PROCEDURE — 88307 TISSUE EXAM BY PATHOLOGIST: CPT | Mod: TC | Performed by: SURGERY

## 2021-05-21 PROCEDURE — 82962 GLUCOSE BLOOD TEST: CPT

## 2021-05-21 PROCEDURE — 710N000012 HC RECOVERY PHASE 2, PER MINUTE: Performed by: SURGERY

## 2021-05-21 PROCEDURE — 87076 CULTURE ANAEROBE IDENT EACH: CPT | Mod: 91 | Performed by: SURGERY

## 2021-05-21 PROCEDURE — 87075 CULTR BACTERIA EXCEPT BLOOD: CPT | Performed by: SURGERY

## 2021-05-21 PROCEDURE — 710N000009 HC RECOVERY PHASE 1, LEVEL 1, PER MIN: Performed by: SURGERY

## 2021-05-21 PROCEDURE — 87176 TISSUE HOMOGENIZATION CULTR: CPT | Performed by: SURGERY

## 2021-05-21 PROCEDURE — 272N000001 HC OR GENERAL SUPPLY STERILE: Performed by: SURGERY

## 2021-05-21 PROCEDURE — 250N000009 HC RX 250: Performed by: SURGERY

## 2021-05-21 PROCEDURE — 88307 TISSUE EXAM BY PATHOLOGIST: CPT | Mod: 26 | Performed by: PATHOLOGY

## 2021-05-21 PROCEDURE — 258N000003 HC RX IP 258 OP 636: Performed by: NURSE ANESTHETIST, CERTIFIED REGISTERED

## 2021-05-21 PROCEDURE — 999N000141 HC STATISTIC PRE-PROCEDURE NURSING ASSESSMENT: Performed by: SURGERY

## 2021-05-21 PROCEDURE — 36415 COLL VENOUS BLD VENIPUNCTURE: CPT | Performed by: ANESTHESIOLOGY

## 2021-05-21 PROCEDURE — 87077 CULTURE AEROBIC IDENTIFY: CPT | Performed by: SURGERY

## 2021-05-21 PROCEDURE — 360N000075 HC SURGERY LEVEL 2, PER MIN: Performed by: SURGERY

## 2021-05-21 PROCEDURE — 84132 ASSAY OF SERUM POTASSIUM: CPT | Performed by: ANESTHESIOLOGY

## 2021-05-21 PROCEDURE — 258N000003 HC RX IP 258 OP 636: Performed by: ANESTHESIOLOGY

## 2021-05-21 RX ORDER — LABETALOL HYDROCHLORIDE 5 MG/ML
10 INJECTION, SOLUTION INTRAVENOUS
Status: DISCONTINUED | OUTPATIENT
Start: 2021-05-21 | End: 2021-05-21 | Stop reason: HOSPADM

## 2021-05-21 RX ORDER — ONDANSETRON 4 MG/1
4 TABLET, ORALLY DISINTEGRATING ORAL EVERY 8 HOURS PRN
Qty: 12 TABLET | Refills: 0 | Status: SHIPPED | OUTPATIENT
Start: 2021-05-21 | End: 2021-07-01

## 2021-05-21 RX ORDER — PROPOFOL 10 MG/ML
INJECTION, EMULSION INTRAVENOUS PRN
Status: DISCONTINUED | OUTPATIENT
Start: 2021-05-21 | End: 2021-05-21

## 2021-05-21 RX ORDER — KETOROLAC TROMETHAMINE 30 MG/ML
INJECTION, SOLUTION INTRAMUSCULAR; INTRAVENOUS PRN
Status: DISCONTINUED | OUTPATIENT
Start: 2021-05-21 | End: 2021-05-21

## 2021-05-21 RX ORDER — NALOXONE HYDROCHLORIDE 0.4 MG/ML
0.4 INJECTION, SOLUTION INTRAMUSCULAR; INTRAVENOUS; SUBCUTANEOUS
Status: DISCONTINUED | OUTPATIENT
Start: 2021-05-21 | End: 2021-05-21 | Stop reason: HOSPADM

## 2021-05-21 RX ORDER — LIDOCAINE 40 MG/G
CREAM TOPICAL
Status: DISCONTINUED | OUTPATIENT
Start: 2021-05-21 | End: 2021-05-21 | Stop reason: HOSPADM

## 2021-05-21 RX ORDER — ONDANSETRON 2 MG/ML
INJECTION INTRAMUSCULAR; INTRAVENOUS PRN
Status: DISCONTINUED | OUTPATIENT
Start: 2021-05-21 | End: 2021-05-21

## 2021-05-21 RX ORDER — NALOXONE HYDROCHLORIDE 0.4 MG/ML
0.2 INJECTION, SOLUTION INTRAMUSCULAR; INTRAVENOUS; SUBCUTANEOUS
Status: DISCONTINUED | OUTPATIENT
Start: 2021-05-21 | End: 2021-05-21 | Stop reason: HOSPADM

## 2021-05-21 RX ORDER — HYDROMORPHONE HYDROCHLORIDE 1 MG/ML
.3-.5 INJECTION, SOLUTION INTRAMUSCULAR; INTRAVENOUS; SUBCUTANEOUS EVERY 5 MIN PRN
Status: DISCONTINUED | OUTPATIENT
Start: 2021-05-21 | End: 2021-05-21 | Stop reason: HOSPADM

## 2021-05-21 RX ORDER — CEFAZOLIN SODIUM 1 G/3ML
1 INJECTION, POWDER, FOR SOLUTION INTRAMUSCULAR; INTRAVENOUS SEE ADMIN INSTRUCTIONS
Status: DISCONTINUED | OUTPATIENT
Start: 2021-05-21 | End: 2021-05-21 | Stop reason: HOSPADM

## 2021-05-21 RX ORDER — ONDANSETRON 2 MG/ML
4 INJECTION INTRAMUSCULAR; INTRAVENOUS EVERY 30 MIN PRN
Status: DISCONTINUED | OUTPATIENT
Start: 2021-05-21 | End: 2021-05-21 | Stop reason: HOSPADM

## 2021-05-21 RX ORDER — ONDANSETRON 4 MG/1
4 TABLET, ORALLY DISINTEGRATING ORAL EVERY 30 MIN PRN
Status: DISCONTINUED | OUTPATIENT
Start: 2021-05-21 | End: 2021-05-21 | Stop reason: HOSPADM

## 2021-05-21 RX ORDER — SODIUM CHLORIDE, SODIUM LACTATE, POTASSIUM CHLORIDE, CALCIUM CHLORIDE 600; 310; 30; 20 MG/100ML; MG/100ML; MG/100ML; MG/100ML
INJECTION, SOLUTION INTRAVENOUS CONTINUOUS
Status: DISCONTINUED | OUTPATIENT
Start: 2021-05-21 | End: 2021-05-21 | Stop reason: HOSPADM

## 2021-05-21 RX ORDER — HYDROXYZINE HYDROCHLORIDE 25 MG/1
25 TABLET, FILM COATED ORAL 3 TIMES DAILY PRN
Qty: 12 TABLET | Refills: 0 | Status: ON HOLD | OUTPATIENT
Start: 2021-05-21 | End: 2021-10-04

## 2021-05-21 RX ORDER — OXYCODONE HYDROCHLORIDE 5 MG/1
5 TABLET ORAL EVERY 6 HOURS PRN
Qty: 20 TABLET | Refills: 0 | Status: SHIPPED | OUTPATIENT
Start: 2021-05-21 | End: 2021-05-26

## 2021-05-21 RX ORDER — FENTANYL CITRATE 50 UG/ML
25-50 INJECTION, SOLUTION INTRAMUSCULAR; INTRAVENOUS
Status: DISCONTINUED | OUTPATIENT
Start: 2021-05-21 | End: 2021-05-21 | Stop reason: HOSPADM

## 2021-05-21 RX ORDER — FENTANYL CITRATE 50 UG/ML
INJECTION, SOLUTION INTRAMUSCULAR; INTRAVENOUS PRN
Status: DISCONTINUED | OUTPATIENT
Start: 2021-05-21 | End: 2021-05-21

## 2021-05-21 RX ORDER — DEXAMETHASONE SODIUM PHOSPHATE 4 MG/ML
INJECTION, SOLUTION INTRA-ARTICULAR; INTRALESIONAL; INTRAMUSCULAR; INTRAVENOUS; SOFT TISSUE PRN
Status: DISCONTINUED | OUTPATIENT
Start: 2021-05-21 | End: 2021-05-21

## 2021-05-21 RX ORDER — LIDOCAINE HYDROCHLORIDE 20 MG/ML
INJECTION, SOLUTION INFILTRATION; PERINEURAL PRN
Status: DISCONTINUED | OUTPATIENT
Start: 2021-05-21 | End: 2021-05-21

## 2021-05-21 RX ORDER — CEFAZOLIN SODIUM IN 0.9 % NACL 3 G/100 ML
3 INTRAVENOUS SOLUTION, PIGGYBACK (ML) INTRAVENOUS
Status: DISCONTINUED | OUTPATIENT
Start: 2021-05-21 | End: 2021-05-21 | Stop reason: HOSPADM

## 2021-05-21 RX ORDER — BUPIVACAINE HYDROCHLORIDE AND EPINEPHRINE 2.5; 5 MG/ML; UG/ML
INJECTION, SOLUTION INFILTRATION; PERINEURAL PRN
Status: DISCONTINUED | OUTPATIENT
Start: 2021-05-21 | End: 2021-05-21 | Stop reason: HOSPADM

## 2021-05-21 RX ADMIN — ROCURONIUM BROMIDE 30 MG: 10 INJECTION INTRAVENOUS at 15:05

## 2021-05-21 RX ADMIN — ROCURONIUM BROMIDE 50 MG: 10 INJECTION INTRAVENOUS at 14:32

## 2021-05-21 RX ADMIN — MIDAZOLAM 2 MG: 1 INJECTION INTRAMUSCULAR; INTRAVENOUS at 14:17

## 2021-05-21 RX ADMIN — DEXAMETHASONE SODIUM PHOSPHATE 10 MG: 4 INJECTION, SOLUTION INTRAMUSCULAR; INTRAVENOUS at 15:25

## 2021-05-21 RX ADMIN — KETOROLAC TROMETHAMINE 30 MG: 30 INJECTION, SOLUTION INTRAMUSCULAR at 15:46

## 2021-05-21 RX ADMIN — PROPOFOL 50 MG: 10 INJECTION, EMULSION INTRAVENOUS at 15:21

## 2021-05-21 RX ADMIN — SUGAMMADEX 240 MG: 100 INJECTION, SOLUTION INTRAVENOUS at 15:49

## 2021-05-21 RX ADMIN — SUGAMMADEX 160 MG: 100 INJECTION, SOLUTION INTRAVENOUS at 15:55

## 2021-05-21 RX ADMIN — PROPOFOL 100 MG: 10 INJECTION, EMULSION INTRAVENOUS at 14:31

## 2021-05-21 RX ADMIN — ONDANSETRON 4 MG: 2 INJECTION INTRAMUSCULAR; INTRAVENOUS at 15:45

## 2021-05-21 RX ADMIN — PHENYLEPHRINE HYDROCHLORIDE 100 MCG: 10 INJECTION INTRAVENOUS at 15:11

## 2021-05-21 RX ADMIN — HYDROMORPHONE HYDROCHLORIDE 0.5 MG: 1 INJECTION, SOLUTION INTRAMUSCULAR; INTRAVENOUS; SUBCUTANEOUS at 15:43

## 2021-05-21 RX ADMIN — PROPOFOL 50 MG: 10 INJECTION, EMULSION INTRAVENOUS at 14:34

## 2021-05-21 RX ADMIN — SODIUM CHLORIDE, POTASSIUM CHLORIDE, SODIUM LACTATE AND CALCIUM CHLORIDE: 600; 310; 30; 20 INJECTION, SOLUTION INTRAVENOUS at 14:13

## 2021-05-21 RX ADMIN — PHENYLEPHRINE HYDROCHLORIDE 100 MCG: 10 INJECTION INTRAVENOUS at 15:38

## 2021-05-21 RX ADMIN — FENTANYL CITRATE 100 MCG: 50 INJECTION, SOLUTION INTRAMUSCULAR; INTRAVENOUS at 14:31

## 2021-05-21 RX ADMIN — LIDOCAINE HYDROCHLORIDE 100 MG: 20 INJECTION, SOLUTION INFILTRATION; PERINEURAL at 14:31

## 2021-05-21 ASSESSMENT — MIFFLIN-ST. JEOR: SCORE: 2057.5

## 2021-05-21 NOTE — ANESTHESIA CARE TRANSFER NOTE
Patient: Sean Woodard    Procedure(s):  EXCISION, HIDRADENITIS, left axilla    Diagnosis: Hidradenitis axillaris [L73.2]  Diagnosis Additional Information: No value filed.    Anesthesia Type:   General     Note:    Oropharynx: oropharynx clear of all foreign objects  Level of Consciousness: drowsy  Oxygen Supplementation: face mask  Level of Supplemental Oxygen (L/min / FiO2): 8  Independent Airway: airway patency satisfactory and stable  Dentition: dentition unchanged  Vital Signs Stable: post-procedure vital signs reviewed and stable  Report to RN Given: handoff report given  Patient transferred to: PACU    Handoff Report: Identifed the Patient, Identified the Reponsible Provider, Reviewed the pertinent medical history, Discussed the surgical course, Reviewed Intra-OP anesthesia mangement and issues during anesthesia, Set expectations for post-procedure period and Allowed opportunity for questions and acknowledgement of understanding      Vitals: (Last set prior to Anesthesia Care Transfer)  CRNA VITALS  5/21/2021 1533 - 5/21/2021 1614      5/21/2021             Pulse:  92    Ht Rate:  91    SpO2:  98 %        Electronically Signed By: FALGUNI Yu CRNA  May 21, 2021  4:14 PM

## 2021-05-21 NOTE — DISCHARGE INSTRUCTIONS
Same-Day Surgery   Adult Discharge Orders & Instructions     For 24 hours after surgery:  1. Get plenty of rest.  A responsible adult must stay with you for at least 24 hours after you leave the hospital.   2. Pain medication can slow your reflexes. Do not drive or use heavy equipment.  If you have weakness or tingling, don't drive or use heavy equipment until this feeling goes away.  3. Mixing alcohol and pain medication can cause dizziness and slow your breathing. It can even be fatal. Do not drink alcohol while taking pain medication.  4. Avoid strenuous or risky activities.  Ask for help when climbing stairs.   5. You may feel lightheaded.  If so, sit for a few minutes before standing.  Have someone help you get up.   6. If you have nausea (feel sick to your stomach), drink only clear liquids such as apple juice, ginger ale, broth or 7-Up.  Rest may also help.  Be sure to drink enough fluids.  Move to a regular diet as you feel able. Take pain medications with a small amount of solid food, such as toast or crackers, to avoid nausea.   7. A slight fever is normal. Call the doctor if your fever is over 100 F (37.7 C) (taken under the tongue) or lasts longer than 24 hours.  8. You may have a dry mouth, muscle aches, trouble sleeping or a sore throat.  These symptoms should go away after 24 hours.  9. Do not make important or legal decisions.   Pain Management:      1. Take pain medication (if prescribed) for pain as directed by your physician.        2. WARNING: If the pain medication you have been prescribed contains Tylenol  (acetaminophen), DO NOT take additional doses of Tylenol (acetaminophen).     Call your doctor for any of the followin.  Signs of infection (fever, growing tenderness at the surgery site, severe pain, a large amount of drainage or bleeding, foul-smelling drainage, redness, swelling).    2.  It has been over 8 to 10 hours since surgery and you are still not able to urinate (pee).    3.   Headache for over 24 hours.    4.  Numbness, tingling or weakness the day after surgery (if you had spinal anesthesia).  To contact a doctor, call _____________________________________ or:      [phone] and ask for the Resident On Call for:          __________________________________________ (answered 24 hours a day)      Emergency Department:  [facility]: [phone]  [facility]: [phone]               Rev. 10/2014

## 2021-05-21 NOTE — ANESTHESIA PROCEDURE NOTES
Airway       Patient location during procedure: OR       Procedure Start/Stop Times: 5/21/2021 2:37 PM  Staff -        Anesthesiologist:  Mitesh Prabhakar DO       CRNA: Jason Lynn APRN CRNA       Performed By: CRNA and anesthesiologist  Consent for Airway        Urgency: elective  Indications and Patient Condition       Indications for airway management: david-procedural       Induction type:intravenous       Mask difficulty assessment: 3 - difficult mask (inadequate, unstable, or two providers) +/- NMBA (At least 2 hands for face mask and one person bagging.  Easier with 2 people masking and one person bagging.)    Final Airway Details       Final airway type: endotracheal airway       Successful airway: ETT - single  Endotracheal Airway Details        ETT size (mm): 2.0       Successful intubation technique: video laryngoscopy and flexible bronchoscopy       VL Blade Size: MAC D Blade       Grade View of Cords: 2       Adjucts: stylet       Position: Right       Measured from: gums/teeth       Secured at (cm): 24       Bite block used: Oral Airway    Post intubation assessment        Placement verified by: capnometry, equal breath sounds and chest rise        Number of attempts at approach: 4 or more       Number of other approaches attempted: 1 (Tried to do a fiberoptic intubation, not able to see, able to see good view using CMAC D Blade)       Secured with: pink tape       Ease of procedure: difficult (D Blade with CMAC relatively easy, but difficult to keep mask seal)    Medication(s) Administered   Medication Administration Time: 5/21/2021 2:37 PM    Additional Comments       See comments

## 2021-05-21 NOTE — BRIEF OP NOTE
[facility]    Brief Operative Note    Pre-operative diagnosis: Hidradenitis axillaris [L73.2]  Post-operative diagnosis Same as pre-operative diagnosis    Procedure: Procedure(s):  EXCISION, HIDRADENITIS, left axilla  Surgeon: Surgeon(s) and Role:     * Shahnaz Garibay MD - Primary     * Latonya Smith PA-C - Assisting  Anesthesia: General   Estimated blood loss: Less than 50 ml  Drains: None  Specimens:   ID Type Source Tests Collected by Time Destination   1 : left axilla tissue Tissue Axilla, Left ANAEROBIC BACTERIAL CULTURE, TISSUE CULTURE AEROBIC BACTERIAL Shahnaz Garibay MD 5/21/2021  3:34 PM    A : left axilla tissue Tissue Axilla, Left SURGICAL PATHOLOGY EXAM Shahnaz Garibay MD 5/21/2021  3:34 PM      Findings:   None.  Complications: None.  Implants: * No implants in log *    L axilla hidradenitis excision. Wound left open.

## 2021-05-23 LAB
BACTERIA SPEC CULT: ABNORMAL
BACTERIA SPEC CULT: ABNORMAL
SPECIMEN SOURCE: ABNORMAL

## 2021-05-24 ENCOUNTER — MYC MEDICAL ADVICE (OUTPATIENT)
Dept: PLASTIC SURGERY | Facility: CLINIC | Age: 41
End: 2021-05-24

## 2021-05-24 LAB
BACTERIA SPEC CULT: ABNORMAL
COPATH REPORT: NORMAL
Lab: ABNORMAL
SPECIMEN SOURCE: ABNORMAL

## 2021-05-24 NOTE — OP NOTE
Procedure Date: 05/21/2021    ATTENDING SURGEON: Shahnaz Garibay M.D.    ASSISTANT:  Latonya Smith PA-C (resident not available, ALEJANDRA did 50% of case).    PREOPERATIVE DIAGNOSIS:  Left axillary hidradenitis.    POSTOPERATIVE DIAGNOSIS:  Left axillary hidradenitis.    PROCEDURE:  Sharp excisional debridement of left axillary hidradenitis.    ANESTHESIA:  GETA, 20 mL of 0.25% Marcaine with epinephrine field block.    ESTIMATED BLOOD LOSS:  25 mL.    INTRAVENOUS FLUIDS:  900 mL.    URINE OUTPUT:  Not measured.    SPECIMENS REMOVED:  Left axillary hidradenitis tissue excised and anaerobic and aerobic cultures.    FINDINGS:  HS lesions active and burnt out, found to be limited to the superficial subQ fat layers.  Minimal extension down to the axillary fascia.  No obvious extensions around the perimeter of excision.    INDICATIONS FOR PROCEDURE:  This is a 41-year-old gentleman who was referred by dermatology for possible surgical resection of his active HS lesions.  This patient has already tried multiple immunosuppressants without much success and he is desperate to have these areas resected.  He chose to start with the left axilla first.    PROCEDURE:  The patient was seen in the preoperative waiting area.  The operative site was marked on the anatomical diagram.  Informed consent was obtained after reviewing possible risks and complications including but not limited to the following:  Infection, bleeding, hematoma, seroma formation, poor healing, residual deformities and need for further surgery, possible disease recurrence and need for further resection, possible scar contracture is, possible injury to the surrounding neurovascular and musculoskeletal structures, which could lead to altered perfusion or sensation to the upper extremity and possible anesthetic risks such as DVT, PE and cardiopulmonary arrest.  The patient was then brought to the operating room and placed supine on the OR table.  Anesthesia was  concerned about intubation given his short neck.  Please see their notes for details.    DESCRIPTION OF PROCEDURE:  With attempted a fiberoptic intubation, ultimately intubation was CMAC.  Once he was intubated, we decided to elevate his left arm on an airplane frame to allow the axilla to be open to access.  The patient was secured to the table with safety straps.  No Garcia was placed.  The left axilla, shoulder and anterior chest and upper extremity to below the elbow was prepped and draped in the usual sterile fashion.  A stockinette was used for the arm and then the airplane sling was also draped in a sterile fashion.  A timeout was taken and the proper patient and procedure were identified.  We had already outlined the affected areas of the left axilla in the preoperative area with the patient awake.  We felt that removal of all areas affected by the disease was most prudent with the understanding that we might not be able to close it.  We did not push hard to get approval for any of the skin substitute at this time.  The outline of the active disease area of the left axilla was then sharply incised with a blade followed by electrocautery to divide the skin, dermis and subcutaneous fat layers.  We found a good plane within the adipose layer that did not appear to have any kind of deep scarring or active inflammatory granulation tissue or lesions at this level that the tissue was removed in its entirety.  Hemostasis was achieved with electrocautery.  No named vascular or neurologic structures were encountered.  The specimen, once removed, was submitted to pathology for permanent examination except for 2 small areas that were excised and sent for aerobic and anaerobic cultures.  We then irrigated the wound with Vashe hypochlorous acid solution and final hemostasis was achieved.  We then injected approximately 20 mL of 0.25% Marcaine with epinephrine around the periphery of the wound as a field block.  Despite  having mobility of the arm, when it was lowered, we did not feel that there were any corners really would lend themselves to even partial closure today.  The wound itself measured x 13 x 13 x 2 cm.  Since no closure, the wound was then dressed with Kerlix roll and ABD pads and secured with Medipore tape and either benzoin and Mastisol.  The patient was extubated once transferred onto the stretcher.  Fortunately, he had quite a lot of emesis, but was otherwise stable.  He was taken to the recovery room without difficulty or complication.    Shahnaz Garibay MD        D: 2021   T: 2021   MT: carey    Name:     SEB SMITH  MRN:      [id number]        Account:        [id number]   :      1980           Procedure Date: 2021     Document: [id number]

## 2021-05-24 NOTE — TELEPHONE ENCOUNTER
Patient had copious emesis upon extubation that would explain the blood shot eye. Patient denies pain to eye. Advised if it doesn't get better to follow up with primary care or eye doctor. Patient is noting to have small amounts of bleeding with dressing changes and occasional clots but just was not sure if that is normal. Advised patient to continue to monitor and call back if it gets worse or changes before follow up. Gave patient signs of infection and told to call with any other questions.

## 2021-06-03 ENCOUNTER — HOSPITAL ENCOUNTER (OUTPATIENT)
Dept: WOUND CARE | Facility: CLINIC | Age: 41
Discharge: HOME | End: 2021-06-03
Attending: SURGERY | Admitting: SURGERY
Payer: COMMERCIAL

## 2021-06-03 ENCOUNTER — TELEPHONE (OUTPATIENT)
Dept: DERMATOLOGY | Facility: CLINIC | Age: 41
End: 2021-06-03

## 2021-06-03 ENCOUNTER — OFFICE VISIT (OUTPATIENT)
Dept: DERMATOLOGY | Facility: CLINIC | Age: 41
End: 2021-06-03
Payer: COMMERCIAL

## 2021-06-03 VITALS — SYSTOLIC BLOOD PRESSURE: 129 MMHG | HEART RATE: 78 BPM | DIASTOLIC BLOOD PRESSURE: 79 MMHG | OXYGEN SATURATION: 99 %

## 2021-06-03 VITALS
DIASTOLIC BLOOD PRESSURE: 80 MMHG | RESPIRATION RATE: 18 BRPM | TEMPERATURE: 97.9 F | SYSTOLIC BLOOD PRESSURE: 139 MMHG | HEART RATE: 83 BPM

## 2021-06-03 DIAGNOSIS — L73.2 HIDRADENITIS SUPPURATIVA: ICD-10-CM

## 2021-06-03 DIAGNOSIS — L73.2 HIDRADENITIS AXILLARIS: Primary | ICD-10-CM

## 2021-06-03 DIAGNOSIS — L73.2 HIDRADENITIS SUPPURATIVA: Primary | ICD-10-CM

## 2021-06-03 PROCEDURE — 99024 POSTOP FOLLOW-UP VISIT: CPT | Performed by: SURGERY

## 2021-06-03 PROCEDURE — 11900 INJECT SKIN LESIONS </W 7: CPT | Performed by: PHYSICIAN ASSISTANT

## 2021-06-03 PROCEDURE — 97602 WOUND(S) CARE NON-SELECTIVE: CPT

## 2021-06-03 RX ORDER — DOXYCYCLINE HYCLATE 100 MG
100 TABLET ORAL 2 TIMES DAILY
COMMUNITY
End: 2022-09-16

## 2021-06-03 NOTE — DISCHARGE INSTRUCTIONS
[facility]  [address] [address]    Call us at [phone] if you have any questions about your wounds, have redness or swelling around your wound, have a fever of 101 or greater or if you have any other problems or concerns. We answer the phone Monday through Friday 8 am to 4 pm, please leave a message as we check the voicemail frequently throughout the day.     Sean Woodard      1980    Wound Dressing Change: Daily  Cleanse wound and surrounding skin in shower with gentle soap and water, pat dry  Cover wound with piece of Hydrofera Blue to fit the wound and ABD with tape  Change dressing Daily     David Garibay M.D. Korin 3, 2021      If you had a positive experience please indicate on your patient satisfaction survey form that [facility] will be sending you.    If you have any billing related questions please call the [facility] office at [phone]. The clinic staff does not handle billing related matters.

## 2021-06-03 NOTE — LETTER
6/3/2021         RE: Sean Woodard  [address] [address]        Dear Colleague,    Thank you for referring your patient, Sean Woodard, to the [facility]. Please see a copy of my visit note below.    HPI:   Chief complaints: Sean Woodard is a 41 year old male who present ILK for inflamed cysts. History of hidradenitis suppurativa currently on Stelara every 2 months. In the past he has tried isotretinoin x 2, Humira, Remicade (neuropathy).   Condition present for:  years.       PHYSICAL EXAM:    /79   Pulse 78   SpO2 99%   Skin exam performed as follows: Type 3 skin. Mood appropriate  Alert and Oriented X 3. Well developed, well nourished in no distress.  General appearance: Normal  Head including face: Normal  Eyes: conjunctiva and lids: Normal  Mouth: Lips, teeth, gums: Normal  Neck: Normal  Chest-breast/axillae: Normal  Back: Normal  Spleen and liver: Normal  Cardiovascular: Exam of peripheral vascular system by observation for swelling, varicosities, edema: Normal  Genitalia: groin, buttocks: Normal  Extremities: digits/nails (clubbing): Normal  Eccrine and Apocrine glands: Normal  Right upper extremity: Normal  Left upper extremity: Normal  Right lower extremity: Normal  Left lower extremity: Normal  Skin: Scalp and body hair: See below    1. Inflamed cysts in the right axilla    ASSESSMENT/PLAN:     1. Hidradenitis suppurativa here today for ILK. Kenalog 10 mg/cc injected to right axillae, buttocks. Total of 2 cc's used.  Advised on risk of atrophy and failure to respond. Advised on aftercare.           Follow-up: PRN  CC:   Scribed By: Jahaira Garcia, MS, PANIMISHA          Again, thank you for allowing me to participate in the care of your patient.        Sincerely,        Jahaira Garcia PA-C

## 2021-06-03 NOTE — NURSING NOTE
Chief Complaint   Patient presents with     Derm Problem     hs       Vitals:    06/03/21 1355   BP: 129/79   Pulse: 78   SpO2: 99%     Wt Readings from Last 1 Encounters:   05/21/21 117.3 kg (258 lb 9.6 oz)       Emily Trimble LPN.................6/3/2021

## 2021-06-03 NOTE — PROGRESS NOTES
HPI:   Chief complaints: Sean Woodard is a 41 year old male who present ILK for inflamed cysts. History of hidradenitis suppurativa currently on Stelara every 2 months. In the past he has tried isotretinoin x 2, Humira, Remicade (neuropathy).   Condition present for:  years.       PHYSICAL EXAM:    /79   Pulse 78   SpO2 99%   Skin exam performed as follows: Type 3 skin. Mood appropriate  Alert and Oriented X 3. Well developed, well nourished in no distress.  General appearance: Normal  Head including face: Normal  Eyes: conjunctiva and lids: Normal  Mouth: Lips, teeth, gums: Normal  Neck: Normal  Chest-breast/axillae: Normal  Back: Normal  Spleen and liver: Normal  Cardiovascular: Exam of peripheral vascular system by observation for swelling, varicosities, edema: Normal  Genitalia: groin, buttocks: Normal  Extremities: digits/nails (clubbing): Normal  Eccrine and Apocrine glands: Normal  Right upper extremity: Normal  Left upper extremity: Normal  Right lower extremity: Normal  Left lower extremity: Normal  Skin: Scalp and body hair: See below    1. Inflamed cysts in the right axilla    ASSESSMENT/PLAN:     1. Hidradenitis suppurativa here today for ILK. Kenalog 10 mg/cc injected to right axillae, buttocks. Total of 2 cc's used.  Advised on risk of atrophy and failure to respond. Advised on aftercare.           Follow-up: PRN  CC:   Scribed By: Jahaira Garcia, MS, PA-C

## 2021-06-03 NOTE — TELEPHONE ENCOUNTER
[facility] Call Center    Phone Message    May a detailed message be left on voicemail: yes     Reason for Call: Symptoms or Concerns     If patient has red-flag symptoms, warm transfer to triage line    Current symptom or concern:   HS flare up with extreme pain    Symptoms have been present for:  1 week(s)    Has patient previously been seen for this? Yes    By Jahaira Garcia and Sabi Bustos    Date: ongoing    Are there any new or worsening symptoms? Yes: as noted      Action Taken: Message routed to:  Other: [place] [facility]    Travel Screening: Not Applicable     Pt scheduled for 1st available on 6/17/21.    Pt is looking for a call back about symptoms and wonders if he can be seen sooner.                                                                     '

## 2021-06-03 NOTE — PROGRESS NOTES
Visit Date: 06/03/2021    This is a 41-year-old gentleman with HS, who is status post resection of left axillary hidradenitis on 05/21/2021.  We were unable to close any of his wound at the time of surgery, so he has been doing daily dressing changes with his wife's help at home.  They do not have visiting home nursing.  They have been buying their supplies off [employer].  He has been leaving the wound covered during a shower and then using antimicrobial spray replaced and the gauze with ABD and tape.  He has had several phone calls to our clinic staff regarding bleeding, although he notes that has decreased along with the drainage just the last several days.  Also, he states he notices an odor and is concerned about that.  He is on low dose doxycycline.  As far as pain is concerned, he basically just tries to take Tylenol.  He does not take narcotics much.  He has been working from home.  His arm does get a bit tired depending on the position that he has to keep his arm in and unfortunately the [employer] is trying to make everyone to mandatory overtime, so we will go ahead and provide a letter states that he should not exceed his normal work hours while he is healing.  The wound itself is quite a bit smaller.  It is filling in nicely, although it is fairly hypertrophic.  Not a lot of active bleeding at this time nor odor.  He has been very careful with range of motion partially just because of discomfort.  At this point, I think we will get him to take off his dressing and use soap and get the handheld shower in thereto really clean things off.  We will switch over to us from antimicrobial spray and dry gauze to Hydrofera Blue foam covered with ABD and held in by tape.  This can be on a daily basis at this point. We will put in an order for supplies from the [email or web address] down stairs, since he does have [employer].  He can just call and let us know if there are any issues or concerns with  regard to this dressing.  Currently, we do not have A and D and actually the Hydrofera Blue might be better at absorbing inflammatory enzymes and making things feel better.  We have him already scheduled for followup with me on the 24th of this month.  If he needs to be seen sooner, we might be able to see one of our colleagues or I could consider squeezing [facility] but things are pretty packed there.  As mentioned, he was given a letter for work and will reassess how he is healing and whether or not he needs further accommodations based on how his healing.  We did talk about future options for getting this site healed.  Obviously, the slowest, but the most least donor morbidity, he can heal by secondary intention as long as we get the biofilm under control.  I did talk about the possibility of skin grafting, but that would require another surgery and immobilization with bulky dressing and a donor site.  He was not too keen on that, so we will see how he heals by secondary intention as long as things do not get too contracted.  I have told him that he should start to do range of motion as tolerated.  He did ask if we could do steroid injection for his right axilla, which seems to be flaring right now.  I do not do these and he will have to talk with the dermatologist regarding that.  He did not need any additional narcotics filled.  Please see nursing notes for dimensions of the wound and photos today as well as vital signs.  Labs:   Recent Labs   Lab Test 04/23/21  1431 09/08/20  1013 10/01/18  1801 10/01/18  1801   ALBUMIN 3.9  --    < > 4.1   HGB 16.0 15.2   < > 16.3   INR  --  0.98  --   --    WBC 9.1 6.8   < > 7.4   A1C 5.8*  --    < > 6.3*   CRP  --   --   --  <2.9    < > = values in this interval not displayed.     Nutrition requirements were discussed with patient today.  Vitals:  /80   Pulse 83   Temp 97.9  F (36.6  C) (Temporal)   Resp 18   Wound:   Wound (used by OP [facility] only)  21 0923 Left other (see comments) (Active)   Thickness/Stage full thickness 21 0800   Dressing Appearance moist drainage 21 0800   Base granulating;moist;red 21 0800   Periwound intact 21 0800   Periwound Temperature warm 21 0800   Periwound Skin Turgor soft 21 0800   Edges open 21 08   Length (cm) 6 21 0800   Width (cm) 9 21 0800   Depth (cm) 0.1 21 0800   Wound (cm^2) 54 cm^2 21 0800   Wound Volume (cm^3) 5.4 cm^3 21 08   Wound healing % -350 21 0800   Drainage Characteristics/Odor serosanguineous 21 0800   Drainage Amount moderate 21 08   Care, Wound non-select wound debridement performed 21 08       Wound (used by OP [facility] only) 21 0923 Right other (see comments) (Active)   Thickness/Stage full thickness 21 08   Dressing Appearance moist drainage 21 08   Base granulating 21 0800   Periwound intact 21 08   Periwound Temperature warm 21 0800   Periwound Skin Turgor soft 21 0800   Length (cm) 6 21 0800   Width (cm) 13 21 08   Depth (cm) 0.5 21 08   Wound (cm^2) 78 cm^2 21 0800   Wound Volume (cm^3) 39 cm^3 21 0800   Wound healing % -290 21 0800   Drainage Characteristics/Odor serosanguineous 21 0800   Drainage Amount moderate 21 0800   Care, Wound non-select wound debridement performed 21 0800      Photo:       Shahnaz Garibay MD        D: 2021   T: 2021   MT: [id number]    Name:     SEB SMITH  MRN:      [id number]        Account:    [id number]   :      1980           Visit Date: 2021     Document: [id number]

## 2021-06-07 ENCOUNTER — APPOINTMENT (OUTPATIENT)
Dept: CT IMAGING | Facility: CLINIC | Age: 41
End: 2021-06-07
Attending: EMERGENCY MEDICINE
Payer: COMMERCIAL

## 2021-06-07 ENCOUNTER — HOSPITAL ENCOUNTER (EMERGENCY)
Facility: CLINIC | Age: 41
Discharge: HOME | End: 2021-06-07
Attending: EMERGENCY MEDICINE | Admitting: EMERGENCY MEDICINE
Payer: COMMERCIAL

## 2021-06-07 VITALS
WEIGHT: 258 LBS | DIASTOLIC BLOOD PRESSURE: 96 MMHG | RESPIRATION RATE: 16 BRPM | SYSTOLIC BLOOD PRESSURE: 147 MMHG | HEART RATE: 68 BPM | TEMPERATURE: 98.2 F | OXYGEN SATURATION: 98 % | BODY MASS INDEX: 39.1 KG/M2 | HEIGHT: 68 IN

## 2021-06-07 DIAGNOSIS — N20.1 URETERAL STONE: ICD-10-CM

## 2021-06-07 DIAGNOSIS — K76.0 FATTY LIVER: ICD-10-CM

## 2021-06-07 DIAGNOSIS — R10.9 FLANK PAIN: ICD-10-CM

## 2021-06-07 LAB
ALBUMIN UR-MCNC: 100 MG/DL
ANION GAP SERPL CALCULATED.3IONS-SCNC: 10 MMOL/L (ref 3–14)
APPEARANCE UR: ABNORMAL
BASOPHILS # BLD AUTO: 0.1 10E9/L (ref 0–0.2)
BASOPHILS NFR BLD AUTO: 0.6 %
BILIRUB UR QL STRIP: NEGATIVE
BUN SERPL-MCNC: 14 MG/DL (ref 7–30)
CALCIUM SERPL-MCNC: 9.1 MG/DL (ref 8.5–10.1)
CAOX CRY #/AREA URNS HPF: ABNORMAL /HPF
CHLORIDE SERPL-SCNC: 106 MMOL/L (ref 94–109)
CO2 SERPL-SCNC: 24 MMOL/L (ref 20–32)
COLOR UR AUTO: YELLOW
CREAT SERPL-MCNC: 1 MG/DL (ref 0.66–1.25)
DIFFERENTIAL METHOD BLD: NORMAL
EOSINOPHIL # BLD AUTO: 0.2 10E9/L (ref 0–0.7)
EOSINOPHIL NFR BLD AUTO: 2.2 %
ERYTHROCYTE [DISTWIDTH] IN BLOOD BY AUTOMATED COUNT: 12.1 % (ref 10–15)
GFR SERPL CREATININE-BSD FRML MDRD: >90 ML/MIN/{1.73_M2}
GLUCOSE SERPL-MCNC: 104 MG/DL (ref 70–99)
GLUCOSE UR STRIP-MCNC: NEGATIVE MG/DL
GRAN CASTS #/AREA URNS LPF: 7 /LPF
HCT VFR BLD AUTO: 42.6 % (ref 40–53)
HGB BLD-MCNC: 14.5 G/DL (ref 13.3–17.7)
HGB UR QL STRIP: ABNORMAL
HYALINE CASTS #/AREA URNS LPF: 7 /LPF (ref 0–2)
IMM GRANULOCYTES # BLD: 0 10E9/L (ref 0–0.4)
IMM GRANULOCYTES NFR BLD: 0.3 %
KETONES UR STRIP-MCNC: NEGATIVE MG/DL
LEUKOCYTE ESTERASE UR QL STRIP: ABNORMAL
LYMPHOCYTES # BLD AUTO: 2.1 10E9/L (ref 0.8–5.3)
LYMPHOCYTES NFR BLD AUTO: 22.4 %
MCH RBC QN AUTO: 30.2 PG (ref 26.5–33)
MCHC RBC AUTO-ENTMCNC: 34 G/DL (ref 31.5–36.5)
MCV RBC AUTO: 89 FL (ref 78–100)
MONOCYTES # BLD AUTO: 0.6 10E9/L (ref 0–1.3)
MONOCYTES NFR BLD AUTO: 6.7 %
MUCOUS THREADS #/AREA URNS LPF: PRESENT /LPF
NEUTROPHILS # BLD AUTO: 6.4 10E9/L (ref 1.6–8.3)
NEUTROPHILS NFR BLD AUTO: 67.8 %
NITRATE UR QL: NEGATIVE
NRBC # BLD AUTO: 0 10*3/UL
NRBC BLD AUTO-RTO: 0 /100
PH UR STRIP: 5 PH (ref 5–7)
PLATELET # BLD AUTO: 275 10E9/L (ref 150–450)
POTASSIUM SERPL-SCNC: 3.9 MMOL/L (ref 3.4–5.3)
RBC # BLD AUTO: 4.8 10E12/L (ref 4.4–5.9)
RBC #/AREA URNS AUTO: >182 /HPF (ref 0–2)
SODIUM SERPL-SCNC: 140 MMOL/L (ref 133–144)
SOURCE: ABNORMAL
SP GR UR STRIP: 1.03 (ref 1–1.03)
SQUAMOUS #/AREA URNS AUTO: 1 /HPF (ref 0–1)
UROBILINOGEN UR STRIP-MCNC: 0 MG/DL (ref 0–2)
WBC # BLD AUTO: 9.5 10E9/L (ref 4–11)
WBC #/AREA URNS AUTO: 3 /HPF (ref 0–5)

## 2021-06-07 PROCEDURE — 250N000013 HC RX MED GY IP 250 OP 250 PS 637: Performed by: EMERGENCY MEDICINE

## 2021-06-07 PROCEDURE — 250N000011 HC RX IP 250 OP 636: Performed by: EMERGENCY MEDICINE

## 2021-06-07 PROCEDURE — 99285 EMERGENCY DEPT VISIT HI MDM: CPT | Mod: 25 | Performed by: EMERGENCY MEDICINE

## 2021-06-07 PROCEDURE — 80048 BASIC METABOLIC PNL TOTAL CA: CPT | Performed by: EMERGENCY MEDICINE

## 2021-06-07 PROCEDURE — 99284 EMERGENCY DEPT VISIT MOD MDM: CPT | Performed by: EMERGENCY MEDICINE

## 2021-06-07 PROCEDURE — 96375 TX/PRO/DX INJ NEW DRUG ADDON: CPT | Performed by: EMERGENCY MEDICINE

## 2021-06-07 PROCEDURE — 85025 COMPLETE CBC W/AUTO DIFF WBC: CPT | Performed by: EMERGENCY MEDICINE

## 2021-06-07 PROCEDURE — 96374 THER/PROPH/DIAG INJ IV PUSH: CPT | Performed by: EMERGENCY MEDICINE

## 2021-06-07 PROCEDURE — 81001 URINALYSIS AUTO W/SCOPE: CPT | Performed by: EMERGENCY MEDICINE

## 2021-06-07 PROCEDURE — 74176 CT ABD & PELVIS W/O CONTRAST: CPT

## 2021-06-07 RX ORDER — ACETAMINOPHEN 500 MG
1000 TABLET ORAL ONCE
Status: COMPLETED | OUTPATIENT
Start: 2021-06-07 | End: 2021-06-07

## 2021-06-07 RX ORDER — ONDANSETRON 2 MG/ML
4 INJECTION INTRAMUSCULAR; INTRAVENOUS EVERY 30 MIN PRN
Status: DISCONTINUED | OUTPATIENT
Start: 2021-06-07 | End: 2021-06-07 | Stop reason: HOSPADM

## 2021-06-07 RX ORDER — HYDROCODONE BITARTRATE AND ACETAMINOPHEN 5; 325 MG/1; MG/1
2 TABLET ORAL ONCE
Status: DISCONTINUED | OUTPATIENT
Start: 2021-06-07 | End: 2021-06-07 | Stop reason: HOSPADM

## 2021-06-07 RX ORDER — KETOROLAC TROMETHAMINE 15 MG/ML
15 INJECTION, SOLUTION INTRAMUSCULAR; INTRAVENOUS ONCE
Status: COMPLETED | OUTPATIENT
Start: 2021-06-07 | End: 2021-06-07

## 2021-06-07 RX ORDER — ONDANSETRON 4 MG/1
4 TABLET, ORALLY DISINTEGRATING ORAL EVERY 8 HOURS PRN
Qty: 10 TABLET | Refills: 0 | Status: SHIPPED | OUTPATIENT
Start: 2021-06-07 | End: 2021-06-10

## 2021-06-07 RX ORDER — HYDROCODONE BITARTRATE AND ACETAMINOPHEN 5; 325 MG/1; MG/1
2 TABLET ORAL ONCE
Qty: 2 TABLET | Refills: 0 | Status: SHIPPED | OUTPATIENT
Start: 2021-06-07 | End: 2021-06-07

## 2021-06-07 RX ORDER — HYDROCODONE BITARTRATE AND ACETAMINOPHEN 5; 325 MG/1; MG/1
1 TABLET ORAL EVERY 4 HOURS PRN
Qty: 16 TABLET | Refills: 0 | Status: SHIPPED | OUTPATIENT
Start: 2021-06-07 | End: 2021-06-10

## 2021-06-07 RX ADMIN — ACETAMINOPHEN 1000 MG: 500 TABLET, FILM COATED ORAL at 00:48

## 2021-06-07 RX ADMIN — KETOROLAC TROMETHAMINE 15 MG: 15 INJECTION, SOLUTION INTRAMUSCULAR; INTRAVENOUS at 00:48

## 2021-06-07 ASSESSMENT — MIFFLIN-ST. JEOR: SCORE: 2054.78

## 2021-06-07 ASSESSMENT — ENCOUNTER SYMPTOMS
FLANK PAIN: 1
SHORTNESS OF BREATH: 0
ABDOMINAL PAIN: 0
FEVER: 0

## 2021-06-07 NOTE — ED PROVIDER NOTES
History     Chief Complaint   Patient presents with     Flank Pain     started ~ 2 hours while lying in bed. Left side. Denies n/v/d, fever or chills. No hx of kidney stones.     HPI  Sean Woodard is a 41 year old male who presents to the emergency department with concerns regarding left-sided flank pain, radiating towards the left groin region.  Pain is sharp and crampy in nature, beginning approximately 2 hours prior to ED arrival.  Patient had been attempting to fall asleep.  No prior similar episodes of pain.  No nausea, vomiting, diarrhea, or other pains elsewhere in the abdomen.  No chest pain, cough, or shortness of breath.  Patient thought perhaps he had blood in the urine earlier today.  No other urinary symptoms.  No prior history of kidney stones.  No fever.    Allergies:  Allergies   Allergen Reactions     Lisinopril Cough       Problem List:    Patient Active Problem List    Diagnosis Date Noted     Fatty liver 06/07/2021     Priority: Medium     Hidradenitis axillaris 04/08/2021     Priority: Medium     Added automatically from request for surgery [id number]       Hidradenitis suppurativa 05/07/2020     Priority: Medium     FER (obstructive sleep apnea) 01/28/2019     Priority: Medium     Diabetes mellitus, type 2 (H) 01/25/2019     Priority: Medium     PTSD (post-traumatic stress disorder) 11/08/2018     Priority: Medium     , served in [place]  Diagnosed with PTSD by Dr. Darlene Jeffery on 11/9/2010       Class 3 severe obesity due to excess calories with serious comorbidity in adult (H) 11/08/2018     Priority: Medium     Benign essential hypertension 07/28/2017     Priority: Medium     Hyperlipidemia with target LDL less than 100 08/14/2014     Priority: Medium     Diagnosis updated by automated process. Provider to review and confirm.       Health Care Home 03/28/2014     Priority: Medium     EMERGENCY CARE PLAN  March 28, 2014: No current Care Coordination follow up planned. Please refer  if Care Coordination services are needed.    Presenting Problem Signs and Symptoms Treatment Plan   Questions or concerns   during clinic hours   I will call my clinic directly:  [facility]  [address] [place] [address]  [phone].    Questions or concerns outside clinic hours   I will call the 24 hour nurse line at   [phone] or [phone].   Need to schedule an appointment   I will call the 24 hour scheduling team at [phone] or my clinic directly at [phone].    Same day treatment     I will call my clinic first, nurse line if after hours, urgent care and express care if needed.   Clinic care coordination services (regular clinic hours)     I will call a clinic care coordinator directly:     Marlo Talley RN  Mon, Tues, Fri - [phone]  Wed, Thurs - [phone]    Anat Wall, :    [phone]    Or call my clinic at [phone] and ask to speak with care coordination.   Crisis Services: Behavioral or Mental Health  [facility] 24 Hour Phone Line  [phone]    [facility] 24 Hour Crisis Services  [phone]    [facility] [phone]    [facility]   [phone]       Emergency treatment -- Immediately    [facility] 911          GERD (gastroesophageal reflux disease) 05/13/2011     Priority: Medium        Past Medical History:    Past Medical History:   Diagnosis Date     Diabetes (H)      Hypertension      NO ACTIVE PROBLEMS      Obese      Sleep apnea        Past Surgical History:    Past Surgical History:   Procedure Laterality Date     EXCISE HIDRADENITIS (LOCATION) Left 5/21/2021    Procedure: Sharp excisional debridement of left axillary hidradenitis.;  Surgeon: Shahnaz Garibay MD;  Location: [facility] OR     ORTHOPEDIC SURGERY  1/1/2005    RT knee arthroscopic menisectomy     Thumb Surgery  2009    Mass removal neuroma       Family History:    Family History   Problem Relation Age of Onset     Heart  "Defect Father         valve replacement     Diabetes Maternal Grandmother      Cancer Maternal Grandfather         stomach     Hypertension Brother      Diabetes Sister      Cerebrovascular Disease Brother 38     Hypertension Brother      Melanoma No family hx of      Skin Cancer No family hx of        Social History:  Marital Status:   [2]  Social History     Tobacco Use     Smoking status: Former Smoker     Packs/day: 0.50     Years: 5.00     Pack years: 2.50     Types: Cigarettes     Quit date: 2005     Years since quittin.4     Smokeless tobacco: Never Used   Substance Use Topics     Alcohol use: Yes     Comment: Social. 1 drink per month     Drug use: No        Medications:    atorvastatin (LIPITOR) 20 MG tablet  augmented betamethasone dipropionate (DIPROLENE-AF) 0.05 % ointment  azelastine (ASTELIN) 0.1 % nasal spray  azelastine (OPTIVAR) 0.05 % ophthalmic solution  benzoyl peroxide 5 % external liquid  blood glucose (ACCU-CHEK GUIDE) test strip  cetirizine (ZYRTEC) 10 MG tablet  clindamycin (CLINDAMAX) 1 % lotion  clindamycin-benzoyl peroxide (BENZACLIN) 1-5 % external gel  diphenhydrAMINE (BENADRYL) 25 MG capsule  doxycycline hyclate (VIBRA-TABS) 100 MG tablet  fluticasone (FLONASE) 50 MCG/ACT nasal spray  hydrOXYzine (ATARAX) 25 MG tablet  ISOtretinoin (ACCUTANE PO)  losartan-hydrochlorothiazide (HYZAAR) 50-12.5 MG tablet  metFORMIN (GLUCOPHAGE-XR) 500 MG 24 hr tablet  ondansetron (ZOFRAN-ODT) 4 MG ODT tab  ustekinumab (STELARA) 90 MG/ML          Review of Systems   Constitutional: Negative for fever.   Respiratory: Negative for shortness of breath.    Cardiovascular: Negative for chest pain.   Gastrointestinal: Negative for abdominal pain.   Genitourinary: Positive for flank pain.   All other systems reviewed and are negative.      Physical Exam   BP: (!) 148/102  Pulse: 73  Temp: 98.3  F (36.8  C)  Resp: 20  Height: 172.7 cm (5' 8\")  Weight: 117 kg (258 lb)  SpO2: 100 %      Physical " "Exam  BP (!) 148/102   Pulse 73   Temp 98.3  F (36.8  C) (Oral)   Resp 20   Ht 1.727 m (5' 8\")   Wt 117 kg (258 lb)   SpO2 100%   BMI 39.23 kg/m    General: alert, interactive, uncomfortable appearing, complaining of severe left flank pain  Head: atraumatic  Nose: no rhinorrhea or epistaxis  Ears: no external auditory canal discharge or bleeding.    Eyes: Sclera nonicteric. Conjunctiva noninjected.    Mouth: no tonsillar erythema, edema, or exudate  Neck: supple, no palp LAD  Lungs: CTAB  CV: RRR, S1/S2; peripheral pulses palpable and symmetric  Abdomen: soft, nt, nd, no guarding or rebound. Positive bowel sounds  Extremities: no cyanosis or edema  Skin: no rash or diaphoresis  Neuro:  strength 5/5 in UE and LEs bilaterally, sensation intact to light touch in UE and LEs bilaterally;       ED Course        Procedures               Critical Care time:  none               Results for orders placed or performed during the hospital encounter of 06/07/21 (from the past 24 hour(s))   CBC with platelets differential   Result Value Ref Range    WBC 9.5 4.0 - 11.0 10e9/L    RBC Count 4.80 4.4 - 5.9 10e12/L    Hemoglobin 14.5 13.3 - 17.7 g/dL    Hematocrit 42.6 40.0 - 53.0 %    MCV 89 78 - 100 fl    MCH 30.2 26.5 - 33.0 pg    MCHC 34.0 31.5 - 36.5 g/dL    RDW 12.1 10.0 - 15.0 %    Platelet Count 275 150 - 450 10e9/L    Diff Method Automated Method     % Neutrophils 67.8 %    % Lymphocytes 22.4 %    % Monocytes 6.7 %    % Eosinophils 2.2 %    % Basophils 0.6 %    % Immature Granulocytes 0.3 %    Nucleated RBCs 0 0 /100    Absolute Neutrophil 6.4 1.6 - 8.3 10e9/L    Absolute Lymphocytes 2.1 0.8 - 5.3 10e9/L    Absolute Monocytes 0.6 0.0 - 1.3 10e9/L    Absolute Eosinophils 0.2 0.0 - 0.7 10e9/L    Absolute Basophils 0.1 0.0 - 0.2 10e9/L    Abs Immature Granulocytes 0.0 0 - 0.4 10e9/L    Absolute Nucleated RBC 0.0    Basic metabolic panel   Result Value Ref Range    Sodium 140 133 - 144 mmol/L    Potassium 3.9 3.4 - 5.3 " mmol/L    Chloride 106 94 - 109 mmol/L    Carbon Dioxide 24 20 - 32 mmol/L    Anion Gap 10 3 - 14 mmol/L    Glucose 104 (H) 70 - 99 mg/dL    Urea Nitrogen 14 7 - 30 mg/dL    Creatinine 1.00 0.66 - 1.25 mg/dL    GFR Estimate >90 >60 mL/min/[1.73_m2]    GFR Estimate If Black >90 >60 mL/min/[1.73_m2]    Calcium 9.1 8.5 - 10.1 mg/dL   CT Abdomen Pelvis w/o Contrast    Narrative    EXAM: CT ABDOMEN PELVIS W/O CONTRAST  LOCATION: [facility]  DATE/TIME: 6/7/2021 12:58 AM    INDICATION: Flank pain, kidney stone suspected  COMPARISON: None.  TECHNIQUE: CT scan of the abdomen and pelvis was performed without IV contrast. Multiplanar reformats were obtained. Dose reduction techniques were used.  CONTRAST: None.    FINDINGS:   LOWER CHEST: Small hiatal hernia.    HEPATOBILIARY: Heterogeneous appearance of liver nonspecific but favored to represent geographic fatty infiltration. Gallbladder and bile ducts unremarkable.    PANCREAS: Normal.    SPLEEN: Normal.    ADRENAL GLANDS: Normal.    KIDNEYS/BLADDER: Mild left hydronephrosis/hydroureter secondary to a 3 mm stone within the distal most ureter, at the UVJ. No additional left-sided kidney stones are present though there does appear to be a 2 cm cyst within left kidney that demonstrates   a small internal or mural calcification    Right kidney normal, no stones or hydronephrosis.    BOWEL: Negative. Appendix normal.    LYMPH NODES: Normal.    VASCULATURE: Unremarkable.    PELVIC ORGANS: Normal.    MUSCULOSKELETAL: Normal.      Impression    IMPRESSION:   1.  There is a 3 mm obstructing distal left ureteral stone at the UVJ resulting in mild left hydronephrosis.  2.  Heterogeneous appearance of liver most suggestive of geographic fatty infiltration. Suggest a follow-up nonemergent liver MRI for confirmation.     UA reflex to Microscopic   Result Value Ref Range    Color Urine Yellow     Appearance Urine Cloudy     Glucose Urine Negative NEG^Negative mg/dL     Bilirubin Urine Negative NEG^Negative    Ketones Urine Negative NEG^Negative mg/dL    Specific Gravity Urine 1.028 1.003 - 1.035    Blood Urine Large (A) NEG^Negative    pH Urine 5.0 5.0 - 7.0 pH    Protein Albumin Urine 100 (A) NEG^Negative mg/dL    Urobilinogen mg/dL 0.0 0.0 - 2.0 mg/dL    Nitrite Urine Negative NEG^Negative    Leukocyte Esterase Urine Trace (A) NEG^Negative    Source Midstream Urine     RBC Urine >182 (H) 0 - 2 /HPF    WBC Urine 3 0 - 5 /HPF    Squamous Epithelial /HPF Urine 1 0 - 1 /HPF    Mucous Urine Present (A) NEG^Negative /LPF    Hyaline Casts 7 (H) 0 - 2 /LPF    Granular Casts 7 (A) NEG^Negative /LPF    Calcium Oxalate Few (A) NEG^Negative /HPF       Medications   ondansetron (ZOFRAN) injection 4 mg (0 mg Intravenous Hold 6/7/21 0052)   ketorolac (TORADOL) injection 15 mg (15 mg Intravenous Given 6/7/21 0048)   acetaminophen (TYLENOL) tablet 1,000 mg (1,000 mg Oral Given 6/7/21 0048)       Assessments & Plan (with Medical Decision Making)  41 year old male presenting to the emergency department with concerns regarding left-sided flank pain.  Acute in onset, radiating towards the groin.  Concern is for possible kidney stone.  CT scan of the abdomen/pelvis is performed showing 3 mm distal UVJ stone.  Mild amounts of hydronephrosis is present.  White blood cell count is normal.  No signs of infection in the urine.  Patient without any systemic symptoms of fever, or other concerns for infection.  Symptomatic treatment recommended.  Patient was driving himself, and therefore Toradol and Tylenol administered.  Patient given home prescription for Norco.  Encouraged follow-up with urologist, and encouraged to be seen if new or worsening pains or intractable pain.    Patient also with likely fatty infiltration of the liver.  I did add this to problem list, and discussed this with patient.  Nonemergent MRI recommended.     I have reviewed the nursing notes.    I have reviewed the findings,  diagnosis, plan and need for follow up with the patient.       New Prescriptions    No medications on file       Final diagnoses:   Ureteral stone   Flank pain   Fatty liver - See on CT 6/7/21.   Heterogeneous appearance of liver most suggestive of geographic fatty infiltration. Suggest a follow-up nonemergent liver MRI for confirmation.       6/7/2021   [facility] EMERGENCY DEPT     Derrick Pierre MD  06/07/21 0209

## 2021-06-07 NOTE — DISCHARGE INSTRUCTIONS
Can take ibuprofen.   Drink plenty of fluids.     Use ibuprofen 400-600 mg up to 4 times per day if needed for pain. Stop if it is causing nausea or abdominal pain.   Add Norco 5-325 (hydrocodone-acetaminophen) 1-2 pills up to every 4 hours if needed for pain. Do not use alcohol, operate machinery, drive, or climb on ladders for 8 hours after taking Norco. Use docusate (100mg) 2 times a day to prevent constipation while on narcotics.

## 2021-06-07 NOTE — ED TRIAGE NOTES
"Pt reports 2 hours ago he started to experience left flank pain, which wraps around to his left abdomen. Denies N/V/D or fever/chills. No hx of kidney stones. Yesterday reports he had dark urine \"may have been blood\". Drank water and urine cleared. 2 Excedrin taken prior to arrival.   "

## 2021-06-08 ENCOUNTER — TELEPHONE (OUTPATIENT)
Dept: FAMILY MEDICINE | Facility: CLINIC | Age: 41
End: 2021-06-08

## 2021-06-08 NOTE — TELEPHONE ENCOUNTER
Sean reports that the left side pain started up 2 hours ago. Pain score of 9. Denies blood upon urination since ED yesterday. Yesterday felt fine after ED visit yesterday. Slept well last night. Took one tablet of norco just now.   Denies nausea.   Denies fever.  Pain score of 9.  Advised to drink lots of water.   Fuad Worrell, RN

## 2021-06-08 NOTE — TELEPHONE ENCOUNTER
HENRRY Skinner:  Message handled by Nurse Triage with [facility] - provider name: Emmy Estrada notified to drink water. OK to stay home if pain is tolerable. If the norco is not working, pain is intolerable, nausea,fever; then have someone take him to the ED.   Fuad Wrorell RN

## 2021-06-11 ENCOUNTER — TELEPHONE (OUTPATIENT)
Dept: DERMATOLOGY | Facility: CLINIC | Age: 41
End: 2021-06-11

## 2021-06-11 NOTE — TELEPHONE ENCOUNTER
PRIOR AUTHORIZATION DENIED    Medication: Stelara    Denial Date: 6/9/2021    Denial Rational: Diagnosis is not covered    Appeal Information:

## 2021-06-15 NOTE — TELEPHONE ENCOUNTER
Medication Appeal Initiation    We have initiated an appeal for the requested medication:  Medication: Stelara  Appeal Start Date:  6/15/2021  Insurance Company: [employer] - Phone [phone] Fax [phone]  Comments:

## 2021-06-16 DIAGNOSIS — L73.2 HIDRADENITIS SUPPURATIVA: ICD-10-CM

## 2021-06-16 RX ORDER — CLINDAMYCIN AND BENZOYL PEROXIDE 10; 50 MG/G; MG/G
GEL TOPICAL
Qty: 50 G | Refills: 6 | Status: SHIPPED | OUTPATIENT
Start: 2021-06-16 | End: 2021-10-21

## 2021-06-16 NOTE — TELEPHONE ENCOUNTER
Requested Prescriptions   Pending Prescriptions Disp Refills     clindamycin-benzoyl peroxide (BENZACLIN) 1-5 % external gel 50 g 6     Sig: Apply twice daily to affected area on skin.       There is no refill protocol information for this order        Last office visit: 6/3/2021 with prescribing provider:  JAVIER LEAL   Future Office Visit:   Next 5 appointments (look out 90 days)    Jun 24, 2021  9:10 AM  Return Visit with Shahnaz Garibay MD  [facility] [place] ([facility] ) [address]  [address]  [facility] [address]  [phone]               [facility] \[facility]\""

## 2021-06-17 NOTE — TELEPHONE ENCOUNTER
MEDICATION APPEAL DENIED    Medication: Stelara    Denial Date: 6/16/2021    Denial Rational: Diagnosis of HS is not covered    Second Level Appeal Information:

## 2021-06-18 ENCOUNTER — HOSPITAL ENCOUNTER (OUTPATIENT)
Dept: MRI IMAGING | Facility: CLINIC | Age: 41
Discharge: HOME | End: 2021-06-18
Attending: PHYSICIAN ASSISTANT | Admitting: PHYSICIAN ASSISTANT
Payer: COMMERCIAL

## 2021-06-18 DIAGNOSIS — K76.0 FATTY LIVER: ICD-10-CM

## 2021-06-18 PROCEDURE — 255N000002 HC RX 255 OP 636: Performed by: PHYSICIAN ASSISTANT

## 2021-06-18 PROCEDURE — 74183 MRI ABD W/O CNTR FLWD CNTR: CPT

## 2021-06-18 PROCEDURE — A9585 GADOBUTROL INJECTION: HCPCS | Performed by: PHYSICIAN ASSISTANT

## 2021-06-18 PROCEDURE — 258N000003 HC RX IP 258 OP 636: Performed by: PHYSICIAN ASSISTANT

## 2021-06-18 RX ORDER — GADOBUTROL 604.72 MG/ML
11 INJECTION INTRAVENOUS ONCE
Status: COMPLETED | OUTPATIENT
Start: 2021-06-18 | End: 2021-06-18

## 2021-06-18 RX ADMIN — SODIUM CHLORIDE 50 ML: 9 INJECTION, SOLUTION INTRAVENOUS at 15:23

## 2021-06-18 RX ADMIN — GADOBUTROL 11 ML: 604.72 INJECTION INTRAVENOUS at 15:22

## 2021-06-24 ENCOUNTER — HOSPITAL ENCOUNTER (OUTPATIENT)
Dept: WOUND CARE | Facility: CLINIC | Age: 41
Discharge: HOME | End: 2021-06-24
Attending: SURGERY | Admitting: SURGERY
Payer: COMMERCIAL

## 2021-06-24 VITALS — TEMPERATURE: 98.4 F | SYSTOLIC BLOOD PRESSURE: 129 MMHG | DIASTOLIC BLOOD PRESSURE: 87 MMHG | HEART RATE: 79 BPM

## 2021-06-24 DIAGNOSIS — S41.102D OPEN WOUND OF LEFT AXILLARY REGION WITH COMPLICATION, SUBSEQUENT ENCOUNTER: ICD-10-CM

## 2021-06-24 DIAGNOSIS — L73.2 HIDRADENITIS AXILLARIS: ICD-10-CM

## 2021-06-24 DIAGNOSIS — S41.102A: Primary | ICD-10-CM

## 2021-06-24 DIAGNOSIS — S41.101A: ICD-10-CM

## 2021-06-24 DIAGNOSIS — L73.2 HIDRADENITIS SUPPURATIVA: ICD-10-CM

## 2021-06-24 PROCEDURE — 97602 WOUND(S) CARE NON-SELECTIVE: CPT

## 2021-06-24 PROCEDURE — 99024 POSTOP FOLLOW-UP VISIT: CPT | Performed by: SURGERY

## 2021-06-24 NOTE — DISCHARGE INSTRUCTIONS
[facility]  [address] [address]    Call us at [phone] if you have any questions about your wounds, have redness or  swelling around your wound, have a fever of 101 or greater or if you have any other  problems or concerns. We answer the phone Monday through Friday 8 am to 4 pm,  please leave a message as we check the voicemail frequently throughout the day.    Sean Woodard 1980    Wound care recommendations to left armpit ulcer  Cleanse wound and surrounding skin in shower with gentle soap and water, pat dry  Cover wound with Hydrofera Blue Transfer or antimicrobial gauze cut to to fit the wound and cover with ABD pad and secure with tape  Change dressing daily    Cover right armpit HS areas with ABD pad and tape, change daily    David Garibay M.D. June 24, 2021    If you had a positive experience please indicate on your patient satisfaction survey form  that [facility] will be sending you.    If you have any billing related questions please call the [facility] Business office at  [phone]. The clinic staff does not handle billing related matters.

## 2021-06-24 NOTE — PROGRESS NOTES
Visit Date: 06/24/2021    [facility] VISIT    This is a 41-year-old  gentleman who has extensive hidradenitis.  We excised some left axillary disease a number of months ago and he has been healing by secondary intention.  At his last visit, we switched from AMD to Hydrofera Blue because there was a lot of hypertrophic granulation tissue.  This month, he is doing much better with decreased pain and decreased drainage, although he is noticing that his range of motion is somewhat limited due to tight scar between the upper inner arm and axillary crease.  Also, he was noting that he felt like a portion of his forearm felt weaker because he has not been using the arm much.  They have been doing well showering with a handheld and his wife helps him to put the Hydrofera Blue and ABDs on.  He is still working from home for the [employer], but not doing any mandatory overtime until July.  He feels like he will be able to get back to normal by then.      On exam, his wound is much smaller, it looks clean, much less hypertrophic.  The only remaining area really is kind of inferiorly along the lateral chest wall.  There is, however, some tightness palpated with arm elevation and extension involving the upper inner arm.     He started out in the OR with a wound that was 13 x 13 x 2 and now he is down to basically 3.5 x 8.5 cm.  Overall, he is quite happy with the process.  We talked about when he wanted to schedule his right axillary excision.  He is also interested in having a couple of small inguinal lesions excised.  The smaller lesions we will try to close primarily, but there is always a chance of dehiscence or recurrent disease.  As far as the right axillary region is concerned, I asked him to consider whether he wants to try to heal by secondary intention again, given that this will be a similar size to the left side or if he wants to try some of the other products such as Myriad or Integra with the VAC and/or  skin grafting to try and prevent more contracture.  He will talk with his wife and let us know.  In the meantime, we will see Marlo in a month on 07/29 at 9:00 in the morning and he was encouraged to start to increase his range of motion and do some massage of the tight areas as tolerated.  We will facilitate an order for supplies from [employer] for Hydrofera Blue, ABD and tape.  It sounds like he might have some AMD left over at home and he asked if he could alternate if necessary and I thought that would be fine.  Please see nursing notes for dimensions of the wound, vital signs and photos today.  Labs:   Recent Labs   Lab Test 06/07/21  0040 04/23/21  1431 09/08/20  1013 10/01/18  1801 10/01/18  1801   ALBUMIN  --  3.9  --    < > 4.1   HGB 14.5 16.0 15.2   < > 16.3   INR  --   --  0.98  --   --    WBC 9.5 9.1 6.8   < > 7.4   A1C  --  5.8*  --    < > 6.3*   CRP  --   --   --   --  <2.9    < > = values in this interval not displayed.     Nutrition requirements were discussed with patient today.  Vitals:  /87   Pulse 79   Temp 98.4  F (36.9  C) (Temporal)   Wound:   Wound (used by OP [facility] only) 03/16/21 0923 Left other (see comments) (Active)   Thickness/Stage full thickness 06/24/21 0932   Dressing Appearance moist drainage 06/24/21 0932   Base granulating 06/24/21 0932   Periwound intact 06/24/21 0932   Periwound Temperature warm 06/24/21 0932   Length (cm) 3.5 06/24/21 0932   Width (cm) 8.5 06/24/21 0932   Depth (cm) 0.1 06/24/21 0932   Wound (cm^2) 29.75 cm^2 06/24/21 0932   Wound Volume (cm^3) 2.98 cm^3 06/24/21 0932   Wound healing % -147.92 06/24/21 0932   Drainage Characteristics/Odor serosanguineous 06/24/21 0932   Drainage Amount copious 06/24/21 0932   Care, Wound non-select wound debridement performed 06/24/21 0932       Wound (used by OP [facility] only) 03/16/21 0923 Right other (see comments) (Active)   Thickness/Stage full thickness 06/24/21 0933   Dressing Appearance moist drainage 06/24/21 0911    Base granulating 21   Periwound intact 21   Periwound Temperature warm 21   Length (cm) 3.5 21   Width (cm) 8.5 21   Depth (cm) 0.1 21   Wound (cm^2) 29.75 cm^2 21   Wound Volume (cm^3) 2.98 cm^3 21   Wound healing % -48.75 21   Drainage Characteristics/Odor serosanguineous 21   Drainage Amount copious 21   Care, Wound non-select wound debridement performed 21      Photo:       Shahnaz Garibay MD        D: 2021   T: 2021   MT: [id number]    Name:     SEB SMITH  MRN:      [id number]        Account:    [id number]   :      1980           Visit Date: 2021     Document: [id number]

## 2021-06-26 ENCOUNTER — APPOINTMENT (OUTPATIENT)
Dept: CT IMAGING | Facility: CLINIC | Age: 41
End: 2021-06-26
Attending: EMERGENCY MEDICINE
Payer: COMMERCIAL

## 2021-06-26 ENCOUNTER — HOSPITAL ENCOUNTER (INPATIENT)
Facility: CLINIC | Age: 41
LOS: 1 days | Discharge: HOME | End: 2021-06-27
Attending: EMERGENCY MEDICINE | Admitting: FAMILY MEDICINE
Payer: COMMERCIAL

## 2021-06-26 DIAGNOSIS — N20.1 LEFT URETERAL STONE: ICD-10-CM

## 2021-06-26 DIAGNOSIS — N13.2 URETERAL STONE WITH HYDRONEPHROSIS: ICD-10-CM

## 2021-06-26 DIAGNOSIS — R10.2 ACUTE SUPRAPUBIC PAIN: ICD-10-CM

## 2021-06-26 DIAGNOSIS — N30.00 ACUTE CYSTITIS WITHOUT HEMATURIA: ICD-10-CM

## 2021-06-26 DIAGNOSIS — Z11.52 ENCOUNTER FOR SCREENING LABORATORY TESTING FOR COVID-19 VIRUS: ICD-10-CM

## 2021-06-26 PROBLEM — E66.01 CLASS 3 SEVERE OBESITY DUE TO EXCESS CALORIES WITH SERIOUS COMORBIDITY IN ADULT (H): Status: ACTIVE | Noted: 2018-11-08

## 2021-06-26 PROBLEM — E66.813 CLASS 3 SEVERE OBESITY DUE TO EXCESS CALORIES WITH SERIOUS COMORBIDITY IN ADULT (H): Status: ACTIVE | Noted: 2018-11-08

## 2021-06-26 PROBLEM — N39.0 UTI (URINARY TRACT INFECTION): Status: ACTIVE | Noted: 2021-06-26

## 2021-06-26 PROBLEM — Z20.822 LAB TEST NEGATIVE FOR COVID-19 VIRUS: Status: ACTIVE | Noted: 2021-06-26

## 2021-06-26 PROBLEM — N20.0 NEPHROLITHIASIS: Status: ACTIVE | Noted: 2021-06-26

## 2021-06-26 PROBLEM — I10 BENIGN ESSENTIAL HYPERTENSION: Status: ACTIVE | Noted: 2017-07-28

## 2021-06-26 LAB
ALBUMIN UR-MCNC: NEGATIVE MG/DL
ANION GAP SERPL CALCULATED.3IONS-SCNC: 8 MMOL/L (ref 3–14)
APPEARANCE UR: CLEAR
BASOPHILS # BLD AUTO: 0.1 10E9/L (ref 0–0.2)
BASOPHILS NFR BLD AUTO: 0.4 %
BILIRUB UR QL STRIP: NEGATIVE
BUN SERPL-MCNC: 14 MG/DL (ref 7–30)
CALCIUM SERPL-MCNC: 9.7 MG/DL (ref 8.5–10.1)
CHLORIDE SERPL-SCNC: 105 MMOL/L (ref 94–109)
CO2 SERPL-SCNC: 25 MMOL/L (ref 20–32)
COLOR UR AUTO: ABNORMAL
CREAT SERPL-MCNC: 1 MG/DL (ref 0.66–1.25)
DIFFERENTIAL METHOD BLD: ABNORMAL
EOSINOPHIL # BLD AUTO: 0.1 10E9/L (ref 0–0.7)
EOSINOPHIL NFR BLD AUTO: 0.6 %
ERYTHROCYTE [DISTWIDTH] IN BLOOD BY AUTOMATED COUNT: 12.6 % (ref 10–15)
GFR SERPL CREATININE-BSD FRML MDRD: >90 ML/MIN/{1.73_M2}
GLUCOSE BLDC GLUCOMTR-MCNC: 152 MG/DL (ref 70–99)
GLUCOSE SERPL-MCNC: 108 MG/DL (ref 70–99)
GLUCOSE UR STRIP-MCNC: NEGATIVE MG/DL
HCT VFR BLD AUTO: 43.3 % (ref 40–53)
HGB BLD-MCNC: 15.1 G/DL (ref 13.3–17.7)
HGB UR QL STRIP: ABNORMAL
HYALINE CASTS #/AREA URNS LPF: 4 /LPF (ref 0–2)
IMM GRANULOCYTES # BLD: 0.1 10E9/L (ref 0–0.4)
IMM GRANULOCYTES NFR BLD: 0.4 %
KETONES UR STRIP-MCNC: NEGATIVE MG/DL
LABORATORY COMMENT REPORT: NORMAL
LACTATE BLD-SCNC: 1.9 MMOL/L (ref 0.7–2)
LACTATE BLD-SCNC: 2.8 MMOL/L (ref 0.7–2)
LEUKOCYTE ESTERASE UR QL STRIP: NEGATIVE
LYMPHOCYTES # BLD AUTO: 1 10E9/L (ref 0.8–5.3)
LYMPHOCYTES NFR BLD AUTO: 8.1 %
MCH RBC QN AUTO: 30.6 PG (ref 26.5–33)
MCHC RBC AUTO-ENTMCNC: 34.9 G/DL (ref 31.5–36.5)
MCV RBC AUTO: 88 FL (ref 78–100)
MONOCYTES # BLD AUTO: 0.8 10E9/L (ref 0–1.3)
MONOCYTES NFR BLD AUTO: 6.5 %
MUCOUS THREADS #/AREA URNS LPF: PRESENT /LPF
NEUTROPHILS # BLD AUTO: 10.3 10E9/L (ref 1.6–8.3)
NEUTROPHILS NFR BLD AUTO: 84 %
NITRATE UR QL: POSITIVE
NRBC # BLD AUTO: 0 10*3/UL
NRBC BLD AUTO-RTO: 0 /100
PH UR STRIP: 5 PH (ref 5–7)
PLATELET # BLD AUTO: 239 10E9/L (ref 150–450)
POTASSIUM SERPL-SCNC: 3.6 MMOL/L (ref 3.4–5.3)
RBC # BLD AUTO: 4.93 10E12/L (ref 4.4–5.9)
RBC #/AREA URNS AUTO: 16 /HPF (ref 0–2)
SARS-COV-2 RNA RESP QL NAA+PROBE: NEGATIVE
SODIUM SERPL-SCNC: 138 MMOL/L (ref 133–144)
SOURCE: ABNORMAL
SP GR UR STRIP: 1.02 (ref 1–1.03)
SPECIMEN SOURCE: NORMAL
SQUAMOUS #/AREA URNS AUTO: <1 /HPF (ref 0–1)
UROBILINOGEN UR STRIP-MCNC: 4 MG/DL (ref 0–2)
WBC # BLD AUTO: 12.3 10E9/L (ref 4–11)
WBC #/AREA URNS AUTO: 1 /HPF (ref 0–5)

## 2021-06-26 PROCEDURE — 74176 CT ABD & PELVIS W/O CONTRAST: CPT

## 2021-06-26 PROCEDURE — 96365 THER/PROPH/DIAG IV INF INIT: CPT | Performed by: EMERGENCY MEDICINE

## 2021-06-26 PROCEDURE — 258N000003 HC RX IP 258 OP 636: Performed by: FAMILY MEDICINE

## 2021-06-26 PROCEDURE — 83605 ASSAY OF LACTIC ACID: CPT | Performed by: EMERGENCY MEDICINE

## 2021-06-26 PROCEDURE — 999N001017 HC STATISTIC GLUCOSE BY METER IP

## 2021-06-26 PROCEDURE — 85025 COMPLETE CBC W/AUTO DIFF WBC: CPT | Performed by: FAMILY MEDICINE

## 2021-06-26 PROCEDURE — 120N000001 HC R&B MED SURG/OB

## 2021-06-26 PROCEDURE — 96375 TX/PRO/DX INJ NEW DRUG ADDON: CPT | Performed by: EMERGENCY MEDICINE

## 2021-06-26 PROCEDURE — 96361 HYDRATE IV INFUSION ADD-ON: CPT | Performed by: EMERGENCY MEDICINE

## 2021-06-26 PROCEDURE — 99285 EMERGENCY DEPT VISIT HI MDM: CPT | Mod: 25 | Performed by: EMERGENCY MEDICINE

## 2021-06-26 PROCEDURE — 87040 BLOOD CULTURE FOR BACTERIA: CPT | Performed by: EMERGENCY MEDICINE

## 2021-06-26 PROCEDURE — 36415 COLL VENOUS BLD VENIPUNCTURE: CPT | Performed by: FAMILY MEDICINE

## 2021-06-26 PROCEDURE — 87088 URINE BACTERIA CULTURE: CPT | Performed by: EMERGENCY MEDICINE

## 2021-06-26 PROCEDURE — 83605 ASSAY OF LACTIC ACID: CPT | Performed by: FAMILY MEDICINE

## 2021-06-26 PROCEDURE — 36415 COLL VENOUS BLD VENIPUNCTURE: CPT | Performed by: EMERGENCY MEDICINE

## 2021-06-26 PROCEDURE — 258N000003 HC RX IP 258 OP 636: Performed by: EMERGENCY MEDICINE

## 2021-06-26 PROCEDURE — 80048 BASIC METABOLIC PNL TOTAL CA: CPT | Performed by: FAMILY MEDICINE

## 2021-06-26 PROCEDURE — 81001 URINALYSIS AUTO W/SCOPE: CPT | Performed by: EMERGENCY MEDICINE

## 2021-06-26 PROCEDURE — 87635 SARS-COV-2 COVID-19 AMP PRB: CPT | Performed by: EMERGENCY MEDICINE

## 2021-06-26 PROCEDURE — 99285 EMERGENCY DEPT VISIT HI MDM: CPT | Performed by: EMERGENCY MEDICINE

## 2021-06-26 PROCEDURE — 87186 SC STD MICRODIL/AGAR DIL: CPT | Performed by: EMERGENCY MEDICINE

## 2021-06-26 PROCEDURE — 87086 URINE CULTURE/COLONY COUNT: CPT | Performed by: EMERGENCY MEDICINE

## 2021-06-26 PROCEDURE — C9803 HOPD COVID-19 SPEC COLLECT: HCPCS | Performed by: EMERGENCY MEDICINE

## 2021-06-26 PROCEDURE — 250N000011 HC RX IP 250 OP 636: Performed by: EMERGENCY MEDICINE

## 2021-06-26 PROCEDURE — 250N000013 HC RX MED GY IP 250 OP 250 PS 637: Performed by: FAMILY MEDICINE

## 2021-06-26 PROCEDURE — 99223 1ST HOSP IP/OBS HIGH 75: CPT | Mod: AI | Performed by: FAMILY MEDICINE

## 2021-06-26 RX ORDER — DEXTROSE MONOHYDRATE 25 G/50ML
25-50 INJECTION, SOLUTION INTRAVENOUS
Status: DISCONTINUED | OUTPATIENT
Start: 2021-06-26 | End: 2021-06-27 | Stop reason: HOSPADM

## 2021-06-26 RX ORDER — DIPHENHYDRAMINE HCL 25 MG
25 CAPSULE ORAL EVERY 6 HOURS PRN
Status: DISCONTINUED | OUTPATIENT
Start: 2021-06-26 | End: 2021-06-27 | Stop reason: HOSPADM

## 2021-06-26 RX ORDER — NALOXONE HYDROCHLORIDE 0.4 MG/ML
0.2 INJECTION, SOLUTION INTRAMUSCULAR; INTRAVENOUS; SUBCUTANEOUS
Status: DISCONTINUED | OUTPATIENT
Start: 2021-06-26 | End: 2021-06-27 | Stop reason: HOSPADM

## 2021-06-26 RX ORDER — ATORVASTATIN CALCIUM 20 MG/1
20 TABLET, FILM COATED ORAL DAILY
Status: DISCONTINUED | OUTPATIENT
Start: 2021-06-27 | End: 2021-06-27 | Stop reason: HOSPADM

## 2021-06-26 RX ORDER — CEFTRIAXONE SODIUM 1 G/50ML
1 INJECTION, SOLUTION INTRAVENOUS EVERY 24 HOURS
Status: DISCONTINUED | OUTPATIENT
Start: 2021-06-26 | End: 2021-06-27 | Stop reason: HOSPADM

## 2021-06-26 RX ORDER — KETOROLAC TROMETHAMINE 15 MG/ML
10 INJECTION, SOLUTION INTRAMUSCULAR; INTRAVENOUS
Status: COMPLETED | OUTPATIENT
Start: 2021-06-26 | End: 2021-06-26

## 2021-06-26 RX ORDER — TAMSULOSIN HYDROCHLORIDE 0.4 MG/1
0.4 CAPSULE ORAL DAILY
Status: DISCONTINUED | OUTPATIENT
Start: 2021-06-26 | End: 2021-06-27 | Stop reason: HOSPADM

## 2021-06-26 RX ORDER — NALOXONE HYDROCHLORIDE 0.4 MG/ML
0.4 INJECTION, SOLUTION INTRAMUSCULAR; INTRAVENOUS; SUBCUTANEOUS
Status: DISCONTINUED | OUTPATIENT
Start: 2021-06-26 | End: 2021-06-27 | Stop reason: HOSPADM

## 2021-06-26 RX ORDER — ONDANSETRON 4 MG/1
4 TABLET, ORALLY DISINTEGRATING ORAL EVERY 6 HOURS PRN
Status: DISCONTINUED | OUTPATIENT
Start: 2021-06-26 | End: 2021-06-27 | Stop reason: HOSPADM

## 2021-06-26 RX ORDER — ONDANSETRON 2 MG/ML
4 INJECTION INTRAMUSCULAR; INTRAVENOUS EVERY 6 HOURS PRN
Status: DISCONTINUED | OUTPATIENT
Start: 2021-06-26 | End: 2021-06-27 | Stop reason: HOSPADM

## 2021-06-26 RX ORDER — NICOTINE POLACRILEX 4 MG
15-30 LOZENGE BUCCAL
Status: DISCONTINUED | OUTPATIENT
Start: 2021-06-26 | End: 2021-06-27 | Stop reason: HOSPADM

## 2021-06-26 RX ORDER — SODIUM CHLORIDE 9 MG/ML
INJECTION, SOLUTION INTRAVENOUS CONTINUOUS
Status: DISCONTINUED | OUTPATIENT
Start: 2021-06-26 | End: 2021-06-27 | Stop reason: HOSPADM

## 2021-06-26 RX ORDER — ACETAMINOPHEN 325 MG/1
650 TABLET ORAL EVERY 4 HOURS PRN
Status: DISCONTINUED | OUTPATIENT
Start: 2021-06-26 | End: 2021-06-27 | Stop reason: HOSPADM

## 2021-06-26 RX ORDER — LIDOCAINE 40 MG/G
CREAM TOPICAL
Status: DISCONTINUED | OUTPATIENT
Start: 2021-06-26 | End: 2021-06-27 | Stop reason: HOSPADM

## 2021-06-26 RX ADMIN — SODIUM CHLORIDE: 9 INJECTION, SOLUTION INTRAVENOUS at 22:38

## 2021-06-26 RX ADMIN — TAMSULOSIN HYDROCHLORIDE 0.4 MG: 0.4 CAPSULE ORAL at 22:10

## 2021-06-26 RX ADMIN — SODIUM CHLORIDE 1000 ML: 9 INJECTION, SOLUTION INTRAVENOUS at 21:29

## 2021-06-26 RX ADMIN — KETOROLAC TROMETHAMINE 10 MG: 15 INJECTION, SOLUTION INTRAMUSCULAR; INTRAVENOUS at 20:22

## 2021-06-26 RX ADMIN — SODIUM CHLORIDE 500 ML: 9 INJECTION, SOLUTION INTRAVENOUS at 20:21

## 2021-06-26 RX ADMIN — CEFTRIAXONE SODIUM 1 G: 1 INJECTION, SOLUTION INTRAVENOUS at 20:23

## 2021-06-26 ASSESSMENT — MIFFLIN-ST. JEOR
SCORE: 2014.5
SCORE: 2018.49

## 2021-06-26 ASSESSMENT — ENCOUNTER SYMPTOMS
PSYCHIATRIC NEGATIVE: 1
MUSCULOSKELETAL NEGATIVE: 1
CARDIOVASCULAR NEGATIVE: 1
GASTROINTESTINAL NEGATIVE: 1
ENDOCRINE NEGATIVE: 1
EYES NEGATIVE: 1
FREQUENCY: 1
CONSTITUTIONAL NEGATIVE: 1
NEUROLOGICAL NEGATIVE: 1
ALLERGIC/IMMUNOLOGIC NEGATIVE: 1
RESPIRATORY NEGATIVE: 1
HEMATOLOGIC/LYMPHATIC NEGATIVE: 1

## 2021-06-26 NOTE — ED PROVIDER NOTES
History     Chief Complaint   Patient presents with     Flank Pain     L side.  h/o stones.  seen in [facility] earlier today and dx with UTI.  pain worsening.     HPI  Sean Woodard is a 41 year old male who presents with flank pain concern about a bladder infection.  On intake on arrival patient reported he had been evaluated earlier in the day had a outside urgent care in [facility] diagnosed with a bladder infection.   Patient's medical records indicate a history of GERD, hyperlipidemia, hypertension, PTSD, class III obesity, type 2 diabetes obstructive sleep apnea and fatty liver disease. Medical records show that he is currently prescribed Lipitor, Astelin, Flonase, Hyzaar, Metformin, Zofran and Stelara.  Patient arrived alone from home in [facility].  He reports he works for [employer].  He reports he was doing well after his evaluation on June 7, 2021 and that his pain resolved.  This was the first time he was diagnosed with a kidney stone.  No family history of kidney stone. Earlier this morning he developed suprapubic pressure and went to urgent care and was prescribed Bactrim because of a positive UA.  After getting home he developed progressive pain and discomfort.  On exam he reports his pain is a 4 out of 10.  No flank pain and difficulty urinating.  No penile discharge.  No history of sexually transmitted infections.     Chart Review  MR LIVER WITHOUT AND WITH CONTRAST 6/18/2021 3:58 PM     INDICATION: Incidentally noted to have a fatty liver on CT of abdomen  in ED and advised to get a follow up MRI. Please change order if  contrast is not needed. Fatty liver.     COMPARISON: 6/7/2021     TECHNIQUE: Routine MRI liver protocol including T1 in/out phase,  diffusion, multiplane T2, and dynamic T1 with IV contrast.       CONTRAST: 10 mL Gadavist     FINDINGS:      LIVER: Signal loss in the liver on opposed phase sequences compatible  with fatty infiltration. No worrisome focal liver lesions are  evident.  No biliary dilation.      ADDITIONAL FINDINGS: The gallbladder, pancreas, spleen, and adrenal  glands are normal. The right kidney appears normal. There are simple  cysts in the left kidney that do not require specific follow-up.  Visualized bowel is unremarkable. No adenopathy or ascites.                                                                      IMPRESSION:  1.  Fatty infiltration of the liver. No worrisome liver lesions.       EXAM: CT ABDOMEN PELVIS W/O CONTRAST  LOCATION: [facility]  DATE/TIME: 6/7/2021 12:58 AM     INDICATION: Flank pain, kidney stone suspected  COMPARISON: None.  TECHNIQUE: CT scan of the abdomen and pelvis was performed without IV contrast. Multiplanar reformats were obtained. Dose reduction techniques were used.  CONTRAST: None.     FINDINGS:   LOWER CHEST: Small hiatal hernia.     HEPATOBILIARY: Heterogeneous appearance of liver nonspecific but favored to represent geographic fatty infiltration. Gallbladder and bile ducts unremarkable.     PANCREAS: Normal.     SPLEEN: Normal.     ADRENAL GLANDS: Normal.     KIDNEYS/BLADDER: Mild left hydronephrosis/hydroureter secondary to a 3 mm stone within the distal most ureter, at the UVJ. No additional left-sided kidney stones are present though there does appear to be a 2 cm cyst within left kidney that demonstrates   a small internal or mural calcification     Right kidney normal, no stones or hydronephrosis.     BOWEL: Negative. Appendix normal.     LYMPH NODES: Normal.     VASCULATURE: Unremarkable.     PELVIC ORGANS: Normal.     MUSCULOSKELETAL: Normal.                                                                      IMPRESSION:   1.  There is a 3 mm obstructing distal left ureteral stone at the UVJ resulting in mild left hydronephrosis.  2.  Heterogeneous appearance of liver most suggestive of geographic fatty infiltration. Suggest a follow-up nonemergent liver MRI for  confirmation.    Allergies:  Allergies   Allergen Reactions     Lisinopril Cough       Problem List:    Patient Active Problem List    Diagnosis Date Noted     UTI (urinary tract infection) 06/26/2021     Priority: Medium     Fatty liver 06/07/2021     Priority: Medium     Hidradenitis axillaris 04/08/2021     Priority: Medium     Added automatically from request for surgery [id number]       Hidradenitis suppurativa 05/07/2020     Priority: Medium     FER (obstructive sleep apnea) 01/28/2019     Priority: Medium     Diabetes mellitus, type 2 (H) 01/25/2019     Priority: Medium     PTSD (post-traumatic stress disorder) 11/08/2018     Priority: Medium     [place], [place] in [place]  Diagnosed with PTSD by Dr. Darlene Jeffery on 11/9/2010       Class 3 severe obesity due to excess calories with serious comorbidity in adult (H) 11/08/2018     Priority: Medium     Benign essential hypertension 07/28/2017     Priority: Medium     Hyperlipidemia with target LDL less than 100 08/14/2014     Priority: Medium     Diagnosis updated by automated process. Provider to review and confirm.       Health Care Home 03/28/2014     Priority: Medium     EMERGENCY CARE PLAN  March 28, 2014: No current Care Coordination follow up planned. Please refer if Care Coordination services are needed.    Presenting Problem Signs and Symptoms Treatment Plan   Questions or concerns   during clinic hours   I will call my clinic directly:  [facility]  [id number] [place] [address] [id number] [phone].    Questions or concerns outside clinic hours   I will call the 24 hour nurse line at   [phone] or [facility].   Need to schedule an appointment   I will call the 24 hour scheduling team at [phone] or my clinic directly at [phone].    Same day treatment     I will call my clinic first, nurse line if after hours, urgent care and express care if needed.   Clinic care coordination services (regular clinic hours)     I will call  a clinic care coordinator directly:     Marlo Talley RN  [place] Fri - [phone]  Wed, Thmila - [phone]    Anat Duke, :    [phone]    Or call my clinic at [phone] and ask to speak with care coordination.   Crisis Services: Behavioral or Mental Health  [facility] 24 Hour Phone Line  [phone]    [facility] 24 Hour Crisis Services  [phone]    [facility] [phone]    Merged with [facility]   [phone]       Emergency treatment -- Immediately    [facility]All 911          GERD (gastroesophageal reflux disease) 2011     Priority: Medium        Past Medical History:    Past Medical History:   Diagnosis Date     Diabetes (H)      Hypertension      NO ACTIVE PROBLEMS      Obese      Sleep apnea        Past Surgical History:    Past Surgical History:   Procedure Laterality Date     EXCISE HIDRADENITIS (LOCATION) Left 2021    Procedure: Sharp excisional debridement of left axillary hidradenitis.;  Surgeon: Shahnaz Garibay MD;  Location: [facility] OR     ORTHOPEDIC SURGERY  2005    RT knee arthroscopic menisectomy     Thumb Surgery  2009    Mass removal neuroma       Family History:    Family History   Problem Relation Age of Onset     Heart Defect Father         valve replacement     Diabetes Maternal Grandmother      Cancer Maternal Grandfather         stomach     Hypertension Brother      Diabetes Sister      Cerebrovascular Disease Brother 38     Hypertension Brother      Melanoma No family hx of      Skin Cancer No family hx of        Social History:  Marital Status:   [2]  Social History     Tobacco Use     Smoking status: Former Smoker     Packs/day: 0.50     Years: 5.00     Pack years: 2.50     Types: Cigarettes     Quit date: 2005     Years since quittin.4     Smokeless tobacco: Never Used   Substance Use Topics     Alcohol use: Yes     Comment: Social. 1 drink per month     Drug use: No        Medications:   "  atorvastatin (LIPITOR) 20 MG tablet  augmented betamethasone dipropionate (DIPROLENE-AF) 0.05 % ointment  azelastine (ASTELIN) 0.1 % nasal spray  azelastine (OPTIVAR) 0.05 % ophthalmic solution  benzoyl peroxide 5 % external liquid  blood glucose (ACCU-CHEK GUIDE) test strip  cetirizine (ZYRTEC) 10 MG tablet  clindamycin (CLINDAMAX) 1 % lotion  clindamycin-benzoyl peroxide (BENZACLIN) 1-5 % external gel  diphenhydrAMINE (BENADRYL) 25 MG capsule  doxycycline hyclate (VIBRA-TABS) 100 MG tablet  fluticasone (FLONASE) 50 MCG/ACT nasal spray  hydrOXYzine (ATARAX) 25 MG tablet  ISOtretinoin (ACCUTANE PO)  losartan-hydrochlorothiazide (HYZAAR) 50-12.5 MG tablet  metFORMIN (GLUCOPHAGE-XR) 500 MG 24 hr tablet  ondansetron (ZOFRAN-ODT) 4 MG ODT tab  ustekinumab (STELARA) 90 MG/ML          Review of Systems   Constitutional: Negative.    HENT: Negative.    Eyes: Negative.    Respiratory: Negative.    Cardiovascular: Negative.    Gastrointestinal: Negative.    Endocrine: Negative.    Genitourinary: Positive for frequency and urgency.   Musculoskeletal: Negative.    Skin: Negative.    Allergic/Immunologic: Negative.    Neurological: Negative.    Hematological: Negative.    Psychiatric/Behavioral: Negative.    All other systems reviewed and are negative.      Physical Exam   BP: (!) 166/99  Pulse: 84  Temp: 98  F (36.7  C)  Resp: 18  Height: 172.7 cm (5' 8\")  Weight: 113.4 kg (250 lb)  SpO2: 99 %      Physical Exam  Constitutional:       General: He is not in acute distress.     Appearance: Normal appearance. He is not ill-appearing, toxic-appearing or diaphoretic.   HENT:      Head: Normocephalic and atraumatic.      Nose: Nose normal.   Eyes:      Extraocular Movements: Extraocular movements intact.      Pupils: Pupils are equal, round, and reactive to light.   Neck:      Musculoskeletal: Normal range of motion and neck supple.   Cardiovascular:      Rate and Rhythm: Normal rate.      Pulses: Normal pulses.   Pulmonary:      " "Effort: Pulmonary effort is normal.      Breath sounds: Normal breath sounds.   Abdominal:      Palpations: Abdomen is soft.      Tenderness: There is abdominal tenderness in the suprapubic area.       Neurological:      General: No focal deficit present.      Mental Status: He is alert and oriented to person, place, and time.      Cranial Nerves: No cranial nerve deficit.      Sensory: No sensory deficit.      Motor: No weakness.      Coordination: Coordination normal.      Gait: Gait normal.      Deep Tendon Reflexes: Reflexes normal.   Psychiatric:         Mood and Affect: Mood normal.         Behavior: Behavior normal.         Thought Content: Thought content normal.         Judgment: Judgment normal.         ED Course        Procedures               Critical Care time:  none   The Lactic acid level is elevated due to UTI and a small left distal ureteral stone measuring 2 to 3 mm, at this time there is no sign of severe sepsis or septic shock.              ED Vitals:  Vitals:    06/26/21 1445 06/26/21 1815 06/26/21 1821 06/26/21 2030   BP: (!) 166/99  (!) 137/90 (!) 137/95   Pulse: 84  72 78   Resp: 18      Temp: 98  F (36.7  C) 98.4  F (36.9  C)     TempSrc: Temporal Oral     SpO2: 99%   100%   Weight: 113.4 kg (250 lb)      Height: 1.727 m (5' 8\")        Vitals:    06/26/21 1815 06/26/21 1821 06/26/21 2030 06/26/21 2147   BP:  (!) 137/90 (!) 137/95 (!) 153/89   Pulse:  72 78 83   Resp:    18   Temp: 98.4  F (36.9  C)   98.2  F (36.8  C)   TempSrc: Oral   Oral   SpO2:   100% 99%   Weight:    113 kg (249 lb 1.9 oz)   Height:    1.727 m (5' 8\")       ED medications:  Medications   cefTRIAXone in d5w (ROCEPHIN) intermittent infusion 1 g (1 g Intravenous New Bag 6/26/21 2023)   0.9% sodium chloride BOLUS (has no administration in time range)   0.9% sodium chloride BOLUS (500 mLs Intravenous New Bag 6/26/21 2021)   ketorolac (TORADOL) injection 10 mg (10 mg Intravenous Given 6/26/21 2022)       ED labs and " imaging:  Results for orders placed or performed during the hospital encounter of 06/26/21   Abd/pelvis CT - no contrast - Stone Protocol     Status: None    Narrative    EXAM: CT ABDOMEN PELVIS W/O CONTRAST  LOCATION: [facility]  DATE/TIME: 6/26/2021 5:36 PM    INDICATION: Flank pain, kidney stone suspected, known left renal stone.  COMPARISON: MRI 06/18/2021, CT 06/07/2021.  TECHNIQUE: CT scan of the abdomen and pelvis was performed without IV contrast. Multiplanar reformats were obtained. Dose reduction techniques were used.  CONTRAST: None.    FINDINGS:   LOWER CHEST: Fibroatelectasis without focal infiltrate or effusion. At least moderate coronary artery calcification.    HEPATOBILIARY: Hepatic steatosis as on prior. Gallbladder unremarkable.    PANCREAS: Normal.    SPLEEN: Normal.    ADRENAL GLANDS: Normal.    KIDNEYS/BLADDER: Obstructing 1 x 2-3 mm stone is at the distal most aspect of the ureterovesical junction protruding slightly into the bladder. There is increasing left hydroureter and hydronephrosis as well as left renal and perirenal edema. Simple and   mildly complex left renal cysts unchanged. Right kidney and urinary bladder unremarkable.    BOWEL: Normal.    LYMPH NODES: Normal.    VASCULATURE: Unremarkable.    PELVIC ORGANS: Normal.    MUSCULOSKELETAL: Degenerative disease. Potential nerve root impingement. Presumed bone islands.      Impression    IMPRESSION:   1.  Slight interval migration of obstructing distal left ureteral stone now at the distal most aspect of the ureterovesical junction. Increasing hydronephrosis, now moderate. Increasing left renal and perirenal edema.  2.  Remainder not significantly changed.     CBC with platelets, differential     Status: Abnormal   Result Value Ref Range    WBC 12.3 (H) 4.0 - 11.0 10e9/L    RBC Count 4.93 4.4 - 5.9 10e12/L    Hemoglobin 15.1 13.3 - 17.7 g/dL    Hematocrit 43.3 40.0 - 53.0 %    MCV 88 78 - 100 fl    MCH 30.6 26.5 - 33.0 pg     MCHC 34.9 31.5 - 36.5 g/dL    RDW 12.6 10.0 - 15.0 %    Platelet Count 239 150 - 450 10e9/L    Diff Method Automated Method     % Neutrophils 84.0 %    % Lymphocytes 8.1 %    % Monocytes 6.5 %    % Eosinophils 0.6 %    % Basophils 0.4 %    % Immature Granulocytes 0.4 %    Nucleated RBCs 0 0 /100    Absolute Neutrophil 10.3 (H) 1.6 - 8.3 10e9/L    Absolute Lymphocytes 1.0 0.8 - 5.3 10e9/L    Absolute Monocytes 0.8 0.0 - 1.3 10e9/L    Absolute Eosinophils 0.1 0.0 - 0.7 10e9/L    Absolute Basophils 0.1 0.0 - 0.2 10e9/L    Abs Immature Granulocytes 0.1 0 - 0.4 10e9/L    Absolute Nucleated RBC 0.0    Basic metabolic panel     Status: Abnormal   Result Value Ref Range    Sodium 138 133 - 144 mmol/L    Potassium 3.6 3.4 - 5.3 mmol/L    Chloride 105 94 - 109 mmol/L    Carbon Dioxide 25 20 - 32 mmol/L    Anion Gap 8 3 - 14 mmol/L    Glucose 108 (H) 70 - 99 mg/dL    Urea Nitrogen 14 7 - 30 mg/dL    Creatinine 1.00 0.66 - 1.25 mg/dL    GFR Estimate >90 >60 mL/min/[1.73_m2]    GFR Estimate If Black >90 >60 mL/min/[1.73_m2]    Calcium 9.7 8.5 - 10.1 mg/dL   UA reflex to Microscopic     Status: Abnormal   Result Value Ref Range    Color Urine Flori     Appearance Urine Clear     Glucose Urine Negative NEG^Negative mg/dL    Bilirubin Urine Negative NEG^Negative    Ketones Urine Negative NEG^Negative mg/dL    Specific Gravity Urine 1.018 1.003 - 1.035    Blood Urine Moderate (A) NEG^Negative    pH Urine 5.0 5.0 - 7.0 pH    Protein Albumin Urine Negative NEG^Negative mg/dL    Urobilinogen mg/dL 4.0 (H) 0.0 - 2.0 mg/dL    Nitrite Urine Positive (A) NEG^Negative    Leukocyte Esterase Urine Negative NEG^Negative    Source Midstream Urine     RBC Urine 16 (H) 0 - 2 /HPF    WBC Urine 1 0 - 5 /HPF    Squamous Epithelial /HPF Urine <1 0 - 1 /HPF    Mucous Urine Present (A) NEG^Negative /LPF    Hyaline Casts 4 (H) 0 - 2 /LPF   Asymptomatic SARS-CoV-2 COVID-19 Virus (Coronavirus) by PCR     Status: None    Specimen: Nasopharyngeal    Result Value Ref Range    SARS-CoV-2 Virus Specimen Source Nasopharyngeal     SARS-CoV-2 PCR Result NEGATIVE     SARS-CoV-2 PCR Comment (Note)    Lactic acid whole blood     Status: Abnormal   Result Value Ref Range    Lactic Acid 2.8 (H) 0.7 - 2.0 mmol/L   Glucose by meter     Status: Abnormal   Result Value Ref Range    Glucose 152 (H) 70 - 99 mg/dL   Lactic acid whole blood     Status: None   Result Value Ref Range    Lactic Acid 1.9 0.7 - 2.0 mmol/L   Blood culture     Status: None (Preliminary result)    Specimen: Blood   Result Value Ref Range    Specimen Description Blood     Culture Micro No growth after 3 hours    Blood culture     Status: None (Preliminary result)    Specimen: Blood   Result Value Ref Range    Specimen Description Blood     Culture Micro No growth after 3 hours            Assessments & Plan (with Medical Decision Making)   Assessment Summary and Clinical Impression: 41-year-old male who presented with report of bladder pressure, and urinary frequency.  Patient was treated for 3 mm distal left ureteral stone with mild left hydronephrosis 3 weeks prior.  He reported improvement in his symptoms until earlier in the day when he developed urinary symptoms and was seen in urgent care.  He was diagnosed with a UTI and prescribed Bactrim and upon getting home after his urgent care visit he developed increasing pain and discomfort.  He has a urinary tract infection with no passage of his recently diagnosed distal left ureteral stone.  He is admitted for further care with IV antibiotics to exclude an infected kidney stone.    Patient appeared uncomfortable on arrival and reported no personal history of kidney stones until his evaluation 3 weeks prior and no family history of kidney stones.  He had no red flags on exam specifically no penile discharge he is uncircumcised, no rash, no trauma.  No prior history of STI.  His work-up suggests he has a UTI with a small distal ureteral stone that has  moved somewhat distally but increasing hydronephrosis and perinephric edema since prior imaging 3 weeks prior. After consultation with urology is admitted to medicine for ongoing care.     ED Course and Plan:  Reviewed the medical record.  Reviewed evaluation on June-7.  Reviewed UA obtained in urgent care prior to arrival in the emergency department. Reviewed CT imaging on June 7 and MRI imaging on June 18.  See additional details in the medical record.  We discussed and reviewed possible causes for his suprapubic pressure acutely with recent treatment for ureteral stone.  A broad differential was considered including pyelonephritis, and new renal or ureteral stone.  He was offered IV Toradol for pain and fluids.  Labs were obtained Repeat imaging to evaluate for new stone versus on past or undescended stone from 3 weeks prior.   Work-up revealed leukocytosis of 12.3.  Creatinine today is 1.0.  CT imaging showed the stone that was diagnosed on June 7 appears to have moved slightly to the most distal aspect of the UVJ with increasing hydronephrosis, and now has new moderate perirenal edema. I spoke with Dr AURELIO Hallman-urology on-call who advised that patient maybe admitted locally  for observation as patient is afebrile with no immediate in indication  for transfer to tertiary care center or intervention required given stone size and location.      I spoke with Dr. Barron-admitting hospitalist at 8.43pm who agreed to accept the patient for admission after discussing rationale for admission including working diagnosis of UTI with a small left distal ureteral stone although interval CT shows some moderate progressive hydronephrosis and  perinephric edema. We discussed blood cultures-unfortunately ordered after the initial antibiotic dose had been administered.  Urine culture is pending.  Lactate was requested and resulted at 2.8, and trended normal.       ED to Inpatient Handoff:    Discussed with Dr Barron at  8.43pm  Patient accepted for Inpatient Stay  Pending studies or consults include: Blood culture, urine culture, urology consultation if fever.  Code Status: Not Addressed         Disclaimer: This note consists of symbols derived from keyboarding, dictation and/or voice recognition software. As a result, there may be errors in the script that have gone undetected. Please consider this when interpreting information found in this chart.  I have reviewed the nursing notes.    I have reviewed the findings, diagnosis, plan and need for follow up with the patient.       New Prescriptions    No medications on file       Final diagnoses:   Acute cystitis without hematuria   Left ureteral stone - 2 to 3 mm left distal ureteral stone initially diagnosed on June 7, 2021 with slight distal migration however increased hydronephrosis and perinephric edema today   Acute suprapubic pain - Likely multifactorial due to UTI and left distal ureteral stone       6/26/2021   [facility] EMERGENCY DEPT     Trent Beavers MD  06/27/21 0039

## 2021-06-26 NOTE — ED NOTES
Pt had recent 1st kidney stone, CT scan showed at that time that the stone was almost in the bladder, pain went away after 2 days. Today pt had bladder pressure, frequent urination.  UA done at [facility] earlier today showed sx of UTI. Pt was started on pyridium and abx. After he got home, L flank pain started and he took a leftover pain pill, then started vomiting. L flank is 4/10, was 8/10 earlier.

## 2021-06-27 VITALS
TEMPERATURE: 97.5 F | OXYGEN SATURATION: 98 % | HEART RATE: 81 BPM | WEIGHT: 249.12 LBS | DIASTOLIC BLOOD PRESSURE: 73 MMHG | RESPIRATION RATE: 18 BRPM | BODY MASS INDEX: 37.76 KG/M2 | SYSTOLIC BLOOD PRESSURE: 125 MMHG | HEIGHT: 68 IN

## 2021-06-27 LAB
ALBUMIN SERPL-MCNC: 3.2 G/DL (ref 3.4–5)
ALP SERPL-CCNC: 57 U/L (ref 40–150)
ALT SERPL W P-5'-P-CCNC: 48 U/L (ref 0–70)
ANION GAP SERPL CALCULATED.3IONS-SCNC: 6 MMOL/L (ref 3–14)
AST SERPL W P-5'-P-CCNC: 15 U/L (ref 0–45)
BASOPHILS # BLD AUTO: 0.1 10E9/L (ref 0–0.2)
BASOPHILS NFR BLD AUTO: 0.8 %
BILIRUB SERPL-MCNC: 0.7 MG/DL (ref 0.2–1.3)
BUN SERPL-MCNC: 13 MG/DL (ref 7–30)
CALCIUM SERPL-MCNC: 8.6 MG/DL (ref 8.5–10.1)
CHLORIDE SERPL-SCNC: 109 MMOL/L (ref 94–109)
CO2 SERPL-SCNC: 25 MMOL/L (ref 20–32)
CREAT SERPL-MCNC: 0.94 MG/DL (ref 0.66–1.25)
DIFFERENTIAL METHOD BLD: NORMAL
EOSINOPHIL # BLD AUTO: 0.2 10E9/L (ref 0–0.7)
EOSINOPHIL NFR BLD AUTO: 2.6 %
ERYTHROCYTE [DISTWIDTH] IN BLOOD BY AUTOMATED COUNT: 12.6 % (ref 10–15)
GFR SERPL CREATININE-BSD FRML MDRD: >90 ML/MIN/{1.73_M2}
GLUCOSE BLDC GLUCOMTR-MCNC: 112 MG/DL (ref 70–99)
GLUCOSE BLDC GLUCOMTR-MCNC: 120 MG/DL (ref 70–99)
GLUCOSE SERPL-MCNC: 105 MG/DL (ref 70–99)
HCT VFR BLD AUTO: 41 % (ref 40–53)
HGB BLD-MCNC: 13.4 G/DL (ref 13.3–17.7)
IMM GRANULOCYTES # BLD: 0 10E9/L (ref 0–0.4)
IMM GRANULOCYTES NFR BLD: 0.3 %
LYMPHOCYTES # BLD AUTO: 1.4 10E9/L (ref 0.8–5.3)
LYMPHOCYTES NFR BLD AUTO: 21.9 %
MCH RBC QN AUTO: 29.7 PG (ref 26.5–33)
MCHC RBC AUTO-ENTMCNC: 32.7 G/DL (ref 31.5–36.5)
MCV RBC AUTO: 91 FL (ref 78–100)
MONOCYTES # BLD AUTO: 0.6 10E9/L (ref 0–1.3)
MONOCYTES NFR BLD AUTO: 10 %
NEUTROPHILS # BLD AUTO: 4 10E9/L (ref 1.6–8.3)
NEUTROPHILS NFR BLD AUTO: 64.4 %
NRBC # BLD AUTO: 0 10*3/UL
NRBC BLD AUTO-RTO: 0 /100
PLATELET # BLD AUTO: 208 10E9/L (ref 150–450)
POTASSIUM SERPL-SCNC: 3.6 MMOL/L (ref 3.4–5.3)
PROT SERPL-MCNC: 6.9 G/DL (ref 6.8–8.8)
RBC # BLD AUTO: 4.51 10E12/L (ref 4.4–5.9)
SODIUM SERPL-SCNC: 140 MMOL/L (ref 133–144)
WBC # BLD AUTO: 6.2 10E9/L (ref 4–11)

## 2021-06-27 PROCEDURE — 999N001017 HC STATISTIC GLUCOSE BY METER IP

## 2021-06-27 PROCEDURE — 85025 COMPLETE CBC W/AUTO DIFF WBC: CPT | Performed by: FAMILY MEDICINE

## 2021-06-27 PROCEDURE — 36415 COLL VENOUS BLD VENIPUNCTURE: CPT | Performed by: FAMILY MEDICINE

## 2021-06-27 PROCEDURE — 250N000013 HC RX MED GY IP 250 OP 250 PS 637: Performed by: FAMILY MEDICINE

## 2021-06-27 PROCEDURE — 258N000003 HC RX IP 258 OP 636: Performed by: FAMILY MEDICINE

## 2021-06-27 PROCEDURE — 80053 COMPREHEN METABOLIC PANEL: CPT | Performed by: FAMILY MEDICINE

## 2021-06-27 PROCEDURE — 99239 HOSP IP/OBS DSCHRG MGMT >30: CPT | Performed by: INTERNAL MEDICINE

## 2021-06-27 RX ORDER — SULFAMETHOXAZOLE/TRIMETHOPRIM 800-160 MG
1 TABLET ORAL 2 TIMES DAILY
Qty: 14 TABLET | Refills: 0 | COMMUNITY
Start: 2021-06-27 | End: 2021-07-01

## 2021-06-27 RX ADMIN — TAMSULOSIN HYDROCHLORIDE 0.4 MG: 0.4 CAPSULE ORAL at 07:53

## 2021-06-27 RX ADMIN — SODIUM CHLORIDE: 9 INJECTION, SOLUTION INTRAVENOUS at 05:24

## 2021-06-27 RX ADMIN — ATORVASTATIN CALCIUM 20 MG: 20 TABLET, FILM COATED ORAL at 07:53

## 2021-06-27 ASSESSMENT — ACTIVITIES OF DAILY LIVING (ADL)
ADLS_ACUITY_SCORE: 15

## 2021-06-27 NOTE — PLAN OF CARE
Patient alert and oriented x4. Denies pain. Up independently. Afebrile. Wounds under both arms from Hidradenitis Suppurativa. Patient stated he changes own dressings at home with wife. NPO. Straining urine with no sediment so far. IV fluids infusing.

## 2021-06-27 NOTE — DISCHARGE INSTRUCTIONS
Please call Urology Clinic in [place] to make appointment as soon as they have an appt. available. [phone].

## 2021-06-27 NOTE — DISCHARGE SUMMARY
[facility] Hospitalist Discharge Summary    Sean Woodard MRN# [id number]   Age: 41 year old YOB: 1980     Date of Admission:  6/26/2021  Date of Discharge::  6/27/2021  Admitting Physician:  Jennifer Barron MD  Discharge Physician:  Marta Landa MD  Primary Physician: Susanne Alberto  Transferring Facility: N/A     Home clinic: [facility]          Admission Diagnoses:   UTI (urinary tract infection) [N39.0]  Acute suprapubic pain [R10.2]  Acute cystitis without hematuria [N30.00]  Left ureteral stone [N20.1]          Discharge Diagnosis:     Principle diagnosis: Left ureteral stone    UTI (urinary tract infection)  Secondary diagnoses:  Patient Active Problem List   Diagnosis     Health Care Home     Hyperlipidemia with target LDL less than 100     Benign essential hypertension     PTSD (post-traumatic stress disorder)     Class 3 severe obesity due to excess calories with serious comorbidity in adult (H)     Diabetes mellitus, type 2 (H)     FER (obstructive sleep apnea)     Hidradenitis suppurativa     Hidradenitis axillaris     Fatty liver     UTI (urinary tract infection)     Left ureteral stone     Lab test negative for COVID-19 virus          Brief History of Presenting Illness:   As per admit hx  Sean Woodard is a 41 year old male with past medical history of hypertension, diabetes, hyperlipidemia, obesity now presents on 6/26/2021 with left flank pain  Patient was recently diagnosed with left ureter stone on 06/07/2021 after presenting to the emergency department with left flank pain, CT scan done at that time and showed 3 mm distal UVJ stone with mild amounts of hydronephrosis.  He was given Toradol ,  Tylenol, and was discharged on [place].  Patient was instructed to follow-up with urology.  He presented yesterday to the urgent care with bladder pressure, urgency, painful urination  Urinalysis was done in the urgent care and was suggestive of urinary tract  infection.  Patient was prescribed Bactrim for 7 days and Pyridium and was advised to return if symptoms not improving or getting worse  After he went home he developed progressive left flank pain discomfort, therefore he presented to the ED.  In The ED labs showed mild leukocytosis at 12.3, creatinine at 1, lactate 2.8.  CT imaging showed Slight interval migration of obstructing distal left ureteral stone now at the distal most aspect of the ureterovesical junction. Increasing hydronephrosis, now moderate. Increasing left renal and perirenal edema  On-call urologist was called by the ED provider and advised that the patient to be admitted,  patient is afebrile therefore there  is no indication to transfer to a tertiary center at this time, if the patient develops fever he will need to be transferred for stenting.  Patient was started on IV ceftriaxone and received 1.5 L normal saline IVF.   Patient was interviewed and examined at bedside, he is feeling is feeling better denies any pain.   No fever or chills, no hematuria or dysuria.            Hospital Course:   Left ureteral stone    UTI (urinary tract infection)  Patient with history of hypertension, diabetes, hyperlipidemia, obesity now presents on 6/26/2021 with left flank pain  Patient was recently diagnosed with left ureter stone on 06/07/2021 after presenting to the emergency department with left flank pain, CT scan done at that time and showed 3 mm distal UVJ stone with mild amounts of hydronephrosis.  He was given Toradol ,  Tylenol, and was discharged on [place].  Patient was instructed to follow-up with urology.He presented yesterday to the urgent care with bladder pressure, urgency, painful urination  Urinalysis was done in the urgent care and was suggestive of urinary tract infection.    Patient was prescribed Bactrim for 7 days and Pyridium and was advised to return if symptoms not improving or getting worse  After he went home he developed progressive  left flank pain discomfort, therefore he presented to the ED.  In The ED labs showed mild leukocytosis at 12.3, creatinine at 1, lactate 2.8.  CT imaging showed Slight interval migration of obstructing distal left ureteral stone now at the distal most aspect of the ureterovesical junction. Increasing hydronephrosis, now moderate. Increasing left renal and perirenal edema  On-call urologist was called by the ED provider and advised that the patient to be admitted,  patient is afebrile therefore there  is no indication to transfer to a tertiary center at this time, if the patient develops fever he will need to be transferred for stenting.  Patient was started on IV ceftriaxone and received 1.5 L normal saline IVF.  Doing much better. Pain resolved. Not sure if he passed the stone. Ur cx -p. Ur cx from  clinic/  NOT seen. Blood cx -NTD  Would recommend he continue bactrim x 7 days-f/u PMD next week for ur cx report from both this hospital admit and  clinic/ UC also f/u blood cx . Also gave referral for urology f/u.        Hyperlipidemia with target LDL less than 100  Chronic stable problem  Resume home statin       Benign essential hypertension  Chronic stable problem.  Continue meds      Class 3 severe obesity due to excess calories with serious comorbidity in adult (H)  Body mass index is 38.01 kg/m .  Contribute to other comorbidities  Recommended diet and exercise  Follow-up as outpatient       Diabetes mellitus, type 2 (H)  Well-controlled with Metformin        Lab Results   Component Value Date     A1C 5.8 04/23/2021     A1C 7.1 07/03/2020     A1C 6.2 11/25/2019     A1C 6.2 06/14/2019     A1C 6.3 03/08/2019      Continue meds        Fatty liver   Noted on MRI liver on 06/18/2021 Fatty infiltration of the liver. No worrisome liver lesions  Follow-up as outpatient    Lab test negative for COVID-19 virus  This patient was evaluated during a global COVID-19 pandemic, which necessitated consideration that the  patient might be at risk for infection with the SARS-CoV-2 virus that causes COVID-19  COVID-19 PCR negative            Procedures:   No procedures performed during this admission         Allergies:      Allergies   Allergen Reactions     Lisinopril Cough             Medications Prior to Admission:     Facility-Administered Medications Prior to Admission   Medication Dose Route Frequency Provider Last Rate Last Admin     triamcinolone acetonide (KENALOG-10) injection 10 mg  10 mg Intra-Lesional Once Jahaira Garcia PA-C         Medications Prior to Admission   Medication Sig Dispense Refill Last Dose     atorvastatin (LIPITOR) 20 MG tablet Take 1 tablet (20 mg) by mouth daily 90 tablet 3 6/26/2021 at am     augmented betamethasone dipropionate (DIPROLENE-AF) 0.05 % ointment Apply sparingly to affected area twice daily as needed.  Do not apply to face. 45 g 1 6/26/2021 at am     losartan-hydrochlorothiazide (HYZAAR) 50-12.5 MG tablet Take 1 tablet by mouth daily 90 tablet 3 6/26/2021 at am     metFORMIN (GLUCOPHAGE-XR) 500 MG 24 hr tablet TAKE TWO TABLETS BY MOUTH TWICE A DAY WITH MEALS 360 tablet 3 6/26/2021 at am     azelastine (ASTELIN) 0.1 % nasal spray Spray 2 sprays into both nostrils 2 times daily as needed for rhinitis 30 mL 3 Unknown at Unknown time     azelastine (OPTIVAR) 0.05 % ophthalmic solution Apply 1 drop to eye 2 times daily 6 mL 3 Unknown at Unknown time     benzoyl peroxide 5 % external liquid Use daily as directed 226 g 11 Unknown at Unknown time     blood glucose (ACCU-CHEK GUIDE) test strip Use to test blood sugar 2 times daily 100 strip 11      cetirizine (ZYRTEC) 10 MG tablet Take 10 mg by mouth daily   Unknown at Unknown time     clindamycin (CLINDAMAX) 1 % lotion Apply to AA BID 60 mL 11 Unknown at Unknown time     clindamycin-benzoyl peroxide (BENZACLIN) 1-5 % external gel Apply twice daily to affected area on skin. 50 g 6 Unknown at Unknown time     diphenhydrAMINE (BENADRYL) 25 MG  capsule Take 25 mg by mouth every 6 hours as needed for itching or allergies   More than a month at Unknown time     doxycycline hyclate (VIBRA-TABS) 100 MG tablet Take 100 mg by mouth 2 times daily   Unknown at Unknown time     fluticasone (FLONASE) 50 MCG/ACT nasal spray Spray 2 sprays into both nostrils daily 16 g 3 Unknown at Unknown time     hydrOXYzine (ATARAX) 25 MG tablet Take 1 tablet (25 mg) by mouth 3 times daily as needed for itching 12 tablet 0 Unknown at Unknown time     ISOtretinoin (ACCUTANE PO)    Unknown at Unknown time     ondansetron (ZOFRAN-ODT) 4 MG ODT tab Take 1 tablet (4 mg) by mouth every 8 hours as needed for nausea 12 tablet 0 Unknown at Unknown time     ustekinumab (STELARA) 90 MG/ML Inject 1 mL (90 mg) Subcutaneous every 2 months 1 mL 3 Unknown at Unknown time             Discharge Medications:     Current Discharge Medication List      CONTINUE these medications which have NOT CHANGED    Details   atorvastatin (LIPITOR) 20 MG tablet Take 1 tablet (20 mg) by mouth daily  Qty: 90 tablet, Refills: 3    Associated Diagnoses: Hyperlipidemia with target LDL less than 100      augmented betamethasone dipropionate (DIPROLENE-AF) 0.05 % ointment Apply sparingly to affected area twice daily as needed.  Do not apply to face.  Qty: 45 g, Refills: 1    Associated Diagnoses: Chronic dermatitis      losartan-hydrochlorothiazide (HYZAAR) 50-12.5 MG tablet Take 1 tablet by mouth daily  Qty: 90 tablet, Refills: 3    Associated Diagnoses: Benign essential hypertension      metFORMIN (GLUCOPHAGE-XR) 500 MG 24 hr tablet TAKE TWO TABLETS BY MOUTH TWICE A DAY WITH MEALS  Qty: 360 tablet, Refills: 3    Associated Diagnoses: Class 3 severe obesity with body mass index (BMI) of 40.0 to 44.9 in adult, unspecified obesity type, unspecified whether serious comorbidity present (H); Type 2 diabetes mellitus without complication, without long-term current use of insulin (H)      sulfamethoxazole-trimethoprim (BACTRIM  DS) 800-160 MG tablet Take 1 tablet by mouth 2 times daily  Qty: 14 tablet, Refills: 0      azelastine (ASTELIN) 0.1 % nasal spray Spray 2 sprays into both nostrils 2 times daily as needed for rhinitis  Qty: 30 mL, Refills: 3    Associated Diagnoses: Chronic rhinitis      azelastine (OPTIVAR) 0.05 % ophthalmic solution Apply 1 drop to eye 2 times daily  Qty: 6 mL, Refills: 3    Associated Diagnoses: Other conjunctivitis of both eyes      benzoyl peroxide 5 % external liquid Use daily as directed  Qty: 226 g, Refills: 11    Associated Diagnoses: Hidradenitis suppurativa      blood glucose (ACCU-CHEK GUIDE) test strip Use to test blood sugar 2 times daily  Qty: 100 strip, Refills: 11    Comments: Please run to check coverage  Associated Diagnoses: Type 2 diabetes mellitus without complication, without long-term current use of insulin (H)      cetirizine (ZYRTEC) 10 MG tablet Take 10 mg by mouth daily      clindamycin (CLINDAMAX) 1 % lotion Apply to AA BID  Qty: 60 mL, Refills: 11    Associated Diagnoses: Acne keloidalis      clindamycin-benzoyl peroxide (BENZACLIN) 1-5 % external gel Apply twice daily to affected area on skin.  Qty: 50 g, Refills: 6    Associated Diagnoses: Hidradenitis suppurativa      diphenhydrAMINE (BENADRYL) 25 MG capsule Take 25 mg by mouth every 6 hours as needed for itching or allergies      doxycycline hyclate (VIBRA-TABS) 100 MG tablet Take 100 mg by mouth 2 times daily      fluticasone (FLONASE) 50 MCG/ACT nasal spray Spray 2 sprays into both nostrils daily  Qty: 16 g, Refills: 3    Associated Diagnoses: Chronic rhinitis      hydrOXYzine (ATARAX) 25 MG tablet Take 1 tablet (25 mg) by mouth 3 times daily as needed for itching  Qty: 12 tablet, Refills: 0    Associated Diagnoses: Hidradenitis axillaris      ISOtretinoin (ACCUTANE PO)       ondansetron (ZOFRAN-ODT) 4 MG ODT tab Take 1 tablet (4 mg) by mouth every 8 hours as needed for nausea  Qty: 12 tablet, Refills: 0    Associated Diagnoses:  "Hidradenitis axillaris      ustekinumab (STELARA) 90 MG/ML Inject 1 mL (90 mg) Subcutaneous every 2 months  Qty: 1 mL, Refills: 3    Associated Diagnoses: Hidradenitis suppurativa                   Consultations:   No consultations were requested during this admission            Discharge Exam:   Blood pressure 125/73, pulse 81, temperature 97.5  F (36.4  C), resp. rate 18, height 1.727 m (5' 8\"), weight 113 kg (249 lb 1.9 oz), SpO2 98 %.  GENERAL APPEARANCE: healthy, alert and no distress  EYES: conjunctiva clear, eyes grossly normal  HENT: external ears and nose normal   NECK: supple, no masses or adenopathy  RESP: lungs clear to auscultation - no rales, rhonchi or wheezes  CV: regular rate and rhythm, normal S1 S2, no S3 or S4 and no murmur, click or rub   ABDOMEN: soft, nontender, no HSM or masses and bowel sounds normal  MS: no clubbing, cyanosis; no edema  SKIN: clear without significant rashes or lesions  NEURO: Normal strength and tone, sensory exam grossly normal, mentation intact and speech normal    Unresulted Labs Ordered in the Past 30 Days of this Admission     Date and Time Order Name Status Description    6/26/2021 2055 Blood culture Preliminary     6/26/2021 2055 Blood culture Preliminary     6/26/2021 2051 Urine Culture Preliminary           Recent Results (from the past 24 hour(s))   Abd/pelvis CT - no contrast - Stone Protocol    Narrative    EXAM: CT ABDOMEN PELVIS W/O CONTRAST  LOCATION: [facility]  DATE/TIME: 6/26/2021 5:36 PM    INDICATION: Flank pain, kidney stone suspected, known left renal stone.  COMPARISON: MRI 06/18/2021, CT 06/07/2021.  TECHNIQUE: CT scan of the abdomen and pelvis was performed without IV contrast. Multiplanar reformats were obtained. Dose reduction techniques were used.  CONTRAST: None.    FINDINGS:   LOWER CHEST: Fibroatelectasis without focal infiltrate or effusion. At least moderate coronary artery calcification.    HEPATOBILIARY: Hepatic steatosis as " on prior. Gallbladder unremarkable.    PANCREAS: Normal.    SPLEEN: Normal.    ADRENAL GLANDS: Normal.    KIDNEYS/BLADDER: Obstructing 1 x 2-3 mm stone is at the distal most aspect of the ureterovesical junction protruding slightly into the bladder. There is increasing left hydroureter and hydronephrosis as well as left renal and perirenal edema. Simple and   mildly complex left renal cysts unchanged. Right kidney and urinary bladder unremarkable.    BOWEL: Normal.    LYMPH NODES: Normal.    VASCULATURE: Unremarkable.    PELVIC ORGANS: Normal.    MUSCULOSKELETAL: Degenerative disease. Potential nerve root impingement. Presumed bone islands.      Impression    IMPRESSION:   1.  Slight interval migration of obstructing distal left ureteral stone now at the distal most aspect of the ureterovesical junction. Increasing hydronephrosis, now moderate. Increasing left renal and perirenal edema.  2.  Remainder not significantly changed.              Pending Tests at Discharge:   Ur cx/ blood cx final report.         Discharge Instructions and Follow-Up:     Discharge diet: Regular   Discharge activity: Activity as tolerated   Discharge follow-up: Follow up with primary care provider in 1 week  F/u urology as scheduled           Discharge Disposition:     Discharged to home      Attestation:  I have reviewed today's vital signs, notes, medications, labs and imaging.    Time Spent on this Encounter   I, Marta Landa MD, personally saw the patient today and spent greater than 30 minutes discharging this patient.    Marta Landa MD

## 2021-06-27 NOTE — H&P
[facility]    History and Physical  Hospital Medicine       Date of Admission:  6/26/2021  Date of Service: 6/26/2021     Assessment & Plan   Sean Woodard is a 41 year old male who presents on 6/26/2021 with obstructing distal left ureter stone and UTI.    Principal Problem:    Left ureteral stone    UTI (urinary tract infection)  Patient with history of hypertension, diabetes, hyperlipidemia, obesity now presents on 6/26/2021 with left flank pain  Patient was recently diagnosed with left ureter stone on 06/07/2021 after presenting to the emergency department with left flank pain, CT scan done at that time and showed 3 mm distal UVJ stone with mild amounts of hydronephrosis.  He was given Toradol ,  Tylenol, and was discharged on [place].  Patient was instructed to follow-up with urology.He presented yesterday to the urgent care with bladder pressure, urgency, painful urination  Urinalysis was done in the urgent care and was suggestive of urinary tract infection.    Patient was prescribed Bactrim for 7 days and Pyridium and was advised to return if symptoms not improving or getting worse  After he went home he developed progressive left flank pain discomfort, therefore he presented to the ED.  In The ED labs showed mild leukocytosis at 12.3, creatinine at 1, lactate 2.8.  CT imaging showed Slight interval migration of obstructing distal left ureteral stone now at the distal most aspect of the ureterovesical junction. Increasing hydronephrosis, now moderate. Increasing left renal and perirenal edema  On-call urologist was called by the ED provider and advised that the patient to be admitted,  patient is afebrile therefore there  is no indication to transfer to a tertiary center at this time, if the patient develops fever he will need to be transferred for stenting.  Patient was started on IV ceftriaxone and received 1.5 L normal saline IVF.       Plan:  -Continue ceftriaxone  -Continue IV  fluids normal saline 150 cc/h  -Start Flomax 0.4 mg daily  -Strain urine  -Follow-up on urine culture      Hyperlipidemia with target LDL less than 100  Chronic stable problem  Resume home statin      Benign essential hypertension  Chronic stable problem.  Home medication lisinopril-hydrochlorothiazide  Hold home blood pressure medication for now due to concern of infection  Resume when appropriate    Class 3 severe obesity due to excess calories with serious comorbidity in adult (H)  Body mass index is 38.01 kg/m .  Contribute to other comorbidities  Recommended diet and exercise  Follow-up as outpatient      Diabetes mellitus, type 2 (H)  Well-controlled with Metformin  Lab Results   Component Value Date    A1C 5.8 04/23/2021    A1C 7.1 07/03/2020    A1C 6.2 11/25/2019    A1C 6.2 06/14/2019    A1C 6.3 03/08/2019     Glucose monitoring  Hyperglycemia -hyperglycemia precautions  Hold Metformin for now  Diabetic diet      Fatty liver    Noted on MRI liver on 06/18/2021 Fatty infiltration of the liver. No worrisome liver lesions  Follow-up as outpatient  Lab test negative for COVID-19 virus  This patient was evaluated during a global COVID-19 pandemic, which necessitated consideration that the patient might be at risk for infection with the SARS-CoV-2 virus that causes COVID-19  COVID-19 PCR negative     Diet:   diabetic diet   DVT Prophylaxis: Pneumatic Compression Devices  Garcia Catheter: Not present  Code Status:   Full code   Lines: IV line     Disposition Plan   Expected discharge: 2 - 3 days, recommended to prior living arrangement once adequate pain management/ tolerating PO medications, safe disposition plan/ TCU bed available and SIRS/Sepsis treated.  Entered: Jennifer Barron MD 06/26/2021, 9:42 PM     Status: Patient is appropriate for Inpatient   Jennifer Barron MD        The patient's care was discussed with the Patient.    Primary Care Physician   Susanne Alberto [phone]    History is  obtained from the patient,  and review of old records via the EMR.    History of Present Illness   Sean Woodard is a 41 year old male with past medical history of hypertension, diabetes, hyperlipidemia, obesity now presents on 6/26/2021 with left flank pain  Patient was recently diagnosed with left ureter stone on 06/07/2021 after presenting to the emergency department with left flank pain, CT scan done at that time and showed 3 mm distal UVJ stone with mild amounts of hydronephrosis.  He was given Toradol ,  Tylenol, and was discharged on [place].  Patient was instructed to follow-up with urology.  He presented yesterday to the urgent care with bladder pressure, urgency, painful urination  Urinalysis was done in the urgent care and was suggestive of urinary tract infection.  Patient was prescribed Bactrim for 7 days and Pyridium and was advised to return if symptoms not improving or getting worse  After he went home he developed progressive left flank pain discomfort, therefore he presented to the ED.  In The ED labs showed mild leukocytosis at 12.3, creatinine at 1, lactate 2.8.  CT imaging showed Slight interval migration of obstructing distal left ureteral stone now at the distal most aspect of the ureterovesical junction. Increasing hydronephrosis, now moderate. Increasing left renal and perirenal edema  On-call urologist was called by the ED provider and advised that the patient to be admitted,  patient is afebrile therefore there  is no indication to transfer to a tertiary center at this time, if the patient develops fever he will need to be transferred for stenting.  Patient was started on IV ceftriaxone and received 1.5 L normal saline IVF.   Patient was interviewed and examined at bedside, he is feeling is feeling better denies any pain.   No fever or chills, no hematuria or dysuria.  Review of Systems   The 10 point Review of Systems is negative other than noted in the HPI or here.     Past Medical History       Past Medical History:   Diagnosis Date     Diabetes (H)      Hypertension      NO ACTIVE PROBLEMS      Obese      Sleep apnea      Patient Active Problem List    Diagnosis Date Noted     UTI (urinary tract infection) 06/26/2021     Priority: High     Left ureteral stone 06/26/2021     Priority: High     Fatty liver 06/07/2021     Priority: Medium     Hidradenitis axillaris 04/08/2021     Priority: Medium     Added automatically from request for surgery [id number]       Hidradenitis suppurativa 05/07/2020     Priority: Medium     FER (obstructive sleep apnea) 01/28/2019     Priority: Medium     Diabetes mellitus, type 2 (H) 01/25/2019     Priority: Medium     PTSD (post-traumatic stress disorder) 11/08/2018     Priority: Medium     , served in [place]  Diagnosed with PTSD by Dr. Darlene Jeffery on 11/9/2010       Class 3 severe obesity due to excess calories with serious comorbidity in adult (H) 11/08/2018     Priority: Medium     Benign essential hypertension 07/28/2017     Priority: Medium     Hyperlipidemia with target LDL less than 100 08/14/2014     Priority: Medium     Diagnosis updated by automated process. Provider to review and confirm.       Health Care Home 03/28/2014     Priority: Medium     EMERGENCY CARE PLAN  March 28, 2014: No current Care Coordination follow up planned. Please refer if Care Coordination services are needed.    Presenting Problem Signs and Symptoms Treatment Plan   Questions or concerns   during clinic hours   I will call my clinic directly:  [facility]  [id number] [place] [address]  [phone].    Questions or concerns outside clinic hours   I will call the 24 hour nurse line at   [phone] or [facility]-[place].   Need to schedule an appointment   I will call the 24 hour scheduling team at [phone] or my clinic directly at [phone].    Same day treatment     I will call my clinic first, nurse line if after hours, urgent care and express care if  needed.   Clinic care coordination services (regular clinic hours)     I will call a clinic care coordinator directly:     Marlo Talley RN  Mon, Tues, Fri - [phone]  Wed, Thurs - [phone]    Anat Wall, SW:    [phone]    Or call my clinic at [phone] and ask to speak with care coordination.   Crisis Services: Behavioral or Mental Health  [facility] 24 Hour Phone Line  [phone]    [facility] 24 Hour Crisis Services  [phone]    [facility] [phone]    [facility]   [phone]       Emergency treatment -- Immediately    [facility] 911          GERD (gastroesophageal reflux disease) 05/13/2011     Priority: Medium        Past Surgical History     Past Surgical History:   Procedure Laterality Date     EXCISE HIDRADENITIS (LOCATION) Left 5/21/2021    Procedure: Sharp excisional debridement of left axillary hidradenitis.;  Surgeon: Shahnaz Garibay MD;  Location: [facility] OR     ORTHOPEDIC SURGERY  1/1/2005    RT knee arthroscopic menisectomy     Thumb Surgery  2009    Mass removal neuroma        Prior to Admission Medications   Prior to Admission Medications   Prescriptions Last Dose Informant Patient Reported? Taking?   ISOtretinoin (ACCUTANE PO)   Yes No   atorvastatin (LIPITOR) 20 MG tablet   No No   Sig: Take 1 tablet (20 mg) by mouth daily   augmented betamethasone dipropionate (DIPROLENE-AF) 0.05 % ointment   No No   Sig: Apply sparingly to affected area twice daily as needed.  Do not apply to face.   azelastine (ASTELIN) 0.1 % nasal spray   No No   Sig: Spray 2 sprays into both nostrils 2 times daily as needed for rhinitis   azelastine (OPTIVAR) 0.05 % ophthalmic solution   No No   Sig: Apply 1 drop to eye 2 times daily   benzoyl peroxide 5 % external liquid   No No   Sig: Use daily as directed   blood glucose (ACCU-CHEK GUIDE) test strip   No No   Sig: Use to test blood sugar 2 times daily   cetirizine (ZYRTEC) 10 MG tablet    Yes No   Sig: Take 10 mg by mouth daily   clindamycin (CLINDAMAX) 1 % lotion   No No   Sig: Apply to AA BID   clindamycin-benzoyl peroxide (BENZACLIN) 1-5 % external gel   No No   Sig: Apply twice daily to affected area on skin.   diphenhydrAMINE (BENADRYL) 25 MG capsule   Yes No   Sig: Take 25 mg by mouth every 6 hours as needed for itching or allergies   doxycycline hyclate (VIBRA-TABS) 100 MG tablet   Yes No   Sig: Take 100 mg by mouth 2 times daily   fluticasone (FLONASE) 50 MCG/ACT nasal spray   No No   Sig: Spray 2 sprays into both nostrils daily   hydrOXYzine (ATARAX) 25 MG tablet   No No   Sig: Take 1 tablet (25 mg) by mouth 3 times daily as needed for itching   losartan-hydrochlorothiazide (HYZAAR) 50-12.5 MG tablet   No No   Sig: Take 1 tablet by mouth daily   metFORMIN (GLUCOPHAGE-XR) 500 MG 24 hr tablet   No No   Sig: TAKE TWO TABLETS BY MOUTH TWICE A DAY WITH MEALS   ondansetron (ZOFRAN-ODT) 4 MG ODT tab   No No   Sig: Take 1 tablet (4 mg) by mouth every 8 hours as needed for nausea   ustekinumab (STELARA) 90 MG/ML   No No   Sig: Inject 1 mL (90 mg) Subcutaneous every 2 months      Facility-Administered Medications Last Administration Doses Remaining   triamcinolone acetonide (KENALOG-10) injection 10 mg None recorded 1        Allergies   Allergies   Allergen Reactions     Lisinopril Cough       Family History    Family History   Problem Relation Age of Onset     Heart Defect Father         valve replacement     Diabetes Maternal Grandmother      Cancer Maternal Grandfather         stomach     Hypertension Brother      Diabetes Sister      Cerebrovascular Disease Brother 38     Hypertension Brother      Melanoma No family hx of      Skin Cancer No family hx of        Social History   Social History     Socioeconomic History     Marital status:      Spouse name: Joycelyn Stokes     Number of children: 1     Years of education: 12+     Highest education level: Not on file   Occupational History      "Occupation: Adjucations officer     Employer: [employer]   Social Needs     Financial resource strain: Not on file     Food insecurity     Worry: Not on file     Inability: Not on file     Transportation needs     Medical: Not on file     Non-medical: Not on file   Tobacco Use     Smoking status: Former Smoker     Packs/day: 0.50     Years: 5.00     Pack years: 2.50     Types: Cigarettes     Quit date: 2005     Years since quittin.4     Smokeless tobacco: Never Used   Substance and Sexual Activity     Alcohol use: Yes     Comment: Social. 1 drink per month     Drug use: No     Sexual activity: Yes     Partners: Female   Lifestyle     Physical activity     Days per week: Not on file     Minutes per session: Not on file     Stress: Not on file   Relationships     Social connections     Talks on phone: Not on file     Gets together: Not on file     Attends Protestant service: Not on file     Active member of club or organization: Not on file     Attends meetings of clubs or organizations: Not on file     Relationship status: Not on file     Intimate partner violence     Fear of current or ex partner: Not on file     Emotionally abused: Not on file     Physically abused: Not on file     Forced sexual activity: Not on file   Other Topics Concern     Parent/sibling w/ CABG, MI or angioplasty before 65F 55M? No   Social History Narrative    2019    ENVIRONMENTAL HISTORY: The family [place] in [place] year [place]. The home is heated with a forced air. They do have central air conditioning. The patient's bedroom is furnished with carpeting in bedroom. No pets inside the house. There is no history of cockroach or mice infestation. There are no smokers in the house.  The house does not have a damp basement.        Physical Exam   BP (!) 137/95   Pulse 78   Temp 98.4  F (36.9  C) (Oral)   Resp 18   Ht 1.727 m (5' 8\")   Wt 113.4 kg (250 lb)   SpO2 100%  "  BMI 38.01 kg/m       Weight: 250 lbs 0 oz Body mass index is 38.01 kg/m .     Constitutional: Alert, oriented, cooperative, no apparent distress, appears nontoxic,obese   Eyes: Eyes are clear, pupils are reactive.  HENT: Oropharynx is clear and moist. No evidence of cranial trauma.  Lymph/Hematologic: No epitrochlear, axillary, anterior or posterior cervical, or supraclavicular lymphadenopathy is appreciated.  Cardiovascular: Regular rate and rhythm, normal S1 and S2, and no murmur noted.  Good peripheral pulses in wrists bilaterally. No lower extremity edema.  Respiratory: Clear to auscultation bilaterally.   GI: mild left CVA tenderness,  Soft, non-tender, normal bowel sounds,  Genitourinary: Deferred  Musculoskeletal: Normal muscle bulk and tone.  Skin: Warm and dry, no rashes.   Neurologic: Neck supple. Cranial nerves are grossly intact.  is symmetric.     Data   Data reviewed today:   Recent Labs   Lab 06/26/21  1710   WBC 12.3*   HGB 15.1   MCV 88         POTASSIUM 3.6   CHLORIDE 105   CO2 25   BUN 14   CR 1.00   ANIONGAP 8   DAWIT 9.7   *       Recent Results (from the past 24 hour(s))   Abd/pelvis CT - no contrast - Stone Protocol    Narrative    EXAM: CT ABDOMEN PELVIS W/O CONTRAST  LOCATION: [facility]  DATE/TIME: 6/26/2021 5:36 PM    INDICATION: Flank pain, kidney stone suspected, known left renal stone.  COMPARISON: MRI 06/18/2021, CT 06/07/2021.  TECHNIQUE: CT scan of the abdomen and pelvis was performed without IV contrast. Multiplanar reformats were obtained. Dose reduction techniques were used.  CONTRAST: None.    FINDINGS:   LOWER CHEST: Fibroatelectasis without focal infiltrate or effusion. At least moderate coronary artery calcification.    HEPATOBILIARY: Hepatic steatosis as on prior. Gallbladder unremarkable.    PANCREAS: Normal.    SPLEEN: Normal.    ADRENAL GLANDS: Normal.    KIDNEYS/BLADDER: Obstructing 1 x 2-3 mm stone is at the distal most aspect of the  ureterovesical junction protruding slightly into the bladder. There is increasing left hydroureter and hydronephrosis as well as left renal and perirenal edema. Simple and   mildly complex left renal cysts unchanged. Right kidney and urinary bladder unremarkable.    BOWEL: Normal.    LYMPH NODES: Normal.    VASCULATURE: Unremarkable.    PELVIC ORGANS: Normal.    MUSCULOSKELETAL: Degenerative disease. Potential nerve root impingement. Presumed bone islands.      Impression    IMPRESSION:   1.  Slight interval migration of obstructing distal left ureteral stone now at the distal most aspect of the ureterovesical junction. Increasing hydronephrosis, now moderate. Increasing left renal and perirenal edema.  2.  Remainder not significantly changed.         I personally reviewed the abdominal CT image(s) showing Slight interval migration of obstructing distal left ureteral stone now at the distal most aspect of the ureterovesical junction

## 2021-06-27 NOTE — PROGRESS NOTES
Reviewing chart due to high probability of Admit to Med/Surg unit. Anoop Thakkar RN on 6/26/2021 at 8:50 PM

## 2021-06-27 NOTE — PLAN OF CARE
Patient alert and orientated. Vital signs stable. On room air. Afebrile. Up independently.     Patient denied any flank pain. Patient denied any urinary symptoms. Urine strained with no sediment noted. Tolerated a regular breakfast tray. BG check completed.     Dressing change to bilateral underarm wounds completed.     Denied any lightheadedness/dizziness, difficulty breathing, shortness of breath, chest pain, nausea, or numbness/tingling.     Patient discharging home today; patient's wife is picking him up.

## 2021-06-27 NOTE — PROGRESS NOTES
[facility] DISCHARGE NOTE    Patient discharged to home at 10:31 AM via ambulation. Accompanied by spouse and staff. Discharge instructions reviewed with patient, opportunity offered to ask questions. Prescriptions - None ordered for discharge. All belongings sent with patient.    Lissy Gregg RN

## 2021-06-27 NOTE — PROGRESS NOTES
"[facility] [place]    Patient admitted to room 2317 at approximately 2140 via cart from emergency room. Patient was accompanied by transport tech.     Verbal SBAR report received from Muna prior to patient arrival.     Patient ambulated to bed independently. Patient alert and oriented X 4. The patient is not having any pain. Admission vital signs: Blood pressure (!) 153/89, pulse 83, temperature 98.2  F (36.8  C), temperature source Oral, resp. rate 18, height 1.727 m (5' 8\"), weight 113 kg (249 lb 1.9 oz), SpO2 99 %. Patient was oriented to plan of care, call light, bed controls, tv, telephone, bathroom and visiting hours.     Risk Assessment    The following safety risks were identified during admission: none. Yellow risk band applied: NO.     Skin Initial Assessment    This writer admitted this patient and completed a full skin assessment and Niranjan score in the Adult PCS flowsheet. Appropriate interventions initiated as needed.     Secondary skin check declined by patient.    Niranjan Risk Assessment  Sensory Perception: 4-->no impairment  Moisture: 4-->rarely moist  Activity: 4-->walks frequently  Mobility: 4-->no limitation  Nutrition: 4-->excellent  Friction and Shear: 3-->no apparent problem  Niranjan Score: 23  Mattress: Standard Hospital Mattress (Foam)  Bed Frame: Standard width and length    Education    Patient has a [place] to Observation order: No  Observation education completed and documented: N/A      Moisés Nazario RN    "

## 2021-06-29 ENCOUNTER — TELEPHONE (OUTPATIENT)
Dept: FAMILY MEDICINE | Facility: CLINIC | Age: 41
End: 2021-06-29

## 2021-06-29 LAB
BACTERIA SPEC CULT: ABNORMAL
BACTERIA SPEC CULT: ABNORMAL
Lab: ABNORMAL
SPECIMEN SOURCE: ABNORMAL

## 2021-06-29 NOTE — TELEPHONE ENCOUNTER
ED/UC/IP follow up phone call:    RN please call to follow up.    Number of ED visits in past 12 months = 1/1      Georgie Reid, TRUPTI Zhou

## 2021-06-30 DIAGNOSIS — E78.5 HYPERLIPIDEMIA WITH TARGET LDL LESS THAN 100: ICD-10-CM

## 2021-06-30 NOTE — TELEPHONE ENCOUNTER
Hospital/TCU/ED for chronic condition Discharge Protocol    Patient has 7/1/21 appointment with Susanne.  Fuad Worrell RN

## 2021-07-01 ENCOUNTER — OFFICE VISIT (OUTPATIENT)
Dept: FAMILY MEDICINE | Facility: CLINIC | Age: 41
End: 2021-07-01
Payer: COMMERCIAL

## 2021-07-01 VITALS
OXYGEN SATURATION: 99 % | HEART RATE: 71 BPM | BODY MASS INDEX: 37.74 KG/M2 | TEMPERATURE: 97.4 F | HEIGHT: 68 IN | DIASTOLIC BLOOD PRESSURE: 72 MMHG | WEIGHT: 249 LBS | SYSTOLIC BLOOD PRESSURE: 128 MMHG

## 2021-07-01 DIAGNOSIS — N30.01 ACUTE CYSTITIS WITH HEMATURIA: Primary | ICD-10-CM

## 2021-07-01 PROCEDURE — 99213 OFFICE O/P EST LOW 20 MIN: CPT | Performed by: PHYSICIAN ASSISTANT

## 2021-07-01 RX ORDER — CIPROFLOXACIN 500 MG/1
500 TABLET, FILM COATED ORAL 2 TIMES DAILY
Qty: 10 TABLET | Refills: 0 | Status: SHIPPED | OUTPATIENT
Start: 2021-07-01 | End: 2021-07-06

## 2021-07-01 RX ORDER — PHENAZOPYRIDINE HYDROCHLORIDE 200 MG/1
200 TABLET, FILM COATED ORAL 3 TIMES DAILY PRN
Qty: 20 TABLET | Refills: 0 | Status: SHIPPED | OUTPATIENT
Start: 2021-07-01 | End: 2022-09-16

## 2021-07-01 ASSESSMENT — MIFFLIN-ST. JEOR: SCORE: 2013.96

## 2021-07-01 ASSESSMENT — PAIN SCALES - GENERAL: PAINLEVEL: NO PAIN (0)

## 2021-07-01 NOTE — PROGRESS NOTES
"    Assessment & Plan     (N30.01) Acute cystitis with hematuria  (primary encounter diagnosis)  Comment: patient improved clinically. Given culture shows resistance to bactrim will change to cipro and have him complete 5 days. Follow up if any changes  Plan: ciprofloxacin (CIPRO) 500 MG tablet,         phenazopyridine (PYRIDIUM) 200 MG tablet          Does have urology follow up in 2 weeks      BMI:   Estimated body mass index is 37.86 kg/m  as calculated from the following:    Height as of this encounter: 1.727 m (5' 8\").    Weight as of this encounter: 112.9 kg (249 lb).     Return in about 1 week (around 7/8/2021) for If not improving or worsening; 2 weeks with specialist.    Susanne Alberto PA-C  [facility] VADIM Estrada is a 41 year old who presents for the following health issues     HPI       Hospital Follow-up Visit:    Hospital/Nursing Home/IP Rehab Facility: [facility]  Date of Admission: 6/26/2021  Date of Discharge: 6/27/2021  Reason(s) for Admission: Left Ureteral Stone       -He said he is doing okay. He is not sure if the kidney stone has passed.     Was your hospitalization related to COVID-19? No   Problems taking medications regularly:  None  Medication changes since discharge: None  Problems adhering to non-medication therapy:  None    Summary of hospitalization:  [facility] discharge summary reviewed  Diagnostic Tests/Treatments reviewed.  Follow up needed: none  Other Healthcare Providers Involved in Patient s Care:         None  Update since discharge: improved.   Post Discharge Medication Reconciliation: discharge medications reconciled and changed, per note/orders.  Plan of care communicated with patient            Feeling better  Does not know if he has passed the stone - hasn't seen anything  Not having any pain   Appetite normal, no nausea      Review of Systems   Remainder of ROS obtained and found to be negative other " "than that which was documented above        Objective    /72   Pulse 71   Temp 97.4  F (36.3  C) (Tympanic)   Ht 1.727 m (5' 8\")   Wt 112.9 kg (249 lb)   SpO2 99%   BMI 37.86 kg/m    Body mass index is 37.86 kg/m .  Physical Exam   GENERAL: healthy, alert and no distress  CV: regular rates and rhythm, normal S1 S2, no S3 or S4 and no murmur, click or rub  ABDOMEN: soft, nontender and bowel sounds normal    Diagnostic Tests:  None today  Reviewed labs in Epic - specifically cultures  Blood cultures - NGTD after 4 days  Urine Culture - Enterococcus and proteus, bactrim resistant           "

## 2021-07-02 RX ORDER — ATORVASTATIN CALCIUM 20 MG/1
TABLET, FILM COATED ORAL
Qty: 90 TABLET | Refills: 3 | Status: SHIPPED | OUTPATIENT
Start: 2021-07-02 | End: 2022-06-28

## 2021-07-03 LAB
BACTERIA SPEC CULT: NO GROWTH
BACTERIA SPEC CULT: NO GROWTH
Lab: NORMAL
SPECIMEN SOURCE: NORMAL
SPECIMEN SOURCE: NORMAL

## 2021-07-07 ENCOUNTER — VIRTUAL VISIT (OUTPATIENT)
Dept: DERMATOLOGY | Facility: CLINIC | Age: 41
End: 2021-07-07
Payer: COMMERCIAL

## 2021-07-07 DIAGNOSIS — Z79.899 ENCOUNTER FOR LONG-TERM (CURRENT) USE OF HIGH-RISK MEDICATION: ICD-10-CM

## 2021-07-07 DIAGNOSIS — L73.2 HIDRADENITIS SUPPURATIVA: Primary | ICD-10-CM

## 2021-07-07 PROCEDURE — 99214 OFFICE O/P EST MOD 30 MIN: CPT | Mod: GQ | Performed by: DERMATOLOGY

## 2021-07-07 NOTE — PROGRESS NOTES
[facility] Dermatology Note  Encounter Date: Jul 7, 2021  Store-and-Forward and Telephone ([phone] ). Location of teledermatologist: [facility] [place].  Start time: 8:12. End time: 8:25.    Dermatology Problem List:  1. Hidradenitis suppurativa - Hamilton stage III involving axillae > neck, groin              - current treatment: restarting adalimumab, topical clindamycin/benzoyl peroxide, intralesional triamcinolone                   - past treatment: doxycycline, other oral antibiotics, isotretinoin, adalimumab, infliximab (neuropathy), ustekinumab    ____________________________________________    Assessment & Plan:     1. Hidradenitis suppurativa: overall left axilla is doing better after excision; surgical intervention planned for right axilla (which remains quite active) and groin area in September. Hasn't been able to get a refill of ustekinumab through insurance, despite being on this for a year with stable disease. We discussed another appeal to insurance for ustekinumab, vs going back to adalimumab. Long term control of inflammation is critical to maximize his surgical outcomes. We'll switch to adalimumab given better likelihood of approval by insurance. He can continue topicals.  - start adalimumab 160 mg on day 1, 80 mg on day 15, then 40 mg weekly starting day 29  - continue clindamycin/BPO  - surgical intervention in September  - check quantiferon    Procedures Performed:    None    Follow-up: 3 months    Staff:     Derrick Avina MD   of Dermatology  Department of Dermatology  [facility]    ____________________________________________    CC: Derm Problem (Sean, is here for a follow up on HS, he states things are the same. no other concerns )    HPI:  Mr. Sean Woodard is a(n) 41 year old male who presents today as a return patient for hidradenitis suppurativa    Hidradenitis suppurativa -  last ustekinumab was mid May  - overall very active - R axilla  - On September 3 - second surgical intervention on R side  - left axilla healing well following surgical intervention  - more activity in groin area - planning surgical intervention in this area as well on Sept 3 - two localized areas - 1 on left, 1 on right  - ustekinumab similar in effectiveness to adalimumab  - using clindamycin/BPO topically    Patient is otherwise feeling well, without additional skin concerns.    Labs Reviewed:  2/2020 quantiferon negative    Physical Exam:  Vitals: There were no vitals taken for this visit.  SKIN: video images were reviewed; image quality and interpretability: acceptable. Image date: n/a.  - no active lesions on the face or anterior neck  - No other lesions of concern on areas examined.     Medications:  Current Outpatient Medications   Medication     atorvastatin (LIPITOR) 20 MG tablet     augmented betamethasone dipropionate (DIPROLENE-AF) 0.05 % ointment     azelastine (ASTELIN) 0.1 % nasal spray     azelastine (OPTIVAR) 0.05 % ophthalmic solution     benzoyl peroxide 5 % external liquid     blood glucose (ACCU-CHEK GUIDE) test strip     cetirizine (ZYRTEC) 10 MG tablet     clindamycin (CLINDAMAX) 1 % lotion     clindamycin-benzoyl peroxide (BENZACLIN) 1-5 % external gel     diphenhydrAMINE (BENADRYL) 25 MG capsule     doxycycline hyclate (VIBRA-TABS) 100 MG tablet     fluticasone (FLONASE) 50 MCG/ACT nasal spray     hydrOXYzine (ATARAX) 25 MG tablet     ISOtretinoin (ACCUTANE PO)     losartan-hydrochlorothiazide (HYZAAR) 50-12.5 MG tablet     metFORMIN (GLUCOPHAGE-XR) 500 MG 24 hr tablet     phenazopyridine (PYRIDIUM) 200 MG tablet     ustekinumab (STELARA) 90 MG/ML     Current Facility-Administered Medications   Medication     triamcinolone acetonide (KENALOG-10) injection 10 mg      Past Medical/Surgical History:   Patient Active Problem List   Diagnosis     Health Care Home     Hyperlipidemia with target  LDL less than 100     Benign essential hypertension     PTSD (post-traumatic stress disorder)     Class 3 severe obesity due to excess calories with serious comorbidity in adult (H)     Diabetes mellitus, type 2 (H)     FER (obstructive sleep apnea)     Hidradenitis suppurativa     Hidradenitis axillaris     Fatty liver     UTI (urinary tract infection)     Left ureteral stone     Lab test negative for COVID-19 virus     Past Medical History:   Diagnosis Date     Diabetes (H)      Hypertension      NO ACTIVE PROBLEMS      Obese      Sleep apnea        CC Susanne Alberto PA-C  [id number] [id number] on close of this encounter.

## 2021-07-07 NOTE — NURSING NOTE
Chief Complaint   Patient presents with     Derm Problem     Sean, is here for a follow up on HS, he states things are the same. no other concerns     Barrera Castillo EMT

## 2021-07-07 NOTE — LETTER
7/7/2021       RE: Sean Woodard  [address] [facility] [address]     Dear Colleague,    Thank you for referring your patient, Sean Woodard, to the [facility]ERMATOLOGY [facility]IN[facility] [place] at [facility]. Please see a copy of my visit note below.    [facility] Dermatology Note  Encounter Date: Jul 7, 2021  Store-and-Forward and Telephone ([phone] ). Location of teledermatologist: [facility]MATOLOG[facility] [place].  Start time: 8:12. End time: 8:25.    Dermatology Problem List:  1. Hidradenitis suppurativa - Hamilton stage III involving axillae > neck, groin              - current treatment: restarting adalimumab, topical clindamycin/benzoyl peroxide, intralesional triamcinolone                   - past treatment: doxycycline, other oral antibiotics, isotretinoin, adalimumab, infliximab (neuropathy), ustekinumab    ____________________________________________    Assessment & Plan:     1. Hidradenitis suppurativa: overall left axilla is doing better after excision; surgical intervention planned for right axilla (which remains quite active) and groin area in September. Hasn't been able to get a refill of ustekinumab through insurance, despite being on this for a year with stable disease. We discussed another appeal to insurance for ustekinumab, vs going back to adalimumab. Long term control of inflammation is critical to maximize his surgical outcomes. We'll switch to adalimumab given better likelihood of approval by insurance. He can continue topicals.  - start adalimumab 160 mg on day 1, 80 mg on day 15, then 40 mg weekly starting day 29  - continue clindamycin/BPO  - surgical intervention in September  - check quantiferon    Procedures Performed:    None    Follow-up: 3 months    Staff:     Derrick Avina MD   of Dermatology  Department of Dermatology  [facility]  Medicine    ____________________________________________    CC: Derm Problem (Sean, is here for a follow up on HS, he states things are the same. no other concerns )    HPI:  Mr. Sean Woodard is a(n) 41 year old male who presents today as a return patient for hidradenitis suppurativa    Hidradenitis suppurativa - last ustekinumab was mid May  - overall very active - R axilla  - On September 3 - second surgical intervention on R side  - left axilla healing well following surgical intervention  - more activity in groin area - planning surgical intervention in this area as well on Sept 3 - two localized areas - 1 on left, 1 on right  - ustekinumab similar in effectiveness to adalimumab  - using clindamycin/BPO topically    Patient is otherwise feeling well, without additional skin concerns.    Labs Reviewed:  2/2020 quantiferon negative    Physical Exam:  Vitals: There were no vitals taken for this visit.  SKIN: video images were reviewed; image quality and interpretability: acceptable. Image date: n/a.  - no active lesions on the face or anterior neck  - No other lesions of concern on areas examined.     Medications:  Current Outpatient Medications   Medication     atorvastatin (LIPITOR) 20 MG tablet     augmented betamethasone dipropionate (DIPROLENE-AF) 0.05 % ointment     azelastine (ASTELIN) 0.1 % nasal spray     azelastine (OPTIVAR) 0.05 % ophthalmic solution     benzoyl peroxide 5 % external liquid     blood glucose (ACCU-CHEK GUIDE) test strip     cetirizine (ZYRTEC) 10 MG tablet     clindamycin (CLINDAMAX) 1 % lotion     clindamycin-benzoyl peroxide (BENZACLIN) 1-5 % external gel     diphenhydrAMINE (BENADRYL) 25 MG capsule     doxycycline hyclate (VIBRA-TABS) 100 MG tablet     fluticasone (FLONASE) 50 MCG/ACT nasal spray     hydrOXYzine (ATARAX) 25 MG tablet     ISOtretinoin (ACCUTANE PO)     losartan-hydrochlorothiazide (HYZAAR) 50-12.5 MG tablet     metFORMIN (GLUCOPHAGE-XR) 500 MG 24 hr tablet      phenazopyridine (PYRIDIUM) 200 MG tablet     ustekinumab (STELARA) 90 MG/ML     Current Facility-Administered Medications   Medication     triamcinolone acetonide (KENALOG-10) injection 10 mg      Past Medical/Surgical History:   Patient Active Problem List   Diagnosis     Health Care Home     Hyperlipidemia with target LDL less than 100     Benign essential hypertension     PTSD (post-traumatic stress disorder)     Class 3 severe obesity due to excess calories with serious comorbidity in adult (H)     Diabetes mellitus, type 2 (H)     FER (obstructive sleep apnea)     Hidradenitis suppurativa     Hidradenitis axillaris     Fatty liver     UTI (urinary tract infection)     Left ureteral stone     Lab test negative for COVID-19 virus     Past Medical History:   Diagnosis Date     Diabetes (H)      Hypertension      NO ACTIVE PROBLEMS      Obese      Sleep apnea        CC Susanne Alberto PA-C  [address] [id number] on close of this encounter.

## 2021-07-09 ENCOUNTER — TELEPHONE (OUTPATIENT)
Dept: DERMATOLOGY | Facility: CLINIC | Age: 41
End: 2021-07-09

## 2021-07-09 NOTE — TELEPHONE ENCOUNTER
PA Initiation    Medication: Humira  Insurance Company: [facility] FEDERAL - Phone [phone] Fax [phone]  Pharm[facility] Filling the Rx[facility] [place] [place] [facility] [id number]  Filling Pharmacy Phone:    Filling Pharmacy Fax:    Start Date: 7/9/2021    Faxed form to [facility] FEP.

## 2021-07-13 ENCOUNTER — LAB (OUTPATIENT)
Dept: LAB | Facility: CLINIC | Age: 41
End: 2021-07-13
Payer: COMMERCIAL

## 2021-07-13 DIAGNOSIS — Z79.899 ENCOUNTER FOR LONG-TERM (CURRENT) USE OF HIGH-RISK MEDICATION: ICD-10-CM

## 2021-07-13 PROCEDURE — 86481 TB AG RESPONSE T-CELL SUSP: CPT

## 2021-07-13 PROCEDURE — 36415 COLL VENOUS BLD VENIPUNCTURE: CPT

## 2021-07-13 NOTE — TELEPHONE ENCOUNTER
Prior Authorization Approval    Authorization Effective Date: 6/10/2021  Authorization Expiration Date: 7/10/2022  Medication: Humira  Approved Dose/Quantity:   Reference #:     Insurance Company: [employer] FEDERAL - Phone [phone] Fax [phone]  Expected CoPay:       CoPay Card Available:      Foundation Assistance Needed:    Which Pharmacy is filling the prescription (Not needed for infusion/clinic administered[facility] - [place], [place] - [id number]  Pharmacy Notified: Yes  Patient Notified: Yes

## 2021-07-14 ENCOUNTER — VIRTUAL VISIT (OUTPATIENT)
Dept: UROLOGY | Facility: CLINIC | Age: 41
End: 2021-07-14
Attending: INTERNAL MEDICINE
Payer: COMMERCIAL

## 2021-07-14 VITALS — HEIGHT: 66 IN | WEIGHT: 250 LBS | BODY MASS INDEX: 40.18 KG/M2

## 2021-07-14 DIAGNOSIS — N20.1 LEFT URETERAL STONE: ICD-10-CM

## 2021-07-14 PROCEDURE — 99203 OFFICE O/P NEW LOW 30 MIN: CPT | Mod: 95 | Performed by: PHYSICIAN ASSISTANT

## 2021-07-14 ASSESSMENT — MIFFLIN-ST. JEOR: SCORE: 1986.74

## 2021-07-14 ASSESSMENT — PAIN SCALES - GENERAL: PAINLEVEL: NO PAIN (0)

## 2021-07-14 NOTE — LETTER
7/14/2021       RE: Sean Woodard  [address] [place] [address]     Dear Colleague,    Thank you for referring your patient, Sean Woodard, to the [facility]OLOG[facility] [place] at [facility]. Please see a copy of my visit note below.    *SEND LINK TO CELL PHONE*    Sean is a 41 year old who is being evaluated via a billable video visit.      How would you like to obtain your AVS? MyChart  If the video visit is dropped, the invitation should be resent by: Text to cell phone: [phone]  Will anyone else be joining your video visit? No      Video Start Time: 2:39 PM  Video-Visit Details  Unable to make connection work. Phone duration: 12 min    CC: ureteral stone.    HPI: Sean Woodard is a pleasant 41 year old male with past medical history of hypertension, diabetes, hyperlipidemia, obesity now presents on 6/26/2021 with left flank pain  Patient was recently diagnosed with left ureter stone on 06/07/2021 after presenting to the emergency department with left flank pain, CT scan done at that time and showed 3 mm distal UVJ stone with mild amounts of hydronephrosis.  He was given Toradol ,  Tylenol, and was discharged on [place].  Patient was instructed to follow-up with urology.  He presented yesterday to the urgent care with bladder pressure, urgency, painful urination  Urinalysis was done in the urgent care and was suggestive of urinary tract infection.  Patient was prescribed Bactrim for 7 days and Pyridium and was advised to return if symptoms not improving or getting worse.  After he went home he developed progressive left flank pain discomfort, therefore he presented to the ED.  In The ED labs showed mild leukocytosis at 12.3, creatinine at 1, lactate 2.8.  CT imaging showed Slight interval migration of obstructing distal left ureteral stone now at the distal most aspect of the ureterovesical junction. Increasing hydronephrosis, now  moderate. Increasing left renal and perirenal edema. Patient was started on IV ceftriaxone and discharged home on Bactrim.     Currently, the patient reports feeling well since last hospital visit. The patient has not been straining their urine with no captured stones thus far. Denies gross hematuria, dysuria, fevers, chills, N/V. No prior history of kidney stones.     No other stones on CT.      RECENT IMAGING:  EXAM: CT ABDOMEN PELVIS W/O CONTRAST  LOCATION: [facility]  DATE/TIME: 6/26/2021 5:36 PM     INDICATION: Flank pain, kidney stone suspected, known left renal stone.  COMPARISON: MRI 06/18/2021, CT 06/07/2021.  TECHNIQUE: CT scan of the abdomen and pelvis was performed without IV contrast. Multiplanar reformats were obtained. Dose reduction techniques were used.  CONTRAST: None.     FINDINGS:   LOWER CHEST: Fibroatelectasis without focal infiltrate or effusion. At least moderate coronary artery calcification.     HEPATOBILIARY: Hepatic steatosis as on prior. Gallbladder unremarkable.     PANCREAS: Normal.     SPLEEN: Normal.     ADRENAL GLANDS: Normal.     KIDNEYS/BLADDER: Obstructing 1 x 2-3 mm stone is at the distal most aspect of the ureterovesical junction protruding slightly into the bladder. There is increasing left hydroureter and hydronephrosis as well as left renal and perirenal edema. Simple and   mildly complex left renal cysts unchanged. Right kidney and urinary bladder unremarkable.     BOWEL: Normal.     LYMPH NODES: Normal.     VASCULATURE: Unremarkable.     PELVIC ORGANS: Normal.     MUSCULOSKELETAL: Degenerative disease. Potential nerve root impingement. Presumed bone islands.                                                                      IMPRESSION:   1.  Slight interval migration of obstructing distal left ureteral stone now at the distal most aspect of the ureterovesical junction. Increasing hydronephrosis, now moderate. Increasing left renal and perirenal edema.  2.   Remainder not significantly changed.       Past Medical History:   Diagnosis Date     Diabetes (H)      Hypertension      NO ACTIVE PROBLEMS      Obese      Sleep apnea        Past Surgical History:   Procedure Laterality Date     EXCISE HIDRADENITIS (LOCATION) Left 5/21/2021    Procedure: Sharp excisional debridement of left axillary hidradenitis.;  Surgeon: Shahnaz Garibay MD;  Location: [facility] OR     ORTHOPEDIC SURGERY  1/1/2005    RT knee arthroscopic menisectomy     Thumb Surgery  2009    Mass removal neuroma       Current Outpatient Medications   Medication Sig Dispense Refill     adalimumab (HUMIRA *CF*) 40 MG/0.4ML pen kit Inject 0.4 mLs (40 mg) Subcutaneous every 7 days 1.6 mL 4     adalimumab (HUMIRA *CF*) 80 MG/0.8ML pen kit Administer 160 mg on day 1, then 80 mg 2 weeks later (day 15) 2.4 mL 0     atorvastatin (LIPITOR) 20 MG tablet TAKE ONE TABLET BY MOUTH ONCE DAILY 90 tablet 3     augmented betamethasone dipropionate (DIPROLENE-AF) 0.05 % ointment Apply sparingly to affected area twice daily as needed.  Do not apply to face. 45 g 1     azelastine (ASTELIN) 0.1 % nasal spray Spray 2 sprays into both nostrils 2 times daily as needed for rhinitis 30 mL 3     azelastine (OPTIVAR) 0.05 % ophthalmic solution Apply 1 drop to eye 2 times daily 6 mL 3     benzoyl peroxide 5 % external liquid Use daily as directed 226 g 11     blood glucose (ACCU-CHEK GUIDE) test strip Use to test blood sugar 2 times daily 100 strip 11     cetirizine (ZYRTEC) 10 MG tablet Take 10 mg by mouth daily       clindamycin (CLINDAMAX) 1 % lotion Apply to AA BID 60 mL 11     clindamycin-benzoyl peroxide (BENZACLIN) 1-5 % external gel Apply twice daily to affected area on skin. 50 g 6     diphenhydrAMINE (BENADRYL) 25 MG capsule Take 25 mg by mouth every 6 hours as needed for itching or allergies       doxycycline hyclate (VIBRA-TABS) 100 MG tablet Take 100 mg by mouth 2 times daily       fluticasone (FLONASE) 50 MCG/ACT nasal spray  Spray 2 sprays into both nostrils daily 16 g 3     hydrOXYzine (ATARAX) 25 MG tablet Take 1 tablet (25 mg) by mouth 3 times daily as needed for itching 12 tablet 0     ISOtretinoin (ACCUTANE PO)        losartan-hydrochlorothiazide (HYZAAR) 50-12.5 MG tablet Take 1 tablet by mouth daily 90 tablet 3     metFORMIN (GLUCOPHAGE-XR) 500 MG 24 hr tablet TAKE TWO TABLETS BY MOUTH TWICE A DAY WITH MEALS 360 tablet 3     phenazopyridine (PYRIDIUM) 200 MG tablet Take 1 tablet (200 mg) by mouth 3 times daily as needed for irritation 20 tablet 0     ustekinumab (STELARA) 90 MG/ML Inject 1 mL (90 mg) Subcutaneous every 2 months 1 mL 3       Allergies   Allergen Reactions     Lisinopril Cough       FAMILY HISTORY: There is no family h/o  malignancy.  There is no family h/o urolithiasis.     Social History     Socioeconomic History     Marital status:      Spouse name: Joycelyn Stokes     Number of children: 1     Years of education: 12+     Highest education level: Not on file   Occupational History     Occupation: Adjucations officer     Employer: [employer]   Tobacco Use     Smoking status: Former Smoker     Packs/day: 0.50     Years: 5.00     Pack years: 2.50     Types: Cigarettes     Quit date: 2005     Years since quittin.5     Smokeless tobacco: Never Used   Substance and Sexual Activity     Alcohol use: Yes     Comment: Social. 1 drink per month     Drug use: No     Sexual activity: Yes     Partners: Female   Other Topics Concern     Parent/sibling w/ CABG, MI or angioplasty before 65F 55M? No   Social History Narrative    2019    ENVIRONMENTAL HISTORY: The family [place] in [place] The home is heated with a forced air. They do have central air conditioning. The patient's bedroom is furnished with carpeting in bedroom. No pets inside the house. There is no history of cockroach or mice infestation. There are no smokers in the house.  The  house does not have a damp basement.      Social Determinants of Health     Financial Resource Strain:      Difficulty of Paying Living Expenses:    Food Insecurity:      Worried About Running Out of Food in the Last Year:      Ran Out of Food in the Last Year:    Transportation Needs:      Lack of Transportation (Medical):      Lack of Transportation (Non-Medical):    Physical Activity:      Days of Exercise per Week:      Minutes of Exercise per Session:    Stress:      Feeling of Stress :    Social Connections:      Frequency of Communication with Friends and Family:      Frequency of Social Gatherings with Friends and Family:      Attends Confucianist Services:      Active Member of Clubs or Organizations:      Attends Club or Organization Meetings:      Marital Status:    Intimate Partner Violence:      Fear of Current or Ex-Partner:      Emotionally Abused:      Physically Abused:      Sexually Abused:        ROS: A comprehensive 14 point ROS was obtained and otherwise negative except for that outlined above in the HPI.    GENERAL PHYSICAL EXAM:     PSYCH: NAD  EYES: EOMI  MOUTH: MMM  NEURO: AAO x3      ASSESSMENT AND PLAN: 41 year old male with a recent left-sided calculus with associated  Hydronephrosis.  -CT to eval for passage   - Warning signs discussed including fevers, chills, N/V, gross hematuria, severe pain that is refractory to medications which should prompt more urgent evaluation. Patient understands to proceed to the ER should these warning signs occur outside of clinic hours.    During counseling for this visit, we covered the natural history of kidney stones, the risk of progression to symptomatic pain/infection, and the possibility of renal failure/kidney damage.  We covered treatment options including medical expulsive therapy, ureteroscopy/laser/stent.    Standard recommendations on kidney stone prevention were provided.  Neph referral for medical management of prevention.       Agatha Fontenot,  ALEJANDRA  [facility] Urology      22 minutes spent on the date of the encounter doing chart review, review of outside records, review of test results, interpretation of tests, patient visit and documentation

## 2021-07-14 NOTE — PATIENT INSTRUCTIONS
Please make an appt with Nephrology for prevention of kidney stones.       Standard recommendations on kidney stone prevention:  -These include maintaining fluid intake of 3 liters per day or more with a goal of making 2 or more liters of urine per day.  If alcoholic or caffeinated beverages are consumed, you need to drink water along with these beverages to maintain hydration.    -A few ounces of lemon juice concentrate a day diluted in water can help prevent stones (citrate is a stone inhibitor).    -Sodium influences calcium excretion in the urine. Limit intake of red meat, salt, and salty processed foods.    -Uric acid-high levels in the blood can lead to kidney stones and gout, especially if the urine pH is low.  Reduce protein intake, especially red meats.  -Weight loss, if overweight, can reduce the recurrence of kidney stones.  -Diabetes-if diabetic, you are at a greater risk of having kidney stones.  -Maintain calcium intake in diet through continued consumption of dairy products etc.    -Limit foods that are high in oxalate such as spinach, sweet potatoes, dark chocolate, soy products, and some nuts such as peanuts.   -Medications that can increase risk of kidney stones: Ephedrine, Guaifenesin, Triamterene, Lasix, Diamox, Topamax, Zonegran, laxatives.     -Additional/different recommendations pending any stone analysis.

## 2021-07-14 NOTE — PROGRESS NOTES
*SEND LINK TO CELL PHONE*    Sean is a 41 year old who is being evaluated via a billable video visit.      How would you like to obtain your AVS? [employer]  If the video visit is dropped, the invitation should be resent by: Text to cell phone: [phone]  Will anyone else be joining your video visit? No      Video Start Time: 2:39 PM  Video-Visit Details  Unable to make connection work. Phone duration: 12 min    CC: ureteral stone.    HPI: Sean Woodard is a pleasant 41 year old male with past medical history of hypertension, diabetes, hyperlipidemia, obesity now presents on 6/26/2021 with left flank pain  Patient was recently diagnosed with left ureter stone on 06/07/2021 after presenting to the emergency department with left flank pain, CT scan done at that time and showed 3 mm distal UVJ stone with mild amounts of hydronephrosis.  He was given Toradol ,  Tylenol, and was discharged on [place].  Patient was instructed to follow-up with urology.  He presented yesterday to the urgent care with bladder pressure, urgency, painful urination  Urinalysis was done in the urgent care and was suggestive of urinary tract infection.  Patient was prescribed Bactrim for 7 days and Pyridium and was advised to return if symptoms not improving or getting worse.  After he went home he developed progressive left flank pain discomfort, therefore he presented to the ED.  In The ED labs showed mild leukocytosis at 12.3, creatinine at 1, lactate 2.8.  CT imaging showed Slight interval migration of obstructing distal left ureteral stone now at the distal most aspect of the ureterovesical junction. Increasing hydronephrosis, now moderate. Increasing left renal and perirenal edema. Patient was started on IV ceftriaxone and discharged home on Bactrim.     Currently, the patient reports feeling well since last hospital visit. The patient has not been straining their urine with no captured stones thus far. Denies gross hematuria, dysuria,  fevers, chills, N/V. No prior history of kidney stones.     No other stones on CT.      RECENT IMAGING:  EXAM: CT ABDOMEN PELVIS W/O CONTRAST  LOCATION: [facility]  DATE/TIME: 6/26/2021 5:36 PM     INDICATION: Flank pain, kidney stone suspected, known left renal stone.  COMPARISON: MRI 06/18/2021, CT 06/07/2021.  TECHNIQUE: CT scan of the abdomen and pelvis was performed without IV contrast. Multiplanar reformats were obtained. Dose reduction techniques were used.  CONTRAST: None.     FINDINGS:   LOWER CHEST: Fibroatelectasis without focal infiltrate or effusion. At least moderate coronary artery calcification.     HEPATOBILIARY: Hepatic steatosis as on prior. Gallbladder unremarkable.     PANCREAS: Normal.     SPLEEN: Normal.     ADRENAL GLANDS: Normal.     KIDNEYS/BLADDER: Obstructing 1 x 2-3 mm stone is at the distal most aspect of the ureterovesical junction protruding slightly into the bladder. There is increasing left hydroureter and hydronephrosis as well as left renal and perirenal edema. Simple and   mildly complex left renal cysts unchanged. Right kidney and urinary bladder unremarkable.     BOWEL: Normal.     LYMPH NODES: Normal.     VASCULATURE: Unremarkable.     PELVIC ORGANS: Normal.     MUSCULOSKELETAL: Degenerative disease. Potential nerve root impingement. Presumed bone islands.                                                                      IMPRESSION:   1.  Slight interval migration of obstructing distal left ureteral stone now at the distal most aspect of the ureterovesical junction. Increasing hydronephrosis, now moderate. Increasing left renal and perirenal edema.  2.  Remainder not significantly changed.       Past Medical History:   Diagnosis Date     Diabetes (H)      Hypertension      NO ACTIVE PROBLEMS      Obese      Sleep apnea        Past Surgical History:   Procedure Laterality Date     EXCISE HIDRADENITIS (LOCATION) Left 5/21/2021    Procedure: Sharp excisional  debridement of left axillary hidradenitis.;  Surgeon: Shahnaz Garibay MD;  Location: [facility] OR     ORTHOPEDIC SURGERY  1/1/2005    RT knee arthroscopic menisectomy     Thumb Surgery  2009    Mass removal neuroma       Current Outpatient Medications   Medication Sig Dispense Refill     adalimumab (HUMIRA *CF*) 40 MG/0.4ML pen kit Inject 0.4 mLs (40 mg) Subcutaneous every 7 days 1.6 mL 4     adalimumab (HUMIRA *CF*) 80 MG/0.8ML pen kit Administer 160 mg on day 1, then 80 mg 2 weeks later (day 15) 2.4 mL 0     atorvastatin (LIPITOR) 20 MG tablet TAKE ONE TABLET BY MOUTH ONCE DAILY 90 tablet 3     augmented betamethasone dipropionate (DIPROLENE-AF) 0.05 % ointment Apply sparingly to affected area twice daily as needed.  Do not apply to face. 45 g 1     azelastine (ASTELIN) 0.1 % nasal spray Spray 2 sprays into both nostrils 2 times daily as needed for rhinitis 30 mL 3     azelastine (OPTIVAR) 0.05 % ophthalmic solution Apply 1 drop to eye 2 times daily 6 mL 3     benzoyl peroxide 5 % external liquid Use daily as directed 226 g 11     blood glucose (ACCU-CHEK GUIDE) test strip Use to test blood sugar 2 times daily 100 strip 11     cetirizine (ZYRTEC) 10 MG tablet Take 10 mg by mouth daily       clindamycin (CLINDAMAX) 1 % lotion Apply to AA BID 60 mL 11     clindamycin-benzoyl peroxide (BENZACLIN) 1-5 % external gel Apply twice daily to affected area on skin. 50 g 6     diphenhydrAMINE (BENADRYL) 25 MG capsule Take 25 mg by mouth every 6 hours as needed for itching or allergies       doxycycline hyclate (VIBRA-TABS) 100 MG tablet Take 100 mg by mouth 2 times daily       fluticasone (FLONASE) 50 MCG/ACT nasal spray Spray 2 sprays into both nostrils daily 16 g 3     hydrOXYzine (ATARAX) 25 MG tablet Take 1 tablet (25 mg) by mouth 3 times daily as needed for itching 12 tablet 0     ISOtretinoin (ACCUTANE PO)        losartan-hydrochlorothiazide (HYZAAR) 50-12.5 MG tablet Take 1 tablet by mouth daily 90 tablet 3      metFORMIN (GLUCOPHAGE-XR) 500 MG 24 hr tablet TAKE TWO TABLETS BY MOUTH TWICE A DAY WITH MEALS 360 tablet 3     phenazopyridine (PYRIDIUM) 200 MG tablet Take 1 tablet (200 mg) by mouth 3 times daily as needed for irritation 20 tablet 0     ustekinumab (STELARA) 90 MG/ML Inject 1 mL (90 mg) Subcutaneous every 2 months 1 mL 3       Allergies   Allergen Reactions     Lisinopril Cough       FAMILY HISTORY: There is no family h/o  malignancy.  There is no family h/o urolithiasis.     Social History     Socioeconomic History     Marital status:      Spouse name: Joycelyn Stokes     Number of children: 1     Years of education: 12+     Highest education level: Not on file   Occupational History     Occupation: Adjucations officer     Employer: [employer]   Tobacco Use     Smoking status: Former Smoker     Packs/day: 0.50     Years: 5.00     Pack years: 2.50     Types: Cigarettes     Quit date: 2005     Years since quittin.5     Smokeless tobacco: Never Used   Substance and Sexual Activity     Alcohol use: Yes     Comment: Social. 1 drink per month     Drug use: No     Sexual activity: Yes     Partners: Female   Other Topics Concern     Parent/sibling w/ CABG, MI or angioplasty before 65F 55M? No   Social History Narrative    2019    ENVIRONMENTAL HISTORY: The family [place] in a [place] year [place] The home is heated with a forced air. They do have central air conditioning. The patient's bedroom is furnished with carpeting in bedroom. No pets inside the house. There is no history of cockroach or mice infestation. There are no smokers in the house.  The house does not have a damp basement.      Social Determinants of Health     Financial Resource Strain:      Difficulty of Paying Living Expenses:    Food Insecurity:      Worried About Running Out of Food in the Last Year:      Ran Out of Food in the Last Year:    Transportation Needs:      Lack of  Transportation (Medical):      Lack of Transportation (Non-Medical):    Physical Activity:      Days of Exercise per Week:      Minutes of Exercise per Session:    Stress:      Feeling of Stress :    Social Connections:      Frequency of Communication with Friends and Family:      Frequency of Social Gatherings with Friends and Family:      Attends Zoroastrian Services:      Active Member of Clubs or Organizations:      Attends Club or Organization Meetings:      Marital Status:    Intimate Partner Violence:      Fear of Current or Ex-Partner:      Emotionally Abused:      Physically Abused:      Sexually Abused:        ROS: A comprehensive 14 point ROS was obtained and otherwise negative except for that outlined above in the HPI.    GENERAL PHYSICAL EXAM:     PSYCH: NAD  EYES: EOMI  MOUTH: MMM  NEURO: AAO x3      ASSESSMENT AND PLAN: 41 year old male with a recent left-sided calculus with associated  Hydronephrosis.  -CT to eval for passage   - Warning signs discussed including fevers, chills, N/V, gross hematuria, severe pain that is refractory to medications which should prompt more urgent evaluation. Patient understands to proceed to the ER should these warning signs occur outside of clinic hours.    During counseling for this visit, we covered the natural history of kidney stones, the risk of progression to symptomatic pain/infection, and the possibility of renal failure/kidney damage.  We covered treatment options including medical expulsive therapy, ureteroscopy/laser/stent.    Standard recommendations on kidney stone prevention were provided.  Neph referral for medical management of prevention.       Agatha Fontenot PA-C  [facility]      22 minutes spent on the date of the encounter doing chart review, review of outside records, review of test results, interpretation of tests, patient visit and documentation

## 2021-07-15 ENCOUNTER — TELEPHONE (OUTPATIENT)
Dept: UROLOGY | Facility: CLINIC | Age: 41
End: 2021-07-15

## 2021-07-15 LAB
QUANTIFERON MITOGEN: 10 IU/ML
QUANTIFERON NIL TUBE: 0.05 IU/ML
QUANTIFERON TB1 TUBE: 0.07 IU/ML
QUANTIFERON TB2 TUBE: 0.18

## 2021-07-15 NOTE — TELEPHONE ENCOUNTER
----- Message from Jen Wu sent at 7/15/2021  9:52 AM CDT -----  Return for CT, will call with results.    RENEE

## 2021-07-16 LAB
GAMMA INTERFERON BACKGROUND BLD IA-ACNC: 0.05 IU/ML
M TB IFN-G BLD-IMP: NEGATIVE
M TB IFN-G CD4+ BCKGRND COR BLD-ACNC: 9.95 IU/ML
MITOGEN IGNF BCKGRD COR BLD-ACNC: 0.02 IU/ML
MITOGEN IGNF BCKGRD COR BLD-ACNC: 0.13 IU/ML

## 2021-07-19 ENCOUNTER — TELEPHONE (OUTPATIENT)
Dept: WOUND CARE | Facility: CLINIC | Age: 41
End: 2021-07-19

## 2021-07-19 NOTE — TELEPHONE ENCOUNTER
Pt contacted I via My Chart with concern for new open wound at previously healed surgical Left Axilla. Dr Garibay contacted and agrees that there may be some recurrent HS disease.  Pt advised to continue with the Hydrofera BLue and pt will be seen 7/29/21.  No other concerns.

## 2021-07-29 ENCOUNTER — HOSPITAL ENCOUNTER (OUTPATIENT)
Dept: WOUND CARE | Facility: CLINIC | Age: 41
Discharge: HOME | End: 2021-07-29
Attending: SURGERY | Admitting: SURGERY
Payer: COMMERCIAL

## 2021-07-29 VITALS
SYSTOLIC BLOOD PRESSURE: 135 MMHG | RESPIRATION RATE: 18 BRPM | TEMPERATURE: 97.4 F | HEART RATE: 79 BPM | DIASTOLIC BLOOD PRESSURE: 81 MMHG

## 2021-07-29 DIAGNOSIS — L73.2 HIDRADENITIS SUPPURATIVA: ICD-10-CM

## 2021-07-29 PROCEDURE — 99024 POSTOP FOLLOW-UP VISIT: CPT | Performed by: SURGERY

## 2021-07-29 PROCEDURE — G0463 HOSPITAL OUTPT CLINIC VISIT: HCPCS

## 2021-07-29 NOTE — DISCHARGE INSTRUCTIONS
[facility]  [address] [address]    Call us at [phone] if you have any questions about your wounds, have redness or swelling around your wound, have a fever of 101 or greater or if you have any other problems or concerns. We answer the phone Monday through Friday 8 am to 4 pm, please leave a message as we check the voicemail frequently throughout the day.     Sean Woodard      1980    Wound care recommendations to left armpit ulcer- Healed  Work with Range of motion on left arm; stretching as tolerated;   Slow and gently walk hand up the wall  Gently massage the armpit several times a day    Cover right armpit HS areas with ABD pad and tape, change daily as needed     David Garibay M.D. July 29, 2021      If you had a positive experience please indicate on your patient satisfaction survey form that [facility] will be sending you.    If you have any billing related questions please call the [facility] Business office at [phone]. The clinic staff does not handle billing related matters.

## 2021-07-29 NOTE — PROGRESS NOTES
Patient arrived for wound care visit. Certified Wound Care Nurse time spent evaluating patient record, completed a full evaluation and documented wound(s) & david-wound skin; provided recommendation based on treatment plan. Applied dressing, reviewed discharge instructions, patient education, and discussed plan of care with appropriate medical team staff members and patient and/or family members.     Left axilla is healed; pt will work to increase ROM. Surgery scheduled 9/3 for right axilla and removal of large skin tears.   Education regarding the surgery was discussed.

## 2021-07-29 NOTE — PROGRESS NOTES
Visit Date: 07/29/2021    [facility]     This is a 41-year-old  gentleman who has axillary hidradenitis.  He is status post wide local excision on the left axilla a number of months ago now and had been placed on our schedule a bit earlier than anticipated due to his concern for some central openings in the widest area of the scar.  He had been using Hydrofera Blue for his wounds.  Today, he actually states that once he was put back on Humira those final areas healed up without difficulty.  He still has some limited range of motion, probably only getting up to about 90 degrees for arm abduction.  The scar looks intact and pretty solid, but he does have a rather thick scar band from the axillary crease to the upper inner arm.  It is not really painful for him, but definitely limits range of motion that I think we should try to improve upon.  At home, he can do some easy things like a finger walking below or massaging that area and just gently trying to do more extended range.  He is a little worried about it ripping but I do not think that will happen if he does things in an incremental manner.  As far as his other disease is concerned, he is scheduled for right axillary wide local excision on 09/03 and is wondering if we could also excise some increasing areas in his bilateral inguinal creases.  It is not very impressive disease down there, but we could certainly easily excised and primarily close those.  He is a bit worried about the extension onto the upper inner arm on the right axilla being even more extended than the left.  Certainly, we will have to see when we get into the OR, but it is possible that there will be an even larger wound and might cause additional contracture.  With that being said, he still is not interested in doing any kind of skin substitutes or negative pressure wound therapy.  I did mention that he could have OT, PT later on if he needs help working on the scar.   Otherwise, we could also do some scar band releases or Z-plasties, as well.  He has supplies at home to take care of his right axilla.  He will let us know if there are any other issues for supplies.  In addition to the right groin disease, we will to our consent for 09/03.  He would also like us to take away some of the skin tags and that is probably a quick and easy thing to do.  He will set up his own history and physical and COVID test and we will see him on the 3rd.  Please see nursing notes for dimensions of the wounds today, vital signs and photos.    Labs: Recent Labs   Lab Test 06/27/21  0528 04/23/21  1431 09/08/20  1013 07/03/20  1002 10/15/18  1000 10/01/18  1801   ALBUMIN 3.2* 3.9  --   --   --  4.1   HGB 13.4 16.0 15.2   < >  --  16.3   INR  --   --  0.98  --   --   --    WBC 6.2 9.1 6.8   < >  --  7.4   A1C  --  5.8*  --   --    < > 6.3*   CRP  --   --   --   --   --  <2.9    < > = values in this interval not displayed.     Nutrition requirements were discussed with patient today.  Vitals:  /81   Pulse 79   Temp 97.4  F (36.3  C) (Temporal)   Resp 18   Wound:   Wound (used by [facility] only) 03/16/21 0923 Left other (see comments) (Active)   Thickness/Stage other (see comments) 07/29/21 0900   Base epithelialization 07/29/21 0900   Periwound intact 07/29/21 0900   Drainage Amount none 07/29/21 0900       Wound (used by [facility] only) 03/16/21 0923 Right other (see comments) (Active)   Dressing Appearance moist drainage 07/29/21 0838   Length (cm) 9 07/29/21 0838   Width (cm) 10.5 07/29/21 0838   Depth (cm) 0.3 07/29/21 0838   Wound (cm^2) 94.5 cm^2 07/29/21 0838   Wound Volume (cm^3) 28.35 cm^3 07/29/21 0838   Wound healing % -372.5 07/29/21 0838   Drainage Characteristics/Odor serosanguineous 07/29/21 0838   Drainage Amount moderate 07/29/21 0838      Photo:       Shahnaz Garibay MD        D: 07/29/2021   T: 07/29/2021   MT: [id number]    Name:     SARAH SEB YAJAIRA  MRN:      [id number]         Account:    [id number]   :      1980           Visit Date: 2021     Document: [id number]

## 2021-07-30 ENCOUNTER — HOSPITAL ENCOUNTER (OUTPATIENT)
Dept: CT IMAGING | Facility: CLINIC | Age: 41
Discharge: HOME | End: 2021-07-30
Attending: PHYSICIAN ASSISTANT | Admitting: PHYSICIAN ASSISTANT
Payer: COMMERCIAL

## 2021-07-30 DIAGNOSIS — N20.1 LEFT URETERAL STONE: ICD-10-CM

## 2021-07-30 PROCEDURE — 74176 CT ABD & PELVIS W/O CONTRAST: CPT

## 2021-08-16 ENCOUNTER — OFFICE VISIT (OUTPATIENT)
Dept: FAMILY MEDICINE | Facility: CLINIC | Age: 41
End: 2021-08-16
Payer: COMMERCIAL

## 2021-08-16 VITALS
SYSTOLIC BLOOD PRESSURE: 130 MMHG | TEMPERATURE: 98.2 F | HEART RATE: 82 BPM | OXYGEN SATURATION: 98 % | DIASTOLIC BLOOD PRESSURE: 74 MMHG | BODY MASS INDEX: 41.78 KG/M2 | WEIGHT: 260 LBS | HEIGHT: 66 IN

## 2021-08-16 DIAGNOSIS — E66.01 CLASS 3 SEVERE OBESITY DUE TO EXCESS CALORIES WITH SERIOUS COMORBIDITY AND BODY MASS INDEX (BMI) OF 40.0 TO 44.9 IN ADULT (H): ICD-10-CM

## 2021-08-16 DIAGNOSIS — E66.813 CLASS 3 SEVERE OBESITY DUE TO EXCESS CALORIES WITH SERIOUS COMORBIDITY AND BODY MASS INDEX (BMI) OF 40.0 TO 44.9 IN ADULT (H): ICD-10-CM

## 2021-08-16 DIAGNOSIS — E11.9 TYPE 2 DIABETES MELLITUS WITHOUT COMPLICATION, WITHOUT LONG-TERM CURRENT USE OF INSULIN (H): ICD-10-CM

## 2021-08-16 DIAGNOSIS — L73.2 HIDRADENITIS AXILLARIS: ICD-10-CM

## 2021-08-16 DIAGNOSIS — Z01.818 PREOP GENERAL PHYSICAL EXAM: Primary | ICD-10-CM

## 2021-08-16 DIAGNOSIS — J31.0 CHRONIC RHINITIS: ICD-10-CM

## 2021-08-16 PROCEDURE — 99214 OFFICE O/P EST MOD 30 MIN: CPT | Performed by: PHYSICIAN ASSISTANT

## 2021-08-16 RX ORDER — FLUTICASONE PROPIONATE 50 MCG
2 SPRAY, SUSPENSION (ML) NASAL DAILY
Qty: 16 G | Refills: 3 | Status: SHIPPED | OUTPATIENT
Start: 2021-08-16

## 2021-08-16 RX ORDER — AZELASTINE 1 MG/ML
2 SPRAY, METERED NASAL 2 TIMES DAILY PRN
Qty: 30 ML | Refills: 3 | Status: SHIPPED | OUTPATIENT
Start: 2021-08-16

## 2021-08-16 ASSESSMENT — PAIN SCALES - GENERAL: PAINLEVEL: NO PAIN (0)

## 2021-08-16 ASSESSMENT — MIFFLIN-ST. JEOR: SCORE: 2032.1

## 2021-08-16 NOTE — PROGRESS NOTES
[facility]  [address] [facility] [address]  Phone: [phone]  Primary Provider: Susanne Chung  Pre-op Performing Provider: SUSANNE CHUNG      PREOPERATIVE EVALUATION:  Today's date: 8/16/2021    Sean Woodard is a 41 year old male who presents for a preoperative evaluation.    Surgical Information:  Surgery/Procedure: EXCISION, HIDRADENITIS, right axilla, possible Myriad or Integra, possible VAC  Surgery Location: [facility]  Surgeon: Dr. Garibay   Surgery Date: 9/3/2021  Time of Surgery: TBD   Where patient plans to recover: At home with family  Fax number for surgical facility: Note does not need to be faxed, will be available electronically in Epic.    Type of Anesthesia Anticipated: General    Assessment & Plan     The proposed surgical procedure is considered INTERMEDIATE risk.    (Z01.818) Preop general physical exam  (primary encounter diagnosis)  Comment:   Plan: Asymptomatic COVID-19 Virus (Coronavirus) by         PCR            (L73.2) Hidradenitis axillaris  Comment:   Plan: Asymptomatic COVID-19 Virus (Coronavirus) by         PCR            (J31.0) Chronic rhinitis  Comment:   Plan: fluticasone (FLONASE) 50 MCG/ACT nasal spray,         azelastine (ASTELIN) 0.1 % nasal spray            (E66.01,  Z68.41) Class 3 severe obesity due to excess calories with serious comorbidity and body mass index (BMI) of 40.0 to 44.9 in adult (H)  Comment:   Plan: lifestyle changes addressed - patient due for annual/preventative visit at which time we can address further    (E11.9) Type 2 diabetes mellitus without complication, without long-term current use of insulin (H)  Comment: last A1c stable/improved. Due for diabetes and annual visit which he will return to do in the next 1-2 months  Plan: return in 1-2 months for yearly visit/diabetes check        Risks and Recommendations:   - No identified additional risk  factors other than previously addressed        RECOMMENDATION:  APPROVAL GIVEN to proceed with proposed procedure, without further diagnostic evaluation.      Subjective     HPI related to upcoming procedure: persistent hidradenitis axillaris failing therapies    Preop Questions 8/16/2021   1. Have you ever had a heart attack or stroke? No   2. Have you ever had surgery on your heart or blood vessels, such as a stent placement, a coronary artery bypass, or surgery on an artery in your head, neck, heart, or legs? No   3. Do you have chest pain with activity? No   4. Do you have a history of  heart failure? No   5. Do you currently have a cold, bronchitis or symptoms of other infection? No   6. Do you have a cough, shortness of breath, or wheezing? No   7. Do you or anyone in your family have previous history of blood clots? No   8. Do you or does anyone in your family have a serious bleeding problem such as prolonged bleeding following surgeries or cuts? No   9. Have you ever had problems with anemia or been told to take iron pills? No   10. Have you had any abnormal blood loss such as black, tarry or bloody stools? No   11. Have you ever had a blood transfusion? No   12. Are you willing to have a blood transfusion if it is medically needed before, during, or after your surgery? Yes   13. Have you or any of your relatives ever had problems with anesthesia? No   14. Do you have sleep apnea, excessive snoring or daytime drowsiness? YES - sleep apnea, has CPAP machine which he uses regularly   14a. Do you have a CPAP machine? Yes   15. Do you have any artifical heart valves or other implanted medical devices like a pacemaker, defibrillator, or continuous glucose monitor? No   16. Do you have artificial joints? No   17. Are you allergic to latex? No         Preoperative Review of :   reviewed - no record of controlled substances prescribed.      Status of Chronic Conditions:  See problem list for active medical  problems.  Problems all longstanding and stable, except as noted/documented.  See ROS for pertinent symptoms related to these conditions.      Review of Systems  CONSTITUTIONAL: NEGATIVE for fever, chills, change in weight  INTEGUMENTARY/SKIN: NEGATIVE for worrisome rashes, moles or lesions  EYES: NEGATIVE for vision changes or irritation  ENT/MOUTH: NEGATIVE for ear, mouth and throat problems  RESP: NEGATIVE for significant cough or SOB  CV: NEGATIVE for chest pain, palpitations or peripheral edema  GI: NEGATIVE for nausea, abdominal pain, heartburn, or change in bowel habits  : NEGATIVE for frequency, dysuria, or hematuria  MUSCULOSKELETAL: NEGATIVE for significant arthralgias or myalgia  NEURO: NEGATIVE for weakness, dizziness or paresthesias  ENDOCRINE: NEGATIVE for temperature intolerance, skin/hair changes  HEME: NEGATIVE for bleeding problems  PSYCHIATRIC: NEGATIVE for changes in mood or affect    Patient Active Problem List    Diagnosis Date Noted     UTI (urinary tract infection) 06/26/2021     Priority: Medium     Left ureteral stone 06/26/2021     Priority: Medium     Lab test negative for COVID-19 virus 06/26/2021     Priority: Medium     Fatty liver 06/07/2021     Priority: Medium     Hidradenitis axillaris 04/08/2021     Priority: Medium     Added automatically from request for surgery [id number]       Hidradenitis suppurativa 05/07/2020     Priority: Medium     FER (obstructive sleep apnea) 01/28/2019     Priority: Medium     Diabetes mellitus, type 2 (H) 01/25/2019     Priority: Medium     PTSD (post-traumatic stress disorder) 11/08/2018     Priority: Medium     , served in [place]  Diagnosed with PTSD by Dr. Darlene Jeffery on 11/9/2010       Class 3 severe obesity due to excess calories with serious comorbidity in adult (H) 11/08/2018     Priority: Medium     Benign essential hypertension 07/28/2017     Priority: Medium     Hyperlipidemia with target LDL less than 100 08/14/2014     Priority:  Medium     Diagnosis updated by automated process. Provider to review and confirm.       Health Care Home 03/28/2014     Priority: Medium     EMERGENCY CARE PLAN  March 28, 2014: No current Care Coordination follow up planned. Please refer if Care Coordination services are needed.    Presenting Problem Signs and Symptoms Treatment Plan   Questions or concerns   during clinic hours   I will call my clinic directly:  [facility]  [address] [place] [address]  [phone].    Questions or concerns outside clinic hours   I will call the 24 hour nurse line at   [phone] or [address].   Need to schedule an appointment   I will call the 24 hour scheduling team at [phone] or my clinic directly at [phone].    Same day treatment     I will call my clinic first, nurse line if after hours, urgent care and express care if needed.   Clinic care coordination services (regular clinic hours)     I will call a clinic care coordinator directly:     Marlo Talley RN  Mon, Tues, Fri - [phone]  Wed, Thurs - [phone]    Anat Wall SW:    [phone]    Or call my clinic at [phone] and ask to speak with care coordination.   Crisis Services: Behavioral or Mental Health  [facility] 24 Hour Phone Line  [phone]    [facility] 24 Hour Crisis Services  [phone]    [facility] [phone]    [facility]   [phone]       Emergency treatment -- Immediately    CAll 911           Past Medical History:   Diagnosis Date     Diabetes (H)      Hypertension      NO ACTIVE PROBLEMS      Obese      Sleep apnea      Past Surgical History:   Procedure Laterality Date     EXCISE HIDRADENITIS (LOCATION) Left 5/21/2021    Procedure: Sharp excisional debridement of left axillary hidradenitis.;  Surgeon: Shahnaz Garibay MD;  Location: [facility] OR     ORTHOPEDIC SURGERY  1/1/2005    RT knee arthroscopic menisectomy     Thumb Surgery  2009     Mass removal neuroma     Current Outpatient Medications   Medication Sig Dispense Refill     adalimumab (HUMIRA *CF*) 40 MG/0.4ML pen kit Inject 0.4 mLs (40 mg) Subcutaneous every 7 days 1.6 mL 4     adalimumab (HUMIRA *CF*) 80 MG/0.8ML pen kit Administer 160 mg on day 1, then 80 mg 2 weeks later (day 15) 2.4 mL 0     atorvastatin (LIPITOR) 20 MG tablet TAKE ONE TABLET BY MOUTH ONCE DAILY 90 tablet 3     augmented betamethasone dipropionate (DIPROLENE-AF) 0.05 % ointment Apply sparingly to affected area twice daily as needed.  Do not apply to face. 45 g 1     azelastine (ASTELIN) 0.1 % nasal spray Spray 2 sprays into both nostrils 2 times daily as needed for rhinitis 30 mL 3     azelastine (OPTIVAR) 0.05 % ophthalmic solution Apply 1 drop to eye 2 times daily 6 mL 3     benzoyl peroxide 5 % external liquid Use daily as directed 226 g 11     blood glucose (ACCU-CHEK GUIDE) test strip Use to test blood sugar 2 times daily 100 strip 11     cetirizine (ZYRTEC) 10 MG tablet Take 10 mg by mouth daily       clindamycin (CLINDAMAX) 1 % lotion Apply to AA BID 60 mL 11     clindamycin-benzoyl peroxide (BENZACLIN) 1-5 % external gel Apply twice daily to affected area on skin. 50 g 6     diphenhydrAMINE (BENADRYL) 25 MG capsule Take 25 mg by mouth every 6 hours as needed for itching or allergies       doxycycline hyclate (VIBRA-TABS) 100 MG tablet Take 100 mg by mouth 2 times daily       fluticasone (FLONASE) 50 MCG/ACT nasal spray Spray 2 sprays into both nostrils daily 16 g 3     hydrOXYzine (ATARAX) 25 MG tablet Take 1 tablet (25 mg) by mouth 3 times daily as needed for itching 12 tablet 0     ISOtretinoin (ACCUTANE PO)        losartan-hydrochlorothiazide (HYZAAR) 50-12.5 MG tablet Take 1 tablet by mouth daily 90 tablet 3     metFORMIN (GLUCOPHAGE-XR) 500 MG 24 hr tablet TAKE TWO TABLETS BY MOUTH TWICE A DAY WITH MEALS 360 tablet 3     phenazopyridine (PYRIDIUM) 200 MG tablet Take 1 tablet (200 mg) by mouth 3 times daily  "as needed for irritation 20 tablet 0     ustekinumab (STELARA) 90 MG/ML Inject 1 mL (90 mg) Subcutaneous every 2 months 1 mL 3       Allergies   Allergen Reactions     Lisinopril Cough        Social History     Tobacco Use     Smoking status: Former Smoker     Packs/day: 0.50     Years: 5.00     Pack years: 2.50     Types: Cigarettes     Quit date: 2005     Years since quittin.6     Smokeless tobacco: Never Used   Substance Use Topics     Alcohol use: Yes     Comment: Social. 1 drink per month     Family History   Problem Relation Age of Onset     Heart Defect Father         valve replacement     Diabetes Maternal Grandmother      Cancer Maternal Grandfather         stomach     Hypertension Brother      Diabetes Sister      Cerebrovascular Disease Brother 38     Hypertension Brother      Melanoma No family hx of      Skin Cancer No family hx of      History   Drug Use No         Objective     /74   Pulse 82   Temp 98.2  F (36.8  C) (Tympanic)   Ht 1.676 m (5' 6\")   Wt 117.9 kg (260 lb)   SpO2 98%   BMI 41.97 kg/m      Physical Exam    GENERAL APPEARANCE: healthy, alert and no distress     EYES: EOMI,  PERRL     HENT: ear canals and TM's normal and nose and mouth without ulcers or lesions     NECK: no adenopathy, no asymmetry, masses, or scars and thyroid normal to palpation     RESP: lungs clear to auscultation - no rales, rhonchi or wheezes     CV: regular rates and rhythm, normal S1 S2, no S3 or S4 and no murmur, click or rub     ABDOMEN:  soft, nontender, no HSM or masses and bowel sounds normal     MS: extremities normal- no gross deformities noted, no evidence of inflammation in joints, FROM in all extremities.     SKIN: no suspicious lesions or rashes     NEURO: Normal strength and tone, sensory exam grossly normal, mentation intact and speech normal     PSYCH: mentation appears normal. and affect normal/bright     LYMPHATICS: No cervical adenopathy    Recent Labs   Lab Test 21  0528 " 06/26/21  1710 05/21/21  1156 04/23/21  1431 09/08/20  1013 07/03/20  1002 02/18/20  1522   HGB 13.4 15.1   < > 16.0 15.2  --   --     239   < > 235 219  --   --    INR  --   --   --   --  0.98  --   --     138   < > 135  --  139   < >   POTASSIUM 3.6 3.6  --  3.6  --  3.6   < >   CR 0.94 1.00   < > 0.97  --  0.82   < >   A1C  --   --   --  5.8*  --  7.1*  --     < > = values in this interval not displayed.        Diagnostics:  No labs were ordered during this visit.   No EKG required, no history of coronary heart disease, significant arrhythmia, peripheral arterial disease or other structural heart disease.    Revised Cardiac Risk Index (RCRI):  The patient has the following serious cardiovascular risks for perioperative complications:   - No serious cardiac risks = 0 points     RCRI Interpretation: 0 points: Class I (very low risk - 0.4% complication rate)           Signed Electronically by: Susanne Alberto PA-C  Copy of this evaluation report is provided to requesting physician.

## 2021-08-16 NOTE — PATIENT INSTRUCTIONS

## 2021-08-26 DIAGNOSIS — L73.2 HIDRADENITIS SUPPURATIVA: Primary | ICD-10-CM

## 2021-08-26 RX ORDER — CEFAZOLIN SODIUM 2 G/50ML
2 SOLUTION INTRAVENOUS SEE ADMIN INSTRUCTIONS
Status: CANCELLED | OUTPATIENT
Start: 2021-08-26

## 2021-08-26 RX ORDER — CEFAZOLIN SODIUM 2 G/50ML
2 SOLUTION INTRAVENOUS
Status: CANCELLED | OUTPATIENT
Start: 2021-08-26

## 2021-08-31 ENCOUNTER — LAB (OUTPATIENT)
Dept: LAB | Facility: CLINIC | Age: 41
End: 2021-08-31
Attending: PHYSICIAN ASSISTANT
Payer: COMMERCIAL

## 2021-08-31 DIAGNOSIS — L73.2 HIDRADENITIS AXILLARIS: ICD-10-CM

## 2021-08-31 DIAGNOSIS — Z01.818 PREOP GENERAL PHYSICAL EXAM: ICD-10-CM

## 2021-08-31 PROCEDURE — U0003 INFECTIOUS AGENT DETECTION BY NUCLEIC ACID (DNA OR RNA); SEVERE ACUTE RESPIRATORY SYNDROME CORONAVIRUS 2 (SARS-COV-2) (CORONAVIRUS DISEASE [COVID-19]), AMPLIFIED PROBE TECHNIQUE, MAKING USE OF HIGH THROUGHPUT TECHNOLOGIES AS DESCRIBED BY CMS-2020-01-R: HCPCS

## 2021-08-31 PROCEDURE — U0005 INFEC AGEN DETEC AMPLI PROBE: HCPCS

## 2021-09-01 ENCOUNTER — TELEPHONE (OUTPATIENT)
Dept: SURGERY | Facility: CLINIC | Age: 41
End: 2021-09-01

## 2021-09-01 LAB — SARS-COV-2 RNA RESP QL NAA+PROBE: NEGATIVE

## 2021-09-03 DIAGNOSIS — Z11.59 ENCOUNTER FOR SCREENING FOR OTHER VIRAL DISEASES: ICD-10-CM

## 2021-10-01 ENCOUNTER — LAB (OUTPATIENT)
Dept: LAB | Facility: CLINIC | Age: 41
End: 2021-10-01
Payer: COMMERCIAL

## 2021-10-01 DIAGNOSIS — Z11.59 ENCOUNTER FOR SCREENING FOR OTHER VIRAL DISEASES: ICD-10-CM

## 2021-10-01 PROCEDURE — U0003 INFECTIOUS AGENT DETECTION BY NUCLEIC ACID (DNA OR RNA); SEVERE ACUTE RESPIRATORY SYNDROME CORONAVIRUS 2 (SARS-COV-2) (CORONAVIRUS DISEASE [COVID-19]), AMPLIFIED PROBE TECHNIQUE, MAKING USE OF HIGH THROUGHPUT TECHNOLOGIES AS DESCRIBED BY CMS-2020-01-R: HCPCS

## 2021-10-01 PROCEDURE — U0005 INFEC AGEN DETEC AMPLI PROBE: HCPCS

## 2021-10-02 ENCOUNTER — HEALTH MAINTENANCE LETTER (OUTPATIENT)
Age: 41
End: 2021-10-02

## 2021-10-02 LAB — SARS-COV-2 RNA RESP QL NAA+PROBE: NEGATIVE

## 2021-10-03 ENCOUNTER — ANESTHESIA EVENT (OUTPATIENT)
Dept: SURGERY | Facility: CLINIC | Age: 41
End: 2021-10-03
Payer: COMMERCIAL

## 2021-10-04 ENCOUNTER — NURSE TRIAGE (OUTPATIENT)
Dept: NURSING | Facility: CLINIC | Age: 41
End: 2021-10-04

## 2021-10-04 ENCOUNTER — HOSPITAL ENCOUNTER (OUTPATIENT)
Facility: CLINIC | Age: 41
Discharge: HOME | End: 2021-10-04
Attending: SURGERY | Admitting: SURGERY
Payer: COMMERCIAL

## 2021-10-04 ENCOUNTER — ANESTHESIA (OUTPATIENT)
Dept: SURGERY | Facility: CLINIC | Age: 41
End: 2021-10-04
Payer: COMMERCIAL

## 2021-10-04 VITALS
HEIGHT: 68 IN | WEIGHT: 260.58 LBS | TEMPERATURE: 98 F | RESPIRATION RATE: 14 BRPM | BODY MASS INDEX: 39.49 KG/M2 | OXYGEN SATURATION: 96 % | DIASTOLIC BLOOD PRESSURE: 105 MMHG | SYSTOLIC BLOOD PRESSURE: 152 MMHG | HEART RATE: 79 BPM

## 2021-10-04 DIAGNOSIS — L73.2 HIDRADENITIS AXILLARIS: ICD-10-CM

## 2021-10-04 DIAGNOSIS — L73.2 HIDRADENITIS SUPPURATIVA: ICD-10-CM

## 2021-10-04 LAB
CREAT SERPL-MCNC: 1.05 MG/DL (ref 0.66–1.25)
GFR SERPL CREATININE-BSD FRML MDRD: 88 ML/MIN/1.73M2
GLUCOSE BLDC GLUCOMTR-MCNC: 122 MG/DL (ref 70–99)
HGB BLD-MCNC: 14.7 G/DL (ref 13.3–17.7)
POTASSIUM BLD-SCNC: 3.7 MMOL/L (ref 3.4–5.3)

## 2021-10-04 PROCEDURE — 258N000003 HC RX IP 258 OP 636: Performed by: STUDENT IN AN ORGANIZED HEALTH CARE EDUCATION/TRAINING PROGRAM

## 2021-10-04 PROCEDURE — 88305 TISSUE EXAM BY PATHOLOGIST: CPT | Mod: 26 | Performed by: PATHOLOGY

## 2021-10-04 PROCEDURE — 250N000011 HC RX IP 250 OP 636: Performed by: SURGERY

## 2021-10-04 PROCEDURE — 82565 ASSAY OF CREATININE: CPT | Performed by: ANESTHESIOLOGY

## 2021-10-04 PROCEDURE — 82962 GLUCOSE BLOOD TEST: CPT

## 2021-10-04 PROCEDURE — 250N000009 HC RX 250: Performed by: STUDENT IN AN ORGANIZED HEALTH CARE EDUCATION/TRAINING PROGRAM

## 2021-10-04 PROCEDURE — 710N000010 HC RECOVERY PHASE 1, LEVEL 2, PER MIN: Performed by: SURGERY

## 2021-10-04 PROCEDURE — 85018 HEMOGLOBIN: CPT | Performed by: ANESTHESIOLOGY

## 2021-10-04 PROCEDURE — 11462 EXC SKN HDRDNT ING SMPL/NTRM: CPT | Mod: RT | Performed by: SURGERY

## 2021-10-04 PROCEDURE — 272N000001 HC OR GENERAL SUPPLY STERILE: Performed by: SURGERY

## 2021-10-04 PROCEDURE — 11450 EXC SKN HDRDNT AX SMPL/NTRM: CPT | Mod: 50 | Performed by: SURGERY

## 2021-10-04 PROCEDURE — 36415 COLL VENOUS BLD VENIPUNCTURE: CPT | Performed by: ANESTHESIOLOGY

## 2021-10-04 PROCEDURE — 360N000075 HC SURGERY LEVEL 2, PER MIN: Performed by: SURGERY

## 2021-10-04 PROCEDURE — 250N000009 HC RX 250: Performed by: PHYSICIAN ASSISTANT

## 2021-10-04 PROCEDURE — 999N000141 HC STATISTIC PRE-PROCEDURE NURSING ASSESSMENT: Performed by: SURGERY

## 2021-10-04 PROCEDURE — 370N000017 HC ANESTHESIA TECHNICAL FEE, PER MIN: Performed by: SURGERY

## 2021-10-04 PROCEDURE — 250N000011 HC RX IP 250 OP 636: Performed by: STUDENT IN AN ORGANIZED HEALTH CARE EDUCATION/TRAINING PROGRAM

## 2021-10-04 PROCEDURE — 84132 ASSAY OF SERUM POTASSIUM: CPT | Performed by: ANESTHESIOLOGY

## 2021-10-04 PROCEDURE — 88305 TISSUE EXAM BY PATHOLOGIST: CPT | Mod: TC | Performed by: SURGERY

## 2021-10-04 PROCEDURE — 250N000025 HC SEVOFLURANE, PER MIN: Performed by: SURGERY

## 2021-10-04 PROCEDURE — 710N000012 HC RECOVERY PHASE 2, PER MINUTE: Performed by: SURGERY

## 2021-10-04 RX ORDER — FENTANYL CITRATE 50 UG/ML
INJECTION, SOLUTION INTRAMUSCULAR; INTRAVENOUS PRN
Status: DISCONTINUED | OUTPATIENT
Start: 2021-10-04 | End: 2021-10-04

## 2021-10-04 RX ORDER — DEXAMETHASONE SODIUM PHOSPHATE 4 MG/ML
INJECTION, SOLUTION INTRA-ARTICULAR; INTRALESIONAL; INTRAMUSCULAR; INTRAVENOUS; SOFT TISSUE PRN
Status: DISCONTINUED | OUTPATIENT
Start: 2021-10-04 | End: 2021-10-04

## 2021-10-04 RX ORDER — SODIUM CHLORIDE, SODIUM LACTATE, POTASSIUM CHLORIDE, CALCIUM CHLORIDE 600; 310; 30; 20 MG/100ML; MG/100ML; MG/100ML; MG/100ML
INJECTION, SOLUTION INTRAVENOUS CONTINUOUS PRN
Status: DISCONTINUED | OUTPATIENT
Start: 2021-10-04 | End: 2021-10-04

## 2021-10-04 RX ORDER — SODIUM CHLORIDE, SODIUM LACTATE, POTASSIUM CHLORIDE, CALCIUM CHLORIDE 600; 310; 30; 20 MG/100ML; MG/100ML; MG/100ML; MG/100ML
INJECTION, SOLUTION INTRAVENOUS CONTINUOUS
Status: DISCONTINUED | OUTPATIENT
Start: 2021-10-04 | End: 2021-10-04 | Stop reason: HOSPADM

## 2021-10-04 RX ORDER — ONDANSETRON 2 MG/ML
4 INJECTION INTRAMUSCULAR; INTRAVENOUS EVERY 30 MIN PRN
Status: DISCONTINUED | OUTPATIENT
Start: 2021-10-04 | End: 2021-10-04 | Stop reason: HOSPADM

## 2021-10-04 RX ORDER — ONDANSETRON 4 MG/1
4 TABLET, ORALLY DISINTEGRATING ORAL EVERY 8 HOURS PRN
Qty: 12 TABLET | Refills: 0 | Status: SHIPPED | OUTPATIENT
Start: 2021-10-04 | End: 2022-09-16

## 2021-10-04 RX ORDER — LIDOCAINE HYDROCHLORIDE 20 MG/ML
INJECTION, SOLUTION INFILTRATION; PERINEURAL PRN
Status: DISCONTINUED | OUTPATIENT
Start: 2021-10-04 | End: 2021-10-04

## 2021-10-04 RX ORDER — OXYCODONE HYDROCHLORIDE 5 MG/1
5 TABLET ORAL EVERY 6 HOURS PRN
Qty: 13 TABLET | Refills: 0 | Status: SHIPPED | OUTPATIENT
Start: 2021-10-04 | End: 2021-10-07

## 2021-10-04 RX ORDER — HYDROMORPHONE HCL IN WATER/PF 6 MG/30 ML
0.2 PATIENT CONTROLLED ANALGESIA SYRINGE INTRAVENOUS EVERY 5 MIN PRN
Status: DISCONTINUED | OUTPATIENT
Start: 2021-10-04 | End: 2021-10-04 | Stop reason: HOSPADM

## 2021-10-04 RX ORDER — BUPIVACAINE HYDROCHLORIDE AND EPINEPHRINE 2.5; 5 MG/ML; UG/ML
INJECTION, SOLUTION INFILTRATION; PERINEURAL PRN
Status: DISCONTINUED | OUTPATIENT
Start: 2021-10-04 | End: 2021-10-04 | Stop reason: HOSPADM

## 2021-10-04 RX ORDER — LIDOCAINE 40 MG/G
CREAM TOPICAL
Status: DISCONTINUED | OUTPATIENT
Start: 2021-10-04 | End: 2021-10-04 | Stop reason: HOSPADM

## 2021-10-04 RX ORDER — ONDANSETRON 4 MG/1
4 TABLET, ORALLY DISINTEGRATING ORAL EVERY 30 MIN PRN
Status: DISCONTINUED | OUTPATIENT
Start: 2021-10-04 | End: 2021-10-04 | Stop reason: HOSPADM

## 2021-10-04 RX ORDER — FENTANYL CITRATE 50 UG/ML
25 INJECTION, SOLUTION INTRAMUSCULAR; INTRAVENOUS EVERY 5 MIN PRN
Status: DISCONTINUED | OUTPATIENT
Start: 2021-10-04 | End: 2021-10-04 | Stop reason: HOSPADM

## 2021-10-04 RX ORDER — CEFAZOLIN SODIUM 2 G/100ML
2 INJECTION, SOLUTION INTRAVENOUS
Status: COMPLETED | OUTPATIENT
Start: 2021-10-04 | End: 2021-10-04

## 2021-10-04 RX ORDER — HYDROXYZINE HYDROCHLORIDE 25 MG/1
25 TABLET, FILM COATED ORAL 3 TIMES DAILY PRN
Qty: 12 TABLET | Refills: 0 | Status: SHIPPED | OUTPATIENT
Start: 2021-10-04 | End: 2024-06-11

## 2021-10-04 RX ORDER — METOPROLOL TARTRATE 1 MG/ML
1-2 INJECTION, SOLUTION INTRAVENOUS EVERY 5 MIN PRN
Status: DISCONTINUED | OUTPATIENT
Start: 2021-10-04 | End: 2021-10-04 | Stop reason: HOSPADM

## 2021-10-04 RX ORDER — OXYCODONE HYDROCHLORIDE 5 MG/1
5 TABLET ORAL EVERY 4 HOURS PRN
Status: DISCONTINUED | OUTPATIENT
Start: 2021-10-04 | End: 2021-10-04 | Stop reason: HOSPADM

## 2021-10-04 RX ORDER — CEFAZOLIN SODIUM 2 G/100ML
2 INJECTION, SOLUTION INTRAVENOUS SEE ADMIN INSTRUCTIONS
Status: DISCONTINUED | OUTPATIENT
Start: 2021-10-04 | End: 2021-10-04 | Stop reason: HOSPADM

## 2021-10-04 RX ORDER — PROPOFOL 10 MG/ML
INJECTION, EMULSION INTRAVENOUS PRN
Status: DISCONTINUED | OUTPATIENT
Start: 2021-10-04 | End: 2021-10-04

## 2021-10-04 RX ORDER — ONDANSETRON 2 MG/ML
INJECTION INTRAMUSCULAR; INTRAVENOUS PRN
Status: DISCONTINUED | OUTPATIENT
Start: 2021-10-04 | End: 2021-10-04

## 2021-10-04 RX ADMIN — PROPOFOL 200 MG: 10 INJECTION, EMULSION INTRAVENOUS at 08:12

## 2021-10-04 RX ADMIN — ROCURONIUM BROMIDE 20 MG: 10 INJECTION INTRAVENOUS at 09:09

## 2021-10-04 RX ADMIN — FENTANYL CITRATE 50 MCG: 50 INJECTION, SOLUTION INTRAMUSCULAR; INTRAVENOUS at 08:50

## 2021-10-04 RX ADMIN — SUGAMMADEX 200 MG: 100 INJECTION, SOLUTION INTRAVENOUS at 10:21

## 2021-10-04 RX ADMIN — SODIUM CHLORIDE, POTASSIUM CHLORIDE, SODIUM LACTATE AND CALCIUM CHLORIDE: 600; 310; 30; 20 INJECTION, SOLUTION INTRAVENOUS at 07:59

## 2021-10-04 RX ADMIN — HYDROMORPHONE HYDROCHLORIDE 0.2 MG: 1 INJECTION, SOLUTION INTRAMUSCULAR; INTRAVENOUS; SUBCUTANEOUS at 11:41

## 2021-10-04 RX ADMIN — FENTANYL CITRATE 25 MCG: 50 INJECTION INTRAMUSCULAR; INTRAVENOUS at 11:11

## 2021-10-04 RX ADMIN — ROCURONIUM BROMIDE 20 MG: 10 INJECTION INTRAVENOUS at 08:37

## 2021-10-04 RX ADMIN — ROCURONIUM BROMIDE 10 MG: 10 INJECTION INTRAVENOUS at 08:50

## 2021-10-04 RX ADMIN — LIDOCAINE HYDROCHLORIDE 100 MG: 20 INJECTION, SOLUTION INFILTRATION; PERINEURAL at 08:12

## 2021-10-04 RX ADMIN — FENTANYL CITRATE 25 MCG: 50 INJECTION INTRAMUSCULAR; INTRAVENOUS at 11:18

## 2021-10-04 RX ADMIN — HYDROMORPHONE HYDROCHLORIDE 0.5 MG: 1 INJECTION, SOLUTION INTRAMUSCULAR; INTRAVENOUS; SUBCUTANEOUS at 10:23

## 2021-10-04 RX ADMIN — ONDANSETRON 4 MG: 2 INJECTION INTRAMUSCULAR; INTRAVENOUS at 12:28

## 2021-10-04 RX ADMIN — FENTANYL CITRATE 50 MCG: 50 INJECTION, SOLUTION INTRAMUSCULAR; INTRAVENOUS at 08:12

## 2021-10-04 RX ADMIN — HYDROMORPHONE HYDROCHLORIDE 0.2 MG: 1 INJECTION, SOLUTION INTRAMUSCULAR; INTRAVENOUS; SUBCUTANEOUS at 10:57

## 2021-10-04 RX ADMIN — CEFAZOLIN 2 G: 10 INJECTION, POWDER, FOR SOLUTION INTRAVENOUS at 07:59

## 2021-10-04 RX ADMIN — HYDROMORPHONE HYDROCHLORIDE 0.2 MG: 1 INJECTION, SOLUTION INTRAMUSCULAR; INTRAVENOUS; SUBCUTANEOUS at 11:57

## 2021-10-04 RX ADMIN — ONDANSETRON 4 MG: 2 INJECTION INTRAMUSCULAR; INTRAVENOUS at 10:21

## 2021-10-04 RX ADMIN — HYDROMORPHONE HYDROCHLORIDE 0.2 MG: 1 INJECTION, SOLUTION INTRAMUSCULAR; INTRAVENOUS; SUBCUTANEOUS at 11:29

## 2021-10-04 RX ADMIN — ROCURONIUM BROMIDE 50 MG: 10 INJECTION INTRAVENOUS at 08:12

## 2021-10-04 RX ADMIN — DEXMEDETOMIDINE 10 MCG: 100 INJECTION, SOLUTION, CONCENTRATE INTRAVENOUS at 10:25

## 2021-10-04 RX ADMIN — DEXMEDETOMIDINE 10 MCG: 100 INJECTION, SOLUTION, CONCENTRATE INTRAVENOUS at 10:51

## 2021-10-04 RX ADMIN — DEXAMETHASONE SODIUM PHOSPHATE 6 MG: 4 INJECTION, SOLUTION INTRAMUSCULAR; INTRAVENOUS at 08:20

## 2021-10-04 RX ADMIN — HYDROMORPHONE HYDROCHLORIDE 0.5 MG: 1 INJECTION, SOLUTION INTRAMUSCULAR; INTRAVENOUS; SUBCUTANEOUS at 09:10

## 2021-10-04 RX ADMIN — MIDAZOLAM 2 MG: 1 INJECTION INTRAMUSCULAR; INTRAVENOUS at 08:12

## 2021-10-04 ASSESSMENT — MIFFLIN-ST. JEOR: SCORE: 2066.5

## 2021-10-04 NOTE — ANESTHESIA PROCEDURE NOTES
Airway       Patient location during procedure: OR       Procedure Start/Stop Times: 10/4/2021 8:16 AM  Staff -        Anesthesiologist:  Kitty Huff MD       Resident/Fellow: Regis Mayen MD       Performed By: resident  Consent for Airway        Urgency: elective  Indications and Patient Condition       Indications for airway management: david-procedural       Induction type:intravenous       Mask difficulty assessment: 3 - difficult mask (inadequate, unstable, or two providers) +/- NMBA    Final Airway Details       Final airway type: endotracheal airway       Successful airway: ETT - single and Oral  Endotracheal Airway Details        ETT size (mm): 8.0       Cuffed: yes       Successful intubation technique: direct laryngoscopy       DL Blade Type: MAC 4       Grade View of Cords: 3       Adjucts: stylet       Measured from: gums/teeth       Secured at (cm): 24       Bite block used: Oral Airway    Post intubation assessment        Placement verified by: capnometry, equal breath sounds and chest rise        Number of attempts at approach: 1       Number of other approaches attempted: 0       Secured with: pink tape       Ease of procedure: difficult       Dentition: Intact and Unchanged    Additional Comments       Grade 3 view with Mac4, barely able to visualize laryngeal cartilages even with ramp of blankets.

## 2021-10-04 NOTE — OR NURSING
Pt doing well. Pain is tolerable per pt report. Dressings have been reinforced and we will send home some extra tape if needed. Pt and pt's wife are knowledgeable in the dressing changes.     Pt did express having some numbess/tingling in L hand thumb and index finger. MDA update no new orders. Pt encouraged to call primary MD office if numbness/tingling does not subsided. Plan to transition to phase 2 shortly.

## 2021-10-04 NOTE — DISCHARGE INSTRUCTIONS
Same-Day Surgery   Adult Discharge Orders & Instructions     For 24 hours after surgery:  1. Get plenty of rest.  A responsible adult must stay with you for at least 24 hours after you leave the hospital.   2. Pain medication can slow your reflexes. Do not drive or use heavy equipment.  If you have weakness or tingling, don't drive or use heavy equipment until this feeling goes away.  3. Mixing alcohol and pain medication can cause dizziness and slow your breathing. It can even be fatal. Do not drink alcohol while taking pain medication.  4. Avoid strenuous or risky activities.  Ask for help when climbing stairs.   5. You may feel lightheaded.  If so, sit for a few minutes before standing.  Have someone help you get up.   6. If you have nausea (feel sick to your stomach), drink only clear liquids such as apple juice, ginger ale, broth or 7-Up.  Rest may also help.  Be sure to drink enough fluids.  Move to a regular diet as you feel able. Take pain medications with a small amount of solid food, such as toast or crackers, to avoid nausea.   7. A slight fever is normal. Call the doctor if your fever is over 100 F (37.7 C) (taken under the tongue) or lasts longer than 24 hours.  8. You may have a dry mouth, muscle aches, trouble sleeping or a sore throat.  These symptoms should go away after 24 hours.  9. Do not make important or legal decisions.   Pain Management:      1. Take pain medication (if prescribed) for pain as directed by your physician.        2. WARNING: If the pain medication you have been prescribed contains Tylenol  (acetaminophen), DO NOT take additional doses of Tylenol (acetaminophen).     Call your doctor for any of the followin.  Signs of infection (fever, growing tenderness at the surgery site, severe pain, a large amount of drainage or bleeding, foul-smelling drainage, redness, swelling).    2.  It has been over 8 to 10 hours since surgery and you are still not able to urinate (pee).    3.   Headache for over 24 hours.    4.  Numbness, tingling or weakness the day after surgery (if you had spinal anesthesia).  To contact a doctor, call _____________________________________ or:      [phone] and ask for the Resident On Call for:          ________________________adult plastics__________________ (answered 24 hours a day)      Emergency Department:  [facility]: [phone]  [facility]: [phone]               Rev. 10/2014   Caring for Your Incision    You ll need to care for your incision after surgery and certain medical procedures. To close an incision, your doctor used sutures (stitches), steri-strips, staples, or dermabond. Follow the tips on this sheet to help heal and prevent infection of your incision.   Types of Incision Closure:    Surgical Sutures (stitches) are placed by sewing the edges of an incision together with surgical thread. Sutures are either absorbable or non-absorbable. Absorbable sutures break down in the body over time. Non-absorbable sutures need to be removed.     Steri-strips are made of adhesive (sticky) material to help hold the edges of an incision together. Steri-strips usually fall off by themselves in 7 to 10 days.     Surgical Staples are made or steel or titanium. They are often used to close shallow incisions. They are not used on certain body areas, such as the face and hands. This is because these areas have nerves that are close to the surface. Staples are usually removed within a week.     Dermabond (skin glue) is used to close a cut or small incision. The skin glue is less painful than stitches (sutures). In some cases, a lower layer of skin may be sutured before Dermabond is applied. The skin glue closes the cut/incision within a few minutes. It also provides a water-resistant covering. No bandage is required. Dermabond peels off on its own within 5 to 10 days.  Home Care for Your  Incision:    Keep the incision clean  and dry. You should bathe only as directed by the doctor. It is okay to wash around the incision, but don t spray water directly on it.     Check the incision site daily for pain, redness, drainage, swelling, or separation of the incision edges.     If you have a dressing over the incision, change the dressing as directed by the doctor/homecare RN.    Make sure any clothing that touches the incision is loose fitting. This will prevent rubbing. If the incision is on the head, avoid wearing caps or other head coverings. These may rub against the incision.    Avoid rough play, contact sports, or physical activities. This can put you at risk of opening an incision.     As your incision heals, the skin may appear pink or red. It may also feel slightly bumpy or raised. This is called a healing ridge. Over time, the color should fade and the raised skin will become less noticeable.   Call the doctor right away if you have any of the following:    Increased pain, redness, drainage, swelling or bleeding at the incision site    Numbness, coldness or tingling around the incision site    Fever of 101 F degrees or higher  Rev. 4/2014

## 2021-10-04 NOTE — ANESTHESIA POSTPROCEDURE EVALUATION
Patient: Sean Woodard    Procedure: Procedure(s):  EXCISION, HIDRADENITIS - bilateral axillae, bilateral groin       Diagnosis:Hidradenitis suppurativa [L73.2]  Diagnosis Additional Information: No value filed.    Anesthesia Type:  General    Note:  Disposition: Outpatient   Postop Pain Control: Uneventful            Sign Out: Well controlled pain   PONV: No   Neuro/Psych: Uneventful            Sign Out: Acceptable/Baseline neuro status   Airway/Respiratory: Uneventful            Sign Out: Acceptable/Baseline resp. status   CV/Hemodynamics: Uneventful            Sign Out: Acceptable CV status; No obvious hypovolemia; No obvious fluid overload   Other NRE:    DID A NON-ROUTINE EVENT OCCUR?            Last vitals:  Vitals Value Taken Time   /96 10/04/21 1200   Temp 36.4  C (97.6  F) 10/04/21 1145   Pulse 68 10/04/21 1203   Resp 14 10/04/21 1203   SpO2 98 % 10/04/21 1203   Vitals shown include unvalidated device data.    Electronically Signed By: Kitty Borges MD  October 4, 2021  12:05 PM

## 2021-10-04 NOTE — OR NURSING
Called into OR to talk to Dr. Garibay concerning patient's increased numbness and tingling extending to all fingers and into hand. She said she would come assess at bedside.

## 2021-10-04 NOTE — PROGRESS NOTES
[facility]  Pre/Post Care Unit 3A        Sean Woodard  [address] [address]      Date: 10/4/2021      TO WHOM IT MAY CONCERN:    Sean Woodard was seen at our hospital for a procedure on 10/4/2021.  Patient may return to school or work 10/11/2021.  The patient has the following activity restrictions: no Gym or vigorous exercise for 1 week.    Sincerely,      Edelmira Kong RN   Pre/Post Care Unit  10/4/2021  1:27 PM

## 2021-10-04 NOTE — TELEPHONE ENCOUNTER
"Please contact Sean back today at [phone]    Reason for Disposition    Caller has NON-URGENT question and triager unable to answer question    Additional Information    Negative: Sounds like a life-threatening emergency to the triager    Negative: Chest pain    Negative: Difficulty breathing    Negative: Acting confused (e.g., disoriented, slurred speech) or excessively sleepy    Negative: Surgical incision symptoms and questions    Negative: Discomfort (pain, burning or stinging) when passing urine and male    Negative: Discomfort (pain, burning or stinging) when passing urine and female    Negative: Constipation    Negative: New or worsening leg (calf, thigh) pain    Negative: New or worsening leg swelling    Negative: Dizziness is severe, or persists > 24 hours after surgery    Negative: Symptoms arising from use of a urinary catheter (Garcia or Coude)    Negative: Cast problems or questions    Negative: Medication question    Negative: Bright red, wide-spread, sunburn-like rash    Negative: SEVERE headache and after spinal (epidural) anesthesia    Negative: Vomiting and persists > 4 hours    Negative: Vomiting and abdomen looks much more swollen than usual    Negative: Drinking very little and dehydration suspected (e.g., no urine > 12 hours, very dry mouth, very lightheaded)    Negative: Patient sounds very sick or weak to the triager    Negative: Sounds like a serious complication to the triager    Negative: Fever > 100.4 F (38.0 C)    Negative: Caller has URGENT question and triager unable to answer question    Negative: SEVERE post-op pain (e.g., excruciating, pain scale 8-10) that is not controlled with pain medications    Negative: Headache and after spinal (epidural) anesthesia and not severe    Negative: Fever present > 3 days (72 hours)    Negative: Patient wants to be seen    Answer Assessment - Initial Assessment Questions  1. SYMPTOM: \"What's the main symptom you're concerned about?\" (e.g., " "pain, fever, vomiting)      Left hand numbness and swelling  2. ONSET: \"When did symptoms  start?\"      Numbness was present immediately after surgery, swelling started about 10 minutes ago  3. SURGERY: \"What surgery was performed?\"      EXCISION, HIDRADENITIS - bilateral axillae, bilateral groin  4. DATE of SURGERY: \"When was surgery performed?\"       10/4/21  5. ANESTHESIA: \" What type of anesthesia did you have?\" (e.g., general, spinal, epidural, local)      general  6. PAIN: \"Is there any pain?\" If so, ask: \"How bad is it?\"  (Scale 1-10; or mild, moderate, severe)      No pain in left hand  7. FEVER: \"Do you have a fever?\" If so, ask: \"What is your temperature, how was it measured, and when did it start?\"      denies  8. VOMITING: \"Is there any vomiting?\" If yes, ask: \"How many times?\"      none  9. BLEEDING: \"Is there any bleeding?\" If so, ask: \"How much?\" and \"Where?\"      none  10. OTHER SYMPTOMS: \"Do you have any other symptoms?\" (e.g., drainage from wound, painful urination, constipation)        Is having difficulty moving index and thumb due to swelling, denies color changes to hand.  Left hand is cooler than right , nail blanching normal, is experiencing slight tingling.  This is the hand which the IV was in during surgery    Protocols used: POST-OP SYMPTOMS AND [id number]-PR      "

## 2021-10-04 NOTE — ANESTHESIA PREPROCEDURE EVALUATION
Anesthesia Pre-Procedure Evaluation    Patient: Sean Woodard   MRN: [id number] : 1980        Preoperative Diagnosis: Hidradenitis suppurativa [L73.2]   Procedure : Procedure(s):  EXCISION, HIDRADENITIS - bilateral axillae, bilateral groin     Past Medical History:   Diagnosis Date     Diabetes (H)      Hypertension      NO ACTIVE PROBLEMS      Obese      Sleep apnea       Past Surgical History:   Procedure Laterality Date     EXCISE HIDRADENITIS (LOCATION) Left 2021    Procedure: Sharp excisional debridement of left axillary hidradenitis.;  Surgeon: Shahnaz Garibay MD;  Location: [facility] OR     ORTHOPEDIC SURGERY  2005    RT knee arthroscopic menisectomy     Thumb Surgery  2009    Mass removal neuroma      Allergies   Allergen Reactions     Lisinopril Cough      Social History     Tobacco Use     Smoking status: Former Smoker     Packs/day: 0.50     Years: 5.00     Pack years: 2.50     Types: Cigarettes     Quit date: 2005     Years since quittin.7     Smokeless tobacco: Never Used   Substance Use Topics     Alcohol use: Yes     Comment: Social. 1 drink per month      Wt Readings from Last 1 Encounters:   21 117.9 kg (260 lb)        Anesthesia Evaluation   Pt has had prior anesthetic. Type: General.    History of anesthetic complications  - difficult airway.  Previous intubation required 4 attempts, was successfully intubated with CMAC D blade. Difficult to mask, required two providers for a seal.    ROS/MED HX  ENT/Pulmonary:     (+) sleep apnea, uses CPAP,     Neurologic:  - neg neurologic ROS     Cardiovascular:     (+) hypertension-----    METS/Exercise Tolerance:  Comment: Losartan     Hematologic:  - neg hematologic  ROS     Musculoskeletal:   (+) arthritis,     GI/Hepatic:  - neg GI/hepatic ROS     Renal/Genitourinary:  - neg Renal ROS     Endo:     (+) type II DM, Not using insulin,     Psychiatric/Substance Use:       Infectious Disease:  - neg infectious disease ROS      Malignancy:  - neg malignancy ROS     Other:               OUTSIDE LABS:  CBC:   Lab Results   Component Value Date    WBC 6.2 06/27/2021    WBC 12.3 (H) 06/26/2021    HGB 13.4 06/27/2021    HGB 15.1 06/26/2021    HCT 41.0 06/27/2021    HCT 43.3 06/26/2021     06/27/2021     06/26/2021     BMP:   Lab Results   Component Value Date     06/27/2021     06/26/2021    POTASSIUM 3.6 06/27/2021    POTASSIUM 3.6 06/26/2021    CHLORIDE 109 06/27/2021    CHLORIDE 105 06/26/2021    CO2 25 06/27/2021    CO2 25 06/26/2021    BUN 13 06/27/2021    BUN 14 06/26/2021    CR 0.94 06/27/2021    CR 1.00 06/26/2021     (H) 06/27/2021     (H) 06/26/2021     COAGS:   Lab Results   Component Value Date    INR 0.98 09/08/2020     POC:   Lab Results   Component Value Date     (H) 06/27/2021     HEPATIC:   Lab Results   Component Value Date    ALBUMIN 3.2 (L) 06/27/2021    PROTTOTAL 6.9 06/27/2021    ALT 48 06/27/2021    AST 15 06/27/2021    ALKPHOS 57 06/27/2021    BILITOTAL 0.7 06/27/2021     OTHER:   Lab Results   Component Value Date    LACT 1.9 06/26/2021    A1C 5.8 (H) 04/23/2021    DAWIT 8.6 06/27/2021    TSH 1.63 10/01/2018    CRP <2.9 10/01/2018       Anesthesia Plan    ASA Status:  3   NPO Status:  Will be NPO Appropriate at ...    Anesthesia Type: General.     - Airway: ETT         Techniques and Equipment:     - Airway: Video-Laryngoscope         Consents            Postoperative Care    Pain management: Multi-modal analgesia.   PONV prophylaxis: Ondansetron (or other 5HT-3)     Comments:                Regis Mayen MD

## 2021-10-04 NOTE — OR NURSING
Dr. Garibay came to bedside, assessed patient's left arm numbness and tingling as well as incision site. Dr. Garibay re-dressed patient's left axilla dressing and stated it might be numb for a couple days.Talked with patient's wife to watch the numbness and tingling and call if worsening.

## 2021-10-04 NOTE — ANESTHESIA CARE TRANSFER NOTE
Patient: Sean Woodard    Procedure(s):  EXCISION, HIDRADENITIS - bilateral axillae, bilateral groin    Diagnosis: Hidradenitis suppurativa [L73.2]  Diagnosis Additional Information: No value filed.    Anesthesia Type:   General     Note:    Oropharynx: spontaneously breathing  Level of Consciousness: awake and drowsy  Oxygen Supplementation: face mask  Level of Supplemental Oxygen (L/min / FiO2): 6  Independent Airway: airway patency satisfactory and stable  Dentition: dentition unchanged  Vital Signs Stable: post-procedure vital signs reviewed and stable  Report to RN Given: handoff report given  Patient transferred to: PACU    Handoff Report: Identifed the Patient, Identified the Reponsible Provider, Reviewed the pertinent medical history, Discussed the surgical course, Reviewed Intra-OP anesthesia mangement and issues during anesthesia, Set expectations for post-procedure period and Allowed opportunity for questions and acknowledgement of understanding      Vitals:  Vitals Value Taken Time   /85 10/04/21 1100   Temp 36.9  C (98.4  F) 10/04/21 1055   Pulse 77 10/04/21 1113   Resp 17 10/04/21 1113   SpO2 100 % 10/04/21 1113   Vitals shown include unvalidated device data.    Electronically Signed By: Regis Mayen MD  October 4, 2021  11:14 AM

## 2021-10-04 NOTE — BRIEF OP NOTE
[facility] Brief Operative Note    Pre-operative diagnosis: Hidradenitis suppurativa [L73.2]   Post-operative diagnosis * No post-op diagnosis entered *  Same as above   Procedure: Procedure(s):  EXCISION, HIDRADENITIS - bilateral axillae, bilateral groin   Surgeon(s): Surgeon(s) and Role:     * Shahnaz Garibay MD - Primary     * Latonya Smith PA-C - Resident - Assisting   Estimated blood loss: * No values recorded between 10/4/2021  8:40 AM and 10/4/2021 10:17 AM *    Specimens: ID Type Source Tests Collected by Time Destination   1 : please section through areas with suture for Erik HS study Tissue Axilla, Left SURGICAL PATHOLOGY EXAM Shahnaz Garibay MD 10/4/2021  9:03 AM    2 : please section through areas with suture for Erik study Tissue Axilla, Right SURGICAL PATHOLOGY EXAM Shahnaz Garibay MD 10/4/2021  9:04 AM    3 : please section through areas with suture for Erik HS study Tissue Groin, Right SURGICAL PATHOLOGY EXAM Shahnaz Garibay MD 10/4/2021  9:04 AM       Findings: Excision of bilateral axillary HS and R groin HS.

## 2021-10-05 NOTE — PROVIDER NOTIFICATION
Dressings in bilateral axilla and right groin.  Bloody drainage on dressing left axilla.  Redness/sores on bilateral cheeks(in beard) and at base of posterior neck.  
During post-op phone call pt states he does still have swelling and numbness in Left hand and arm.  He did not think it is worse today than yesterday, and this was present on discharge.  Pt was told by Dr. Garibay it may last a few days.  Pt is well versed in using [facility], Instructed pt to photograph and send message with how he is doing and photos to Dr. Garibay's team via [facility].  He did not feel like he needed to be seen.  
No

## 2021-10-07 LAB
PATH REPORT.COMMENTS IMP SPEC: NORMAL
PATH REPORT.COMMENTS IMP SPEC: NORMAL
PATH REPORT.FINAL DX SPEC: NORMAL
PATH REPORT.GROSS SPEC: NORMAL
PATH REPORT.MICROSCOPIC SPEC OTHER STN: NORMAL
PHOTO IMAGE: NORMAL

## 2021-10-08 NOTE — OP NOTE
Procedure Date: 10/04/2021    ATTENDING SURGEON:  Shahnaz Garibay MD    ASSISTANT:  Latonya Smith PA-C (ALEJANDRA did 50% of case due to lack of resident).    SECOND ASSISTANT:  Patricia Larios MS-4    PREOPERATIVE DIAGNOSIS:  Hidradenitis, bilateral axillae, bilateral groins.    POSTOPERATIVE DIAGNOSIS:  Bilateral axillae hidradenitis and right inguinal crease, multiple skin tags of the buttock region.    PROCEDURE:  Wide local excision of right axillary hidradenitis, left recurrent posterior axillary hidradenitis, right superior, medial and inferior groin crease hidradenitis wide local excision, removal of numerous skin tags from bilateral gluteal region.    ANESTHESIA:  GET.    ESTIMATED BLOOD LOSS:  25 mL    COUNTS:  Correct.    COMPLICATIONS:  None.    DRAINS:  None.    INDICATIONS FOR PROCEDURE:  This is a 41-year-old male with history of hidradenitis suppurativa involving his axilla and groin.  He has already undergone wide local excision from the left axilla a number of months ago back in May.  He healed by secondary intention but, unfortunately, has had a recurrence at the posterior end of his incision on the left.  His right axilla has a fairly extensive involvement with HS including tunnels, abscesses and burnt out scar lesions.  Some of them are actively draining.  He now is having some independent lesions down in the right groin crease with quite a large section anteriorly or superiorly and a couple smaller satellites more inferior and laterally.  These are tender, inflamed and draining.  This patient is on Humira, but is requesting and requiring more aggressive management with excision.  He already had a local excision of the left axilla and did well.  A decision was made to bring the patient back and do these multiple areas this time since he is used to doing dressing changes.  We will try to close when we can.  We are not requiring that he stop his suppressive medications.  He is on long-term  doxycycline.    DESCRIPTION OF PROCEDURE:  The patient was seen in the preoperative waiting area.  After history and physical was updated, the areas for resection were circled with a marker with the patient's guidance.  This included the wide local excision from the right axilla, the posterior recurrence in the left axilla, several sites along the right groin crease.  Some areas of the left groin crease were not very active and these were left alone.  Then, a large area on bilateral gluteal folds with numerous skin tags.  Informed consent was obtained after reviewing the possible risks and complications, including but not limited to the following:  Infection, bleeding, hematoma, seroma formation, poor healing, possible dehiscence, possible spitting sutures, possible hypertrophic scarring, possible chronic wound healing issues, possible recurrence of disease, possible residual deformities, possible need for further surgery, possible injury surrounding neurovascular musculoskeletal structures, and anesthetic risks such as DVT, PE and cardiopulmonary arrest.      The patient was then brought to the operating room and placed supine on the OR table.  After general anesthesia was administered, the patient was oral endotracheally intubated. We turned our attention to prepping and positioning.  Due to the fact that the patient had multiple sites, each of these sites was prepped separately with PCMX.  These were then draped separately to allow us to each work on an area simultaneously.  Split sheets, stockinette and Ace wraps were used to allow us mobility with his upper extremities.  After timeout was taken and the proper patient and procedure were identified, we used Marcaine with epinephrine to inject as a field block around all of our marked sites.  This was allowed to work for at least 5-7 minutes.  We then made our incisions with 10 and 15 blades around the margin of the marked active lesions.  This was then taken down  through the dermis into the subcutaneous fat.  The Bovie was then used to excise the specimen in its entirety in the adipose layer.  Great care was taken to make sure that we were not leaving residual disease behind along the perimeter of in situ skin or in the depth.  While we excised the right 3 groin lesions separately, these were marked with suture through lesions that hopefully pathology will section through those areas.  We were unable to start our preoperative ultrasound study quite yet, but still wanted to at least look at the histology for some of the distinct lesions.  This included the right groin sections as well as the right axilla, although specimens ultimately went into path for permanent sectioning.     With regard to the bilateral axillary areas, we were basically unable to close either 1.  The left side, albeit a much smaller defect, still had a lot of tight skin from healing by secondary intention with contracture; therefore, we did not do much in the way of suturing.  The right axilla could not really be closed, even in the corners.  We got down into the good healthy fat.      For the groin on the right side, we did end up combining all 3 of those lesions into 1 long incision and that facilitated some loose closure.  That was done with 3-0 PDS followed by 3-0 Prolene vertical mattress sutures.      All wounds were irrigated with lavage before closing or dressing.  Hemostasis was achieved with cautery.  Dressings were placed including Adaptic, Xeroform sheet with Kerlix fluffs and ABDs and mesh panties.  Everything was secured with Medipore tape.      Our final measurements were as follows:  The right arm pit was 15 x 19 x 2 cm.  The left arm pit was 5 x 2 x 1 cm.  The right groin superior lesion was 8.9 x 4.8 x 1.7 added to the inferior medial 1 measuring 5.6 x 2.5 x 0.5 cm and then an inferolateral lesion of the right groin crease measuring 3.2 x 1.6 x 1.6 cm.  Four path specimens were processed  in total, but with specific lesions marked with suture to alert the pathologist where we would like to have resections done to ultimately compare for histologic anatomy versus ultrasonic preoperative assessment.    With regard to the skin tags on the buttocks, these were just snipped off with iris scissors.  He did not even require the use of cautery.  This too was dressed.      Once we had everything dressed, [facility]al region.  A Garcia had not been placed.  The patient was ultimately extubated and transferred to a stretcher, and taken to the recovery room in satisfactory condition, having tolerated the procedure without difficulty or complication.    Shahnaz Garibay MD        D: 10/08/2021   T: 10/08/2021   MT: MARYAM    Name:     SEB SMITHGene  MRN:      [id number]        Account:        [id number]   :      1980           Procedure Date: 10/04/2021     Document: [id number]

## 2021-10-11 ENCOUNTER — MYC MEDICAL ADVICE (OUTPATIENT)
Dept: PLASTIC SURGERY | Facility: CLINIC | Age: 41
End: 2021-10-11

## 2021-10-11 ENCOUNTER — VIRTUAL VISIT (OUTPATIENT)
Dept: DERMATOLOGY | Facility: CLINIC | Age: 41
End: 2021-10-11
Payer: COMMERCIAL

## 2021-10-11 DIAGNOSIS — L73.2 HIDRADENITIS SUPPURATIVA: Primary | ICD-10-CM

## 2021-10-11 PROCEDURE — 99214 OFFICE O/P EST MOD 30 MIN: CPT | Mod: 95 | Performed by: DERMATOLOGY

## 2021-10-11 ASSESSMENT — PAIN SCALES - GENERAL: PAINLEVEL: MODERATE PAIN (5)

## 2021-10-11 NOTE — PROGRESS NOTES
[facility] Dermatology Note  Encounter Date: Oct 11, 2021  MyChart Connected - video failure 944 am  Telephone visit: Start: 1003 End: 1008    Dermatology Problem List:  1. Hidradenitis suppurativa - Hamilton stage III involving axillae > neck, groin.   - s/p WLE (BL axillae, groin crease) 10/4/21 w/ Dr. Garibay              - current treatment: adalimumab, topical clindamycin/benzoyl peroxide, intralesional triamcinolone                   - past treatment: doxycycline, other oral antibiotics, isotretinoin, adalimumab, infliximab (neuropathy), ustekinumab (insurance issues)     ____________________________________________    Assessment & Plan:     1. Hidradenitis suppurativa - Hamilton stage III.  Completed WLE of axillae and groin crease 10/4/21. Will continue adalimumab 40 mg q7d. Instructed patient to discuss timing of resumption with Dr. Garibay. Can escalate dosing (has been on 80 mg qwk in past) in future if evidence of recurrence. Last TB reviewed 07/2021 negative; due next July.  -Continue adalimumab 40 mg weekly  -Continue benzoyl peroxide and clindamycin topically  -Clarify with Dr. Garibay regarding timing of resumption of Humira  -We will follow up in 3 months - can assess need for dose escalation versus alternative at that visit    Procedures Performed:    None    Follow-up: 3 month(s) virtually (telephone with photos), or earlier for new or changing lesions    Staff and Resident:     This patient was staffed with Dr. Avina.    Sean Sandhu MD  Internal Medicine - Dermatology PGY 4    Staff Physician Comments:   I evaluated any available patient photographs with the resident and I edited the assessment and plan as documented in the note. I was present on the line and participated in the entire telephone call.    Derrick Avina MD   of Dermatology  Department of Dermatology  [facility]  Medicine  ____________________________________________    CC: Derm Problem (Sean, is here for an HS appt, follow up after surgery, no other concerns )    HPI:  Mr. Sean Woodard is a(n) 41 year old male who presents today as a return patient for HS. patient underwent wide local excision 10/4/2021.  He still has some postoperative pain.  He has no additional active lesions at present.  He seems to be healing well.  He is wondering whether or not he should continue his adalimumab or how long to hold. Thought the two months of humira prior to surgery stabilized things but was still getting new lesions. Nervous he may get recurrence. Patient is otherwise feeling well, without additional skin concerns.    Labs Reviewed:  N/A    Physical Exam:  Gen: pleasant  Pulm: non-labored breathing    Medications:  Current Outpatient Medications   Medication     adalimumab (HUMIRA *CF*) 40 MG/0.4ML pen kit     atorvastatin (LIPITOR) 20 MG tablet     augmented betamethasone dipropionate (DIPROLENE-AF) 0.05 % ointment     azelastine (ASTELIN) 0.1 % nasal spray     azelastine (OPTIVAR) 0.05 % ophthalmic solution     benzoyl peroxide 5 % external liquid     blood glucose (ACCU-CHEK GUIDE) test strip     cetirizine (ZYRTEC) 10 MG tablet     clindamycin (CLINDAMAX) 1 % lotion     clindamycin-benzoyl peroxide (BENZACLIN) 1-5 % external gel     diphenhydrAMINE (BENADRYL) 25 MG capsule     doxycycline hyclate (VIBRA-TABS) 100 MG tablet     fluticasone (FLONASE) 50 MCG/ACT nasal spray     hydrOXYzine (ATARAX) 25 MG tablet     ISOtretinoin (ACCUTANE PO)     losartan-hydrochlorothiazide (HYZAAR) 50-12.5 MG tablet     metFORMIN (GLUCOPHAGE-XR) 500 MG 24 hr tablet     ondansetron (ZOFRAN-ODT) 4 MG ODT tab     phenazopyridine (PYRIDIUM) 200 MG tablet     ustekinumab (STELARA) 90 MG/ML     Current Facility-Administered Medications   Medication     triamcinolone acetonide (KENALOG-10) injection 10 mg      Past Medical/Surgical History:   Patient  Active Problem List   Diagnosis     Health Care Home     Hyperlipidemia with target LDL less than 100     Benign essential hypertension     PTSD (post-traumatic stress disorder)     Class 3 severe obesity due to excess calories with serious comorbidity in adult (H)     Diabetes mellitus, type 2 (H)     FER (obstructive sleep apnea)     Hidradenitis suppurativa     Hidradenitis axillaris     Fatty liver     UTI (urinary tract infection)     Left ureteral stone     Lab test negative for COVID-19 virus     Past Medical History:   Diagnosis Date     Diabetes (H)      Hypertension      NO ACTIVE PROBLEMS      Obese      Sleep apnea        CC Susanne Alberto PA-C  [id number]  [place] [phone] on close of this encounter.

## 2021-10-11 NOTE — NURSING NOTE
Chief Complaint   Patient presents with     Derm Problem     Sean, is here for an HS appt, follow up after surgery, no other concerns     Barrera Castillo EMT

## 2021-10-11 NOTE — TELEPHONE ENCOUNTER
Pt called to say that Dr Garibay is out of town and that he should call the MD who orders the Humira to ask this question. Pt also reminded to think about the last surgery for this answer.     Pt complained that the edges of the wound are sticking to the dressing and encouraged to apply a small amount of Vaseline to the edges to prevent that or leave dressings in place and take a shower to moisten the gauze for easier removal.     All concerns addressed.

## 2021-10-11 NOTE — PATIENT INSTRUCTIONS
No changes to your present medications.  We would recommend clarifying with your surgeon regarding when to resume the Humira.  We will see you back in 3 months to check in.

## 2021-10-11 NOTE — LETTER
10/11/2021       RE: Sean Woodard  [address] [facility] [address]     Dear Colleague,    Thank you for referring your patient, Sean Woodard, to the [facility] [place] at [facility]. Please see a copy of my visit note below.    [facility] Dermatology Note  Encounter Date: Oct 11, 2021  MyChart Connected - video failure 944 am  Telephone visit: Start: 1003 End: 1008    Dermatology Problem List:  1. Hidradenitis suppurativa - Hamilton stage III involving axillae > neck, groin.   - s/p WLE (BL axillae, groin crease) 10/4/21 w/ Dr. Garibay              - current treatment: adalimumab, topical clindamycin/benzoyl peroxide, intralesional triamcinolone                   - past treatment: doxycycline, other oral antibiotics, isotretinoin, adalimumab, infliximab (neuropathy), ustekinumab (insurance issues)     ____________________________________________    Assessment & Plan:     1. Hidradenitis suppurativa - Hamilton stage III.  Completed WLE of axillae and groin crease 10/4/21. Will continue adalimumab 40 mg q7d. Instructed patient to discuss timing of resumption with Dr. Garibay. Can escalate dosing (has been on 80 mg qwk in past) in future if evidence of recurrence. Last TB reviewed 07/2021 negative; due next July.  -Continue adalimumab 40 mg weekly  -Continue benzoyl peroxide and clindamycin topically  -Clarify with Dr. Garibay regarding timing of resumption of Humira  -We will follow up in 3 months - can assess need for dose escalation versus alternative at that visit    Procedures Performed:    None    Follow-up: 3 month(s) virtually (telephone with photos), or earlier for new or changing lesions    Staff and Resident:     This patient was staffed with Dr. Avina.    Sean Sandhu MD  Internal Medicine - Dermatology PGY 4    Staff Physician Comments:   I evaluated any available patient photographs with the  resident and I edited the assessment and plan as documented in the note. I was present on the line and participated in the entire telephone call.    Derrick Avina MD   of Dermatology  Department of Dermatology  [facility]  ____________________________________________    CC: Derm Problem (Sean, is here for an HS appt, follow up after surgery, no other concerns )    HPI:  Mr. Sean Woodard is a(n) 41 year old male who presents today as a return patient for HS. patient underwent wide local excision 10/4/2021.  He still has some postoperative pain.  He has no additional active lesions at present.  He seems to be healing well.  He is wondering whether or not he should continue his adalimumab or how long to hold. Thought the two months of humira prior to surgery stabilized things but was still getting new lesions. Nervous he may get recurrence. Patient is otherwise feeling well, without additional skin concerns.    Labs Reviewed:  N/A    Physical Exam:  Gen: pleasant  Pulm: non-labored breathing    Medications:  Current Outpatient Medications   Medication     adalimumab (HUMIRA *CF*) 40 MG/0.4ML pen kit     atorvastatin (LIPITOR) 20 MG tablet     augmented betamethasone dipropionate (DIPROLENE-AF) 0.05 % ointment     azelastine (ASTELIN) 0.1 % nasal spray     azelastine (OPTIVAR) 0.05 % ophthalmic solution     benzoyl peroxide 5 % external liquid     blood glucose (ACCU-CHEK GUIDE) test strip     cetirizine (ZYRTEC) 10 MG tablet     clindamycin (CLINDAMAX) 1 % lotion     clindamycin-benzoyl peroxide (BENZACLIN) 1-5 % external gel     diphenhydrAMINE (BENADRYL) 25 MG capsule     doxycycline hyclate (VIBRA-TABS) 100 MG tablet     fluticasone (FLONASE) 50 MCG/ACT nasal spray     hydrOXYzine (ATARAX) 25 MG tablet     ISOtretinoin (ACCUTANE PO)     losartan-hydrochlorothiazide (HYZAAR) 50-12.5 MG tablet     metFORMIN (GLUCOPHAGE-XR) 500 MG 24 hr tablet     ondansetron  (ZOFRAN-ODT) 4 MG ODT tab     phenazopyridine (PYRIDIUM) 200 MG tablet     ustekinumab (STELARA) 90 MG/ML     Current Facility-Administered Medications   Medication     triamcinolone acetonide (KENALOG-10) injection 10 mg      Past Medical/Surgical History:   Patient Active Problem List   Diagnosis     Health Care Home     Hyperlipidemia with target LDL less than 100     Benign essential hypertension     PTSD (post-traumatic stress disorder)     Class 3 severe obesity due to excess calories with serious comorbidity in adult (H)     Diabetes mellitus, type 2 (H)     FER (obstructive sleep apnea)     Hidradenitis suppurativa     Hidradenitis axillaris     Fatty liver     UTI (urinary tract infection)     Left ureteral stone     Lab test negative for COVID-19 virus     Past Medical History:   Diagnosis Date     Diabetes (H)      Hypertension      NO ACTIVE PROBLEMS      Obese      Sleep apnea        CC Susanne Alberto PA-C  [address] [id number] on close of this encounter.

## 2021-10-21 ENCOUNTER — HOSPITAL ENCOUNTER (OUTPATIENT)
Dept: WOUND CARE | Facility: CLINIC | Age: 41
Discharge: HOME | End: 2021-10-21
Attending: SURGERY | Admitting: SURGERY
Payer: COMMERCIAL

## 2021-10-21 VITALS
HEART RATE: 81 BPM | DIASTOLIC BLOOD PRESSURE: 95 MMHG | TEMPERATURE: 97.5 F | RESPIRATION RATE: 16 BRPM | SYSTOLIC BLOOD PRESSURE: 138 MMHG

## 2021-10-21 DIAGNOSIS — L73.2 HIDRADENITIS SUPPURATIVA: ICD-10-CM

## 2021-10-21 PROBLEM — J30.9 ALLERGIC RHINITIS: Status: ACTIVE | Noted: 2021-10-21

## 2021-10-21 PROBLEM — J20.9 ACUTE BRONCHITIS: Status: ACTIVE | Noted: 2021-10-21

## 2021-10-21 PROBLEM — H93.19 TINNITUS: Status: ACTIVE | Noted: 2021-10-21

## 2021-10-21 PROBLEM — L72.3 SEBACEOUS CYST: Status: ACTIVE | Noted: 2021-10-21

## 2021-10-21 PROBLEM — D23.9 BENIGN NEOPLASM OF SKIN: Status: ACTIVE | Noted: 2021-10-21

## 2021-10-21 PROBLEM — M25.469 EFFUSION OF KNEE: Status: ACTIVE | Noted: 2021-10-21

## 2021-10-21 PROBLEM — Z11.1 SCREENING EXAMINATION FOR PULMONARY TUBERCULOSIS: Status: ACTIVE | Noted: 2021-10-21

## 2021-10-21 PROBLEM — N48.9 LESION OF PENIS: Status: ACTIVE | Noted: 2021-10-21

## 2021-10-21 PROBLEM — M25.569 PAIN IN JOINT, LOWER LEG: Status: ACTIVE | Noted: 2021-10-21

## 2021-10-21 PROBLEM — L70.8 OTHER ACNE: Status: ACTIVE | Noted: 2021-10-21

## 2021-10-21 PROBLEM — M79.646 PAIN OF FINGER: Status: ACTIVE | Noted: 2021-10-21

## 2021-10-21 PROCEDURE — 97602 WOUND(S) CARE NON-SELECTIVE: CPT

## 2021-10-21 NOTE — DISCHARGE INSTRUCTIONS
[facility]  [address] [address]    Call us at [phone] if you have any questions about your wounds, have redness or swelling around your wound, have a fever of 101 or greater or if you have any other problems or concerns. We answer the phone Monday through Friday 8 am to 4 pm, please leave a message as we check the voicemail frequently throughout the day.     Sean Woodard      1980    Wound care recommendations to left armpit ulcer and right armpit and right groin  Cleanse wound and surrounding skin in shower with gentle soap and water, pat dry  Cover wound with antimicrobial gauze cut to to fit the wound and cover with ABD pad and secure with tape  Change dressing daily     David Garibay M.D.. October 21, 2021      If you had a positive experience please indicate on your patient satisfaction survey form that [facility] will be sending you.    If you have any billing related questions please call the [facility] Business office at [phone]. The clinic staff does not handle billing related matters.

## 2021-10-21 NOTE — PROGRESS NOTES
"Visit Date: 10/21/2021    This is a 41-year-old Hmong gentleman, who has a history of hidradenitis, who is here today for further followup after his most recent surgical excision procedure on 10/04.    He is well versed that doing wound care at home with his wife and things are \"going good.\"  They have been buying the AMD off of [employer], so they are less affected by the shortage to the medical supply companies.  At this point, he rates his pain as a 3/10, and he is really not taking any narcotics, except for maybe occasional Tylenol.  He states that things are still draining, but looking good, and he has resumed taking his Humira.  He follows with Dr. Avina in the Dermatology Department at the .  We did take some specimens and sent those to [facility] on the 4th and those were sent predominantly for growth, but also to do a few sections through a couple of lesions that we will be referring to for study with Dr. Mustapha Valencia at the .  I think Dr. Valecnia would like to add Marlo to their HS registry, so Marlo has given me his cell phone number and is interested in asking some questions, etc.  His cell is [phone].    On exam, the right axillary region, which was the largest area, was not able to be closed at all.  We just left it to heal by secondary intention and everything is granulating nicely now.  It is a little hypertrophic, but I think we will not change any of our dressings at this point.  The left posterior axillary area was kind of a recurrence or a new lesion at the end of his previous excision that healed by secondary intention and that is doing very well.  It is very small and filling in nicely.  The only other area was with multiple skin tabs from his bilateral buttocks, most that have since healed and are not problematic.  The right inguinal incision that we actually combined multiple lesions and 1 linear defect has an area of slight dehiscence, but otherwise, it is intact, but still looking pretty " edematous.  I think it is a bit too early to remove the sutures, but those will need to be taken out in about 2-3 more weeks.  He is glad that we at least tried to close things so he did not have a large wound in that area as well.  He basically just jumps in the shower or uses a wound cleanser before putting dry AMD on a daily basis.  He will let us know otherwise if there are any other issues or concerns.  Otherwise, we will have him see our physician assistant, Karen Coates, on  for suture removal from the right inguinal crease, and then I will see him virtually on .     Please see nursing notes for wound dimensions, photos, and vital signs today.  Labs: Recent Labs   Lab Test 10/04/21  0606 21  0528 21  0040 21  1431 20  1013 20  1002 10/15/18  1000 10/01/18  1801   ALBUMIN  --  3.2*  --  3.9  --   --    < > 4.1   HGB 14.7 13.4   < > 16.0 15.2   < >   < > 16.3   INR  --   --   --   --  0.98  --   --   --    WBC  --  6.2   < > 9.1 6.8   < >   < > 7.4   A1C  --   --   --  5.8*  --    < >   < > 6.3*   CRP  --   --   --   --   --   --   --  <2.9    < > = values in this interval not displayed.     Nutrition requirements were discussed with patient today.  Vitals:  BP (!) 138/95 (BP Location: Right arm)   Pulse 81   Temp 97.5  F (36.4  C) (Temporal)   Resp 16   Wound:     Photo:       Shahnaz Garibay MD        D: 10/21/2021   T: 10/21/2021   MT: [facility]    Name:     SEB SIMTH  MRN:      [id number]        Account:    [id number]   :      1980           Visit Date: 10/21/2021     Document: [id number]

## 2021-11-11 ENCOUNTER — HOSPITAL ENCOUNTER (OUTPATIENT)
Dept: WOUND CARE | Facility: CLINIC | Age: 41
Discharge: HOME | End: 2021-11-11
Attending: SURGERY | Admitting: SURGERY
Payer: COMMERCIAL

## 2021-11-11 VITALS
DIASTOLIC BLOOD PRESSURE: 104 MMHG | HEART RATE: 111 BPM | SYSTOLIC BLOOD PRESSURE: 137 MMHG | TEMPERATURE: 97.2 F | RESPIRATION RATE: 18 BRPM

## 2021-11-11 DIAGNOSIS — L73.2 HIDRADENITIS SUPPURATIVA: ICD-10-CM

## 2021-11-11 PROCEDURE — 97602 WOUND(S) CARE NON-SELECTIVE: CPT

## 2021-11-11 NOTE — DISCHARGE INSTRUCTIONS
Sean Woodard      1980    Wound care recommendations to left armpit ulcer and right armpit   Cleanse wound and surrounding skin in shower with gentle soap and water, pat dry  Cover wound with woundres gel on antimicrobial gauze cut to to fit the wound and cover with ABD pad and secure with tape  Change dressing daily  Wound Dressing Change:Right Groin  Cleanse wound and surrounding skin in shower with gentle soap and water, pat dry  Cover wound with benzoyl peroxide and clindamycin topically  Change dressing daily         David Garibay M.D.. November 11, 2021    Call us at [phone] if you have any questions about your wounds, have redness or swelling around your wound, have a fever of 101 or greater or if you have any other problems or concerns. We answer the phone Monday through Friday 8 am to 4 pm, please leave a message as we check the voicemail frequently throughout the day.     If you had a positive experience please indicate on your patient satisfaction survey form that  [facility] [employer] will be sending you.    If you have any billing related questions please call the  [employer] Business office at [phone]. The clinic staff does not handle billing related matters.

## 2021-11-12 NOTE — PROGRESS NOTES
Visit Date: 11/11/2021    [facility] VISIT NOTE    SUBJECTIVE:  This is a 41-year-old gentleman who has progressive hidradenitis.  He is status post right axillary HS excision with healing by secondary intention as well as re-resection of the posterior aspect of his left axilla, which has otherwise healed from his first debridement.  He had also asked us to address the disease that is in his right inguinal crease and this was temporarily closed with sutures.  While the axilla are doing well just with dry AMD gauze, the right inguinal crease is looking very angry and inflammatory.  There is an area of hypertrophic granulation tissue inferiorly and all along the incision there is a lot of kind of bubbling ruborous soft tissue under the surface even though it is approximated.  There are also some satellite lesions on the scrotum.  This area is quite tender for him.  The sutures were removed today, but unless we can settle things down with his previous topical applications of clindamycin and benzoyl peroxide or any other medical management by Dr. Avina in dermatology, Sean may need another excision of the right groin and this time I would not bother trying to close it.  As far as his left axilla is concerned, he notes that there is already a lesion kind of adjacent to the closing wound, within a couple of centimeters of that, seems to be a cropping up but has not come to a head at this point.  Not surprisingly, he is quite disappointed in the groin healing process, but his axilla fared much better with a lot of that inflammatory tissue gone and the immediate postsurgical pain resolved.     PLAN:  We will have him use AMD for the right groin after applying his topical and antimicrobials and then continue to use the dry AMD with the armpit, but to add some wound dress which is a collagen containing gel to see if providing some additional moisture and/or collagen would hasten the  reepithelialization process.  He is game to try.  He was given samples today.  I think we have a tele visit set up with him on 12/01 sp we will see how his arm pits are doing with the addition of the gel and also see if the topicals are doing anything to help the groin or if we need to find another surgical date for him.  Please see nursing notes for dimensions of the wound, vital signs and photos today.    Labs: Recent Labs   Lab Test 10/04/21  0606 06/27/21  0528 06/07/21  0040 04/23/21  1431 09/08/20  1013 10/15/18  1000 10/01/18  1801   ALBUMIN  --  3.2*  --  3.9  --    < > 4.1   HGB 14.7 13.4   < > 16.0 15.2   < > 16.3   INR  --   --   --   --  0.98  --   --    WBC  --  6.2   < > 9.1 6.8   < > 7.4   A1C  --   --   --  5.8*  --    < > 6.3*   CRP  --   --   --   --   --   --  <2.9    < > = values in this interval not displayed.     Nutrition requirements were discussed with patient today.  Vitals:  BP (!) 137/104 (BP Location: Left arm)   Pulse 111   Temp 97.2  F (36.2  C) (Temporal)   Resp 18   Wound:     Photo:             Further instructions from your care team       Sean Woodard      1980    Wound care recommendations to left armpit ulcer and right armpit   Cleanse wound and surrounding skin in shower with gentle soap and water, pat dry  Cover wound with woundres gel on antimicrobial gauze cut to to fit the wound and cover with ABD pad and secure with tape  Change dressing daily  Wound Dressing Change:Right Groin  Cleanse wound and surrounding skin in shower with gentle soap and water, pat dry  Cover wound with benzoyl peroxide and clindamycin topically  Change dressing daily         David Garibay M.D.. November 11, 2021    Call us at [phone] if you have any questions about your wounds, have redness or swelling around your wound, have a fever of 101 or greater or if you have any other problems or concerns. We answer the phone Monday through Friday 8 am to 4 pm, please leave a message as  we check the voicemail frequently throughout the day.     If you had a positive experience please indicate on your patient satisfaction survey form that  [employer] [facility] will be sending you.    If you have any billing related questions please call the  [employer] [facility] office at [phone]. The clinic staff does not handle billing related matters.           Shahnaz Garibay MD        D: 2021   T: 2021   MT: [facility]    Name:     SEB SMITHGene  MRN:      [id number]        Account:    [id number]   :      1980           Visit Date: 2021     Document: [id number]

## 2021-11-23 NOTE — TELEPHONE ENCOUNTER
Derrick Avina MD [place], [place], [facility]    Caller: Unspecified (2 days ago,  2:26 PM)               Thanks! I also put in a referral for home infusions - can you check on that tomorrow?     Thanks!  Awais         (1) abnormal pulmonary function (COPD)

## 2021-11-27 ENCOUNTER — HEALTH MAINTENANCE LETTER (OUTPATIENT)
Age: 41
End: 2021-11-27

## 2021-12-02 ENCOUNTER — HOSPITAL ENCOUNTER (OUTPATIENT)
Dept: WOUND CARE | Facility: CLINIC | Age: 41
End: 2021-12-02
Attending: SURGERY
Payer: COMMERCIAL

## 2021-12-02 ENCOUNTER — MYC MEDICAL ADVICE (OUTPATIENT)
Dept: PLASTIC SURGERY | Facility: CLINIC | Age: 41
End: 2021-12-02
Payer: COMMERCIAL

## 2021-12-02 DIAGNOSIS — L73.2 HIDRADENITIS SUPPURATIVA: ICD-10-CM

## 2021-12-02 NOTE — PROGRESS NOTES
Patient called for a telephone visit. Certified Wound Care Nurse time spent evaluating patient record, completed a full evaluation and documented wound(s) & david-wound skin; provided recommendation based on treatment plan. Reviewed discharge instructions, patient education, and discussed plan of care with appropriate medical team staff members and patient and/or family members.     All concerns addressed.

## 2021-12-02 NOTE — DISCHARGE INSTRUCTIONS
Sean Woodard      1980    Wound care recommendations to left armpit ulcer and right armpit as needed until healed  Cleanse wound and surrounding skin in shower with gentle soap and water, pat dry  Cover wound with woundres gel on antimicrobial gauze cut to to fit the wound and  cover with ABD pad and secure with tape  Change dressing daily    Wound Dressing Change:Right Groin  Cleanse wound and surrounding skin in shower with gentle soap and water, pat dry  Cover wound with benzoyl peroxide and clindamycin topically  Change dressing daily    Dr Garibay will contact Dr Valencia to discuss any future surgery.     David aGribay M.D. December 2, 2021    Call us at [phone] if you have any questions about your wounds, have redness or swelling around your wound, have a fever of 101 or greater or if you have any other problems or concerns. We answer the phone Monday through Friday 8 am to 4 pm, please leave a message as we check the voicemail frequently throughout the day.     If you had a positive experience please indicate on your patient satisfaction survey form that [facility] will be sending you.    If you have any billing related questions please call the [facility] Business office at [phone]. The clinic staff does not handle billing related matters.

## 2021-12-03 NOTE — PROGRESS NOTES
Visit Date: 12/02/2021    [facility] VISIT NOTE    SUBJECTIVE:  This is a telephone visit that lasted 10 minutes with the patient.  He is a 41-year-old  gentleman with a history of hidradenitis involving his bilateral axillae and also his right groin.  He underwent surgery a number of weeks ago for re-resection of the left axilla, first resection for the right axilla, and then first resection for the right groin.  While the axillae are healing nicely, the right groin did not heal well.  He has had ongoing inflammatory changes and adjacent active lesions.  Despite using his topical cream with clindamycin and benzoyl peroxide, the photos he sent today still show active disease.  He states it is still draining.  It does look better after the sutures were removed, but it is still very inflamed.  As far as the photos of his axillae are concerned, the right is progressing well with new skin evident around the perimeter.  The left axilla essentially has healed, although there is some purplish discoloration underneath the scar suggestive of possible inflammatory tissue.  There are also some satellite lesions that are becoming more symptomatic.  He is still on Humira and just finished a 2-week course of p.o. doxycycline for his scalp, which did respond, but it did not do much for his groin area.  He sees Dr. Derrick Avina at the  and I am wondering if a second opinion with Dr. Mustapha Valencia would be at all helpful.  If there is no other medical treatment to be added to his regimen then he is slated for additional excision from the right groin and leaving the wound open this time.  He is resigned to that possibility and we will wait to hear what Dr. Valencia after his chart is reviewed.    Marlo is doing okay with regards to supplies for wound cares as well as his meds.  He is always very interested in other possible causes for this and today wondered if his hormones might be causing the problem.  I  did try to explain that obesity is not helpful, and this may be somewhat related to his hormonal levels.  He was also told that sometimes trying to eat  to decrease inflammation might be helpful not only as far as weight control, but also as far as immune system reactivity.  He will wait to hear from us.  Otherwise, we have an in-person followup on 01/06/2022 at 8:10 in the morning.  We will go from there, depending on what Dr. Valencia has to add.  There are no measurements through with these photos that were submitted by the patient today.  Labs: Recent Labs   Lab Test 10/04/21  0606 06/27/21  0528 06/07/21  0040 04/23/21  1431 09/08/20  1013 10/15/18  1000 10/01/18  1801   ALBUMIN  --  3.2*  --  3.9  --    < > 4.1   HGB 14.7 13.4   < > 16.0 15.2   < > 16.3   INR  --   --   --   --  0.98  --   --    WBC  --  6.2   < > 9.1 6.8   < > 7.4   A1C  --   --   --  5.8*  --    < > 6.3*   CRP  --   --   --   --   --   --  <2.9    < > = values in this interval not displayed.     Nutrition requirements were discussed with patient today.  Vitals:  There were no vitals taken for this visit.  Wound:   Wound (used by OP [facility] only) 03/16/21 0923 Left other (see comments) (Active)   Thickness/Stage full thickness 12/02/21 1541   Dressing Appearance moist drainage 12/02/21 1541   Base epithelialization 12/02/21 1541   Periwound intact 12/02/21 1541   Periwound Temperature warm 12/02/21 1541   Periwound Skin Turgor soft 12/02/21 1541   Drainage Characteristics/Odor serosanguineous 12/02/21 1541   Drainage Amount moderate 12/02/21 1541       Wound (used by OP [facility] only) 03/16/21 0923 Right other (see comments) (Active)   Thickness/Stage full thickness 12/02/21 1541   Dressing Appearance moist drainage 12/02/21 1541   Base granulating 12/02/21 1541   Periwound intact 12/02/21 1541   Periwound Temperature warm 12/02/21 1541   Periwound Skin Turgor soft 12/02/21 1541   Edges open 12/02/21 1541   Drainage Characteristics/Odor  sanguineous 21 1541   Drainage Amount moderate 21 1541      Photo: No images are attached to the encounter.      Further instructions from your care team       Sean LIMA Vance      1980    Wound care recommendations to left armpit ulcer and right armpit as needed until healed  Cleanse wound and surrounding skin in shower with gentle soap and water, pat dry  Cover wound with woundres gel on antimicrobial gauze cut to to fit the wound and  cover with ABD pad and secure with tape  Change dressing daily    Wound Dressing Change:Right Groin  Cleanse wound and surrounding skin in shower with gentle soap and water, pat dry  Cover wound with benzoyl peroxide and clindamycin topically  Change dressing daily    Dr Garibay will contact Dr Valencia to discuss any future surgery.     David Garibay M.D. 2021    Call us at [phone] if you have any questions about your wounds, have redness or swelling around your wound, have a fever of 101 or greater or if you have any other problems or concerns. We answer the phone Monday through Friday 8 am to 4 pm, please leave a message as we check the voicemail frequently throughout the day.     If you had a positive experience please indicate on your patient satisfaction survey form that  [facility] will be sending you.    If you have any billing related questions please call the  [employer] Business office at [phone]. The clinic staff does not handle billing related matters.             Shahnaz Garibay MD        D: 2021   T: 2021   MT: [facility]    Name:     SEAN SMITHGene  MRN:      [id number]        Account:    [id number]   :      1980           Visit Date: 2021     Document: [id number]

## 2022-01-03 DIAGNOSIS — L73.2 HIDRADENITIS SUPPURATIVA: ICD-10-CM

## 2022-01-05 ENCOUNTER — MYC MEDICAL ADVICE (OUTPATIENT)
Dept: PLASTIC SURGERY | Facility: CLINIC | Age: 42
End: 2022-01-05
Payer: COMMERCIAL

## 2022-01-05 NOTE — TELEPHONE ENCOUNTER
adalimumab (HUMIRA *CF*) 40 MG/0.4ML pen kit    Last Written Prescription Date:   7/7/2021  Last Fill Quantity: 1.6,   # refills: 4  Last Office Visit :  10/11/2021  Future Office visit:  1/10/2022    Routing refill request to provider for review/approval because:  Drug not on the [facility], [facility] or [facility] refill protocol or controlled substance      Niki Montes RN  Central Triage Red Flags/Med Refills

## 2022-01-06 ENCOUNTER — HOSPITAL ENCOUNTER (OUTPATIENT)
Dept: WOUND CARE | Facility: CLINIC | Age: 42
End: 2022-01-06
Attending: SURGERY
Payer: COMMERCIAL

## 2022-01-06 DIAGNOSIS — L73.2 HIDRADENITIS SUPPURATIVA: ICD-10-CM

## 2022-01-06 RX ORDER — LIDOCAINE 50 MG/G
OINTMENT TOPICAL
COMMUNITY
Start: 2021-12-08 | End: 2022-09-16

## 2022-01-06 RX ORDER — IBUPROFEN 600 MG/1
TABLET, FILM COATED ORAL
COMMUNITY
Start: 2021-12-08

## 2022-01-06 NOTE — PROGRESS NOTES
Labs: Recent Labs   Lab Test 10/04/21  0606 06/27/21  0528 06/07/21  0040 04/23/21  1431 09/08/20  1013 10/15/18  1000 10/01/18  1801   ALBUMIN  --  3.2*  --  3.9  --    < > 4.1   HGB 14.7 13.4   < > 16.0 15.2   < > 16.3   INR  --   --   --   --  0.98  --   --    WBC  --  6.2   < > 9.1 6.8   < > 7.4   A1C  --   --   --  5.8*  --    < > 6.3*   CRP  --   --   --   --   --   --  <2.9    < > = values in this interval not displayed.     Nutrition requirements were discussed with patient today.  Vitals:  There were no vitals taken for this visit.  Wound:   Wound (used by OP [facility] only) 03/16/21 0923 Right other (see comments) (Active)   Thickness/Stage full thickness 01/06/22 0829   Dressing Appearance moist drainage 01/06/22 0829   Base granulating 01/06/22 0829   Periwound intact 01/06/22 0829   Periwound Temperature warm 01/06/22 0829   Periwound Skin Turgor soft 01/06/22 0829   Edges open 01/06/22 0829   Length (cm) 3 01/06/22 0829   Width (cm) 3 01/06/22 0829   Depth (cm) 0.1 01/06/22 0829   Wound (cm^2) 9 cm^2 01/06/22 0829   Wound Volume (cm^3) 0.9 cm^3 01/06/22 0829   Wound healing % 55 01/06/22 0829   Drainage Characteristics/Odor sanguineous 01/06/22 0829   Drainage Amount moderate 01/06/22 0829   Care, Wound non-select wound debridement performed 01/06/22 0829                                    Further instructions from your care team         Sean Woodard      1980    Wound care recommendations to right armpit as needed until healed  Cleanse wound and surrounding skin in shower with gentle soap and water, pat dry  Cover wound with woundres gel on antimicrobial gauze cut to to fit the wound and  cover with ABD pad and secure with tape  Change dressing daily     Wound Dressing Change:Right Groin  Cleanse wound and surrounding skin in shower with gentle soap and water, pat dry  Cover wound with benzoyl peroxide and clindamycin topically  Change dressing daily      David Garibay M.D.. January 6,  2022    Call us at [phone] if you have any questions about your wounds, have redness or swelling around your wound, have a fever of 101 or greater or if you have any other problems or concerns. We answer the phone Monday through Friday 8 am to 4 pm, please leave a message as we check the voicemail frequently throughout the day.     If you had a positive experience please indicate on your patient satisfaction survey form that  "[employer] [place] will be sending you.    If you have any billing related questions please call the  "[employer] Business office at [phone]. The clinic staff does not handle billing related matters.

## 2022-01-06 NOTE — PROGRESS NOTES
Patient called for a telephone visit. Certified Wound Care Nurse time spent evaluating patient record, completed a full evaluation and documented wound(s) & david-wound skin; provided recommendation based on treatment plan. Reviewed discharge instructions, patient education, and discussed plan of care with appropriate medical team staff members and patient and/or family members.

## 2022-01-06 NOTE — TELEPHONE ENCOUNTER
Received refill request for humira as the resident on call. Reviewed patient's chart and attached communication. Patient last seen 10/2021. RTC 1/2022, quant gold < 1 yr negative . After reviewing the medication list and assessment and plan from last visit, the refill request was accepted.    Calista Trimble PGY4  Dermatology Resident  pager      CC'ing Dr Avina as FYI only  .

## 2022-01-06 NOTE — DISCHARGE INSTRUCTIONS
Sean Woodard      1980    Wound care recommendations to right armpit as needed until healed  Cleanse wound and surrounding skin in shower with gentle soap and water, pat dry  Cover wound with woundres gel on antimicrobial gauze cut to to fit the wound and  cover with ABD pad and secure with tape  Change dressing daily     Wound Dressing Change:Right Groin  Cleanse wound and surrounding skin in shower with gentle soap and water, pat dry  Cover wound with benzoyl peroxide and clindamycin topically  Change dressing daily      David Garibay M.D.. January 6, 2022    Call us at [phone] if you have any questions about your wounds, have redness or swelling around your wound, have a fever of 101 or greater or if you have any other problems or concerns. We answer the phone Monday through Friday 8 am to 4 pm, please leave a message as we check the voicemail frequently throughout the day.     If you had a positive experience please indicate on your patient satisfaction survey form that [facility] will be sending you.    If you have any billing related questions please call the [facility] Business office at [phone]. The clinic staff does not handle billing related matters.

## 2022-01-06 NOTE — PROGRESS NOTES
Visit Date: 01/06/2022    This is a 49-year-old  gentleman who has hidradenitis.  He is on Humira, but no suppressive antibiotics.  We had recently resected his right axillary disease, and recurrent left posterior axillary disease, and active right inguinal groin disease.  Sean submitted photos today electronically, and it appears that the left axillary area has completely healed.  He does, however, have a new active lesion more on his upper inner arm that is somewhat uncomfortable and drains on occasion.  The right axilla is doing very well.  It still has some raw exposed granulation tissue centrally.  It looks very clean and not hypertrophic, and there is evidence of new skin around the perimeter.  It does not look like there are any other new lesions in the area.  The part that bothers him most is the right groin.  He had a lot of healing issues with that after surgery since we did close things primarily.  He still has some activity along the incision.  He has just been using clindamycin and benzoyl peroxide topical for the groin and then using some moisturizing hydrogel with AMD for the armpit.  At this point, he is holding his own.  He suspects that we will need to do some more surgery on his right groin, possibly the left arm, but for now, he is able to withstand it.  He is due to see his dermatologist, Dr. Avina, on the 10th of this month, and he is also curious about [facility] and whether they might have some new ideas for treatment.  I think getting a second opinion is a great idea.  I am not sure that there is any new real surgical options other than to resect and not to close and heal by secondary intention, but perhaps there is some new medical management developments.  For now, we will continue with his current cares.  He has sufficient supplies and does not need any help with re-ordering.  He is interested in following up with us on the telephone again on 02/17/2022.  At that point, we  can see how his wounds are doing and then see if he wants to go down to [place] for a consultation.  Also, at that time, we can kind of look ahead at the schedule and see if we can squeeze him somewhere for possible resection of whatever areas seem to be bothering him.  I do not think it is wise to do frequent surgery visits for just isolated areas, particularly given staffing shortage and difficulty in scheduling the OR, so if we need to squeeze him onto an already booked schedule that would be easier than trying to reserve a time and then not use it.  Please see photos in Epic.  Marlo will let us know if there are any other issues or concerns that he needs help with.    Total time for the visit was no more than 10 minutes.    Shahnaz Garibay MD        D: 2022   T: 2022   MT: SALVADOR    Name:     SEB SMITH  MRN:      [id number]        Account:    [id number]   :      1980           Visit Date: 2022     Document: [id number]

## 2022-01-10 ENCOUNTER — TELEPHONE (OUTPATIENT)
Dept: DERMATOLOGY | Facility: CLINIC | Age: 42
End: 2022-01-10

## 2022-01-10 ENCOUNTER — VIRTUAL VISIT (OUTPATIENT)
Dept: DERMATOLOGY | Facility: CLINIC | Age: 42
End: 2022-01-10
Payer: COMMERCIAL

## 2022-01-10 DIAGNOSIS — L73.2 HIDRADENITIS SUPPURATIVA: Primary | ICD-10-CM

## 2022-01-10 DIAGNOSIS — D84.9 IMMUNOSUPPRESSION (H): ICD-10-CM

## 2022-01-10 PROCEDURE — 99213 OFFICE O/P EST LOW 20 MIN: CPT | Mod: 95 | Performed by: DERMATOLOGY

## 2022-01-10 RX ORDER — CLINDAMYCIN HCL 300 MG
300 CAPSULE ORAL 2 TIMES DAILY
Qty: 60 CAPSULE | Refills: 3 | Status: SHIPPED | OUTPATIENT
Start: 2022-01-10 | End: 2023-12-07 | Stop reason: ALTCHOICE

## 2022-01-10 ASSESSMENT — PAIN SCALES - GENERAL: PAINLEVEL: NO PAIN (0)

## 2022-01-10 NOTE — TELEPHONE ENCOUNTER
PA Initiation    Medication: Cosentyx  Insurance Company: [facility] [employer] - Phone [phone] Fax [phone]  ID#: [id number]  [facility] [place] [facility] [place] [facility] [place] [address] [place] [address]  Filling Pharmacy Phone:    Filling Pharmacy Fax:    Start Date: 1/10/2022    CMM Aceves [id number], Had to fax the PA Form to [facility] FEP

## 2022-01-10 NOTE — NURSING NOTE
Chief Complaint   Patient presents with     Derm Problem     Sean, is here for a 3 month follow up     Barrera Castillo EMT

## 2022-01-10 NOTE — LETTER
1/10/2022       RE: Sean Woodard  [address] [facility] [address]     Dear Colleague,    Thank you for referring your patient, Sean Woodard, to the [facility] [place] at [facility]. Please see a copy of my visit note below.    [facility] Dermatology Note  Encounter Date: Santos 10, 2022  Store-and-Forward and Telephone ([phone]). Location of teledermatologist: [facility] [place].  Start time: 11:28. End time: 11:53.    Dermatology Problem List:  1. Hidradenitis suppurativa - Hamilton stage III involving axillae > neck, groin.              - s/p WLE (BL axillae, groin crease) 10/4/21 w/ Dr. Garibay              - current treatment: adalimumab, topical clindamycin/benzoyl peroxide, intralesional triamcinolone                   - past treatment: doxycycline, other oral antibiotics, isotretinoin, adalimumab, infliximab (neuropathy), ustekinumab (insurance issues)  ____________________________________________    Assessment & Plan:     1. Hidradenitis suppurativa: s/p excisions in axillae and groin, but with persistent disease activity in the groin and recurrent in the left axilla. We discussed risks/benefits of next steps in treatment including repeat surgical treatment, increasing adalimumab back to 80 mg weekly (he had been on this in the past), and switching to secukinumab. Will try to obtain secukinumab. If unable to obtain, will try to get 80 mg weekly, or another biologic such as risankizumab. Will restart clindamycin in the interim and continue current adalimumab dosing. Recommend follow up with surgery re: repeating excision in groin and L axilla, if desired, though would like him to be medically optimized for this.  - try to obtain secukinumab; continue adalimumab 40 mg weekly in the interim. If unable to obtain, increase adalimumab to 80 mg weekly  - start clindamycin 300  mg BID  - continue topicals    Procedures Performed:    None    Follow-up: 3 months    Staff:     Derrick Avina MD, FAAD   of Dermatology  Department of Dermatology  [facility]    ____________________________________________    CC: No chief complaint on file.    HPI:  Mr. Sean Woodard is a(n) 41 year old male who presents today as a return patient for hidradenitis suppurativa    Hidradenitis suppurativa - right axilla - doing well  - left axilla - overall healing but a couple spots of inflammation have recurred  - groin - still quite active and draining - had been closed with stitches - inflammation recurred around stitches   - may revise in the future  - on adalimumab 40 mg weekly  - was on doxycycline until about 8 weeks ago    Patient is otherwise feeling well, without additional skin concerns.    Labs Reviewed:  N/A    Physical Exam:  Vitals: There were no vitals taken for this visit.  SKIN: Teledermatology photos were reviewed; image quality and interpretability: acceptable. Image date: 1/7/22.  - erythematous subcutaneous nodules and papules in the groin > left axilla  - surgical wound in the right axilla  - No other lesions of concern on areas examined.     Medications:  Current Outpatient Medications   Medication     adalimumab (HUMIRA *CF*) 40 MG/0.4ML pen kit     atorvastatin (LIPITOR) 20 MG tablet     augmented betamethasone dipropionate (DIPROLENE-AF) 0.05 % ointment     azelastine (ASTELIN) 0.1 % nasal spray     azelastine (OPTIVAR) 0.05 % ophthalmic solution     benzoyl peroxide 5 % external liquid     blood glucose (ACCU-CHEK GUIDE) test strip     cetirizine (ZYRTEC) 10 MG tablet     diclofenac (VOLTAREN) 1 % topical gel     diphenhydrAMINE (BENADRYL) 25 MG capsule     doxycycline hyclate (VIBRA-TABS) 100 MG tablet     fluticasone (FLONASE) 50 MCG/ACT nasal spray     hydrOXYzine (ATARAX) 25 MG tablet     ibuprofen (ADVIL/MOTRIN) 600 MG tablet      ISOtretinoin (ACCUTANE PO)     lidocaine (XYLOCAINE) 5 % external ointment     losartan-hydrochlorothiazide (HYZAAR) 50-12.5 MG tablet     metFORMIN (GLUCOPHAGE-XR) 500 MG 24 hr tablet     ondansetron (ZOFRAN-ODT) 4 MG ODT tab     phenazopyridine (PYRIDIUM) 200 MG tablet     ustekinumab (STELARA) 90 MG/ML     Current Facility-Administered Medications   Medication     triamcinolone acetonide (KENALOG-10) injection 10 mg      Past Medical/Surgical History:   Patient Active Problem List   Diagnosis     Health Care Home     Hyperlipidemia with target LDL less than 100     Benign essential hypertension     PTSD (post-traumatic stress disorder)     Class 3 severe obesity due to excess calories with serious comorbidity in adult (H)     Diabetes mellitus, type 2 (H)     FER (obstructive sleep apnea)     Hidradenitis suppurativa     Hidradenitis axillaris     Fatty liver     UTI (urinary tract infection)     Left ureteral stone     Lab test negative for COVID-19 virus     Acute bronchitis     Allergic rhinitis     Benign neoplasm of skin     Effusion of knee     Encounter for other physical therapy     Lesion of penis     Low back pain     Other acne     Other chest pain     Pain in joint, lower leg     Pain of finger     Screening examination for pulmonary tuberculosis     Sebaceous cyst     Epidermoid cyst of skin     Tear of medial cartilage or meniscus of knee, current     Tinnitus     Borderline diabetes mellitus     Past Medical History:   Diagnosis Date     Diabetes (H)      Hypertension      NO ACTIVE PROBLEMS      Obese      Sleep apnea        CC Susanne Alberto PA-C  [id number] [id number] on close of this encounter.

## 2022-01-10 NOTE — PROGRESS NOTES
[facility] Dermatology Note  Encounter Date: Santos 10, 2022  Store-and-Forward and Telephone ([phone]). Location of teledermatologist: [facility] [place].  Start time: 11:28. End time: 11:53.    Dermatology Problem List:  1. Hidradenitis suppurativa - Hamilton stage III involving axillae > neck, groin.              - s/p WLE (BL axillae, groin crease) 10/4/21 w/ Dr. Garibay              - current treatment: adalimumab, topical clindamycin/benzoyl peroxide, intralesional triamcinolone                   - past treatment: doxycycline, other oral antibiotics, isotretinoin, adalimumab, infliximab (neuropathy), ustekinumab (insurance issues)  ____________________________________________    Assessment & Plan:     1. Hidradenitis suppurativa: s/p excisions in axillae and groin, but with persistent disease activity in the groin and recurrent in the left axilla. We discussed risks/benefits of next steps in treatment including repeat surgical treatment, increasing adalimumab back to 80 mg weekly (he had been on this in the past), and switching to secukinumab. Will try to obtain secukinumab. If unable to obtain, will try to get 80 mg weekly, or another biologic such as risankizumab. Will restart clindamycin in the interim and continue current adalimumab dosing. Recommend follow up with surgery re: repeating excision in groin and L axilla, if desired, though would like him to be medically optimized for this.  - try to obtain secukinumab; continue adalimumab 40 mg weekly in the interim. If unable to obtain, increase adalimumab to 80 mg weekly  - start clindamycin 300 mg BID  - continue topicals    Procedures Performed:    None    Follow-up: 3 months    Staff:     Derrick Avina MD, FAAD   of Dermatology  Department of Dermatology  [facility] [employer] [facility]    ____________________________________________    CC: No chief complaint on  file.    HPI:  Mr. Sean Woodard is a(n) 41 year old male who presents today as a return patient for hidradenitis suppurativa    Hidradenitis suppurativa - right axilla - doing well  - left axilla - overall healing but a couple spots of inflammation have recurred  - groin - still quite active and draining - had been closed with stitches - inflammation recurred around stitches   - may revise in the future  - on adalimumab 40 mg weekly  - was on doxycycline until about 8 weeks ago    Patient is otherwise feeling well, without additional skin concerns.    Labs Reviewed:  N/A    Physical Exam:  Vitals: There were no vitals taken for this visit.  SKIN: Teledermatology photos were reviewed; image quality and interpretability: acceptable. Image date: 1/7/22.  - erythematous subcutaneous nodules and papules in the groin > left axilla  - surgical wound in the right axilla  - No other lesions of concern on areas examined.     Medications:  Current Outpatient Medications   Medication     adalimumab (HUMIRA *CF*) 40 MG/0.4ML pen kit     atorvastatin (LIPITOR) 20 MG tablet     augmented betamethasone dipropionate (DIPROLENE-AF) 0.05 % ointment     azelastine (ASTELIN) 0.1 % nasal spray     azelastine (OPTIVAR) 0.05 % ophthalmic solution     benzoyl peroxide 5 % external liquid     blood glucose (ACCU-CHEK GUIDE) test strip     cetirizine (ZYRTEC) 10 MG tablet     diclofenac (VOLTAREN) 1 % topical gel     diphenhydrAMINE (BENADRYL) 25 MG capsule     doxycycline hyclate (VIBRA-TABS) 100 MG tablet     fluticasone (FLONASE) 50 MCG/ACT nasal spray     hydrOXYzine (ATARAX) 25 MG tablet     ibuprofen (ADVIL/MOTRIN) 600 MG tablet     ISOtretinoin (ACCUTANE PO)     lidocaine (XYLOCAINE) 5 % external ointment     losartan-hydrochlorothiazide (HYZAAR) 50-12.5 MG tablet     metFORMIN (GLUCOPHAGE-XR) 500 MG 24 hr tablet     ondansetron (ZOFRAN-ODT) 4 MG ODT tab     phenazopyridine (PYRIDIUM) 200 MG tablet     ustekinumab (STELARA) 90 MG/ML      Current Facility-Administered Medications   Medication     triamcinolone acetonide (KENALOG-10) injection 10 mg      Past Medical/Surgical History:   Patient Active Problem List   Diagnosis     Health Care Home     Hyperlipidemia with target LDL less than 100     Benign essential hypertension     PTSD (post-traumatic stress disorder)     Class 3 severe obesity due to excess calories with serious comorbidity in adult (H)     Diabetes mellitus, type 2 (H)     FER (obstructive sleep apnea)     Hidradenitis suppurativa     Hidradenitis axillaris     Fatty liver     UTI (urinary tract infection)     Left ureteral stone     Lab test negative for COVID-19 virus     Acute bronchitis     Allergic rhinitis     Benign neoplasm of skin     Effusion of knee     Encounter for other physical therapy     Lesion of penis     Low back pain     Other acne     Other chest pain     Pain in joint, lower leg     Pain of finger     Screening examination for pulmonary tuberculosis     Sebaceous cyst     Epidermoid cyst of skin     Tear of medial cartilage or meniscus of knee, current     Tinnitus     Borderline diabetes mellitus     Past Medical History:   Diagnosis Date     Diabetes (H)      Hypertension      NO ACTIVE PROBLEMS      Obese      Sleep apnea        CC Susanne Alberto PA-C  [id number] [id number] on close of this encounter.

## 2022-01-18 NOTE — TELEPHONE ENCOUNTER
I called [facility] [phone] to check the status of the prior authorization.  Per the rep this was denied.  She is sending the denial to me now.    Uniuqe

## 2022-01-20 NOTE — TELEPHONE ENCOUNTER
PRIOR AUTHORIZATION DENIED    Medication: Cosentyx    Denial Date: 1/13/2022    Denial Rational: diagnosis is not covered    Appeal Information: [phone]

## 2022-01-28 NOTE — TELEPHONE ENCOUNTER
Ryan Calhoun,  We are going to try to obtain higher dose Humira (80 mg weekly) instead of Cosentyx. I put in a new Rx and sent to [place] [facility].  Thanks!  Awais

## 2022-01-31 ENCOUNTER — TELEPHONE (OUTPATIENT)
Dept: DERMATOLOGY | Facility: CLINIC | Age: 42
End: 2022-01-31
Payer: COMMERCIAL

## 2022-02-17 ENCOUNTER — HOSPITAL ENCOUNTER (OUTPATIENT)
Dept: WOUND CARE | Facility: CLINIC | Age: 42
End: 2022-02-17
Attending: SURGERY
Payer: COMMERCIAL

## 2022-02-17 ENCOUNTER — MYC MEDICAL ADVICE (OUTPATIENT)
Dept: WOUND CARE | Facility: CLINIC | Age: 42
End: 2022-02-17
Payer: COMMERCIAL

## 2022-02-17 DIAGNOSIS — L73.2 HIDRADENITIS SUPPURATIVA: ICD-10-CM

## 2022-02-17 NOTE — DISCHARGE INSTRUCTIONS
Sean Woodard      1980    Wound care recommendations to right armpit and left armpit  Cleanse wound and surrounding skin in shower with gentle soap and water, pat dry  Cover wound with woundres gel on antimicrobial gauze cut to to fit the wound and  cover with ABD pad and secure with tape  Change dressing daily  Wound Dressing Change:Right Groin  Cleanse wound and surrounding skin in shower with gentle soap and water, pat dry  Cover wound with benzoyl peroxide and clindamycin topically  Change dressing daily       David Garibay M.D.. February 17, 2022    Call us at [phone] if you have any questions about your wounds, have redness or swelling around your wound, have a fever of 101 or greater or if you have any other problems or concerns. We answer the phone Monday through Friday 8 am to 4 pm, please leave a message as we check the voicemail frequently throughout the day.

## 2022-02-17 NOTE — PROGRESS NOTES
Patient Active Problem List   Diagnosis    Health Care Home    Hyperlipidemia with target LDL less than 100    Benign essential hypertension    PTSD (post-traumatic stress disorder)    Class 3 severe obesity due to excess calories with serious comorbidity in adult (H)    Diabetes mellitus, type 2 (H)    FER (obstructive sleep apnea)    Hidradenitis suppurativa    Hidradenitis axillaris    Fatty liver    UTI (urinary tract infection)    Left ureteral stone    Lab test negative for COVID-19 virus    Acute bronchitis    Allergic rhinitis    Benign neoplasm of skin    Effusion of knee    Encounter for other physical therapy    Lesion of penis    Low back pain    Other acne    Other chest pain    Pain in joint, lower leg    Pain of finger    Screening examination for pulmonary tuberculosis    Sebaceous cyst    Epidermoid cyst of skin    Tear of medial cartilage or meniscus of knee, current    Tinnitus    Borderline diabetes mellitus     Past Medical History:   Diagnosis Date    Diabetes (H)     Hypertension     NO ACTIVE PROBLEMS     Obese     Sleep apnea      Labs:   Recent Labs   Lab Test 10/04/21  0606 06/27/21  0528 06/07/21  0040 04/23/21  1431 09/08/20  1013 10/15/18  1000 10/01/18  1801   ALBUMIN  --  3.2*  --  3.9  --    < > 4.1   HGB 14.7 13.4   < > 16.0 15.2   < > 16.3   INR  --   --   --   --  0.98  --   --    WBC  --  6.2   < > 9.1 6.8   < > 7.4   A1C  --   --   --  5.8*  --    < > 6.3*   CRP  --   --   --   --   --   --  <2.9    < > = values in this interval not displayed.     Nutrition requirements were discussed with patient today.  Vitals:  There were no vitals taken for this visit.  Wound:   Wound (used by OP [facility] only) 03/16/21 0923 Right other (see comments) (Active)   Thickness/Stage full thickness 02/17/22 1544   Base granulating 02/17/22 1544   Periwound intact 02/17/22 1544   Periwound Temperature warm 02/17/22 1544   Periwound Skin Turgor soft 02/17/22 1544   Edges open 02/17/22 1544   Length (cm)  0.5 02/17/22 1544   Width (cm) 0.5 02/17/22 1544   Depth (cm) 0.3 02/17/22 1544   Wound (cm^2) 0.25 cm^2 02/17/22 1544   Wound Volume (cm^3) 0.08 cm^3 02/17/22 1544   Wound healing % 98.75 02/17/22 1544   Drainage Characteristics/Odor sanguineous 02/17/22 1544   Drainage Amount moderate 02/17/22 1544   Care, Wound non-select wound debridement performed 02/17/22 1544       Wound (used by OP [facility] only) 02/17/22 1544 Left mid axillary other (see comments) (Active)   Thickness/Stage full thickness 02/17/22 1544   Base granulating 02/17/22 1544   Periwound intact 02/17/22 1544   Periwound Temperature warm 02/17/22 1544   Periwound Skin Turgor soft 02/17/22 1544   Edges open 02/17/22 1544   Length (cm) 0.5 02/17/22 1544   Width (cm) 0.5 02/17/22 1544   Depth (cm) 0.5 02/17/22 1544   Wound (cm^2) 0.25 cm^2 02/17/22 1544   Wound Volume (cm^3) 0.12 cm^3 02/17/22 1544   Drainage Characteristics/Odor serosanguineous 02/17/22 1544   Drainage Amount moderate 02/17/22 1544   Care, Wound non-select wound debridement performed 02/17/22 1544      Photo: No images are attached to the encounter.      Further instructions from your care team         Sean Woodard      1980    Wound care recommendations to right armpit and left armpit  Cleanse wound and surrounding skin in shower with gentle soap and water, pat dry  Cover wound with woundres gel on antimicrobial gauze cut to to fit the wound and  cover with ABD pad and secure with tape  Change dressing daily  Wound Dressing Change:Right Groin  Cleanse wound and surrounding skin in shower with gentle soap and water, pat dry  Cover wound with benzoyl peroxide and clindamycin topically  Change dressing daily       David Garibay M.D.. February 17, 2022    Right Groin      Left Axilla    Left Axilla    Right Axilla    Right Axilla    Right Axilla

## 2022-02-18 NOTE — PROGRESS NOTES
"Visit Date: 02/17/2022    [facility] VISIT NOTE    This was a telephone visit that essentially lasted 15 minutes long.  We are discussing Sean' progress with his axillary and right groin hidradenitis.  He is 42 and slightly overweight.  He has been struggling with recurrent and chronic hidradenitis involving his bilateral axillas and groin areas.  Within the last year, we took him to the OR for staged resection of first his axillary disease and then the right inguinal crease.  He did have some recurrence at the posterior end of the left axilla that was further excised at the time of the contralateral axilla.  He chose to heal his wounds by secondary intention, and for the most part, things have healed quite nicely.  He has some mild limitations to range of motion, but nothing significant that is of concern for him.  He has been using AMD gauze for the most part and states that other than his weekly Humira shots, he has recently been on 5-week course of clindamycin, and that was started by his dermatologist, Dr. Avina.  It is difficult to tell whether the progress of his wounds is solely related to the Humira or the antibiotics or the resection.  At his last visit by phone, we were thinking we would have to put him on the schedule to re-excise his right inguinal crease, but this is now considerably improved.  There is minimal bleeding and inflammation and overall much better.  He did have a new hole open up in a couple of spots along the right axillary incision, as well as some new open \"craters\" about 3 weeks ago along the anterior left axilla.  Interestingly, the patient had a 3-4 day delay of his most recent Humira shot, just due to forgetfulness, but he feels that this may have exacerbated his wounds.  He was talking about perhaps experimenting with different timing between his shots to see how it affects his wounds.  I strongly encouraged him to first discuss this with Dr. Avina and " that his most recent flare, if you will, may have been due to building inflammation rather than reaction to the actual Humira.  He will contact him and just discuss what would be optimal for medical management.  He is also interested in possibly going down to the [facility] specialty clinic.  They had been talking about using laser to ablate some of the diseased areas, and I certainly think that is worth learning from.  He would not necessarily have to go down there for treatments if [place] wanted to [place]  I think he would still benefit from learning their approach and having all of his options explained.  While his groin is doing better, it is still not completely healed.  So, Marlo is hoping that surgery is not off the table for that.  I think if he has optimized medical therapy and still has persistent or recurring disease that it is not too terribly large, we should be able to take him back for further revision of the right groin if so needed.    Marlo did send in pictures.  As I mentioned, they were all looking better, although there were those couple newer small holes of the axilla bilaterally.  The groin actually looks quite good compared to his last pictures.  No evidence of any gross purulence or any surrounding cellulitis, at least by phone photographs.  Marlo wanted to talk with us again by phone in mid May just to allow him some time to figure out his optimal medication regimen.  As long as he is not getting sick from his disease, I think that is perfectly reasonable.  Please see photos in the chart.    Shahnaz Garibay MD        D: 2022   T: 2022   MT: DIANNE    Name:     SEB SMITH  MRN:      [id number]        Account:    [id number]   :      1980           Visit Date: 2022     Document: [id number]

## 2022-02-28 ENCOUNTER — OFFICE VISIT (OUTPATIENT)
Dept: DERMATOLOGY | Facility: CLINIC | Age: 42
End: 2022-02-28
Payer: COMMERCIAL

## 2022-02-28 VITALS — OXYGEN SATURATION: 99 % | DIASTOLIC BLOOD PRESSURE: 88 MMHG | HEART RATE: 89 BPM | SYSTOLIC BLOOD PRESSURE: 135 MMHG

## 2022-02-28 DIAGNOSIS — L73.2 HIDRADENITIS SUPPURATIVA: Primary | ICD-10-CM

## 2022-02-28 PROCEDURE — 99207 PR DROP WITH A PROCEDURE: CPT | Performed by: PHYSICIAN ASSISTANT

## 2022-02-28 PROCEDURE — 11900 INJECT SKIN LESIONS </W 7: CPT | Performed by: PHYSICIAN ASSISTANT

## 2022-02-28 NOTE — PROGRESS NOTES
HPI:   Chief complaints: Sean Woodard is a 41 year old male who present ILK for inflamed cysts. He is post excision in bilateral axillae. Overall he is considerably improved but is flaring in one spot on the left armpit. He is currently back on Humira 80 mg weekly. In the past he has tried isotretinoin x 2, Humira, Remicade (neuropathy).   Condition present for:  years.       PHYSICAL EXAM:    /88   Pulse 89   SpO2 99%   Skin exam performed as follows: Type 3 skin. Mood appropriate  Alert and Oriented X 3. Well developed, well nourished in no distress.  General appearance: Normal  Head including face: Normal  Eyes: conjunctiva and lids: Normal  Mouth: Lips, teeth, gums: Normal  Neck: Normal  Chest-breast/axillae: Normal  Back: Normal  Spleen and liver: Normal  Cardiovascular: Exam of peripheral vascular system by observation for swelling, varicosities, edema: Normal  Genitalia: groin, buttocks: Normal  Extremities: digits/nails (clubbing): Normal  Eccrine and Apocrine glands: Normal  Right upper extremity: Normal  Left upper extremity: Normal  Right lower extremity: Normal  Left lower extremity: Normal  Skin: Scalp and body hair: See below    1. Inflamed cysts in the left axilla x 3    ASSESSMENT/PLAN:     1. Hidradenitis suppurativa here today for ILK. Kenalog 10 mg/cc injected to left. Total of 1 cc's used.  Advised on risk of atrophy and failure to respond. Advised on aftercare.           Follow-up: PRN  CC:   Scribed By: Jahaira Garcia, MS, PA-C

## 2022-02-28 NOTE — LETTER
2/28/2022         RE: Sean Woodard  [address] [id number]        Dear Colleague,    Thank you for referring your patient, Sean Woodard, to the [facility]. Please see a copy of my visit note below.    HPI:   Chief complaints: Sean Woodard is a 41 year old male who present ILK for inflamed cysts. He is post excision in bilateral axillae. Overall he is considerably improved but is flaring in one spot on the left armpit. He is currently back on Humira 80 mg weekly. In the past he has tried isotretinoin x 2, Humira, Remicade (neuropathy).   Condition present for:  years.       PHYSICAL EXAM:    /88   Pulse 89   SpO2 99%   Skin exam performed as follows: Type 3 skin. Mood appropriate  Alert and Oriented X 3. Well developed, well nourished in no distress.  General appearance: Normal  Head including face: Normal  Eyes: conjunctiva and lids: Normal  Mouth: Lips, teeth, gums: Normal  Neck: Normal  Chest-breast/axillae: Normal  Back: Normal  Spleen and liver: Normal  Cardiovascular: Exam of peripheral vascular system by observation for swelling, varicosities, edema: Normal  Genitalia: groin, buttocks: Normal  Extremities: digits/nails (clubbing): Normal  Eccrine and Apocrine glands: Normal  Right upper extremity: Normal  Left upper extremity: Normal  Right lower extremity: Normal  Left lower extremity: Normal  Skin: Scalp and body hair: See below    1. Inflamed cysts in the left axilla x 3    ASSESSMENT/PLAN:     1. Hidradenitis suppurativa here today for ILK. Kenalog 10 mg/cc injected to left. Total of 1 cc's used.  Advised on risk of atrophy and failure to respond. Advised on aftercare.           Follow-up: PRN  CC:   Scribed By: Jahaira Garcia, MS, PANIMISHA          Again, thank you for allowing me to participate in the care of your patient.        Sincerely,        Jahaira Garcia PA-C

## 2022-03-01 NOTE — ANESTHESIA PREPROCEDURE EVALUATION
Anesthesia Pre-Procedure Evaluation    Patient: Sean Woodard   MRN: [id number] : 1980        Procedure : Procedure(s):  Sharp excisional debridement of left axillary hidradenitis.          Past Medical History:   Diagnosis Date     Diabetes (H)      Hypertension      NO ACTIVE PROBLEMS      Obese      FER (obstructive sleep apnea)      Sleep apnea       Past Surgical History:   Procedure Laterality Date     EXCISE HIDRADENITIS (LOCATION) Left 2021    Procedure: Sharp excisional debridement of left axillary hidradenitis.;  Surgeon: Shahnaz Garibay MD;  Location: [facility] OR     EXCISE HIDRADENITIS (LOCATION) Bilateral 10/4/2021    Procedure: EXCISION, HIDRADENITIS - bilateral axillae, bilateral groin;  Surgeon: Shahnaz Garibay MD;  Location: [facility] OR     ORTHOPEDIC SURGERY  2005    RT knee arthroscopic menisectomy     Thumb Surgery  2009    Mass removal neuroma      Allergies   Allergen Reactions     Lisinopril Cough      Social History     Tobacco Use     Smoking status: Former Smoker     Packs/day: 0.50     Years: 5.00     Pack years: 2.50     Types: Cigarettes     Quit date: 2005     Years since quittin.1     Smokeless tobacco: Never Used   Substance Use Topics     Alcohol use: Yes     Comment: Social. 1 drink per month      Wt Readings from Last 1 Encounters:   10/04/21 118.2 kg (260 lb 9.3 oz)           Physical Exam    Airway        Mallampati: III       Respiratory Devices and Support         Dental  no notable dental history         Cardiovascular          Rhythm and rate: regular     Pulmonary   pulmonary exam normal                OUTSIDE LABS:  CBC:   Lab Results   Component Value Date    WBC 6.2 2021    WBC 12.3 (H) 2021    HGB 14.7 10/04/2021    HGB 13.4 2021    HCT 41.0 2021    HCT 43.3 2021     2021     2021     BMP:   Lab Results   Component Value Date     2021     2021    POTASSIUM 3.7  10/04/2021    POTASSIUM 3.6 06/27/2021    CHLORIDE 109 06/27/2021    CHLORIDE 105 06/26/2021    CO2 25 06/27/2021    CO2 25 06/26/2021    BUN 13 06/27/2021    BUN 14 06/26/2021    CR 1.05 10/04/2021    CR 0.94 06/27/2021     (H) 10/04/2021     (H) 06/27/2021     COAGS:   Lab Results   Component Value Date    INR 0.98 09/08/2020     POC:   Lab Results   Component Value Date     (H) 06/27/2021     HEPATIC:   Lab Results   Component Value Date    ALBUMIN 3.2 (L) 06/27/2021    PROTTOTAL 6.9 06/27/2021    ALT 48 06/27/2021    AST 15 06/27/2021    ALKPHOS 57 06/27/2021    BILITOTAL 0.7 06/27/2021     OTHER:   Lab Results   Component Value Date    LACT 1.9 06/26/2021    A1C 5.8 (H) 04/23/2021    DAWIT 8.6 06/27/2021    TSH 1.63 10/01/2018    CRP <2.9 10/01/2018       Anesthesia Plan    ASA Status:  2      Anesthesia Type: General.     - Airway: ETT   Induction: Intravenous.   Maintenance: Balanced.        Consents    Anesthesia Plan(s) and associated risks, benefits, and realistic alternatives discussed. Questions answered and patient/representative(s) expressed understanding.    - Discussed:     - Discussed with:  Patient      - Extended Intubation/Ventilatory Support Discussed: No.      - Patient is DNR/DNI Status: No    Use of blood products discussed: No .     Postoperative Care            Comments:                Mitesh Prabhakar DO

## 2022-03-01 NOTE — ANESTHESIA POSTPROCEDURE EVALUATION
Patient: Sean Woodard    Procedure: Procedure(s):  Sharp excisional debridement of left axillary hidradenitis.       Anesthesia Type:  General    Note:     Postop Pain Control: Uneventful            Sign Out: Well controlled pain   PONV: No   Neuro/Psych: Uneventful            Sign Out: Acceptable/Baseline neuro status   Airway/Respiratory: Uneventful            Sign Out: Acceptable/Baseline resp. status   CV/Hemodynamics: Uneventful            Sign Out: Acceptable CV status; No obvious hypovolemia; No obvious fluid overload   Other NRE: NONE   DID A NON-ROUTINE EVENT OCCUR? No           Last vitals:  Vitals Value Taken Time   /90 05/21/21 1645   Temp 36.7  C (98  F) 05/21/21 1645   Pulse 86 05/21/21 1645   Resp 14 05/21/21 1645   SpO2 98 % 05/21/21 1645       Electronically Signed By: Mitesh Prabhakar DO  March 1, 2022  4:04 PM

## 2022-03-31 ENCOUNTER — TELEPHONE (OUTPATIENT)
Dept: DERMATOLOGY | Facility: CLINIC | Age: 42
End: 2022-03-31
Payer: COMMERCIAL

## 2022-03-31 NOTE — TELEPHONE ENCOUNTER
[facility] Call Center    Phone Message    May a detailed message be left on voicemail: no     Reason for Call: Other: Pharmacy [facility] [place] calling about Pharmacy calling about 1 pen vs 2 pens- Would you like to have 1 pen at 80mg or 2 pens at 40mg/ Call [phone] to confirm     Action Taken: Message routed to:  [facility] ([facility] DERM    Travel Screening: Not Applicable

## 2022-03-31 NOTE — TELEPHONE ENCOUNTER
Derrick Avina MD  to Sean Woodard      1/28/22 1:12 PM  Good afternoon Mr. Woodard,     I wanted to let you know that the prescription for Cosentyx was denied. As we discussed at your last visit, since we aren't able to get Cosentyx, we are going to try to get Humira at a higher dose (80 mg weekly). I sent in that prescription and we will wait to hear back from insurance.      Thank you,  Derrick Avina MD, FAAD   of Dermatology  Department of Dermatology  [facility]    Last read by Sean Woodard at 1:14 PM on 1/28/2022.

## 2022-04-11 ENCOUNTER — VIRTUAL VISIT (OUTPATIENT)
Dept: DERMATOLOGY | Facility: CLINIC | Age: 42
End: 2022-04-11
Payer: COMMERCIAL

## 2022-04-11 DIAGNOSIS — D84.9 IMMUNOSUPPRESSION (H): ICD-10-CM

## 2022-04-11 DIAGNOSIS — L73.2 HIDRADENITIS SUPPURATIVA: Primary | ICD-10-CM

## 2022-04-11 PROCEDURE — 99213 OFFICE O/P EST LOW 20 MIN: CPT | Mod: 95 | Performed by: DERMATOLOGY

## 2022-04-11 NOTE — LETTER
4/11/2022       RE: Sean Woodard  [address] [address]     Dear Colleague,    Thank you for referring your patient, Sean Woodard, to the [facility] [place] at [facility]. Please see a copy of my visit note below.    [facility] D[facility]ology Note  Encounter Date: Apr 11, 2022  [employer] and Telephone ([phone]). Location of teledermatologist: [facility] [place].  Start time: 10:10. End time: 10:18.    Dermatology Problem List:  1. Hidradenitis suppurativa - Hamilton stage III involving axillae > neck, groin.              - s/p WLE (BL axillae, groin crease) 10/4/21 w/ Dr. Garibay              - current treatment: adalimumab 80 mg weekly, topical clindamycin/benzoyl peroxide, intralesional triamcinolone                   - past treatment: doxycycline, other oral antibiotics, isotretinoin, adalimumab, infliximab (neuropathy), ustekinumab (insurance issues), oral clindamycin    ____________________________________________    Assessment & Plan:     1. Hidradenitis suppurativa: overall stable, with ongoing activity in axillae and groin, particularly left axilla. Just increased adalimumab dose to 80 mg weekly last week, so too early to assess effectiveness of increased dose. Has appointment with plastics next month to discuss reexcision of active areas, particularly left axilla. Not seeing much benefit from oral clindamycin, so will stop.  - adalimumab 80 mg weekly  - stop oral clindamycin  - follow up with plastics    Procedures Performed:    None    Follow-up: 3 months    Staff:     Derrick Avina MD, FAAD   of Dermatology  Department of Dermatology  [facility]    ____________________________________________    CC: Derm Problem (3 month follow up, things are up and down/)    HPI:  Mr. Sean Woodard is a(n 42  year old male who presents today as a return patient for hidradenitis suppurativa    Hidradenitis suppurativa - up and down over the last few months  - some ulcers in the axillae - coming and going - worse 1 week ago and now healing, but reopening at different locations  - increased adalimumab to 80 mg weekly  - some activity in groin  - left axilla more active - following up with plastics (Dr. Garibay) in 1 month - evaluate for possible repeat surgery   - right axilla not as symptomatic  - on PO clindamycin     Patient is otherwise feeling well, without additional skin concerns.    Labs Reviewed:  N/A    Physical Exam:  Vitals: There were no vitals taken for this visit.  SKIN: Teledermatology photos were reviewed; image quality and interpretability: acceptable. Image date: 4/4/22.  - surgical sites in the axillae and groin with multiple erythematous subcutaneous nodules, some with open ulceration  - No other lesions of concern on areas examined.     Medications:  Current Outpatient Medications   Medication     adalimumab (HUMIRA *CF*) 40 MG/0.4ML pen kit     adalimumab (HUMIRA *CF*) 40 MG/0.4ML pen kit     atorvastatin (LIPITOR) 20 MG tablet     augmented betamethasone dipropionate (DIPROLENE-AF) 0.05 % ointment     azelastine (ASTELIN) 0.1 % nasal spray     azelastine (OPTIVAR) 0.05 % ophthalmic solution     benzoyl peroxide 5 % external liquid     blood glucose (ACCU-CHEK GUIDE) test strip     cetirizine (ZYRTEC) 10 MG tablet     clindamycin (CLEOCIN) 300 MG capsule     diclofenac (VOLTAREN) 1 % topical gel     diphenhydrAMINE (BENADRYL) 25 MG capsule     doxycycline hyclate (VIBRA-TABS) 100 MG tablet     fluticasone (FLONASE) 50 MCG/ACT nasal spray     hydrOXYzine (ATARAX) 25 MG tablet     ibuprofen (ADVIL/MOTRIN) 600 MG tablet     ISOtretinoin (ACCUTANE PO)     lidocaine (XYLOCAINE) 5 % external ointment     losartan-hydrochlorothiazide (HYZAAR) 50-12.5 MG tablet     metFORMIN (GLUCOPHAGE-XR) 500 MG 24 hr  tablet     ondansetron (ZOFRAN-ODT) 4 MG ODT tab     phenazopyridine (PYRIDIUM) 200 MG tablet     Current Facility-Administered Medications   Medication     triamcinolone acetonide (KENALOG-10) injection 10 mg      Past Medical/Surgical History:   Patient Active Problem List   Diagnosis     Health Care Home     Hyperlipidemia with target LDL less than 100     Benign essential hypertension     PTSD (post-traumatic stress disorder)     Class 3 severe obesity due to excess calories with serious comorbidity in adult (H)     Diabetes mellitus, type 2 (H)     FER (obstructive sleep apnea)     Hidradenitis suppurativa     Hidradenitis axillaris     Fatty liver     UTI (urinary tract infection)     Left ureteral stone     Lab test negative for COVID-19 virus     Acute bronchitis     Allergic rhinitis     Benign neoplasm of skin     Effusion of knee     Encounter for other physical therapy     Lesion of penis     Low back pain     Other acne     Other chest pain     Pain in joint, lower leg     Pain of finger     Screening examination for pulmonary tuberculosis     Sebaceous cyst     Epidermoid cyst of skin     Tear of medial cartilage or meniscus of knee, current     Tinnitus     Borderline diabetes mellitus     Past Medical History:   Diagnosis Date     Diabetes (H)      Hypertension      NO ACTIVE PROBLEMS      Obese      FER (obstructive sleep apnea)      Sleep apnea        CC Susanne Alberto PA-C  [address] [place] [address] on close of this encounter.

## 2022-04-11 NOTE — PROGRESS NOTES
[facility] Dermatology Note  Encounter Date: Apr 11, 2022  Store-and-Forward and Telephone ([phone]). Location of teledermatologist: [facility] [place].  Start time: 10:10. End time: 10:18.    Dermatology Problem List:  1. Hidradenitis suppurativa - Hamilton stage III involving axillae > neck, groin.              - s/p WLE (BL axillae, groin crease) 10/4/21 w/ Dr. Garibay              - current treatment: adalimumab 80 mg weekly, topical clindamycin/benzoyl peroxide, intralesional triamcinolone                   - past treatment: doxycycline, other oral antibiotics, isotretinoin, adalimumab, infliximab (neuropathy), ustekinumab (insurance issues), oral clindamycin    ____________________________________________    Assessment & Plan:     1. Hidradenitis suppurativa: overall stable, with ongoing activity in axillae and groin, particularly left axilla. Just increased adalimumab dose to 80 mg weekly last week, so too early to assess effectiveness of increased dose. Has appointment with plastics next month to discuss reexcision of active areas, particularly left axilla. Not seeing much benefit from oral clindamycin, so will stop.  - adalimumab 80 mg weekly  - stop oral clindamycin  - follow up with plastics    Procedures Performed:    None    Follow-up: 3 months    Staff:     Derrick Avina MD, FAAD   of Dermatology  Department of Dermatology  [facility]    ____________________________________________    CC: Derm Problem (3 month follow up, things are up and down/)    HPI:  Mr. Sean Woodard is a(n) 42 year old male who presents today as a return patient for hidradenitis suppurativa    Hidradenitis suppurativa - up and down over the last few months  - some ulcers in the axillae - coming and going - worse 1 week ago and now healing, but reopening at different locations  - increased adalimumab to 80 mg weekly  - some  activity in groin  - left axilla more active - following up with plastics (Dr. Garibay) in 1 month - evaluate for possible repeat surgery   - right axilla not as symptomatic  - on PO clindamycin     Patient is otherwise feeling well, without additional skin concerns.    Labs Reviewed:  N/A    Physical Exam:  Vitals: There were no vitals taken for this visit.  SKIN: Teledermatology photos were reviewed; image quality and interpretability: acceptable. Image date: 4/4/22.  - surgical sites in the axillae and groin with multiple erythematous subcutaneous nodules, some with open ulceration  - No other lesions of concern on areas examined.     Medications:  Current Outpatient Medications   Medication     adalimumab (HUMIRA *CF*) 40 MG/0.4ML pen kit     adalimumab (HUMIRA *CF*) 40 MG/0.4ML pen kit     atorvastatin (LIPITOR) 20 MG tablet     augmented betamethasone dipropionate (DIPROLENE-AF) 0.05 % ointment     azelastine (ASTELIN) 0.1 % nasal spray     azelastine (OPTIVAR) 0.05 % ophthalmic solution     benzoyl peroxide 5 % external liquid     blood glucose (ACCU-CHEK GUIDE) test strip     cetirizine (ZYRTEC) 10 MG tablet     clindamycin (CLEOCIN) 300 MG capsule     diclofenac (VOLTAREN) 1 % topical gel     diphenhydrAMINE (BENADRYL) 25 MG capsule     doxycycline hyclate (VIBRA-TABS) 100 MG tablet     fluticasone (FLONASE) 50 MCG/ACT nasal spray     hydrOXYzine (ATARAX) 25 MG tablet     ibuprofen (ADVIL/MOTRIN) 600 MG tablet     ISOtretinoin (ACCUTANE PO)     lidocaine (XYLOCAINE) 5 % external ointment     losartan-hydrochlorothiazide (HYZAAR) 50-12.5 MG tablet     metFORMIN (GLUCOPHAGE-XR) 500 MG 24 hr tablet     ondansetron (ZOFRAN-ODT) 4 MG ODT tab     phenazopyridine (PYRIDIUM) 200 MG tablet     Current Facility-Administered Medications   Medication     triamcinolone acetonide (KENALOG-10) injection 10 mg      Past Medical/Surgical History:   Patient Active Problem List   Diagnosis     Health Care Home      Hyperlipidemia with target LDL less than 100     Benign essential hypertension     PTSD (post-traumatic stress disorder)     Class 3 severe obesity due to excess calories with serious comorbidity in adult (H)     Diabetes mellitus, type 2 (H)     FER (obstructive sleep apnea)     Hidradenitis suppurativa     Hidradenitis axillaris     Fatty liver     UTI (urinary tract infection)     Left ureteral stone     Lab test negative for COVID-19 virus     Acute bronchitis     Allergic rhinitis     Benign neoplasm of skin     Effusion of knee     Encounter for other physical therapy     Lesion of penis     Low back pain     Other acne     Other chest pain     Pain in joint, lower leg     Pain of finger     Screening examination for pulmonary tuberculosis     Sebaceous cyst     Epidermoid cyst of skin     Tear of medial cartilage or meniscus of knee, current     Tinnitus     Borderline diabetes mellitus     Past Medical History:   Diagnosis Date     Diabetes (H)      Hypertension      NO ACTIVE PROBLEMS      Obese      FER (obstructive sleep apnea)      Sleep apnea        CC Susanne Alberto PA-C  [address] [place] [address] on close of this encounter.

## 2022-04-11 NOTE — NURSING NOTE
Chief Complaint   Patient presents with     Derm Problem     3 month follow up, things are up and down       Medications and allergies reviewed with patient.    Marni Coello, VVF

## 2022-04-12 DIAGNOSIS — I10 BENIGN ESSENTIAL HYPERTENSION: ICD-10-CM

## 2022-04-12 RX ORDER — LOSARTAN POTASSIUM AND HYDROCHLOROTHIAZIDE 12.5; 5 MG/1; MG/1
TABLET ORAL
Qty: 90 TABLET | Refills: 0 | Status: SHIPPED | OUTPATIENT
Start: 2022-04-12 | End: 2022-07-07

## 2022-05-14 ENCOUNTER — HEALTH MAINTENANCE LETTER (OUTPATIENT)
Age: 42
End: 2022-05-14

## 2022-05-19 ENCOUNTER — HOSPITAL ENCOUNTER (OUTPATIENT)
Dept: WOUND CARE | Facility: CLINIC | Age: 42
Discharge: HOME | End: 2022-05-19
Attending: SURGERY
Payer: COMMERCIAL

## 2022-05-19 DIAGNOSIS — L73.2 HIDRADENITIS SUPPURATIVA: Primary | ICD-10-CM

## 2022-05-19 PROCEDURE — 99212 OFFICE O/P EST SF 10 MIN: CPT | Mod: 95 | Performed by: SURGERY

## 2022-05-19 NOTE — DISCHARGE INSTRUCTIONS
Sean Woodard      1980    Wound Dressing Change: Bilateral Axilla  -Cleanse wound and surrounding skin with antimicrobial soap and rinse well with water, pat dry  -Cover wound with ABD pad or dry gauze  -Change dressing Daily and as needed     David Garibay M.D. May 19, 2022    Call us at [phone] if you have any questions about your wounds, have redness or swelling around your wound, have a fever of 101 or greater or if you have any other problems or concerns. We answer the phone Monday through Friday 8 am to 4 pm, please leave a message as we check the voicemail frequently throughout the day.     If you had a positive experience please indicate on your patient satisfaction survey form that [employer] will be sending you.    If you have any billing related questions please call the [employer] office at [phone]. The clinic staff does not handle billing related matters.

## 2022-05-19 NOTE — PROGRESS NOTES
Visit Date: 05/19/2022    This is a 42-year-old Hmong gentleman with longstanding hidradenitis suppurativa, who is doing a telephone visit with us today.  We last spoke with Marlo back in mid February, and he has been on a double dose of Humira as directed by his dermatologist, Dr. Avina.  He has not been using any topical antibiotics, but he is on another round of oral clindamycin again for some breakout of his face and scalp.  Unfortunately, that antibiotic has not really altered the progression of his disease in his axilla, particularly on the left.  We had previously done a wide local excision and he had healed by secondary intention quite nicely, but is now having reactivation of disease in the neighboring tissues.  The right side is pretty good, except for a small opening posteriorly, but the left kind of has more active lesions within and adjacent to the scar as well as inferior to that.  Interestingly, his groin and periscrotal area has not been very active at all except for occasional few flares to the point where he is not using any dressings and he did not submit any pictures today.  Since medical management does not seem to be controlling his axillary disease, Marlo is interested in further resection.  He understands that this will again result in an open wound, that he will need to do dressing changes and heal by secondary intention.  Things might take a bit longer considering this is the second time that this would have been done.  He is very comfortable with doing dressing changes at home.  He is hoping to schedule sometime in September since he will be taking his kids to [place] in August.  I think we can accommodate that.  We will not need any preop visits from our perspective since he has already been through this.  He will need history and physical from his primary, and a COVID test.  Our  from the  will ultimately call him and find a date.  As far as follow up here in clinic, he  feels that unless something worsens considerably, he has plenty of dressing supplies and will not need to follow up here until after surgery.  Obviously, we do not really have what we would consider to be measurements, but photos are uploaded into his chart.  This conversation took about 10 minutes.  We will be in contact for surgery.    Shahnaz Garibay MD        D: 2022   T: 2022   MT: SALVADOR    Name:     SEB SMITHGene  MRN:      [id number]        Account:    [id number]   :      1980           Visit Date: 2022     Document: [id number]

## 2022-05-19 NOTE — PROGRESS NOTES
Patient called for a telephone visit with Dr Garibay. Certified Wound Care Nurse time spent evaluating patient record, completed a full evaluation and documented wound(s) & david-wound skin; provided recommendation based on treatment plan. Reviewed discharge instructions, patient education, and discussed plan of care with appropriate medical team staff members and patient and/or family members.     Patient Active Problem List   Diagnosis     Health Care Home     Hyperlipidemia with target LDL less than 100     Benign essential hypertension     PTSD (post-traumatic stress disorder)     Class 3 severe obesity due to excess calories with serious comorbidity in adult (H)     Diabetes mellitus, type 2 (H)     FER (obstructive sleep apnea)     Hidradenitis suppurativa     Hidradenitis axillaris     Fatty liver     UTI (urinary tract infection)     Left ureteral stone     Lab test negative for COVID-19 virus     Acute bronchitis     Allergic rhinitis     Benign neoplasm of skin     Effusion of knee     Encounter for other physical therapy     Lesion of penis     Low back pain     Other acne     Other chest pain     Pain in joint, lower leg     Pain of finger     Screening examination for pulmonary tuberculosis     Sebaceous cyst     Epidermoid cyst of skin     Tear of medial cartilage or meniscus of knee, current     Tinnitus     Borderline diabetes mellitus     Past Medical History:   Diagnosis Date     Diabetes (H)      Hypertension      NO ACTIVE PROBLEMS      Obese      FER (obstructive sleep apnea)      Sleep apnea      Labs:   Recent Labs   Lab Test 10/04/21  0606 06/27/21  0528 06/07/21  0040 04/23/21  1431 09/08/20  1013 10/15/18  1000 10/01/18  1801   ALBUMIN  --  3.2*  --  3.9  --    < > 4.1   HGB 14.7 13.4   < > 16.0 15.2   < > 16.3   INR  --   --   --   --  0.98  --   --    WBC  --  6.2   < > 9.1 6.8   < > 7.4   A1C  --   --   --  5.8*  --    < > 6.3*   CRP  --   --   --   --   --   --  <2.9    < > = values in  this interval not displayed.     Nutrition requirements were discussed with patient today.  Vitals:  There were no vitals taken for this visit.  Wound:   Wound (used by OP [facility] only) 03/16/21 0923 Right other (see comments) (Active)   Thickness/Stage full thickness 05/19/22 0851   Base granulating 05/19/22 0851   Periwound intact 05/19/22 0851   Periwound Temperature warm 05/19/22 0851   Periwound Skin Turgor soft 05/19/22 0851   Edges open 05/19/22 0851   Length (cm) 1 05/19/22 0851   Width (cm) 0.5 05/19/22 0851   Depth (cm) 0.3 05/19/22 0851   Wound (cm^2) 0.5 cm^2 05/19/22 0851   Wound Volume (cm^3) 0.15 cm^3 05/19/22 0851   Wound healing % 97.5 05/19/22 0851   Drainage Characteristics/Odor sanguineous 05/19/22 0851   Drainage Amount moderate 05/19/22 0851   Care, Wound non-select wound debridement performed 05/19/22 0851       Wound (used by OP [facility] only) 02/17/22 1544 Left mid axillary other (see comments) (Active)   Thickness/Stage full thickness 05/19/22 0851   Base granulating 05/19/22 0851   Periwound intact 05/19/22 0851   Periwound Temperature warm 05/19/22 0851   Periwound Skin Turgor soft 05/19/22 0851   Edges open 05/19/22 0851   Length (cm) 1 05/19/22 0851   Width (cm) 1 05/19/22 0851   Depth (cm) 1 05/19/22 0851   Wound (cm^2) 1 cm^2 05/19/22 0851   Wound Volume (cm^3) 1 cm^3 05/19/22 0851   Wound healing % -300 05/19/22 0851   Drainage Characteristics/Odor serosanguineous 05/19/22 0851   Drainage Amount moderate 05/19/22 0851   Care, Wound non-select wound debridement performed 05/19/22 0851      Photo: No images are attached to the encounter.      Further instructions from your care team       Sean Woodard      1980    Wound Dressing Change: Bilateral Axilla  -Cleanse wound and surrounding skin with antimicrobial soap and rinse well with water, pat dry  -Cover wound with ABD pad or dry gauze  -Change dressing Daily and as needed     David Garibay M.D. May 19, 2022    Call us at  [phone] if you have any questions about your wounds, have redness or swelling around your wound, have a fever of 101 or greater or if you have any other problems or concerns. We answer the phone Monday through Friday 8 am to 4 pm, please leave a message as we check the voicemail frequently throughout the day.     If you had a positive experience please indicate on your patient satisfaction survey form that [employer] will be sending you.    If you have any billing related questions please call the [facility] office at [phone]. The clinic staff does not handle billing related matters.

## 2022-05-19 NOTE — ADDENDUM NOTE
Encounter addended by: Shahnaz Garibay MD on: 5/19/2022 4:01 PM   Actions taken: Charge Capture section accepted

## 2022-06-12 DIAGNOSIS — E11.9 TYPE 2 DIABETES MELLITUS WITHOUT COMPLICATION, WITHOUT LONG-TERM CURRENT USE OF INSULIN (H): ICD-10-CM

## 2022-06-12 DIAGNOSIS — E66.01 CLASS 3 SEVERE OBESITY WITH BODY MASS INDEX (BMI) OF 40.0 TO 44.9 IN ADULT, UNSPECIFIED OBESITY TYPE, UNSPECIFIED WHETHER SERIOUS COMORBIDITY PRESENT (H): ICD-10-CM

## 2022-06-12 DIAGNOSIS — E66.813 CLASS 3 SEVERE OBESITY WITH BODY MASS INDEX (BMI) OF 40.0 TO 44.9 IN ADULT, UNSPECIFIED OBESITY TYPE, UNSPECIFIED WHETHER SERIOUS COMORBIDITY PRESENT (H): ICD-10-CM

## 2022-06-13 DIAGNOSIS — L73.2 HIDRADENITIS SUPPURATIVA: ICD-10-CM

## 2022-06-13 RX ORDER — METFORMIN HCL 500 MG
TABLET, EXTENDED RELEASE 24 HR ORAL
Qty: 360 TABLET | Refills: 3 | Status: SHIPPED | OUTPATIENT
Start: 2022-06-13 | End: 2023-06-14

## 2022-06-13 NOTE — TELEPHONE ENCOUNTER
Routing refill request to provider for review/approval because:  Patient needs to be seen because:  Overdue for DM visit and labs,A1c  Floresita Galan RN

## 2022-06-14 ENCOUNTER — TELEPHONE (OUTPATIENT)
Dept: DERMATOLOGY | Facility: CLINIC | Age: 42
End: 2022-06-14

## 2022-06-14 NOTE — TELEPHONE ENCOUNTER
Medication Appeal Initiation    We have initiated an appeal for the requested medication:  Medication: Humira 80mg weekly  Appeal Start Date:  6/14/2022  Insurance Company: [employer] - Phone [phone] Fax [phone]  Comments:  Faxed [phone]

## 2022-06-16 RX ORDER — CLINDAMYCIN HCL 300 MG
300 CAPSULE ORAL 2 TIMES DAILY
Qty: 60 CAPSULE | Refills: 3 | OUTPATIENT
Start: 2022-06-16

## 2022-06-16 NOTE — TELEPHONE ENCOUNTER
clindamycin (CLEOCIN) 300 MG   Last Written Prescription Date:  1/10/22  Last Fill Quantity: 60,   # refills: 3  Last Office Visit : 4/11/22  Future Office visit:  10/27/22  RTC   3 MOS July 2022  Routing refill request to provider for review/approval because:  Pt outside of RTC timeframe

## 2022-06-23 ENCOUNTER — TELEPHONE (OUTPATIENT)
Dept: WOUND CARE | Facility: CLINIC | Age: 42
End: 2022-06-23

## 2022-06-23 NOTE — TELEPHONE ENCOUNTER
Has been waiting for surgery since June   Type of surgery: Excisional debridement of Axilla, Hydradenitis supervita     Location of surgery: [facility]  Date and time of surgery: TBD  Surgeon: Dr. Shahnaz Garibay  Pre-Op Appt Date: Patient to schedule  Post-Op Appt Date: TBD according to the surgery date    Route to Pilar Barlow

## 2022-06-27 DIAGNOSIS — E78.5 HYPERLIPIDEMIA WITH TARGET LDL LESS THAN 100: ICD-10-CM

## 2022-06-27 NOTE — TELEPHONE ENCOUNTER
Routing refill request to provider for review/approval because:  Labs not current:  Lipids > year    Andrea Fernandez RN

## 2022-06-28 ENCOUNTER — MYC MEDICAL ADVICE (OUTPATIENT)
Dept: PHARMACY | Facility: OTHER | Age: 42
End: 2022-06-28

## 2022-06-28 DIAGNOSIS — L73.2 HIDRADENITIS SUPPURATIVA: Primary | ICD-10-CM

## 2022-06-28 RX ORDER — ATORVASTATIN CALCIUM 20 MG/1
TABLET, FILM COATED ORAL
Qty: 90 TABLET | Refills: 3 | Status: SHIPPED | OUTPATIENT
Start: 2022-06-28 | End: 2023-06-22

## 2022-06-28 NOTE — TELEPHONE ENCOUNTER
Spoke with [employer] and they said in order for us to continue to appeal they would need letter from the patient asking for them to reconsider coverage. Sent my chart message to patient for letter.

## 2022-07-06 RX ORDER — DOXYCYCLINE 100 MG/1
100 CAPSULE ORAL 2 TIMES DAILY
Qty: 60 CAPSULE | Refills: 3 | Status: SHIPPED | OUTPATIENT
Start: 2022-07-06 | End: 2022-10-27

## 2022-07-08 ENCOUNTER — TELEPHONE (OUTPATIENT)
Dept: WOUND CARE | Facility: CLINIC | Age: 42
End: 2022-07-08

## 2022-07-08 NOTE — TELEPHONE ENCOUNTER
Return call to inform Patient that Dr Garibay is planning the surgery sometime in September per the Patient request. Her  will call with the date when available.

## 2022-07-08 NOTE — TELEPHONE ENCOUNTER
Tried to call insurance 3 times but every but every time the call was sent to appeal the line would end. Will call again Monday morning.

## 2022-07-09 ENCOUNTER — HEALTH MAINTENANCE LETTER (OUTPATIENT)
Age: 42
End: 2022-07-09

## 2022-07-11 NOTE — TELEPHONE ENCOUNTER
MEDICATION APPEAL APPROVED    Medication: Humira 80mg weekly  Authorization Effective Date: 6/7/2022  Authorization Expiration Date: 7/7/2023  Approved Dose/Quantity: 4 for 28 days  Reference #:     Insurance Company: [employer] - Phone [phone] Fax [phone]  Expected CoPay:       CoPay Card Available:      Foundation Assistance Needed:    Which Pharmacy is filling the prescription (Not needed for infusion/clinic administered): [facility] - [place], [place] - [address]

## 2022-07-12 ENCOUNTER — TELEPHONE (OUTPATIENT)
Dept: DERMATOLOGY | Facility: CLINIC | Age: 42
End: 2022-07-12

## 2022-07-12 DIAGNOSIS — L73.2 HIDRADENITIS SUPPURATIVA: ICD-10-CM

## 2022-07-12 NOTE — TELEPHONE ENCOUNTER
[facility] Call Center    Phone Message    May a detailed message be left on voicemail: yes     Reason for Call: Medication Question or concern regarding medication   Prescription Clarification  Name of Medication: adalimumab (HUMIRA *CF*) 80 MG/0.8ML pen kit  Prescribing Provider: Dr. Avina   Pharmacy: [facility]  P: [phone]  F: [phone]   What on the order needs clarification? Pharmacist states the Rx was rejected by insurance at the  pharmacy and needs to be sent to the above [facility] pharmacy instead.     Action Taken: Message routed to:  [facility] Derm    Travel Screening: Not Applicable

## 2022-07-28 DIAGNOSIS — L73.2 HIDRADENITIS SUPPURATIVA: ICD-10-CM

## 2022-07-28 NOTE — TELEPHONE ENCOUNTER
Patient now seeing Dr. Avina and is scheduled for October follow up with Dr. Avina.     Elsa Linn RN

## 2022-07-28 NOTE — TELEPHONE ENCOUNTER
Requested Prescriptions   Pending Prescriptions Disp Refills     adalimumab (HUMIRA *CF*) 80 MG/0.8ML pen kit 3.2 mL 12     Sig: Inject 0.8 mLs (80 mg) Subcutaneous once a week       There is no refill protocol information for this order          Last office visit: 2/28/2022 with prescribing provider:  Sabi Bustos PA-C   Future Office Visit:   Next 5 appointments (look out 90 days)    Aug 12, 2022  9:40 AM  (Arrive by 9:20 AM)  Adult Preventative Visit with Susanne Alberto PA-C  [facility] [address] [address]  [phone]       [facility]

## 2022-08-10 ENCOUNTER — TELEPHONE (OUTPATIENT)
Dept: WOUND CARE | Facility: CLINIC | Age: 42
End: 2022-08-10

## 2022-08-10 NOTE — TELEPHONE ENCOUNTER
Patient call regarding future surgery date for Axilla surgery. He wants surgery in September. Instructed that he is on the schedule with Dr Garibay and will be notified when a date is set.   Pt ambulated to bathroom

## 2022-08-18 DIAGNOSIS — L73.2 HIDRADENITIS SUPPURATIVA: ICD-10-CM

## 2022-08-18 RX ORDER — CLINDAMYCIN HCL 300 MG
300 CAPSULE ORAL 2 TIMES DAILY
Qty: 60 CAPSULE | Refills: 3 | OUTPATIENT
Start: 2022-08-18

## 2022-08-18 NOTE — TELEPHONE ENCOUNTER
Per 6/13 refill request note, this med has been DC'd by Dr. Avina.  Lissy Berman, RN on 8/18/2022 at 11:28 AM

## 2022-08-18 NOTE — TELEPHONE ENCOUNTER
Requested Prescriptions   Pending Prescriptions Disp Refills     clindamycin (CLEOCIN) 300 MG capsule 60 capsule 3     Sig: Take 1 capsule (300 mg) by mouth 2 times daily       There is no refill protocol information for this order         Last office visit: 2/28/2022 with prescribing provider:  Sabi Bustos PA-C   Future Office Visit:   Next 5 appointments (look out 90 days)    Sep 16, 2022  1:20 PM  (Arrive by 1:00 PM)  Adult Preventative Visit with Susanne Alberto PA-C  [facility] ([facility] ) [address] [address]  [phone]       [facility]

## 2022-09-01 DIAGNOSIS — L73.2 HIDRADENITIS SUPPURATIVA: ICD-10-CM

## 2022-09-01 NOTE — TELEPHONE ENCOUNTER
Requested Prescriptions   Pending Prescriptions Disp Refills     adalimumab (HUMIRA *CF*) 80 MG/0.8ML pen kit 3.2 mL 12     Sig: Inject 0.8 mLs (80 mg) Subcutaneous once a week       There is no refill protocol information for this order          Last office visit: 2/28/2022 with prescribing provider:  Sabi Bustos PA-C   Future Office Visit:   Next 5 appointments (look out 90 days)    Sep 16, 2022  1:20 PM  (Arrive by 1:00 PM)  Adult Preventative Visit with Susanne Alberto PA-C  [facility] ([facility] ) [address] [address]  [phone]       [facility]

## 2022-09-03 ENCOUNTER — HEALTH MAINTENANCE LETTER (OUTPATIENT)
Age: 42
End: 2022-09-03

## 2022-09-16 ENCOUNTER — OFFICE VISIT (OUTPATIENT)
Dept: FAMILY MEDICINE | Facility: CLINIC | Age: 42
End: 2022-09-16
Payer: COMMERCIAL

## 2022-09-16 VITALS
DIASTOLIC BLOOD PRESSURE: 72 MMHG | OXYGEN SATURATION: 97 % | RESPIRATION RATE: 16 BRPM | WEIGHT: 262.31 LBS | TEMPERATURE: 98.1 F | SYSTOLIC BLOOD PRESSURE: 122 MMHG | HEART RATE: 82 BPM | BODY MASS INDEX: 39.75 KG/M2 | HEIGHT: 68 IN

## 2022-09-16 DIAGNOSIS — Z00.00 ENCOUNTER FOR ROUTINE ADULT HEALTH EXAMINATION WITHOUT ABNORMAL FINDINGS: Primary | ICD-10-CM

## 2022-09-16 DIAGNOSIS — I10 BENIGN ESSENTIAL HYPERTENSION: ICD-10-CM

## 2022-09-16 DIAGNOSIS — L73.2 HIDRADENITIS SUPPURATIVA: ICD-10-CM

## 2022-09-16 DIAGNOSIS — E11.9 TYPE 2 DIABETES MELLITUS WITHOUT COMPLICATION, WITHOUT LONG-TERM CURRENT USE OF INSULIN (H): ICD-10-CM

## 2022-09-16 LAB
ANION GAP SERPL CALCULATED.3IONS-SCNC: 10 MMOL/L (ref 7–15)
BUN SERPL-MCNC: 10.4 MG/DL (ref 6–20)
CALCIUM SERPL-MCNC: 9.7 MG/DL (ref 8.6–10)
CHLORIDE SERPL-SCNC: 100 MMOL/L (ref 98–107)
CREAT SERPL-MCNC: 0.89 MG/DL (ref 0.67–1.17)
CREAT UR-MCNC: 193.6 MG/DL
DEPRECATED HCO3 PLAS-SCNC: 26 MMOL/L (ref 22–29)
GFR SERPL CREATININE-BSD FRML MDRD: >90 ML/MIN/1.73M2
GLUCOSE SERPL-MCNC: 99 MG/DL (ref 70–99)
HBA1C MFR BLD: 6.1 % (ref 0–5.6)
MICROALBUMIN UR-MCNC: 14.1 MG/L
MICROALBUMIN/CREAT UR: 7.28 MG/G CR (ref 0–17)
POTASSIUM SERPL-SCNC: 4 MMOL/L (ref 3.4–5.3)
SODIUM SERPL-SCNC: 136 MMOL/L (ref 136–145)

## 2022-09-16 PROCEDURE — 99396 PREV VISIT EST AGE 40-64: CPT | Mod: 25 | Performed by: PHYSICIAN ASSISTANT

## 2022-09-16 PROCEDURE — 90471 IMMUNIZATION ADMIN: CPT | Performed by: PHYSICIAN ASSISTANT

## 2022-09-16 PROCEDURE — 82043 UR ALBUMIN QUANTITATIVE: CPT | Performed by: PHYSICIAN ASSISTANT

## 2022-09-16 PROCEDURE — 80048 BASIC METABOLIC PNL TOTAL CA: CPT | Performed by: PHYSICIAN ASSISTANT

## 2022-09-16 PROCEDURE — 83036 HEMOGLOBIN GLYCOSYLATED A1C: CPT | Performed by: PHYSICIAN ASSISTANT

## 2022-09-16 PROCEDURE — 90686 IIV4 VACC NO PRSV 0.5 ML IM: CPT | Performed by: PHYSICIAN ASSISTANT

## 2022-09-16 PROCEDURE — 36415 COLL VENOUS BLD VENIPUNCTURE: CPT | Performed by: PHYSICIAN ASSISTANT

## 2022-09-16 RX ORDER — LOSARTAN POTASSIUM AND HYDROCHLOROTHIAZIDE 12.5; 5 MG/1; MG/1
1 TABLET ORAL DAILY
Qty: 90 TABLET | Refills: 3 | Status: SHIPPED | OUTPATIENT
Start: 2022-09-16 | End: 2023-09-26

## 2022-09-16 ASSESSMENT — ENCOUNTER SYMPTOMS
ABDOMINAL PAIN: 0
HEMATOCHEZIA: 0
NAUSEA: 0
FREQUENCY: 0
FEVER: 0
WEAKNESS: 0
ARTHRALGIAS: 0
SHORTNESS OF BREATH: 0
CHILLS: 0
DIZZINESS: 0
MYALGIAS: 0
DYSURIA: 0
HEMATURIA: 0
HEARTBURN: 0
COUGH: 0
HEADACHES: 0
PARESTHESIAS: 0
JOINT SWELLING: 0
NERVOUS/ANXIOUS: 0
DIARRHEA: 0
SORE THROAT: 0
PALPITATIONS: 0
CONSTIPATION: 0
EYE PAIN: 0

## 2022-09-16 NOTE — PROGRESS NOTES
SUBJECTIVE:   CC: Sean is an 42 year old who presents for preventative health visit.     Patient has been advised of split billing requirements and indicates understanding: Yes  Healthy Habits:     Getting at least 3 servings of Calcium per day:  Yes    Bi-annual eye exam:  Yes    Dental care twice a year:  Yes    Sleep apnea or symptoms of sleep apnea:  Sleep apnea    Diet:  Regular (no restrictions)    Frequency of exercise:  None    Taking medications regularly:  No    Barriers to taking medications:  None    PHQ-2 Total Score: 0    Additional concerns today:  No      Today's PHQ-2 Score:   PHQ-2 (  [employer]) 2022   Q1: Little interest or pleasure in doing things 0   Q2: Feeling down, depressed or hopeless 0   PHQ-2 Score 0   PHQ-2 Total Score (12-17 Years)- Positive if 3 or more points; Administer PHQ-A if positive -   Q1: Little interest or pleasure in doing things Not at all   Q2: Feeling down, depressed or hopeless Not at all   PHQ-2 Score 0       Abuse: Current or Past(Physical, Sexual or Emotional)- No  Do you feel safe in your environment? Yes    Have you ever done Advance Care Planning? (For example, a Health Directive, POLST, or a discussion with a medical provider or your loved ones about your wishes): No, advance care planning information given to patient to review.  Patient plans to discuss their wishes with loved ones or provider.      Social History     Tobacco Use     Smoking status: Former Smoker     Packs/day: 0.50     Years: 5.00     Pack years: 2.50     Types: Cigarettes     Quit date: 2005     Years since quittin.7     Smokeless tobacco: Never Used   Substance Use Topics     Alcohol use: Yes     Comment: Social. 1 drink per month         Alcohol Use 2022   Prescreen: >3 drinks/day or >7 drinks/week? No   Prescreen: >3 drinks/day or >7 drinks/week? -       Last PSA: No results found for: PSA    Reviewed orders with patient. Reviewed health maintenance and updated orders  "accordingly - Yes  BP Readings from Last 3 Encounters:   09/16/22 122/72   02/28/22 135/88   11/11/21 (!) 137/104    Wt Readings from Last 3 Encounters:   09/16/22 119 kg (262 lb 5 oz)   10/04/21 118.2 kg (260 lb 9.3 oz)   08/16/21 117.9 kg (260 lb)                    Reviewed and updated as needed this visit by clinical staff   Tobacco  Allergies  Meds                Reviewed and updated as needed this visit by Provider                       Review of Systems   Constitutional: Negative for chills and fever.   HENT: Negative for congestion, ear pain, hearing loss and sore throat.    Eyes: Negative for pain and visual disturbance.   Respiratory: Negative for cough and shortness of breath.    Cardiovascular: Negative for chest pain, palpitations and peripheral edema.   Gastrointestinal: Negative for abdominal pain, constipation, diarrhea, heartburn, hematochezia and nausea.   Genitourinary: Negative for dysuria, frequency, genital sores, hematuria, impotence, penile discharge and urgency.   Musculoskeletal: Negative for arthralgias, joint swelling and myalgias.   Skin: Negative for rash.   Neurological: Negative for dizziness, weakness, headaches and paresthesias.   Psychiatric/Behavioral: Negative for mood changes. The patient is not nervous/anxious.        OBJECTIVE:   /72   Pulse 82   Temp 98.1  F (36.7  C) (Tympanic)   Resp 16   Ht 1.715 m (5' 7.5\")   Wt 119 kg (262 lb 5 oz)   SpO2 97%   BMI 40.48 kg/m      Physical Exam  GENERAL: healthy, alert and no distress  EYES: Eyes grossly normal to inspection, PERRL and conjunctivae and sclerae normal  HENT: ear canals and TM's normal, nose and mouth without ulcers or lesions  NECK: no adenopathy, no asymmetry, masses, or scars and thyroid normal to palpation  RESP: lungs clear to auscultation - no rales, rhonchi or wheezes  CV: regular rate and rhythm, normal S1 S2, no S3 or S4, no murmur, click or rub, no peripheral edema and peripheral pulses " "strong  ABDOMEN: soft, nontender, no hepatosplenomegaly, no masses and bowel sounds normal  MS: no gross musculoskeletal defects noted, no edema  SKIN: no suspicious lesions or rashes  NEURO: Normal strength and tone, mentation intact and speech normal  PSYCH: mentation appears normal, affect normal/bright    Diagnostic Test Results:  pending    ASSESSMENT/PLAN:   (Z00.00) Encounter for routine adult health examination without abnormal findings  (primary encounter diagnosis)  Comment:   Plan:     (E11.9) Type 2 diabetes mellitus without complication, without long-term current use of insulin (H)  Comment:   Plan: Albumin Random Urine Quantitative with Creat         Ratio, HEMOGLOBIN A1C            (I10) Benign essential hypertension  Comment:   Plan: BASIC METABOLIC PANEL,         losartan-hydrochlorothiazide (HYZAAR) 50-12.5         MG tablet            (L73.2) Hidradenitis suppurativa  Comment:   Plan: followed by dermatology      Patient has been advised of split billing requirements and indicates understanding: Yes    COUNSELING:   Reviewed preventive health counseling, as reflected in patient instructions    Estimated body mass index is 40.48 kg/m  as calculated from the following:    Height as of this encounter: 1.715 m (5' 7.5\").    Weight as of this encounter: 119 kg (262 lb 5 oz).     Weight management plan: Discussed healthy diet and exercise guidelines    He reports that he quit smoking about 17 years ago. His smoking use included cigarettes. He has a 2.50 pack-year smoking history. He has never used smokeless tobacco.      Counseling Resources:  ATP IV Guidelines  Pooled Cohorts Equation Calculator  FRAX Risk Assessment  ICSI Preventive Guidelines  Dietary Guidelines for Americans, 2010  USDA's MyPlate  ASA Prophylaxis  Lung CA Screening    Susanne Alberto PA-C  [facility]  "

## 2022-10-14 NOTE — PROGRESS NOTES
Chief Complaint   Patient presents with     Epistaxis     started about 2 weeks 2 to 4 times a day random right side     History of Present Illness   Sean Woodard is a 40 year old male presents for nasal evaluation.  I am seeing this patient in consultation for right epistaxis at the request of the provider Dr. Goldman.  The patient presents with approximately 2 week history of epistaxis. It occurs on the right side. It usually only comes out the front of the nose, but it has ran down the back of the throat at times. The bleeding occurs approximately several times per day, couple times per week. The patient can get the bleeding to stop by putting pressure. The patient has never gone to the emergency department or required a blood transfusion due to nose bleeding.  No previous history of nasal packing.  The patient has no personal or family history of bleeding disorders. The patient takes no blood-thinning medication.     Past Medical History  Patient Active Problem List   Diagnosis     GERD (gastroesophageal reflux disease)     Health Care Home     Hyperlipidemia with target LDL less than 100     Benign essential hypertension     PTSD (post-traumatic stress disorder)     Class 3 severe obesity due to excess calories with serious comorbidity in adult (H)     Diabetes mellitus, type 2 (H)     FER (obstructive sleep apnea)     Hidradenitis suppurativa     Current Medications     Current Outpatient Medications:      atorvastatin (LIPITOR) 20 MG tablet, Take 1 tablet (20 mg) by mouth daily, Disp: 90 tablet, Rfl: 3     augmented betamethasone dipropionate (DIPROLENE-AF) 0.05 % ointment, Apply sparingly to affected area twice daily as needed.  Do not apply to face., Disp: 45 g, Rfl: 1     benzoyl peroxide 5 % external liquid, Use daily as directed, Disp: 226 g, Rfl: 11     blood glucose (ACCU-CHEK GUIDE) test strip, Use to test blood sugar 2 times daily, Disp: 100 strip, Rfl: 11     clindamycin (CLEOCIN) 300 MG capsule, Take  Continue with gabapentin 100mg in the am, then 300mg at bedtime  Increase pramipexole 0 5mg daily at bedtime  Increase exercise and activity to the lower extremities  Can get resistance band exercises to increase LE strength  Good oral Hydration  Continue to follow up with Endocrine and Nutrition/Dietician re: Blood Sugar management  Follow up with Neurology office in 5 months  1 capsule (300 mg) by mouth 2 times daily, Disp: 60 capsule, Rfl: 3     clindamycin (CLINDAMAX) 1 % lotion, Apply to AA BID, Disp: 60 mL, Rfl: 11     losartan-hydrochlorothiazide (HYZAAR) 50-12.5 MG tablet, TAKE ONE TABLET BY MOUTH ONCE DAILY, Disp: 90 tablet, Rfl: 0     metFORMIN (GLUCOPHAGE-XR) 500 MG 24 hr tablet, Take 2 tablets (1,000 mg) by mouth 2 times daily (with meals), Disp: 360 tablet, Rfl: 1     ustekinumab (STELARA) 45 MG/0.5ML SOLN, Inject 1 mL (90 mg) Subcutaneous every 2 months, Disp: 1 mL, Rfl: 3     ustekinumab (STELARA) 90 MG/ML, Inject 1 mL (90 mg) Subcutaneous every 2 months, Disp: 1 mL, Rfl: 4     azelastine (ASTELIN) 0.1 % nasal spray, Spray 2 sprays into both nostrils 2 times daily as needed for rhinitis (Patient not taking: Reported on 9/8/2020), Disp: 30 mL, Rfl: 3     azelastine (OPTIVAR) 0.05 % ophthalmic solution, Apply 1 drop to eye 2 times daily (Patient not taking: Reported on 9/8/2020), Disp: 6 mL, Rfl: 3     cetirizine (ZYRTEC) 10 MG tablet, Take 10 mg by mouth daily, Disp: , Rfl:      clindamycin-benzoyl peroxide (BENZACLIN) 1-5 % external gel, Apply twice daily to affected area on skin. (Patient not taking: Reported on 7/3/2020), Disp: 50 g, Rfl: 6     diphenhydrAMINE (BENADRYL) 25 MG capsule, Take 25 mg by mouth every 6 hours as needed for itching or allergies, Disp: , Rfl:      fluticasone (FLONASE) 50 MCG/ACT nasal spray, Spray 2 sprays into both nostrils daily (Patient not taking: Reported on 9/8/2020), Disp: 16 g, Rfl: 3     gabapentin (NEURONTIN) 100 MG capsule, Take 1 capsule (100 mg) by mouth At Bedtime (Patient not taking: Reported on 9/8/2020), Disp: 30 capsule, Rfl: 1    Allergies  Allergies   Allergen Reactions     Lisinopril Cough       Social History   Social History     Socioeconomic History     Marital status:      Spouse name: Joycelyn Stokes     Number of children: 1     Years of education: 12+     Highest education level: Not on file   Occupational History      Occupation: Adjucations officer     Employer: [employer]   Social Needs     Financial resource strain: Not on file     Food insecurity     Worry: Not on file     Inability: Not on file     Transportation needs     Medical: Not on file     Non-medical: Not on file   Tobacco Use     Smoking status: Former Smoker     Packs/day: 0.50     Years: 5.00     Pack years: 2.50     Types: Cigarettes     Last attempt to quit: 1/1/2005     Years since quitting: 15.7     Smokeless tobacco: Never Used   Substance and Sexual Activity     Alcohol use: Yes     Comment: Social. 1 drink per month     Drug use: No     Sexual activity: Yes     Partners: Female   Lifestyle     Physical activity     Days per week: Not on file     Minutes per session: Not on file     Stress: Not on file   Relationships     Social connections     Talks on phone: Not on file     Gets together: Not on file     Attends Latter day service: Not on file     Active member of club or organization: Not on file     Attends meetings of clubs or organizations: Not on file     Relationship status: Not on file     Intimate partner violence     Fear of current or ex partner: Not on file     Emotionally abused: Not on file     Physically abused: Not on file     Forced sexual activity: Not on file   Other Topics Concern     Parent/sibling w/ CABG, MI or angioplasty before 65F 55M? No   Social History Narrative    March 26, 2019    ENVIRONMENTAL HISTORY: The family [place] in [place] year [place] The home is heated with a forced air. They do have central air conditioning. The patient's bedroom is furnished with carpeting in bedroom. No pets inside the house. There is no history of cockroach or mice infestation. There are no smokers in the house.  The house does not have a damp basement.        Family History  Family History   Problem Relation Age of Onset     Heart Defect Father         valve replacement     Diabetes Maternal  Grandmother      Cancer Maternal Grandfather         stomach     Hypertension Brother      Diabetes Sister      Cerebrovascular Disease Brother 38     Hypertension Brother      Melanoma No family hx of      Skin Cancer No family hx of        Review of Systems  As per HPI and PMHx, otherwise 10+ comprehensive system review is negative.    Physical Exam  /71 (BP Location: Left arm, Patient Position: Sitting)   Pulse 84   Temp 98.8  F (37.1  C) (Tympanic)   GENERAL: Patient is a pleasant, cooperative 40 year old male in no acute distress.  HEAD: Normocephalic, atraumatic.  Hair and scalp are normal.  EYES: Pupils are equal, round, reactive to light and accommodation.  Extraocular movements are intact.  The sclera nonicteric without injection.  The extraocular structures are normal.  EARS: Normal shape and symmetry.  No tenderness when palpating the mastoid or tragal areas bilaterally.   NOSE: Nares are patent.  Nasal mucosa is lightly dry.  The patient has a leftward nasal septal deviation.  Anterior anoscopy reveals prominent vasculature in Kiesselbach's plexus on the right.  No nasal cavity masses, polyps, or mucopurulence on anterior rhinoscopy.  NEUROLOGIC: Cranial nerves II through XII are grossly intact.  Voice is strong.  Patient is House-Brackmann I/VI bilaterally.  CARDIOVASCULAR: Extremities are warm and well-perfused.  No significant peripheral edema.  RESPIRATORY: Patient has nonlabored breathing without cough, wheeze, stridor.  PSYCHIATRIC: Patient is alert and oriented.  Mood and affect appear normal.  SKIN: Warm and dry.  No scalp, face, or neck lesions noted.    Procedure: Control simple right anterior nasal hemorrhage  Indication: Recurrent epistaxis     Phenylephrine and lidocaine was applied to the anterior septum.  Using silver nitrate, I addressed several prominent blood vessels in the right anterior Kiesselbach's plexus. Hemostasis was achieved.  Patient tolerated the procedure well.       Assessment and Plan    ICD-10-CM    1. Recurrent epistaxis  R04.0 Nasal Cautery Simple Unilat      It was my pleasure seeing Sean Woodard today in clinic.  The patient presents with epistaxis. This is almost certainly coming from the right anterior septum. We discussed restrictions on manipulation and picking of the nose.  Also, sleeping with the head elevated, and avoidance of strenuous activity, heavy lifting, and bending over until the septum heals. I explained using vaseline twice daily to the anterior septum, nasal saline spray 3-5 times per day, and a humidifier at the bedside at night.    I also explained applying digital pressure to the nasal tip for a minimum of 15-20 minutes to stop a nose bleed. If that doesn't stop the bleeding the patient needs to proceed to the nearest emergency department. I will see the patient back in 2-4 weeks.     Lj Angeles MD  Department of Otolarygology-Head and Neck Surgery  [facility]

## 2022-10-27 ENCOUNTER — VIRTUAL VISIT (OUTPATIENT)
Dept: DERMATOLOGY | Facility: CLINIC | Age: 42
End: 2022-10-27
Payer: COMMERCIAL

## 2022-10-27 DIAGNOSIS — L73.2 HIDRADENITIS SUPPURATIVA: Primary | ICD-10-CM

## 2022-10-27 DIAGNOSIS — D84.9 IMMUNOSUPPRESSION (H): ICD-10-CM

## 2022-10-27 PROCEDURE — 99213 OFFICE O/P EST LOW 20 MIN: CPT | Mod: 95 | Performed by: DERMATOLOGY

## 2022-10-27 RX ORDER — DOXYCYCLINE 100 MG/1
100 CAPSULE ORAL 2 TIMES DAILY
Qty: 60 CAPSULE | Refills: 3 | Status: SHIPPED | OUTPATIENT
Start: 2022-10-27 | End: 2023-03-23

## 2022-10-27 ASSESSMENT — PAIN SCALES - GENERAL: PAINLEVEL: MODERATE PAIN (4)

## 2022-10-27 NOTE — PROGRESS NOTES
[facility] Dermatology Note  Encounter Date: Oct 27, 2022  Store-and-Forward and Telephone ([phone]). Location of teledermatologist: [facility] [place].  Start time: 1:02. End time: 1:12.    Dermatology Problem List:  1. Hidradenitis suppurativa - Hamilton stage III involving axillae > neck, groin.              - s/p WLE (BL axillae, groin crease) 10/4/21 w/ Dr. Garibay              - current treatment: adalimumab 80 mg weekly, topical clindamycin/benzoyl peroxide, intralesional triamcinolone                   - past treatment: doxycycline, other oral antibiotics, isotretinoin, adalimumab, infliximab (neuropathy), ustekinumab (insurance issues), oral clindamycin       ____________________________________________    Assessment & Plan:     1. Hidradenitis suppurativa: on adalimumab 80 mg weekly and still having active disease, predominantly in the left axilla and groin. Has upcoming excision scheduled for February. We discussed medical therapy options including trial of secukinumab vs risankizumab. However given upcoming surgery and anticipated difficulties in switching to an off-label treatment, as well as uncertainties inherent in switching, will continue current regimen for now. We discussed repeat intralesional triamcinolone as needed. Will plan to readdress following surgery. Has doxycycline if needed for breakthrough, as well as topicals.  - adalimumab 80 mg weekly  - doxycycline and topicals as needed    Procedures Performed:    None    Follow-up: 4-5 months    Staff:     Derrick Avina MD, FAAD   of Dermatology  Department of Dermatology  [facility] [employer] [facility]    ____________________________________________    CC: Derm Problem (CAMRON Estrada has been flaring on the left axilla and left groin areas )    HPI:  Mr. Sean Woodard is a(n) 42 year old male who presents today as a return patient for HS    R axilla - has  been calm since surgical excision    L axilla - not excised recently - was planned for September, now pushed back to February   - has spreading since last visit    L groin - flared recently - more activity  - R side on/off - intermittent flares    On adalimumab 80 mg weekly  - off doxycycline a few weeks  - has clindamycin/BPO - doesn't help with axillary involvement    Patient is otherwise feeling well, without additional skin concerns.    Labs Reviewed:  N/A    Physical Exam:  Vitals: There were no vitals taken for this visit.  SKIN: Teledermatology photos were reviewed; image quality and interpretability: acceptable. Image date: 10/27/22.  - L armpit and groin with multiple subcutaneous nodules with drainage  - R axilla surgical site without activity  - No other lesions of concern on areas examined.     Medications:  Current Outpatient Medications   Medication     adalimumab (HUMIRA *CF*) 80 MG/0.8ML pen kit     atorvastatin (LIPITOR) 20 MG tablet     augmented betamethasone dipropionate (DIPROLENE-AF) 0.05 % ointment     azelastine (ASTELIN) 0.1 % nasal spray     azelastine (OPTIVAR) 0.05 % ophthalmic solution     benzoyl peroxide 5 % external liquid     blood glucose (ACCU-CHEK GUIDE) test strip     clindamycin-benzoyl peroxide (BENZACLIN) 1-5 % external gel     diphenhydrAMINE (BENADRYL) 25 MG capsule     doxycycline monohydrate (MONODOX) 100 MG capsule     fluticasone (FLONASE) 50 MCG/ACT nasal spray     hydrOXYzine (ATARAX) 25 MG tablet     ibuprofen (ADVIL/MOTRIN) 600 MG tablet     losartan-hydrochlorothiazide (HYZAAR) 50-12.5 MG tablet     metFORMIN (GLUCOPHAGE XR) 500 MG 24 hr tablet     adalimumab (HUMIRA *CF*) 40 MG/0.4ML pen kit     clindamycin (CLEOCIN) 300 MG capsule     No current facility-administered medications for this visit.      Past Medical/Surgical History:   Patient Active Problem List   Diagnosis     Health Care Home     Hyperlipidemia with target LDL less than 100     Benign essential  hypertension     PTSD (post-traumatic stress disorder)     Class 3 severe obesity due to excess calories with serious comorbidity in adult (H)     Diabetes mellitus, type 2 (H)     FER (obstructive sleep apnea)     Hidradenitis suppurativa     Hidradenitis axillaris     Fatty liver     UTI (urinary tract infection)     Left ureteral stone     Lab test negative for COVID-19 virus     Acute bronchitis     Allergic rhinitis     Benign neoplasm of skin     Effusion of knee     Encounter for other physical therapy     Lesion of penis     Low back pain     Other acne     Other chest pain     Pain in joint, lower leg     Pain of finger     Screening examination for pulmonary tuberculosis     Sebaceous cyst     Epidermoid cyst of skin     Tear of medial cartilage or meniscus of knee, current     Tinnitus     Borderline diabetes mellitus     Past Medical History:   Diagnosis Date     Diabetes (H)      Hypertension      NO ACTIVE PROBLEMS      Obese      FER (obstructive sleep apnea)      Sleep apnea        CC Susanne Alberto PA-C  [id number]  [phone] on close of this encounter.

## 2022-10-27 NOTE — LETTER
10/27/2022       RE: Sean Woodard  [address] [facility] [address]     Dear Colleague,    Thank you for referring your patient, Sean Woodard, to the [facility] [place] at [facility]. Please see a copy of my visit note below.    [facility] Dermatology Note  Encounter Date: Oct 27, 2022  Store-and-Forward and Telephone ([phone]). Location of teledermatologist: [facility] [place].  Start time: 1:02. End time: 1:12.    Dermatology Problem List:  1. Hidradenitis suppurativa - Hamilton stage III involving axillae > neck, groin.              - s/p WLE (BL axillae, groin crease) 10/4/21 w/ Dr. Garibay              - current treatment: adalimumab 80 mg weekly, topical clindamycin/benzoyl peroxide, intralesional triamcinolone                   - past treatment: doxycycline, other oral antibiotics, isotretinoin, adalimumab, infliximab (neuropathy), ustekinumab (insurance issues), oral clindamycin       ____________________________________________    Assessment & Plan:     1. Hidradenitis suppurativa: on adalimumab 80 mg weekly and still having active disease, predominantly in the left axilla and groin. Has upcoming excision scheduled for February. We discussed medical therapy options including trial of secukinumab vs risankizumab. However given upcoming surgery and anticipated difficulties in switching to an off-label treatment, as well as uncertainties inherent in switching, will continue current regimen for now. We discussed repeat intralesional triamcinolone as needed. Will plan to readdress following surgery. Has doxycycline if needed for breakthrough, as well as topicals.  - adalimumab 80 mg weekly  - doxycycline and topicals as needed    Procedures Performed:    None    Follow-up: 4-5 months    Staff:     Derrick Avina MD, FAAD   of Dermatology  Department  of Dermatology  [facility]    ____________________________________________    CC: Derm Problem (CAMRON - Sean has been flaring on the left axilla and left groin areas )    HPI:  Mr. Sean Woodard is a(n) 42 year old male who presents today as a return patient for HS    R axilla - has been calm since surgical excision    L axilla - not excised recently - was planned for September, now pushed back to February   - has spreading since last visit    L groin - flared recently - more activity  - R side on/off - intermittent flares    On adalimumab 80 mg weekly  - off doxycycline a few weeks  - has clindamycin/BPO - doesn't help with axillary involvement    Patient is otherwise feeling well, without additional skin concerns.    Labs Reviewed:  N/A    Physical Exam:  Vitals: There were no vitals taken for this visit.  SKIN: Teledermatology photos were reviewed; image quality and interpretability: acceptable. Image date: 10/27/22.  - L armpit and groin with multiple subcutaneous nodules with drainage  - R axilla surgical site without activity  - No other lesions of concern on areas examined.     Medications:  Current Outpatient Medications   Medication     adalimumab (HUMIRA *CF*) 80 MG/0.8ML pen kit     atorvastatin (LIPITOR) 20 MG tablet     augmented betamethasone dipropionate (DIPROLENE-AF) 0.05 % ointment     azelastine (ASTELIN) 0.1 % nasal spray     azelastine (OPTIVAR) 0.05 % ophthalmic solution     benzoyl peroxide 5 % external liquid     blood glucose (ACCU-CHEK GUIDE) test strip     clindamycin-benzoyl peroxide (BENZACLIN) 1-5 % external gel     diphenhydrAMINE (BENADRYL) 25 MG capsule     doxycycline monohydrate (MONODOX) 100 MG capsule     fluticasone (FLONASE) 50 MCG/ACT nasal spray     hydrOXYzine (ATARAX) 25 MG tablet     ibuprofen (ADVIL/MOTRIN) 600 MG tablet     losartan-hydrochlorothiazide (HYZAAR) 50-12.5 MG tablet     metFORMIN (GLUCOPHAGE XR) 500 MG 24 hr tablet      adalimumab (HUMIRA *CF*) 40 MG/0.4ML pen kit     clindamycin (CLEOCIN) 300 MG capsule     No current facility-administered medications for this visit.      Past Medical/Surgical History:   Patient Active Problem List   Diagnosis     Health Care Home     Hyperlipidemia with target LDL less than 100     Benign essential hypertension     PTSD (post-traumatic stress disorder)     Class 3 severe obesity due to excess calories with serious comorbidity in adult (H)     Diabetes mellitus, type 2 (H)     FER (obstructive sleep apnea)     Hidradenitis suppurativa     Hidradenitis axillaris     Fatty liver     UTI (urinary tract infection)     Left ureteral stone     Lab test negative for COVID-19 virus     Acute bronchitis     Allergic rhinitis     Benign neoplasm of skin     Effusion of knee     Encounter for other physical therapy     Lesion of penis     Low back pain     Other acne     Other chest pain     Pain in joint, lower leg     Pain of finger     Screening examination for pulmonary tuberculosis     Sebaceous cyst     Epidermoid cyst of skin     Tear of medial cartilage or meniscus of knee, current     Tinnitus     Borderline diabetes mellitus     Past Medical History:   Diagnosis Date     Diabetes (H)      Hypertension      NO ACTIVE PROBLEMS      Obese      FER (obstructive sleep apnea)      Sleep apnea        CC Susanne Alberto PA-C  [id number] [id number] on close of this encounter.

## 2022-10-27 NOTE — NURSING NOTE
Dermatology Rooming Note    Sean Woodard's goals for this visit include:   Chief Complaint   Patient presents with     Derm Problem     CAMRON - Sean has been flaring on the left axilla and left groin areas      Wendie Dwyer CMA

## 2022-10-27 NOTE — PATIENT INSTRUCTIONS
[facility] Dermatology Visit    Thank you for allowing us to participate in your care. Your findings, instructions and follow-up plan are as follows:         When should I call my doctor?  If you are worsening or not improving, please, contact us or seek urgent care as noted below.     Who should I call with questions (adults)?  [facility] (adult and pediatric): [phone]  [facility] (adult): [phone]  For urgent needs outside of business hours call the [facility] at [phone] and ask for the dermatology resident on call  If this is a medical emergency and you are unable to reach an ER, Call 911    Who should I call with questions (pediatric)?  [facility]- Pediatric Dermatology  Dr. Alisa Roque, Dr. Marisol Romero, Dr. Zuleika Greenberg, Kaya Oneill, PA  Dr. Verena Ocampo, Dr. Kyleigh Carlson & Dr. Derrick Segal  Non Urgent  Nurse Triage Line; [phone]- Emma and Gabrielle RN Care Coordinators   Piedad (/Complex ) [phone]    If you need a prescription refill, please contact your pharmacy. Refills are approved or denied by our physicians during normal business hours, Monday through Fridays  Per office policy, refills will not be granted if you have not been seen within the past year (or sooner depending on your child's condition).    Scheduling Information:  Pediatric Appointment Scheduling and Call Center [phone]  Radiology Scheduling- [phone]  Sedation Unit Scheduling- [phone]  [place] Scheduling- General [phone]; Pediatric Dermatology [phone]  Main  Services: [phone]  Montserratian: [phone]  Vietnamese: [phone]  Hmong/Srinivas/Croatian: [phone]  Preadmission Nursing Department Fax Number: [phone] (fax all pre-operative paperwork to this number)    For urgent matters arising during evenings, weekends, or  holidays that cannot wait for normal business hours please call [phone] and ask for the dermatology resident on call to be paged.

## 2022-11-28 ENCOUNTER — TELEPHONE (OUTPATIENT)
Dept: WOUND CARE | Facility: CLINIC | Age: 42
End: 2022-11-28

## 2022-11-28 NOTE — TELEPHONE ENCOUNTER
Patient called to follow up on a surgery that was suppose to be scheduled in September but didn't happen. He had wanted to reschedule for late January or sometime in February but was still waiting to hear. Please call with any updates    [phone]

## 2022-11-29 NOTE — TELEPHONE ENCOUNTER
RN Care Coordinator: Fide Cadet; [phone]     Surgery is scheduled with Dr. Garibay   Date: 2/17   Location: [facility]    H&P to be completed by Primary Care    Post-op: will be scheduled by the clinic    Patient will receive a phone call from pre-admission nurses 1-2 days prior to surgery with arrival and start time.      Spoke with the patient and was able to confirm all scheduled information.       Patient questions/concerns: N/A       Surgery packet was sent via [employer]    __    Pilar Barlow, Senior Perioperative Coordinator, on 11/29/2022 at 3:09 PM  P: [phone]

## 2022-11-29 NOTE — TELEPHONE ENCOUNTER
Return call to Patient who states that he would prefer the surgery on his right armpit to be in February. He has a cruise in January.   Will route to Dr Garibay's ;     Has been waiting for surgery since September   Type of surgery: Excisional debridement HS tissue in right axilla   [facility] of surgery[facility]  Date and time of surgery: TBD  Surgeon: Dr. Shahnaz Garibay  Pre-Op Appt Date: Patient to schedule  Post-Op Appt Date: TBD    Route to Pilar Barlow

## 2022-12-07 DIAGNOSIS — R35.0 URINARY FREQUENCY: Primary | ICD-10-CM

## 2022-12-08 ENCOUNTER — APPOINTMENT (OUTPATIENT)
Dept: LAB | Facility: CLINIC | Age: 42
End: 2022-12-08
Payer: COMMERCIAL

## 2022-12-08 PROCEDURE — 87591 N.GONORRHOEAE DNA AMP PROB: CPT | Performed by: PHYSICIAN ASSISTANT

## 2022-12-08 PROCEDURE — 87491 CHLMYD TRACH DNA AMP PROBE: CPT | Performed by: PHYSICIAN ASSISTANT

## 2022-12-09 LAB
C TRACH DNA SPEC QL NAA+PROBE: NEGATIVE
N GONORRHOEA DNA SPEC QL NAA+PROBE: NEGATIVE

## 2022-12-11 ENCOUNTER — HOSPITAL ENCOUNTER (EMERGENCY)
Facility: CLINIC | Age: 42
Discharge: HOME | End: 2022-12-11
Attending: FAMILY MEDICINE | Admitting: FAMILY MEDICINE
Payer: COMMERCIAL

## 2022-12-11 ENCOUNTER — APPOINTMENT (OUTPATIENT)
Dept: CT IMAGING | Facility: CLINIC | Age: 42
End: 2022-12-11
Attending: FAMILY MEDICINE
Payer: COMMERCIAL

## 2022-12-11 VITALS
HEART RATE: 73 BPM | DIASTOLIC BLOOD PRESSURE: 99 MMHG | WEIGHT: 260 LBS | BODY MASS INDEX: 39.4 KG/M2 | HEIGHT: 68 IN | OXYGEN SATURATION: 98 % | SYSTOLIC BLOOD PRESSURE: 154 MMHG

## 2022-12-11 DIAGNOSIS — N20.1 URETERAL STONE: ICD-10-CM

## 2022-12-11 LAB
ALBUMIN SERPL BCG-MCNC: 4.6 G/DL (ref 3.5–5.2)
ALBUMIN UR-MCNC: 30 MG/DL
ALP SERPL-CCNC: 65 U/L (ref 40–129)
ALT SERPL W P-5'-P-CCNC: 47 U/L (ref 10–50)
ANION GAP SERPL CALCULATED.3IONS-SCNC: 9 MMOL/L (ref 7–15)
APPEARANCE UR: CLEAR
AST SERPL W P-5'-P-CCNC: 29 U/L (ref 10–50)
BASOPHILS # BLD AUTO: 0 10E3/UL (ref 0–0.2)
BASOPHILS NFR BLD AUTO: 1 %
BILIRUB SERPL-MCNC: 0.8 MG/DL
BILIRUB UR QL STRIP: NEGATIVE
BUN SERPL-MCNC: 11.6 MG/DL (ref 6–20)
CALCIUM SERPL-MCNC: 9.7 MG/DL (ref 8.6–10)
CHLORIDE SERPL-SCNC: 102 MMOL/L (ref 98–107)
COLOR UR AUTO: YELLOW
CREAT SERPL-MCNC: 1.06 MG/DL (ref 0.67–1.17)
DEPRECATED HCO3 PLAS-SCNC: 26 MMOL/L (ref 22–29)
EOSINOPHIL # BLD AUTO: 0.2 10E3/UL (ref 0–0.7)
EOSINOPHIL NFR BLD AUTO: 3 %
ERYTHROCYTE [DISTWIDTH] IN BLOOD BY AUTOMATED COUNT: 12.4 % (ref 10–15)
GFR SERPL CREATININE-BSD FRML MDRD: 90 ML/MIN/1.73M2
GLUCOSE SERPL-MCNC: 152 MG/DL (ref 70–99)
GLUCOSE UR STRIP-MCNC: NEGATIVE MG/DL
HCT VFR BLD AUTO: 45.7 % (ref 40–53)
HGB BLD-MCNC: 15.6 G/DL (ref 13.3–17.7)
HGB UR QL STRIP: ABNORMAL
HOLD SPECIMEN: NORMAL
HYALINE CASTS: 3 /LPF
IMM GRANULOCYTES # BLD: 0 10E3/UL
IMM GRANULOCYTES NFR BLD: 0 %
KETONES UR STRIP-MCNC: NEGATIVE MG/DL
LEUKOCYTE ESTERASE UR QL STRIP: NEGATIVE
LYMPHOCYTES # BLD AUTO: 1.3 10E3/UL (ref 0.8–5.3)
LYMPHOCYTES NFR BLD AUTO: 22 %
MCH RBC QN AUTO: 29.8 PG (ref 26.5–33)
MCHC RBC AUTO-ENTMCNC: 34.1 G/DL (ref 31.5–36.5)
MCV RBC AUTO: 87 FL (ref 78–100)
MONOCYTES # BLD AUTO: 0.4 10E3/UL (ref 0–1.3)
MONOCYTES NFR BLD AUTO: 7 %
MUCOUS THREADS #/AREA URNS LPF: PRESENT /LPF
NEUTROPHILS # BLD AUTO: 4.2 10E3/UL (ref 1.6–8.3)
NEUTROPHILS NFR BLD AUTO: 67 %
NITRATE UR QL: NEGATIVE
NRBC # BLD AUTO: 0 10E3/UL
NRBC BLD AUTO-RTO: 0 /100
PH UR STRIP: 5.5 [PH] (ref 5–7)
PLATELET # BLD AUTO: 267 10E3/UL (ref 150–450)
POTASSIUM SERPL-SCNC: 4.1 MMOL/L (ref 3.4–5.3)
PROT SERPL-MCNC: 8.4 G/DL (ref 6.4–8.3)
RBC # BLD AUTO: 5.24 10E6/UL (ref 4.4–5.9)
RBC URINE: >182 /HPF
SODIUM SERPL-SCNC: 137 MMOL/L (ref 136–145)
SP GR UR STRIP: 1.03 (ref 1–1.03)
SQUAMOUS EPITHELIAL: <1 /HPF
UROBILINOGEN UR STRIP-MCNC: NORMAL MG/DL
WBC # BLD AUTO: 6.1 10E3/UL (ref 4–11)
WBC URINE: 5 /HPF

## 2022-12-11 PROCEDURE — 85025 COMPLETE CBC W/AUTO DIFF WBC: CPT | Performed by: FAMILY MEDICINE

## 2022-12-11 PROCEDURE — 96361 HYDRATE IV INFUSION ADD-ON: CPT | Performed by: FAMILY MEDICINE

## 2022-12-11 PROCEDURE — 81001 URINALYSIS AUTO W/SCOPE: CPT | Performed by: FAMILY MEDICINE

## 2022-12-11 PROCEDURE — 82040 ASSAY OF SERUM ALBUMIN: CPT | Performed by: FAMILY MEDICINE

## 2022-12-11 PROCEDURE — 99285 EMERGENCY DEPT VISIT HI MDM: CPT | Mod: 25 | Performed by: FAMILY MEDICINE

## 2022-12-11 PROCEDURE — 96376 TX/PRO/DX INJ SAME DRUG ADON: CPT | Performed by: FAMILY MEDICINE

## 2022-12-11 PROCEDURE — 80053 COMPREHEN METABOLIC PANEL: CPT | Performed by: FAMILY MEDICINE

## 2022-12-11 PROCEDURE — 258N000003 HC RX IP 258 OP 636: Performed by: FAMILY MEDICINE

## 2022-12-11 PROCEDURE — 99285 EMERGENCY DEPT VISIT HI MDM: CPT | Performed by: FAMILY MEDICINE

## 2022-12-11 PROCEDURE — 96375 TX/PRO/DX INJ NEW DRUG ADDON: CPT | Performed by: FAMILY MEDICINE

## 2022-12-11 PROCEDURE — 36415 COLL VENOUS BLD VENIPUNCTURE: CPT | Performed by: FAMILY MEDICINE

## 2022-12-11 PROCEDURE — 250N000011 HC RX IP 250 OP 636: Performed by: FAMILY MEDICINE

## 2022-12-11 PROCEDURE — 96374 THER/PROPH/DIAG INJ IV PUSH: CPT | Performed by: FAMILY MEDICINE

## 2022-12-11 PROCEDURE — 74176 CT ABD & PELVIS W/O CONTRAST: CPT

## 2022-12-11 RX ORDER — ONDANSETRON 2 MG/ML
4 INJECTION INTRAMUSCULAR; INTRAVENOUS
Status: DISCONTINUED | OUTPATIENT
Start: 2022-12-11 | End: 2022-12-11 | Stop reason: HOSPADM

## 2022-12-11 RX ORDER — ONDANSETRON 4 MG/1
4-8 TABLET, ORALLY DISINTEGRATING ORAL EVERY 8 HOURS PRN
Qty: 12 TABLET | Refills: 0 | Status: SHIPPED | OUTPATIENT
Start: 2022-12-11

## 2022-12-11 RX ORDER — PHENAZOPYRIDINE HYDROCHLORIDE 200 MG/1
200 TABLET, FILM COATED ORAL 3 TIMES DAILY
COMMUNITY
Start: 2022-12-04 | End: 2022-12-11

## 2022-12-11 RX ORDER — KETOROLAC TROMETHAMINE 15 MG/ML
15 INJECTION, SOLUTION INTRAMUSCULAR; INTRAVENOUS ONCE
Status: COMPLETED | OUTPATIENT
Start: 2022-12-11 | End: 2022-12-11

## 2022-12-11 RX ORDER — ONDANSETRON 2 MG/ML
4 INJECTION INTRAMUSCULAR; INTRAVENOUS ONCE
Status: COMPLETED | OUTPATIENT
Start: 2022-12-11 | End: 2022-12-11

## 2022-12-11 RX ORDER — OXYCODONE HYDROCHLORIDE 5 MG/1
5-10 TABLET ORAL EVERY 8 HOURS PRN
Qty: 12 TABLET | Refills: 0 | Status: SHIPPED | OUTPATIENT
Start: 2022-12-11 | End: 2022-12-14

## 2022-12-11 RX ORDER — DOXYCYCLINE 100 MG/1
100 CAPSULE ORAL 2 TIMES DAILY
COMMUNITY
Start: 2022-10-27 | End: 2022-12-11

## 2022-12-11 RX ORDER — HYDROMORPHONE HYDROCHLORIDE 1 MG/ML
0.5 INJECTION, SOLUTION INTRAMUSCULAR; INTRAVENOUS; SUBCUTANEOUS
Status: COMPLETED | OUTPATIENT
Start: 2022-12-11 | End: 2022-12-11

## 2022-12-11 RX ORDER — CIPROFLOXACIN 500 MG/1
500 TABLET, FILM COATED ORAL 2 TIMES DAILY
COMMUNITY
Start: 2022-12-04 | End: 2022-12-11

## 2022-12-11 RX ADMIN — ONDANSETRON 4 MG: 2 INJECTION INTRAMUSCULAR; INTRAVENOUS at 11:47

## 2022-12-11 RX ADMIN — HYDROMORPHONE HYDROCHLORIDE 0.5 MG: 1 INJECTION, SOLUTION INTRAMUSCULAR; INTRAVENOUS; SUBCUTANEOUS at 11:52

## 2022-12-11 RX ADMIN — KETOROLAC TROMETHAMINE 15 MG: 15 INJECTION, SOLUTION INTRAMUSCULAR; INTRAVENOUS at 11:51

## 2022-12-11 RX ADMIN — HYDROMORPHONE HYDROCHLORIDE 0.5 MG: 1 INJECTION, SOLUTION INTRAMUSCULAR; INTRAVENOUS; SUBCUTANEOUS at 13:21

## 2022-12-11 RX ADMIN — SODIUM CHLORIDE 1000 ML: 9 INJECTION, SOLUTION INTRAVENOUS at 11:50

## 2022-12-11 RX ADMIN — HYDROMORPHONE HYDROCHLORIDE 0.5 MG: 1 INJECTION, SOLUTION INTRAMUSCULAR; INTRAVENOUS; SUBCUTANEOUS at 12:46

## 2022-12-11 ASSESSMENT — ACTIVITIES OF DAILY LIVING (ADL)
ADLS_ACUITY_SCORE: 35
ADLS_ACUITY_SCORE: 35

## 2022-12-11 NOTE — ED PROVIDER NOTES
HPI   The patient is a 42-year-old male presenting with right flank pain.  Symptoms began abruptly this morning about an hour prior to arrival.  He has a known history of ureteral stone.  He was recently in [place], [place] and had protected sex.  Shortly after, he was found to have dysuria and urgency and he was treated as potentially having had acquired an STD.  He was seen again in the urgency room and was treated with more antibiotics for a possible UTI.  He is just finishing that course.  Then, this morning he had onset of severe pain in the right flank.  He has associated nausea with vomiting.  No rashad hematuria.  No fever.  No recent trauma or injury.        Allergies:  Allergies   Allergen Reactions     Lisinopril Cough     Problem List:    Patient Active Problem List    Diagnosis Date Noted     Acute bronchitis 10/21/2021     Priority: Medium     Allergic rhinitis 10/21/2021     Priority: Medium     Benign neoplasm of skin 10/21/2021     Priority: Medium     Effusion of knee 10/21/2021     Priority: Medium     Ice to area BID.   Continue Motrin as directed.       Encounter for other physical therapy 10/21/2021     Priority: Medium     Lesion of penis 10/21/2021     Priority: Medium     Low back pain 10/21/2021     Priority: Medium     Other acne 10/21/2021     Priority: Medium     Other chest pain 10/21/2021     Priority: Medium     Pain in joint, lower leg 10/21/2021     Priority: Medium     pt.instructed to return for xray results 4/22/05. pt. given crutches - told to RICE times 24 hrs.       Pain of finger 10/21/2021     Priority: Medium     Screening examination for pulmonary tuberculosis 10/21/2021     Priority: Medium     Sebaceous cyst 10/21/2021     Priority: Medium     Epidermoid cyst of skin 10/21/2021     Priority: Medium     right thumb  Jun 26, 2009 Entered By: EMANI RUSS Comment: right thumb       Tear of medial cartilage or meniscus of knee, current 10/21/2021     Priority: Medium      Tinnitus 10/21/2021     Priority: Medium     UTI (urinary tract infection) 06/26/2021     Priority: Medium     Left ureteral stone 06/26/2021     Priority: Medium     Lab test negative for COVID-19 virus 06/26/2021     Priority: Medium     Fatty liver 06/07/2021     Priority: Medium     Hidradenitis axillaris 04/08/2021     Priority: Medium     Added automatically from request for surgery [id number]       Hidradenitis suppurativa 05/07/2020     Priority: Medium     FER (obstructive sleep apnea) 01/28/2019     Priority: Medium     Diabetes mellitus, type 2 (H) 01/25/2019     Priority: Medium     PTSD (post-traumatic stress disorder) 11/08/2018     Priority: Medium     , served in [place]  Diagnosed with PTSD by Dr. Darlene Jeffery on 11/9/2010       Class 3 severe obesity due to excess calories with serious comorbidity in adult (H) 11/08/2018     Priority: Medium     Benign essential hypertension 07/28/2017     Priority: Medium     Borderline diabetes mellitus 03/01/2016     Priority: Medium     Hyperlipidemia with target LDL less than 100 08/14/2014     Priority: Medium     Diagnosis updated by automated process. Provider to review and confirm.       Health Care Home 03/28/2014     Priority: Medium     EMERGENCY CARE PLAN  March 28, 2014: No current Care Coordination follow up planned. Please refer if Care Coordination services are needed.    Presenting Problem Signs and Symptoms Treatment Plan   Questions or concerns   during clinic hours   I will call my clinic directly:  [facility]  [id number] [place] [id number]  [phone].    Questions or concerns outside clinic hours   I will call the 24 hour nurse line at   [phone] or [phone].   Need to schedule an appointment   I will call the 24 hour scheduling team at [phone] or my clinic directly at [phone].    Same day treatment     I will call my clinic first, nurse line if after hours, urgent care and express care if  needed.   Clinic care coordination services (regular clinic hours)     I will call a clinic care coordinator directly:     Marlo Talley RN  [place], Fri - [phone]  Wed, Thurs - [phone]    Anat Duke, :    [phone]    Or call my clinic at [phone] and ask to speak with care coordination.   Crisis Services: Behavioral or Mental Health  [facility] 24 Hour Phone Line  [phone]    [facility] 24 Hour Crisis Services  [phone]    [facility] [phone]    [facility]   [phone]       Emergency treatment -- Immediately    CAll 061           Past Medical History:    Past Medical History:   Diagnosis Date     Diabetes (H)      Hypertension      NO ACTIVE PROBLEMS      Obese      FER (obstructive sleep apnea)      Sleep apnea      Past Surgical History:    Past Surgical History:   Procedure Laterality Date     EXCISE HIDRADENITIS (LOCATION) Left 5/21/2021    Procedure: Sharp excisional debridement of left axillary hidradenitis.;  Surgeon: Shahnaz Garibay MD;  Location: [facility] OR     EXCISE HIDRADENITIS (LOCATION) Bilateral 10/4/2021    Procedure: EXCISION, HIDRADENITIS - bilateral axillae, bilateral groin;  Surgeon: Shahnaz Garibay MD;  Location: [facility] OR     ORTHOPEDIC SURGERY  1/1/2005    RT knee arthroscopic menisectomy     Thumb Surgery  2009    Mass removal neuroma     Family History:    Family History   Problem Relation Age of Onset     Heart Defect Father         valve replacement     Diabetes Maternal Grandmother      Cancer Maternal Grandfather         stomach     Hypertension Brother      Diabetes Sister      Cerebrovascular Disease Brother 38     Hypertension Brother      Melanoma No family hx of      Skin Cancer No family hx of      Social History:  Marital Status:   [2]  Social History     Tobacco Use     Smoking status: Former     Packs/day: 0.50     Years: 5.00     Pack years: 2.50     Types: Cigarettes  "    Quit date: 2005     Years since quittin.9     Smokeless tobacco: Never   Substance Use Topics     Alcohol use: Yes     Comment: Social. 1 drink per month     Drug use: No      Medications:    atorvastatin (LIPITOR) 20 MG tablet  augmented betamethasone dipropionate (DIPROLENE-AF) 0.05 % ointment  azelastine (ASTELIN) 0.1 % nasal spray  diphenhydrAMINE (BENADRYL) 25 MG capsule  doxycycline monohydrate (MONODOX) 100 MG capsule  fluticasone (FLONASE) 50 MCG/ACT nasal spray  losartan-hydrochlorothiazide (HYZAAR) 50-12.5 MG tablet  metFORMIN (GLUCOPHAGE XR) 500 MG 24 hr tablet  ondansetron (ZOFRAN ODT) 4 MG ODT tab  oxyCODONE (ROXICODONE) 5 MG tablet  adalimumab (HUMIRA *CF*) 40 MG/0.4ML pen kit  adalimumab (HUMIRA *CF*) 80 MG/0.8ML pen kit  azelastine (OPTIVAR) 0.05 % ophthalmic solution  benzoyl peroxide 5 % external liquid  blood glucose (ACCU-CHEK GUIDE) test strip  clindamycin (CLEOCIN) 300 MG capsule  clindamycin-benzoyl peroxide (BENZACLIN) 1-5 % external gel  hydrOXYzine (ATARAX) 25 MG tablet  ibuprofen (ADVIL/MOTRIN) 600 MG tablet      Review of Systems   All other systems reviewed and are negative.      PE   BP: (!) 153/100  Pulse: 72  Height: 172.7 cm (5' 8\")  Weight: 117.9 kg (260 lb)  SpO2: 99 %  Physical Exam  Vitals and nursing note reviewed.   Constitutional:       Comments: Obvious discomfort, severe.  Vomiting.  Answering questions well despite this.   HENT:      Head: Atraumatic.      Right Ear: External ear normal.      Left Ear: External ear normal.      Nose: Nose normal.      Mouth/Throat:      Mouth: Mucous membranes are moist.      Pharynx: Oropharynx is clear.   Eyes:      General: No scleral icterus.     Extraocular Movements: Extraocular movements intact.      Conjunctiva/sclera: Conjunctivae normal.      Pupils: Pupils are equal, round, and reactive to light.   Cardiovascular:      Rate and Rhythm: Normal rate.   Pulmonary:      Effort: Pulmonary effort is normal. No respiratory " distress.   Abdominal:      Comments: Abdominal discomfort with any palpation.  No localized tenderness.  Soft.   Musculoskeletal:         General: Normal range of motion.      Cervical back: Normal range of motion.   Skin:     General: Skin is warm and dry.   Neurological:      Mental Status: He is alert and oriented to person, place, and time.   Psychiatric:         Behavior: Behavior normal.         ED COURSE and [facility]   1155.  The patient has a known history of ureteral stones now presenting with flank pain.  He is with nausea and vomiting and acute pain.  He was recently treated with antibiotics for the potential of STD/UTI.  Most likely, I suspect he was experiencing symptoms related to a descending ureteral stone given his current context.  Toradol, Dilaudid, Zofran, fluid.  CT scan pending.  Lab values and urine.    1426.  CT scan shows a distal ureteral stone, 2 mm.  This is consistent with his pain and recent history.  Prescriptions will be given for home management, oxycodone and Zofran.  A referral order for urology was placed.  The patient will be discharged home when improved.    1453.  The patient, their parent if applicable, and/or their medical decision maker(s) and I have reviewed all of the available historical information, applicable PMH, physical exam findings, and objective diagnostic data gathered during this ED visit.  We then discussed all work-up options and then together agreed upon the course taken during this visit.  The ultimate disposition and plan was a cooperative decision made between myself and the patient, their parent if applicable, and/or their legal decision maker(s).  The risks and benefits of all decisions made during this visit were discussed to the best of my abilities given the circumstances, and all parties are understanding of the pertinent ramifications of these decisions.      LABS  Labs Ordered and Resulted from Time of ED Arrival to Time of ED Departure   ROUTINE UA  WITH MICROSCOPIC REFLEX TO CULTURE - Abnormal       Result Value    Color Urine Yellow      Appearance Urine Clear      Glucose Urine Negative      Bilirubin Urine Negative      Ketones Urine Negative      Specific Gravity Urine 1.030      Blood Urine Large (*)     pH Urine 5.5      Protein Albumin Urine 30 (*)     Urobilinogen Urine Normal      Nitrite Urine Negative      Leukocyte Esterase Urine Negative      Mucus Urine Present (*)     RBC Urine >182 (*)     WBC Urine 5      Squamous Epithelials Urine <1      Hyaline Casts Urine 3 (*)    COMPREHENSIVE METABOLIC PANEL - Abnormal    Sodium 137      Potassium 4.1      Chloride 102      Carbon Dioxide (CO2) 26      Anion Gap 9      Urea Nitrogen 11.6      Creatinine 1.06      Calcium 9.7      Glucose 152 (*)     Alkaline Phosphatase 65      AST 29      ALT 47      Protein Total 8.4 (*)     Albumin 4.6      Bilirubin Total 0.8      GFR Estimate 90     CBC WITH PLATELETS AND DIFFERENTIAL    WBC Count 6.1      RBC Count 5.24      Hemoglobin 15.6      Hematocrit 45.7      MCV 87      MCH 29.8      MCHC 34.1      RDW 12.4      Platelet Count 267      % Neutrophils 67      % Lymphocytes 22      % Monocytes 7      % Eosinophils 3      % Basophils 1      % Immature Granulocytes 0      NRBCs per 100 WBC 0      Absolute Neutrophils 4.2      Absolute Lymphocytes 1.3      Absolute Monocytes 0.4      Absolute Eosinophils 0.2      Absolute Basophils 0.0      Absolute Immature Granulocytes 0.0      Absolute NRBCs 0.0         IMAGING  Images reviewed by me.  Radiology report also reviewed.  CT Abdomen Pelvis w/o Contrast   Final Result   IMPRESSION:    1.  Mild right-sided hydronephrosis and hydroureter. A faint 2 mm calculus is identified within the distal ureter.   2.  Hepatomegaly             Procedures    Medications   ondansetron (ZOFRAN) injection 4 mg (has no administration in time range)   ondansetron (ZOFRAN) injection 4 mg (4 mg Intravenous Given 12/11/22 1147)   ketorolac  (TORADOL) injection 15 mg (15 mg Intravenous Given 12/11/22 1151)   HYDROmorphone (PF) (DILAUDID) injection 0.5 mg (0.5 mg Intravenous Given 12/11/22 1321)   0.9% sodium chloride BOLUS (0 mLs Intravenous Stopped 12/11/22 1429)         IMPRESSION       ICD-10-CM    1. Ureteral stone  N20.1 Adult Urology  Referral               Medication List      Started    ondansetron 4 MG ODT tab  Commonly known as: ZOFRAN ODT  4-8 mg, Oral, EVERY 8 HOURS PRN     oxyCODONE 5 MG tablet  Commonly known as: ROXICODONE  5-10 mg, Oral, EVERY 8 HOURS PRN        ASK your doctor about these medications    doxycycline monohydrate 100 MG capsule  Commonly known as: MONODOX  100 mg, Oral, 2 TIMES DAILY  Ask about: Which instructions should I use?                        Deon Orantes MD  12/11/22 4532

## 2022-12-11 NOTE — ED TRIAGE NOTES
Pt c/o right flank pain and right abd pain started suddenly this AM. Pt has hx kidney stones, had one on left side 1 year ago. Pt states is currently being treated for UTI s/s.      Triage Assessment     Row Name 12/11/22 1143       Triage Assessment (Adult)    Airway WDL WDL       Respiratory WDL    Respiratory WDL WDL       Skin Circulation/Temperature WDL    Skin Circulation/Temperature WDL WDL       Cardiac WDL    Cardiac WDL WDL       Peripheral/Neurovascular WDL    Peripheral Neurovascular WDL WDL       Cognitive/Neuro/Behavioral WDL    Cognitive/Neuro/Behavioral WDL WDL

## 2022-12-11 NOTE — DISCHARGE INSTRUCTIONS
RETURN TO THE EMERGENCY ROOM FOR THE FOLLOWING:    Severely worsened pain, fever greater than 101, vomiting and dehydration, or at anytime for any concern.    FOLLOW UP:    Consider follow-up with urology if not improved over the next 5 to 7 days.  A referral order was placed at the time of your discharge to help with scheduling.  Expect a phone call within the next 1-2 business days.    TREATMENT RECOMMENDATIONS:    Ibuprofen 600mg every 6 hours for pain (7 days duration).  Tylenol 1000mg every 6 hours for pain (7 days duration).  Oxycodone 1-2 tabs every 6 hours, for pain not relieved by the above.  Zofran for nausea, as needed.  MiraLax OTC.  Titrate the medicine to achieve a soft, easy stool every 1-2 days.    NURSE ADVICE LINE:  [phone] or [phone]

## 2023-02-03 ENCOUNTER — OFFICE VISIT (OUTPATIENT)
Dept: FAMILY MEDICINE | Facility: CLINIC | Age: 43
End: 2023-02-03
Payer: COMMERCIAL

## 2023-02-03 VITALS
HEART RATE: 108 BPM | DIASTOLIC BLOOD PRESSURE: 72 MMHG | WEIGHT: 263 LBS | SYSTOLIC BLOOD PRESSURE: 112 MMHG | TEMPERATURE: 99.4 F | BODY MASS INDEX: 39.86 KG/M2 | HEIGHT: 68 IN | OXYGEN SATURATION: 98 %

## 2023-02-03 DIAGNOSIS — Z01.818 PREOP GENERAL PHYSICAL EXAM: Primary | ICD-10-CM

## 2023-02-03 DIAGNOSIS — L73.2 HIDRADENITIS AXILLARIS: ICD-10-CM

## 2023-02-03 DIAGNOSIS — E66.813 CLASS 3 SEVERE OBESITY DUE TO EXCESS CALORIES WITH SERIOUS COMORBIDITY AND BODY MASS INDEX (BMI) OF 40.0 TO 44.9 IN ADULT (H): ICD-10-CM

## 2023-02-03 DIAGNOSIS — E66.01 CLASS 3 SEVERE OBESITY DUE TO EXCESS CALORIES WITH SERIOUS COMORBIDITY AND BODY MASS INDEX (BMI) OF 40.0 TO 44.9 IN ADULT (H): ICD-10-CM

## 2023-02-03 DIAGNOSIS — E11.9 TYPE 2 DIABETES MELLITUS WITHOUT COMPLICATION, WITHOUT LONG-TERM CURRENT USE OF INSULIN (H): ICD-10-CM

## 2023-02-03 DIAGNOSIS — I10 BENIGN ESSENTIAL HYPERTENSION: ICD-10-CM

## 2023-02-03 LAB
BASOPHILS # BLD AUTO: 0.1 10E3/UL (ref 0–0.2)
BASOPHILS NFR BLD AUTO: 1 %
EOSINOPHIL # BLD AUTO: 0.3 10E3/UL (ref 0–0.7)
EOSINOPHIL NFR BLD AUTO: 4 %
ERYTHROCYTE [DISTWIDTH] IN BLOOD BY AUTOMATED COUNT: 12.8 % (ref 10–15)
HBA1C MFR BLD: 6.2 % (ref 0–5.6)
HCT VFR BLD AUTO: 44.6 % (ref 40–53)
HGB BLD-MCNC: 14.9 G/DL (ref 13.3–17.7)
LYMPHOCYTES # BLD AUTO: 1.6 10E3/UL (ref 0.8–5.3)
LYMPHOCYTES NFR BLD AUTO: 21 %
MCH RBC QN AUTO: 29.7 PG (ref 26.5–33)
MCHC RBC AUTO-ENTMCNC: 33.4 G/DL (ref 31.5–36.5)
MCV RBC AUTO: 89 FL (ref 78–100)
MONOCYTES # BLD AUTO: 0.6 10E3/UL (ref 0–1.3)
MONOCYTES NFR BLD AUTO: 8 %
NEUTROPHILS # BLD AUTO: 4.9 10E3/UL (ref 1.6–8.3)
NEUTROPHILS NFR BLD AUTO: 66 %
PLATELET # BLD AUTO: 225 10E3/UL (ref 150–450)
RBC # BLD AUTO: 5.01 10E6/UL (ref 4.4–5.9)
WBC # BLD AUTO: 7.5 10E3/UL (ref 4–11)

## 2023-02-03 PROCEDURE — 83036 HEMOGLOBIN GLYCOSYLATED A1C: CPT | Performed by: PHYSICIAN ASSISTANT

## 2023-02-03 PROCEDURE — 85025 COMPLETE CBC W/AUTO DIFF WBC: CPT | Performed by: PHYSICIAN ASSISTANT

## 2023-02-03 PROCEDURE — 36415 COLL VENOUS BLD VENIPUNCTURE: CPT | Performed by: PHYSICIAN ASSISTANT

## 2023-02-03 PROCEDURE — 99214 OFFICE O/P EST MOD 30 MIN: CPT | Performed by: PHYSICIAN ASSISTANT

## 2023-02-03 NOTE — PROGRESS NOTES
[facility]  [address] [facility] [address]  Phone: [phone]  Primary Provider: Susanne Chung  Pre-op Performing Provider: SUSANNE CHUNG      PREOPERATIVE EVALUATION:  Today's date: 2/3/2023    Sean Woodard is a 43 year old male who presents for a preoperative evaluation.    Surgical Information:  Surgery/Procedure: hidradentis excision  Surgery Location: [facility] [place]  Surgeon: Dr. MARY Garibay  Surgery Date: 02/17/22  Time of Surgery: tbd  Where patient plans to recover: At home with family  Fax number for surgical facility: Note does not need to be faxed, will be available electronically in Epic.    Type of Anesthesia Anticipated: to be determined    Assessment & Plan     The proposed surgical procedure is considered INTERMEDIATE risk.    (Z01.818) Preop general physical exam  (primary encounter diagnosis)  Comment:   Plan: Hemoglobin A1c, CBC with platelets and         differential            (L73.2) Hidradenitis axillaris  Comment:   Plan: Hemoglobin A1c, CBC with platelets and         differential            (E11.9) Type 2 diabetes mellitus without complication, without long-term current use of insulin (H)  Comment:   Plan: A1c stable/controlled    (E66.01,  Z68.41) Class 3 severe obesity due to excess calories with serious comorbidity and body mass index (BMI) of 40.0 to 44.9 in adult (H)  Comment:   Plan: working on weight loss - stable weight    (I10) Benign essential hypertension  Comment:   Plan: well controlled.              Risks and Recommendations:  The patient has the following additional risks and recommendations for perioperative complications:   - No identified additional risk factors other than previously addressed      RECOMMENDATION:  APPROVAL GIVEN to proceed with proposed procedure, without further diagnostic evaluation.        Subjective     HPI related to upcoming procedure:   Long h/o HS followed by dermatology and  wound clinic. On adalimumab weekly but persistent, active disease. HS in axilla managed by wound clinic - plan for excisional debridement of tissue in the right axilla     Preop Questions 2/3/2023   1. Have you ever had a heart attack or stroke? No   2. Have you ever had surgery on your heart or blood vessels, such as a stent placement, a coronary artery bypass, or surgery on an artery in your head, neck, heart, or legs? No   3. Do you have chest pain with activity? No   4. Do you have a history of  heart failure? No   5. Do you currently have a cold, bronchitis or symptoms of other infection? No   6. Do you have a cough, shortness of breath, or wheezing? No   7. Do you or anyone in your family have previous history of blood clots? No   8. Do you or does anyone in your family have a serious bleeding problem such as prolonged bleeding following surgeries or cuts? No   9. Have you ever had problems with anemia or been told to take iron pills? No   10. Have you had any abnormal blood loss such as black, tarry or bloody stools? No   11. Have you ever had a blood transfusion? No   12. Are you willing to have a blood transfusion if it is medically needed before, during, or after your surgery? Yes   13. Have you or any of your relatives ever had problems with anesthesia? No   14. Do you have sleep apnea, excessive snoring or daytime drowsiness? YES - sleep apnea   14a. Do you have a CPAP machine? Yes   15. Do you have any artifical heart valves or other implanted medical devices like a pacemaker, defibrillator, or continuous glucose monitor? No   16. Do you have artificial joints? No   17. Are you allergic to latex? No       Health Care Directive:  Patient does not have a Health Care Directive or Living Will:     Preoperative Review of :   reviewed - controlled substances reflected in medication list. x1 by ED. Nothing since or repeating    Status of Chronic Conditions:  See problem list for active medical problems.   Problems all longstanding and stable, except as noted/documented.  See ROS for pertinent symptoms related to these conditions.      Review of Systems  CONSTITUTIONAL: NEGATIVE for fever, chills, change in weight  INTEGUMENTARY/SKIN: NEGATIVE for worrisome rashes, moles or lesions  EYES: NEGATIVE for vision changes or irritation  ENT/MOUTH: NEGATIVE for ear, mouth and throat problems  RESP: NEGATIVE for significant cough or SOB  CV: NEGATIVE for chest pain, palpitations or peripheral edema  GI: NEGATIVE for nausea, abdominal pain, heartburn, or change in bowel habits  : NEGATIVE for frequency, dysuria, or hematuria  MUSCULOSKELETAL: NEGATIVE for significant arthralgias or myalgia  NEURO: NEGATIVE for weakness, dizziness or paresthesias  ENDOCRINE: NEGATIVE for temperature intolerance, skin/hair changes  HEME: NEGATIVE for bleeding problems  PSYCHIATRIC: NEGATIVE for changes in mood or affect    Patient Active Problem List    Diagnosis Date Noted     Acute bronchitis 10/21/2021     Priority: Medium     Allergic rhinitis 10/21/2021     Priority: Medium     Benign neoplasm of skin 10/21/2021     Priority: Medium     Effusion of knee 10/21/2021     Priority: Medium     Ice to area BID.   Continue Motrin as directed.       Encounter for other physical therapy 10/21/2021     Priority: Medium     Lesion of penis 10/21/2021     Priority: Medium     Low back pain 10/21/2021     Priority: Medium     Other acne 10/21/2021     Priority: Medium     Other chest pain 10/21/2021     Priority: Medium     Pain in joint, lower leg 10/21/2021     Priority: Medium     pt.instructed to return for xray results 4/22/05. pt. given crutches - told to RICE times 24 hrs.       Pain of finger 10/21/2021     Priority: Medium     Screening examination for pulmonary tuberculosis 10/21/2021     Priority: Medium     Sebaceous cyst 10/21/2021     Priority: Medium     Epidermoid cyst of skin 10/21/2021     Priority: Medium     right thumb  Roger 26,  2009 Entered By: EMANI RUSS Comment: right thumb       Tear of medial cartilage or meniscus of knee, current 10/21/2021     Priority: Medium     Tinnitus 10/21/2021     Priority: Medium     UTI (urinary tract infection) 06/26/2021     Priority: Medium     Left ureteral stone 06/26/2021     Priority: Medium     Lab test negative for COVID-19 virus 06/26/2021     Priority: Medium     Fatty liver 06/07/2021     Priority: Medium     Hidradenitis axillaris 04/08/2021     Priority: Medium     Added automatically from request for surgery [id number]       Hidradenitis suppurativa 05/07/2020     Priority: Medium     FER (obstructive sleep apnea) 01/28/2019     Priority: Medium     Diabetes mellitus, type 2 (H) 01/25/2019     Priority: Medium     PTSD (post-traumatic stress disorder) 11/08/2018     Priority: Medium     [place], served in [place]  Diagnosed with PTSD by Dr. Darlene Jeffery on 11/9/2010       Class 3 severe obesity due to excess calories with serious comorbidity in adult (H) 11/08/2018     Priority: Medium     Benign essential hypertension 07/28/2017     Priority: Medium     Borderline diabetes mellitus 03/01/2016     Priority: Medium     Hyperlipidemia with target LDL less than 100 08/14/2014     Priority: Medium     Diagnosis updated by automated process. Provider to review and confirm.       Health Care Home 03/28/2014     Priority: Medium     EMERGENCY CARE PLAN  March 28, 2014: No current Care Coordination follow up planned. Please refer if Care Coordination services are needed.    Presenting Problem Signs and Symptoms Treatment Plan   Questions or concerns   during clinic hours   I will call my clinic directly:  [address] [address]  [phone].    Questions or concerns outside clinic hours   I will call the 24 hour nurse line at   [phone] or [phone].   Need to schedule an appointment   I will call the 24 hour scheduling team at [phone] or my  clinic directly at [phone].    Same day treatment     I will call my clinic first, nurse line if after hours, urgent care and express care if needed.   Clinic care coordination services (regular clinic hours)     I will call a clinic care coordinator directly:     Marlo Talley RN  [place], Fri - [phone]  Wed, Thurs - [phone]    Anat Wall, SW:    [phone]    Or call my clinic at [phone] and ask to speak with care coordination.   Crisis Services: Behavioral or Mental Health  [facility] 24 Hour Phone Line  [phone]    [facility] 24 Hour Crisis Services  [phone]    [facility] (Behavioral Health Providers) Network [phone]    Located within [facility]   [phone]       Emergency treatment -- Immediately    CAll 909           Past Medical History:   Diagnosis Date     Diabetes (H)      Hypertension      NO ACTIVE PROBLEMS      Obese      FER (obstructive sleep apnea)      Sleep apnea      Past Surgical History:   Procedure Laterality Date     EXCISE HIDRADENITIS (LOCATION) Left 5/21/2021    Procedure: Sharp excisional debridement of left axillary hidradenitis.;  Surgeon: Shahnaz Garibay MD;  Location: [facility] OR     EXCISE HIDRADENITIS (LOCATION) Bilateral 10/4/2021    Procedure: EXCISION, HIDRADENITIS - bilateral axillae, bilateral groin;  Surgeon: Shahnaz Garibay MD;  Location: [facility] OR     ORTHOPEDIC SURGERY  1/1/2005    RT knee arthroscopic menisectomy     Thumb Surgery  2009    Mass removal neuroma     Current Outpatient Medications   Medication Sig Dispense Refill     adalimumab (HUMIRA *CF*) 80 MG/0.8ML pen kit Inject 0.8 mLs (80 mg) Subcutaneous once a week (Patient taking differently: Inject 80 mg Subcutaneous once a week On friday) 3.2 mL 12     atorvastatin (LIPITOR) 20 MG tablet TAKE ONE TABLET BY MOUTH ONCE DAILY 90 tablet 3     augmented betamethasone dipropionate (DIPROLENE-AF) 0.05 % ointment Apply sparingly to affected area twice daily as needed.   Do not apply to face. 45 g 1     azelastine (ASTELIN) 0.1 % nasal spray Spray 2 sprays into both nostrils 2 times daily as needed for rhinitis 30 mL 3     azelastine (OPTIVAR) 0.05 % ophthalmic solution Apply 1 drop to eye 2 times daily 6 mL 3     benzoyl peroxide 5 % external liquid Use daily as directed 226 g 11     diphenhydrAMINE (BENADRYL) 25 MG capsule Take 25 mg by mouth every 6 hours as needed for itching or allergies       doxycycline monohydrate (MONODOX) 100 MG capsule Take 1 capsule (100 mg) by mouth 2 times daily 60 capsule 3     fluticasone (FLONASE) 50 MCG/ACT nasal spray Spray 2 sprays into both nostrils daily 16 g 3     hydrOXYzine (ATARAX) 25 MG tablet Take 1 tablet (25 mg) by mouth 3 times daily as needed for itching 12 tablet 0     losartan-hydrochlorothiazide (HYZAAR) 50-12.5 MG tablet Take 1 tablet by mouth daily 90 tablet 3     metFORMIN (GLUCOPHAGE XR) 500 MG 24 hr tablet TAKE TWO TABLETS BY MOUTH TWICE A DAY WITH MEALS 360 tablet 3     ondansetron (ZOFRAN ODT) 4 MG ODT tab Take 1-2 tablets (4-8 mg) by mouth every 8 hours as needed for nausea 12 tablet 0     adalimumab (HUMIRA *CF*) 40 MG/0.4ML pen kit Inject 0.8 mLs (80 mg) Subcutaneous every 7 days (Patient not taking: Reported on 2/3/2023) 3.2 mL 11     blood glucose (ACCU-CHEK GUIDE) test strip Use to test blood sugar 2 times daily (Patient not taking: Reported on 2/3/2023) 100 strip 11     clindamycin (CLEOCIN) 300 MG capsule Take 1 capsule (300 mg) by mouth 2 times daily (Patient not taking: Reported on 10/27/2022) 60 capsule 3     clindamycin-benzoyl peroxide (BENZACLIN) 1-5 % external gel Apply topically 2 times daily (Patient not taking: Reported on 2/3/2023) 50 g 11     ibuprofen (ADVIL/MOTRIN) 600 MG tablet TAKE ONE TABLET BY MOUTH THREE TIMES A DAY AS NEEDED WITH FOOD FOR PAIN (Patient not taking: Reported on 2/3/2023)         Allergies   Allergen Reactions     Lisinopril Cough        Social History     Tobacco Use     Smoking  "status: Former     Packs/day: 0.50     Years: 5.00     Pack years: 2.50     Types: Cigarettes     Quit date: 2005     Years since quittin.1     Smokeless tobacco: Never   Substance Use Topics     Alcohol use: Yes     Comment: Social. 1 drink per month     Family History   Problem Relation Age of Onset     Heart Defect Father         valve replacement     Diabetes Maternal Grandmother      Cancer Maternal Grandfather         stomach     Hypertension Brother      Diabetes Sister      Cerebrovascular Disease Brother 38     Hypertension Brother      Melanoma No family hx of      Skin Cancer No family hx of      History   Drug Use No         Objective     /72 (BP Location: Right arm, Patient Position: Sitting, Cuff Size: Adult Large)   Pulse 108   Temp 99.4  F (37.4  C) (Tympanic)   Ht 1.727 m (5' 8\")   Wt 119.3 kg (263 lb)   SpO2 98%   BMI 39.99 kg/m      Physical Exam    GENERAL APPEARANCE: healthy, alert and no distress     EYES: EOMI,  PERRL     HENT: ear canals and TM's normal and nose and mouth without ulcers or lesions     NECK: no adenopathy, no asymmetry, masses, or scars and thyroid normal to palpation     RESP: lungs clear to auscultation - no rales, rhonchi or wheezes     CV: regular rates and rhythm, normal S1 S2, no S3 or S4 and no murmur, click or rub     ABDOMEN:  soft, nontender, no HSM or masses and bowel sounds normal     MS: extremities normal- no gross deformities noted, no evidence of inflammation in joints, FROM in all extremities.     SKIN: no suspicious lesions or rashes     NEURO: Normal strength and tone, sensory exam grossly normal, mentation intact and speech normal     PSYCH: mentation appears normal. and affect normal/bright     LYMPHATICS: No cervical adenopathy    Recent Labs   Lab Test 22  1145 22  1356 10/04/21  0606 10/04/21  0606 21  0528 21  1156 21  1431   HGB 15.6  --   --  14.7 13.4   < > 16.0     --   --   --  208   < > " 235    136  --   --  140   < > 135   POTASSIUM 4.1 4.0  --  3.7 3.6   < > 3.6   CR 1.06 0.89   < > 1.05 0.94   < > 0.97   A1C  --  6.1*  --   --   --   --  5.8*    < > = values in this interval not displayed.        Diagnostics:  Labs pending  No EKG required for low risk surgery (cataract, skin procedure, breast biopsy, etc).    Revised Cardiac Risk Index (RCRI):  The patient has the following serious cardiovascular risks for perioperative complications:   - No serious cardiac risks = 0 points     RCRI Interpretation: 0 points: Class I (very low risk - 0.4% complication rate)           Signed Electronically by: Susanne Alberto PA-C  Copy of this evaluation report is provided to requesting physician.

## 2023-02-03 NOTE — H&P (VIEW-ONLY)
[facility]  [address] [address] [address]  Phone: [phone]  Primary Provider: Susanne Chung  Pre-op Performing Provider: SUSANNE CHUNG      PREOPERATIVE EVALUATION:  Today's date: 2/3/2023    Sean Woodard is a 43 year old male who presents for a preoperative evaluation.    Surgical Information:  Surgery/Procedure: hidradentis excision  Surgery Location: [facility]  Surgeon: Dr. MARY Garibay  Surgery Date: 02/17/22  Time of Surgery: tbd  Where patient plans to recover: At home with family  Fax number for surgical facility: Note does not need to be faxed, will be available electronically in Epic.    Type of Anesthesia Anticipated: to be determined    Assessment & Plan     The proposed surgical procedure is considered INTERMEDIATE risk.    (Z01.818) Preop general physical exam  (primary encounter diagnosis)  Comment:   Plan: Hemoglobin A1c, CBC with platelets and         differential            (L73.2) Hidradenitis axillaris  Comment:   Plan: Hemoglobin A1c, CBC with platelets and         differential            (E11.9) Type 2 diabetes mellitus without complication, without long-term current use of insulin (H)  Comment:   Plan: A1c stable/controlled    (E66.01,  Z68.41) Class 3 severe obesity due to excess calories with serious comorbidity and body mass index (BMI) of 40.0 to 44.9 in adult (H)  Comment:   Plan: working on weight loss - stable weight    (I10) Benign essential hypertension  Comment:   Plan: well controlled.              Risks and Recommendations:  The patient has the following additional risks and recommendations for perioperative complications:   - No identified additional risk factors other than previously addressed      RECOMMENDATION:  APPROVAL GIVEN to proceed with proposed procedure, without further diagnostic evaluation.        Subjective     HPI related to upcoming procedure:   Long h/o HS followed by dermatology and  wound clinic. On adalimumab weekly but persistent, active disease. HS in axilla managed by wound clinic - plan for excisional debridement of tissue in the right axilla     Preop Questions 2/3/2023   1. Have you ever had a heart attack or stroke? No   2. Have you ever had surgery on your heart or blood vessels, such as a stent placement, a coronary artery bypass, or surgery on an artery in your head, neck, heart, or legs? No   3. Do you have chest pain with activity? No   4. Do you have a history of  heart failure? No   5. Do you currently have a cold, bronchitis or symptoms of other infection? No   6. Do you have a cough, shortness of breath, or wheezing? No   7. Do you or anyone in your family have previous history of blood clots? No   8. Do you or does anyone in your family have a serious bleeding problem such as prolonged bleeding following surgeries or cuts? No   9. Have you ever had problems with anemia or been told to take iron pills? No   10. Have you had any abnormal blood loss such as black, tarry or bloody stools? No   11. Have you ever had a blood transfusion? No   12. Are you willing to have a blood transfusion if it is medically needed before, during, or after your surgery? Yes   13. Have you or any of your relatives ever had problems with anesthesia? No   14. Do you have sleep apnea, excessive snoring or daytime drowsiness? YES - sleep apnea   14a. Do you have a CPAP machine? Yes   15. Do you have any artifical heart valves or other implanted medical devices like a pacemaker, defibrillator, or continuous glucose monitor? No   16. Do you have artificial joints? No   17. Are you allergic to latex? No       Health Care Directive:  Patient does not have a Health Care Directive or Living Will:     Preoperative Review of :   reviewed - controlled substances reflected in medication list. x1 by ED. Nothing since or repeating    Status of Chronic Conditions:  See problem list for active medical problems.   Problems all longstanding and stable, except as noted/documented.  See ROS for pertinent symptoms related to these conditions.      Review of Systems  CONSTITUTIONAL: NEGATIVE for fever, chills, change in weight  INTEGUMENTARY/SKIN: NEGATIVE for worrisome rashes, moles or lesions  EYES: NEGATIVE for vision changes or irritation  ENT/MOUTH: NEGATIVE for ear, mouth and throat problems  RESP: NEGATIVE for significant cough or SOB  CV: NEGATIVE for chest pain, palpitations or peripheral edema  GI: NEGATIVE for nausea, abdominal pain, heartburn, or change in bowel habits  : NEGATIVE for frequency, dysuria, or hematuria  MUSCULOSKELETAL: NEGATIVE for significant arthralgias or myalgia  NEURO: NEGATIVE for weakness, dizziness or paresthesias  ENDOCRINE: NEGATIVE for temperature intolerance, skin/hair changes  HEME: NEGATIVE for bleeding problems  PSYCHIATRIC: NEGATIVE for changes in mood or affect    Patient Active Problem List    Diagnosis Date Noted     Acute bronchitis 10/21/2021     Priority: Medium     Allergic rhinitis 10/21/2021     Priority: Medium     Benign neoplasm of skin 10/21/2021     Priority: Medium     Effusion of knee 10/21/2021     Priority: Medium     Ice to area BID.   Continue Motrin as directed.       Encounter for other physical therapy 10/21/2021     Priority: Medium     Lesion of penis 10/21/2021     Priority: Medium     Low back pain 10/21/2021     Priority: Medium     Other acne 10/21/2021     Priority: Medium     Other chest pain 10/21/2021     Priority: Medium     Pain in joint, lower leg 10/21/2021     Priority: Medium     pt.instructed to return for xray results 4/22/05. pt. given crutches - told to RICE times 24 hrs.       Pain of finger 10/21/2021     Priority: Medium     Screening examination for pulmonary tuberculosis 10/21/2021     Priority: Medium     Sebaceous cyst 10/21/2021     Priority: Medium     Epidermoid cyst of skin 10/21/2021     Priority: Medium     right thumb  Roger 26,  2009 Entered By: EMANI RUSS Comment: right thumb       Tear of medial cartilage or meniscus of knee, current 10/21/2021     Priority: Medium     Tinnitus 10/21/2021     Priority: Medium     UTI (urinary tract infection) 06/26/2021     Priority: Medium     Left ureteral stone 06/26/2021     Priority: Medium     Lab test negative for COVID-19 virus 06/26/2021     Priority: Medium     Fatty liver 06/07/2021     Priority: Medium     Hidradenitis axillaris 04/08/2021     Priority: Medium     Added automatically from request for surgery [id number]       Hidradenitis suppurativa 05/07/2020     Priority: Medium     FER (obstructive sleep apnea) 01/28/2019     Priority: Medium     Diabetes mellitus, type 2 (H) 01/25/2019     Priority: Medium     PTSD (post-traumatic stress disorder) 11/08/2018     Priority: Medium     [place], [place] in [place]  Diagnosed with PTSD by Dr. Darlene Jeffery on 11/9/2010       Class 3 severe obesity due to excess calories with serious comorbidity in adult (H) 11/08/2018     Priority: Medium     Benign essential hypertension 07/28/2017     Priority: Medium     Borderline diabetes mellitus 03/01/2016     Priority: Medium     Hyperlipidemia with target LDL less than 100 08/14/2014     Priority: Medium     Diagnosis updated by automated process. Provider to review and confirm.       Health Care Home 03/28/2014     Priority: Medium     EMERGENCY CARE PLAN  March 28, 2014: No current Care Coordination follow up planned. Please refer if Care Coordination services are needed.    Presenting Problem Signs and Symptoms Treatment Plan   Questions or concerns   during clinic hours   I will call my clinic directly:  [address] [address]  [phone].    Questions or concerns outside clinic hours   I will call the 24 hour nurse line at   [phone] or [phone].   Need to schedule an appointment   I will call the 24 hour scheduling team at [phone] or my  clinic directly at [phone].    Same day treatment     I will call my clinic first, nurse line if after hours, urgent care and express care if needed.   Clinic care coordination services (regular clinic hours)     I will call a clinic care coordinator directly:     Marlo Talley RN  [place], Fri - [phone]  Wed, Thurs - [phone]    Anat Wall, SW:    [phone]    Or call my clinic at [phone] and ask to speak with care coordination.   Crisis Services: Behavioral or Mental Health  [facility] 24 Hour Phone Line  [phone]    [facility] 24 Hour Crisis Services  [phone]    [facility] (Behavioral Health Providers) Network [phone]    [facility]   [phone]       Emergency treatment -- Immediately    [facility]All 486           Past Medical History:   Diagnosis Date     Diabetes (H)      Hypertension      NO ACTIVE PROBLEMS      Obese      FER (obstructive sleep apnea)      Sleep apnea      Past Surgical History:   Procedure Laterality Date     EXCISE HIDRADENITIS (LOCATION) Left 5/21/2021    Procedure: Sharp excisional debridement of left axillary hidradenitis.;  Surgeon: Shahnaz Garibay MD;  Location: [facility] OR     EXCISE HIDRADENITIS (LOCATION) Bilateral 10/4/2021    Procedure: EXCISION, HIDRADENITIS - bilateral axillae, bilateral groin;  Surgeon: Shahnaz Garibay MD;  Location: [facility] OR     ORTHOPEDIC SURGERY  1/1/2005    RT knee arthroscopic menisectomy     Thumb Surgery  2009    Mass removal neuroma     Current Outpatient Medications   Medication Sig Dispense Refill     adalimumab (HUMIRA *CF*) 80 MG/0.8ML pen kit Inject 0.8 mLs (80 mg) Subcutaneous once a week (Patient taking differently: Inject 80 mg Subcutaneous once a week On friday) 3.2 mL 12     atorvastatin (LIPITOR) 20 MG tablet TAKE ONE TABLET BY MOUTH ONCE DAILY 90 tablet 3     augmented betamethasone dipropionate (DIPROLENE-AF) 0.05 % ointment Apply sparingly to affected area twice daily as needed.   Do not apply to face. 45 g 1     azelastine (ASTELIN) 0.1 % nasal spray Spray 2 sprays into both nostrils 2 times daily as needed for rhinitis 30 mL 3     azelastine (OPTIVAR) 0.05 % ophthalmic solution Apply 1 drop to eye 2 times daily 6 mL 3     benzoyl peroxide 5 % external liquid Use daily as directed 226 g 11     diphenhydrAMINE (BENADRYL) 25 MG capsule Take 25 mg by mouth every 6 hours as needed for itching or allergies       doxycycline monohydrate (MONODOX) 100 MG capsule Take 1 capsule (100 mg) by mouth 2 times daily 60 capsule 3     fluticasone (FLONASE) 50 MCG/ACT nasal spray Spray 2 sprays into both nostrils daily 16 g 3     hydrOXYzine (ATARAX) 25 MG tablet Take 1 tablet (25 mg) by mouth 3 times daily as needed for itching 12 tablet 0     losartan-hydrochlorothiazide (HYZAAR) 50-12.5 MG tablet Take 1 tablet by mouth daily 90 tablet 3     metFORMIN (GLUCOPHAGE XR) 500 MG 24 hr tablet TAKE TWO TABLETS BY MOUTH TWICE A DAY WITH MEALS 360 tablet 3     ondansetron (ZOFRAN ODT) 4 MG ODT tab Take 1-2 tablets (4-8 mg) by mouth every 8 hours as needed for nausea 12 tablet 0     adalimumab (HUMIRA *CF*) 40 MG/0.4ML pen kit Inject 0.8 mLs (80 mg) Subcutaneous every 7 days (Patient not taking: Reported on 2/3/2023) 3.2 mL 11     blood glucose (ACCU-CHEK GUIDE) test strip Use to test blood sugar 2 times daily (Patient not taking: Reported on 2/3/2023) 100 strip 11     clindamycin (CLEOCIN) 300 MG capsule Take 1 capsule (300 mg) by mouth 2 times daily (Patient not taking: Reported on 10/27/2022) 60 capsule 3     clindamycin-benzoyl peroxide (BENZACLIN) 1-5 % external gel Apply topically 2 times daily (Patient not taking: Reported on 2/3/2023) 50 g 11     ibuprofen (ADVIL/MOTRIN) 600 MG tablet TAKE ONE TABLET BY MOUTH THREE TIMES A DAY AS NEEDED WITH FOOD FOR PAIN (Patient not taking: Reported on 2/3/2023)         Allergies   Allergen Reactions     Lisinopril Cough        Social History     Tobacco Use     Smoking  "status: Former     Packs/day: 0.50     Years: 5.00     Pack years: 2.50     Types: Cigarettes     Quit date: 2005     Years since quittin.1     Smokeless tobacco: Never   Substance Use Topics     Alcohol use: Yes     Comment: Social. 1 drink per month     Family History   Problem Relation Age of Onset     Heart Defect Father         valve replacement     Diabetes Maternal Grandmother      Cancer Maternal Grandfather         stomach     Hypertension Brother      Diabetes Sister      Cerebrovascular Disease Brother 38     Hypertension Brother      Melanoma No family hx of      Skin Cancer No family hx of      History   Drug Use No         Objective     /72 (BP Location: Right arm, Patient Position: Sitting, Cuff Size: Adult Large)   Pulse 108   Temp 99.4  F (37.4  C) (Tympanic)   Ht 1.727 m (5' 8\")   Wt 119.3 kg (263 lb)   SpO2 98%   BMI 39.99 kg/m      Physical Exam    GENERAL APPEARANCE: healthy, alert and no distress     EYES: EOMI,  PERRL     HENT: ear canals and TM's normal and nose and mouth without ulcers or lesions     NECK: no adenopathy, no asymmetry, masses, or scars and thyroid normal to palpation     RESP: lungs clear to auscultation - no rales, rhonchi or wheezes     CV: regular rates and rhythm, normal S1 S2, no S3 or S4 and no murmur, click or rub     ABDOMEN:  soft, nontender, no HSM or masses and bowel sounds normal     MS: extremities normal- no gross deformities noted, no evidence of inflammation in joints, FROM in all extremities.     SKIN: no suspicious lesions or rashes     NEURO: Normal strength and tone, sensory exam grossly normal, mentation intact and speech normal     PSYCH: mentation appears normal. and affect normal/bright     LYMPHATICS: No cervical adenopathy    Recent Labs   Lab Test 22  1145 22  1356 10/04/21  0606 10/04/21  0606 21  0528 21  1156 21  1431   HGB 15.6  --   --  14.7 13.4   < > 16.0     --   --   --  208   < > " 235    136  --   --  140   < > 135   POTASSIUM 4.1 4.0  --  3.7 3.6   < > 3.6   CR 1.06 0.89   < > 1.05 0.94   < > 0.97   A1C  --  6.1*  --   --   --   --  5.8*    < > = values in this interval not displayed.        Diagnostics:  Labs pending  No EKG required for low risk surgery (cataract, skin procedure, breast biopsy, etc).    Revised Cardiac Risk Index (RCRI):  The patient has the following serious cardiovascular risks for perioperative complications:   - No serious cardiac risks = 0 points     RCRI Interpretation: 0 points: Class I (very low risk - 0.4% complication rate)           Signed Electronically by: Susanne Alberto PA-C  Copy of this evaluation report is provided to requesting physician.

## 2023-02-03 NOTE — PATIENT INSTRUCTIONS
For informational purposes only. Not to replace the advice of your health care provider. Copyright   2003,  [employer] [facility]. All rights reserved. Clinically reviewed by Emmanuelle Ashford MD. [employer] [id number] - REV .  Preparing for Your Surgery  Getting started  A nurse will call you to review your health history and instructions. They will give you an arrival time based on your scheduled surgery time. Please be ready to share:    Your doctor's clinic name and phone number    Your medical, surgical, and anesthesia history    A list of allergies and sensitivities    A list of medicines, including herbal treatments and over-the-counter drugs    Whether the patient has a legal guardian (ask how to send us the papers in advance)  Please tell us if you're pregnant--or if there's any chance you might be pregnant. Some surgeries may injure a fetus (unborn baby), so they require a pregnancy test. Surgeries that are safe for a fetus don't always need a test, and you can choose whether to have one.   If you have a child who's having surgery, please ask for a copy of Preparing for Your Child's Surgery.    Preparing for surgery    Within 10 to 30 days of surgery: Have a pre-op exam (sometimes called an H&P, or History and Physical). This can be done at a clinic or pre-operative center.  ? If you're having a , you may not need this exam. Talk to your care team.    At your pre-op exam, talk to your care team about all medicines you take. If you need to stop any medicines before surgery, ask when to start taking them again.  ? We do this for your safety. Many medicines can make you bleed too much during surgery. Some change how well surgery (anesthesia) drugs work.    Call your insurance company to let them know you're having surgery. (If you don't have insurance, call [phone].)    Call your clinic if there's any change in your health. This includes signs of a cold or flu (sore throat, runny nose,  cough, rash, fever). It also includes a scrape or scratch near the surgery site.    If you have questions on the day of surgery, call your hospital or surgery center.  Eating and drinking guidelines  For your safety: Unless your surgeon tells you otherwise, follow the guidelines below.    Eat and drink as usual until 8 hours before you arrive for surgery. After that, no food or milk.    Drink clear liquids until 2 hours before you arrive. These are liquids you can see through, like water, Gatorade, and Propel Water. They also include plain black coffee and tea (no cream or milk), candy, and breath mints. You can spit out gum when you arrive.    If you drink alcohol: Stop drinking it the night before surgery.    If your care team tells you to take medicine on the morning of surgery, it's okay to take it with a sip of water.  Preventing infection    Shower or bathe the night before and morning of your surgery. Follow the instructions your clinic gave you. (If no instructions, use regular soap.)    Don't shave or clip hair near your surgery site. We'll remove the hair if needed.    Don't smoke or vape the morning of surgery. You may chew nicotine gum up to 2 hours before surgery. A nicotine patch is okay.  ? Note: Some surgeries require you to completely quit smoking and nicotine. Check with your surgeon.    Your care team will make every effort to keep you safe from infection. We will:  ? Clean our hands often with soap and water (or an alcohol-based hand rub).  ? Clean the skin at your surgery site with a special soap that kills germs.  ? Give you a special gown to keep you warm. (Cold raises the risk of infection.)  ? Wear special hair covers, masks, gowns and gloves during surgery.  ? Give antibiotic medicine, if prescribed. Not all surgeries need antibiotics.  What to bring on the day of surgery    Photo ID and insurance card    Copy of your health care directive, if you have one    Glasses and hearing aids (bring  cases)  ? You can't wear contacts during surgery    Inhaler and eye drops, if you use them (tell us about these when you arrive)    CPAP machine or breathing device, if you use them    A few personal items, if spending the night    If you have . . .  ? A pacemaker, ICD (cardiac defibrillator) or other implant: Bring the ID card.  ? An implanted stimulator: Bring the remote control.  ? A legal guardian: Bring a copy of the certified (court-stamped) guardianship papers.  Please remove any jewelry, including body piercings. Leave jewelry and other valuables at home.  If you're going home the day of surgery    You must have a responsible adult drive you home. They should stay with you overnight as well.    If you don't have someone to stay with you, and you aren't safe to go home alone, we may keep you overnight. Insurance often won't pay for this.  After surgery  If it's hard to control your pain or you need more pain medicine, please call your surgeon's office.  Questions?   If you have any questions for your care team, list them here: _________________________________________________________________________________________________________________________________________________________________________ ____________________________________ ____________________________________ ____________________________________

## 2023-02-17 ENCOUNTER — HOSPITAL ENCOUNTER (OUTPATIENT)
Facility: CLINIC | Age: 43
Discharge: HOME | End: 2023-02-17
Attending: SURGERY | Admitting: SURGERY
Payer: COMMERCIAL

## 2023-02-17 ENCOUNTER — ANESTHESIA EVENT (OUTPATIENT)
Dept: SURGERY | Facility: CLINIC | Age: 43
End: 2023-02-17
Payer: COMMERCIAL

## 2023-02-17 ENCOUNTER — ANESTHESIA (OUTPATIENT)
Dept: SURGERY | Facility: CLINIC | Age: 43
End: 2023-02-17
Payer: COMMERCIAL

## 2023-02-17 VITALS
DIASTOLIC BLOOD PRESSURE: 93 MMHG | HEIGHT: 68 IN | SYSTOLIC BLOOD PRESSURE: 140 MMHG | BODY MASS INDEX: 39.66 KG/M2 | WEIGHT: 261.69 LBS | HEART RATE: 84 BPM | TEMPERATURE: 98.1 F | OXYGEN SATURATION: 96 % | RESPIRATION RATE: 14 BRPM

## 2023-02-17 DIAGNOSIS — L73.2 HIDRADENITIS SUPPURATIVA: Primary | ICD-10-CM

## 2023-02-17 LAB
GLUCOSE BLDC GLUCOMTR-MCNC: 126 MG/DL (ref 70–99)
GLUCOSE BLDC GLUCOMTR-MCNC: 127 MG/DL (ref 70–99)

## 2023-02-17 PROCEDURE — 250N000011 HC RX IP 250 OP 636: Performed by: STUDENT IN AN ORGANIZED HEALTH CARE EDUCATION/TRAINING PROGRAM

## 2023-02-17 PROCEDURE — 710N000012 HC RECOVERY PHASE 2, PER MINUTE: Performed by: SURGERY

## 2023-02-17 PROCEDURE — 250N000009 HC RX 250: Performed by: REGISTERED NURSE

## 2023-02-17 PROCEDURE — 11450 EXC SKN HDRDNT AX SMPL/NTRM: CPT | Mod: LT | Performed by: SURGERY

## 2023-02-17 PROCEDURE — 999N000141 HC STATISTIC PRE-PROCEDURE NURSING ASSESSMENT: Performed by: SURGERY

## 2023-02-17 PROCEDURE — 360N000075 HC SURGERY LEVEL 2, PER MIN: Performed by: SURGERY

## 2023-02-17 PROCEDURE — 82962 GLUCOSE BLOOD TEST: CPT

## 2023-02-17 PROCEDURE — 710N000010 HC RECOVERY PHASE 1, LEVEL 2, PER MIN: Performed by: SURGERY

## 2023-02-17 PROCEDURE — 258N000003 HC RX IP 258 OP 636: Performed by: REGISTERED NURSE

## 2023-02-17 PROCEDURE — 250N000011 HC RX IP 250 OP 636: Performed by: REGISTERED NURSE

## 2023-02-17 PROCEDURE — 250N000009 HC RX 250

## 2023-02-17 PROCEDURE — 250N000011 HC RX IP 250 OP 636: Performed by: SURGERY

## 2023-02-17 PROCEDURE — 272N000001 HC OR GENERAL SUPPLY STERILE: Performed by: SURGERY

## 2023-02-17 PROCEDURE — 88305 TISSUE EXAM BY PATHOLOGIST: CPT | Mod: 26 | Performed by: PATHOLOGY

## 2023-02-17 PROCEDURE — 250N000025 HC SEVOFLURANE, PER MIN: Performed by: SURGERY

## 2023-02-17 PROCEDURE — 370N000017 HC ANESTHESIA TECHNICAL FEE, PER MIN: Performed by: SURGERY

## 2023-02-17 PROCEDURE — 250N000009 HC RX 250: Performed by: SURGERY

## 2023-02-17 PROCEDURE — 88305 TISSUE EXAM BY PATHOLOGIST: CPT | Mod: TC | Performed by: SURGERY

## 2023-02-17 PROCEDURE — 250N000011 HC RX IP 250 OP 636

## 2023-02-17 PROCEDURE — 250N000013 HC RX MED GY IP 250 OP 250 PS 637: Performed by: STUDENT IN AN ORGANIZED HEALTH CARE EDUCATION/TRAINING PROGRAM

## 2023-02-17 RX ORDER — ONDANSETRON 4 MG/1
4 TABLET, ORALLY DISINTEGRATING ORAL EVERY 30 MIN PRN
Status: DISCONTINUED | OUTPATIENT
Start: 2023-02-17 | End: 2023-02-17 | Stop reason: HOSPADM

## 2023-02-17 RX ORDER — CEFAZOLIN SODIUM/WATER 2 G/20 ML
2 SYRINGE (ML) INTRAVENOUS SEE ADMIN INSTRUCTIONS
Status: DISCONTINUED | OUTPATIENT
Start: 2023-02-17 | End: 2023-02-17 | Stop reason: HOSPADM

## 2023-02-17 RX ORDER — ONDANSETRON 2 MG/ML
4 INJECTION INTRAMUSCULAR; INTRAVENOUS EVERY 30 MIN PRN
Status: DISCONTINUED | OUTPATIENT
Start: 2023-02-17 | End: 2023-02-17 | Stop reason: HOSPADM

## 2023-02-17 RX ORDER — LIDOCAINE HYDROCHLORIDE 20 MG/ML
INJECTION, SOLUTION INFILTRATION; PERINEURAL PRN
Status: DISCONTINUED | OUTPATIENT
Start: 2023-02-17 | End: 2023-02-17

## 2023-02-17 RX ORDER — FENTANYL CITRATE 50 UG/ML
50 INJECTION, SOLUTION INTRAMUSCULAR; INTRAVENOUS EVERY 5 MIN PRN
Status: DISCONTINUED | OUTPATIENT
Start: 2023-02-17 | End: 2023-02-17 | Stop reason: HOSPADM

## 2023-02-17 RX ORDER — SODIUM CHLORIDE, SODIUM LACTATE, POTASSIUM CHLORIDE, CALCIUM CHLORIDE 600; 310; 30; 20 MG/100ML; MG/100ML; MG/100ML; MG/100ML
INJECTION, SOLUTION INTRAVENOUS CONTINUOUS PRN
Status: DISCONTINUED | OUTPATIENT
Start: 2023-02-17 | End: 2023-02-17

## 2023-02-17 RX ORDER — BUPIVACAINE HYDROCHLORIDE AND EPINEPHRINE 2.5; 5 MG/ML; UG/ML
INJECTION, SOLUTION INFILTRATION; PERINEURAL PRN
Status: DISCONTINUED | OUTPATIENT
Start: 2023-02-17 | End: 2023-02-17 | Stop reason: HOSPADM

## 2023-02-17 RX ORDER — PROPOFOL 10 MG/ML
INJECTION, EMULSION INTRAVENOUS PRN
Status: DISCONTINUED | OUTPATIENT
Start: 2023-02-17 | End: 2023-02-17

## 2023-02-17 RX ORDER — CEFAZOLIN SODIUM/WATER 2 G/20 ML
2 SYRINGE (ML) INTRAVENOUS
Status: COMPLETED | OUTPATIENT
Start: 2023-02-17 | End: 2023-02-17

## 2023-02-17 RX ORDER — ONDANSETRON 2 MG/ML
INJECTION INTRAMUSCULAR; INTRAVENOUS PRN
Status: DISCONTINUED | OUTPATIENT
Start: 2023-02-17 | End: 2023-02-17

## 2023-02-17 RX ORDER — ACETAMINOPHEN 325 MG/1
975 TABLET ORAL ONCE
Status: COMPLETED | OUTPATIENT
Start: 2023-02-17 | End: 2023-02-17

## 2023-02-17 RX ORDER — HALOPERIDOL 5 MG/ML
1 INJECTION INTRAMUSCULAR
Status: DISCONTINUED | OUTPATIENT
Start: 2023-02-17 | End: 2023-02-17 | Stop reason: HOSPADM

## 2023-02-17 RX ORDER — HYDROMORPHONE HYDROCHLORIDE 1 MG/ML
0.4 INJECTION, SOLUTION INTRAMUSCULAR; INTRAVENOUS; SUBCUTANEOUS EVERY 5 MIN PRN
Status: DISCONTINUED | OUTPATIENT
Start: 2023-02-17 | End: 2023-02-17 | Stop reason: HOSPADM

## 2023-02-17 RX ORDER — FENTANYL CITRATE 50 UG/ML
INJECTION, SOLUTION INTRAMUSCULAR; INTRAVENOUS PRN
Status: DISCONTINUED | OUTPATIENT
Start: 2023-02-17 | End: 2023-02-17

## 2023-02-17 RX ORDER — HYDROMORPHONE HYDROCHLORIDE 1 MG/ML
0.2 INJECTION, SOLUTION INTRAMUSCULAR; INTRAVENOUS; SUBCUTANEOUS EVERY 5 MIN PRN
Status: DISCONTINUED | OUTPATIENT
Start: 2023-02-17 | End: 2023-02-17 | Stop reason: HOSPADM

## 2023-02-17 RX ORDER — FENTANYL CITRATE 50 UG/ML
25 INJECTION, SOLUTION INTRAMUSCULAR; INTRAVENOUS EVERY 5 MIN PRN
Status: DISCONTINUED | OUTPATIENT
Start: 2023-02-17 | End: 2023-02-17 | Stop reason: HOSPADM

## 2023-02-17 RX ORDER — OXYCODONE HYDROCHLORIDE 5 MG/1
5 TABLET ORAL EVERY 6 HOURS PRN
Qty: 7 TABLET | Refills: 0 | Status: SHIPPED | OUTPATIENT
Start: 2023-02-17 | End: 2023-11-09

## 2023-02-17 RX ORDER — OXYCODONE HYDROCHLORIDE 5 MG/1
5 TABLET ORAL EVERY 4 HOURS PRN
Status: DISCONTINUED | OUTPATIENT
Start: 2023-02-17 | End: 2023-02-17 | Stop reason: HOSPADM

## 2023-02-17 RX ORDER — SODIUM CHLORIDE, SODIUM LACTATE, POTASSIUM CHLORIDE, CALCIUM CHLORIDE 600; 310; 30; 20 MG/100ML; MG/100ML; MG/100ML; MG/100ML
INJECTION, SOLUTION INTRAVENOUS CONTINUOUS
Status: DISCONTINUED | OUTPATIENT
Start: 2023-02-17 | End: 2023-02-17 | Stop reason: HOSPADM

## 2023-02-17 RX ORDER — MEPERIDINE HYDROCHLORIDE 25 MG/ML
12.5 INJECTION INTRAMUSCULAR; INTRAVENOUS; SUBCUTANEOUS EVERY 5 MIN PRN
Status: DISCONTINUED | OUTPATIENT
Start: 2023-02-17 | End: 2023-02-17 | Stop reason: HOSPADM

## 2023-02-17 RX ORDER — OXYCODONE HYDROCHLORIDE 10 MG/1
10 TABLET ORAL EVERY 4 HOURS PRN
Status: DISCONTINUED | OUTPATIENT
Start: 2023-02-17 | End: 2023-02-17 | Stop reason: HOSPADM

## 2023-02-17 RX ORDER — DEXAMETHASONE SODIUM PHOSPHATE 4 MG/ML
INJECTION, SOLUTION INTRA-ARTICULAR; INTRALESIONAL; INTRAMUSCULAR; INTRAVENOUS; SOFT TISSUE PRN
Status: DISCONTINUED | OUTPATIENT
Start: 2023-02-17 | End: 2023-02-17

## 2023-02-17 RX ORDER — ONDANSETRON 4 MG/1
4-8 TABLET, ORALLY DISINTEGRATING ORAL EVERY 8 HOURS PRN
Qty: 10 TABLET | Refills: 0 | Status: SHIPPED | OUTPATIENT
Start: 2023-02-17 | End: 2024-06-11

## 2023-02-17 RX ADMIN — SODIUM CHLORIDE, POTASSIUM CHLORIDE, SODIUM LACTATE AND CALCIUM CHLORIDE: 600; 310; 30; 20 INJECTION, SOLUTION INTRAVENOUS at 14:23

## 2023-02-17 RX ADMIN — Medication 50 MG: at 14:31

## 2023-02-17 RX ADMIN — MIDAZOLAM 2 MG: 1 INJECTION INTRAMUSCULAR; INTRAVENOUS at 14:16

## 2023-02-17 RX ADMIN — FENTANYL CITRATE 100 MCG: 50 INJECTION, SOLUTION INTRAMUSCULAR; INTRAVENOUS at 14:30

## 2023-02-17 RX ADMIN — SUGAMMADEX 300 MG: 100 INJECTION, SOLUTION INTRAVENOUS at 15:40

## 2023-02-17 RX ADMIN — ONDANSETRON 4 MG: 2 INJECTION INTRAMUSCULAR; INTRAVENOUS at 15:40

## 2023-02-17 RX ADMIN — PROPOFOL 300 MG: 10 INJECTION, EMULSION INTRAVENOUS at 14:30

## 2023-02-17 RX ADMIN — PROPOFOL 60 MG: 10 INJECTION, EMULSION INTRAVENOUS at 14:33

## 2023-02-17 RX ADMIN — ACETAMINOPHEN 975 MG: 325 TABLET ORAL at 16:27

## 2023-02-17 RX ADMIN — Medication 50 MG: at 14:45

## 2023-02-17 RX ADMIN — OXYCODONE HYDROCHLORIDE 10 MG: 5 TABLET ORAL at 16:28

## 2023-02-17 RX ADMIN — Medication 2 G: at 14:31

## 2023-02-17 RX ADMIN — SUGAMMADEX 100 MG: 100 INJECTION, SOLUTION INTRAVENOUS at 15:42

## 2023-02-17 RX ADMIN — FENTANYL CITRATE 50 MCG: 50 INJECTION, SOLUTION INTRAMUSCULAR; INTRAVENOUS at 16:07

## 2023-02-17 RX ADMIN — DEXAMETHASONE SODIUM PHOSPHATE 8 MG: 4 INJECTION, SOLUTION INTRA-ARTICULAR; INTRALESIONAL; INTRAMUSCULAR; INTRAVENOUS; SOFT TISSUE at 14:47

## 2023-02-17 RX ADMIN — FENTANYL CITRATE 50 MCG: 50 INJECTION, SOLUTION INTRAMUSCULAR; INTRAVENOUS at 15:10

## 2023-02-17 RX ADMIN — LIDOCAINE HYDROCHLORIDE 100 MG: 20 INJECTION, SOLUTION INFILTRATION; PERINEURAL at 14:30

## 2023-02-17 RX ADMIN — FENTANYL CITRATE 50 MCG: 50 INJECTION, SOLUTION INTRAMUSCULAR; INTRAVENOUS at 15:16

## 2023-02-17 ASSESSMENT — ACTIVITIES OF DAILY LIVING (ADL)
ADLS_ACUITY_SCORE: 35

## 2023-02-17 NOTE — INTERVAL H&P NOTE
"I have reviewed the surgical (or preoperative) H&P that is linked to this encounter, and examined the patient. There are no significant changes    Clinical Conditions Present on Arrival:  Clinically Significant Risk Factors Present on Admission                    # Obesity: Estimated body mass index is 39.79 kg/m  as calculated from the following:    Height as of this encounter: 1.727 m (5' 8\").    Weight as of this encounter: 118.7 kg (261 lb 11 oz).       "

## 2023-02-17 NOTE — BRIEF OP NOTE
[facility]    Brief Operative Note    Pre-operative diagnosis: Hidradenitis axillaris [L73.2]  Post-operative diagnosis Same as pre-operative diagnosis    Procedure: Procedure(s):  EXCISION, HIDRADENITIS, LEFT AXILLA,  Surgeon: Surgeon(s) and Role:     * Shahnaz Garibay MD - Primary     * Tacho Baig MD - Resident - Assisting     * Latonya Smith PA-C  Anesthesia: General   Estimated Blood Loss: 10 ml    Drains: None  Specimens:   ID Type Source Tests Collected by Time Destination   1 : LEFT AXILLA Tissue Axilla, Left SURGICAL PATHOLOGY EXAM Shahnaz Garibay MD 2/17/2023  3:20 PM      Findings:   None.  Complications: None.  Implants: * No implants in log *

## 2023-02-17 NOTE — ANESTHESIA PREPROCEDURE EVALUATION
Anesthesia Pre-Procedure Evaluation    Patient: Sean Woodard   MRN: [id number] : 1980        Procedure : Procedure(s):  EXCISION, HIDRADENITIS, BILATERAL AXILLA, POSSIBLE CLOSURE          Past Medical History:   Diagnosis Date     Diabetes (H)      Hypertension      Obese      FER (obstructive sleep apnea)      Sleep apnea       Past Surgical History:   Procedure Laterality Date     EXCISE HIDRADENITIS (LOCATION) Left 2021    Procedure: Sharp excisional debridement of left axillary hidradenitis.;  Surgeon: Shahnaz Garibay MD;  Location: [facility] OR     EXCISE HIDRADENITIS (LOCATION) Bilateral 10/4/2021    Procedure: EXCISION, HIDRADENITIS - bilateral axillae, bilateral groin;  Surgeon: Shahnaz Garibay MD;  Location: [facility] OR     ORTHOPEDIC SURGERY  2005    RT knee arthroscopic menisectomy     Thumb Surgery  2009    Mass removal neuroma      Allergies   Allergen Reactions     Lisinopril Cough      Social History     Tobacco Use     Smoking status: Former     Packs/day: 0.50     Years: 5.00     Pack years: 2.50     Types: Cigarettes     Quit date: 2005     Years since quittin.1     Smokeless tobacco: Never   Substance Use Topics     Alcohol use: Yes     Comment: Social. 1 drink per month      Wt Readings from Last 1 Encounters:   23 118.7 kg (261 lb 11 oz)        Anesthesia Evaluation   Pt has had prior anesthetic. Type: General.    History of anesthetic complications  - difficult airway.  Previous intubation required 4 attempts, was successfully intubated with CMAC D blade. Difficult to mask, required two providers for a seal.    ROS/MED HX  ENT/Pulmonary:     (+) sleep apnea, uses CPAP,     Neurologic:  - neg neurologic ROS     Cardiovascular:     (+) hypertension-----    METS/Exercise Tolerance:  Comment: Losartan     Hematologic:  - neg hematologic  ROS     Musculoskeletal:   (+) arthritis,     GI/Hepatic:  - neg GI/hepatic ROS     Renal/Genitourinary:  - neg Renal ROS      Endo:     (+) type II DM, Not using insulin,     Psychiatric/Substance Use:  - neg psychiatric ROS     Infectious Disease:  - neg infectious disease ROS     Malignancy:  - neg malignancy ROS     Other:  - neg other ROS          Physical Exam    Airway        Mallampati: III   TM distance: > 3 FB   Neck ROM: full   Mouth opening: > 3 cm    Respiratory Devices and Support         Dental       (+) Completely normal teeth      Cardiovascular          Rhythm and rate: regular and normal     Pulmonary   pulmonary exam normal        breath sounds clear to auscultation           OUTSIDE LABS:  CBC:   Lab Results   Component Value Date    WBC 7.5 02/03/2023    WBC 6.1 12/11/2022    HGB 14.9 02/03/2023    HGB 15.6 12/11/2022    HCT 44.6 02/03/2023    HCT 45.7 12/11/2022     02/03/2023     12/11/2022     BMP:   Lab Results   Component Value Date     12/11/2022     09/16/2022    POTASSIUM 4.1 12/11/2022    POTASSIUM 4.0 09/16/2022    CHLORIDE 102 12/11/2022    CHLORIDE 100 09/16/2022    CO2 26 12/11/2022    CO2 26 09/16/2022    BUN 11.6 12/11/2022    BUN 10.4 09/16/2022    CR 1.06 12/11/2022    CR 0.89 09/16/2022     (H) 02/17/2023     (H) 12/11/2022     COAGS:   Lab Results   Component Value Date    INR 0.98 09/08/2020     POC:   Lab Results   Component Value Date     (H) 06/27/2021     HEPATIC:   Lab Results   Component Value Date    ALBUMIN 4.6 12/11/2022    PROTTOTAL 8.4 (H) 12/11/2022    ALT 47 12/11/2022    AST 29 12/11/2022    ALKPHOS 65 12/11/2022    BILITOTAL 0.8 12/11/2022     OTHER:   Lab Results   Component Value Date    LACT 1.9 06/26/2021    A1C 6.2 (H) 02/03/2023    DAWIT 9.7 12/11/2022    TSH 1.63 10/01/2018    CRP <2.9 10/01/2018       Anesthesia Plan    ASA Status:  3   NPO Status:  NPO Appropriate    Anesthesia Type: General.     - Airway: ETT   Induction: Intravenous.   Maintenance: Balanced.        Consents    Anesthesia Plan(s) and associated risks, benefits,  and realistic alternatives discussed. Questions answered and patient/representative(s) expressed understanding.     - Discussed: Risks, Benefits and Alternatives for BOTH SEDATION and the PROCEDURE were discussed     - Discussed with:  Patient      - Extended Intubation/Ventilatory Support Discussed: No.      - Patient is DNR/DNI Status: No    Use of blood products discussed: No .     Postoperative Care    Pain management: IV analgesics.   PONV prophylaxis: Ondansetron (or other 5HT-3), Dexamethasone or Solumedrol     Comments:                Carlos Thakkar DO

## 2023-02-17 NOTE — ANESTHESIA PROCEDURE NOTES
Airway         Procedure Start/Stop Times: 2/17/2023 2:35 PM  Staff -        Anesthesiologist:  Carlos Thakkar DO       Resident/Fellow: John Sepulveda MD       Performed By: resident and with residents       Procedure performed by resident/fellow/CRNA in presence of a teaching physician.  Indications and Patient Condition       Indications for airway management: david-procedural       Induction type:intravenous       Mask difficulty assessment: 3 - difficult mask (inadequate, unstable, or two providers) +/- NMBA    Final Airway Details       Final airway type: endotracheal airway       Successful airway: ETT - single and Oral  Endotracheal Airway Details        ETT size (mm): 7.5       Cuffed: yes       Successful intubation technique: video laryngoscopy       VL Blade Size: MAC D Blade       Grade View of Cords: 1       Adjucts: stylet       Position: Right       Measured from: lips       Secured at (cm): 24       Bite block used: Oral Airway    Post intubation assessment        Placement verified by: capnometry, equal breath sounds and chest rise        Number of attempts at approach: 1       Number of other approaches attempted: 0       Secured with: pink tape (with tegaderms)       Ease of procedure: easy       Dentition: Intact    Medication(s) Administered   Medication Administration Time: 2/17/2023 2:35 PM    Additional Comments       Difficult mask requiring NBMDs, two-handed mask, oral airway, and nasal airway. Easy intubation, Grade 1 view using D blade.

## 2023-02-17 NOTE — LETTER
To Whom It May Concern,    This is to inform you that Mr Sean Woodard underwent surgery today that will require daily dressing changes to the axillary region for the next 4-6 weeks. While he is undergoing his recovery, we request that he be able to work from home only. He will also need the assistance of his wife or nursing to complete these complex dressing changes until he has healed enough to be able to do them by himself.     Thank you for your cooperation with Mr Woodard's cares. Please direct any questions or concerns to our office/clinic.    Sincerely,      David Garibay MD    Division of Plastic Surgery  [facility]

## 2023-02-17 NOTE — ANESTHESIA CARE TRANSFER NOTE
Patient: Sean Woodard    Procedure: Procedure(s):  EXCISION, HIDRADENITIS, LEFT AXILLA,       Diagnosis: Hidradenitis axillaris [L73.2]  Diagnosis Additional Information: No value filed.    Anesthesia Type:   No value filed.     Note:    Oropharynx: oropharynx clear of all foreign objects and spontaneously breathing  Level of Consciousness: awake  Oxygen Supplementation: face mask  Level of Supplemental Oxygen (L/min / FiO2): 8  Independent Airway: airway patency satisfactory and stable  Dentition: dentition unchanged  Vital Signs Stable: post-procedure vital signs reviewed and stable  Report to RN Given: handoff report given  Patient transferred to: PACU    Handoff Report: Identifed the Patient, Identified the Reponsible Provider, Reviewed the pertinent medical history, Discussed the surgical course, Reviewed Intra-OP anesthesia mangement and issues during anesthesia, Set expectations for post-procedure period and Allowed opportunity for questions and acknowledgement of understanding      Vitals:  Vitals Value Taken Time   /92 02/17/23 1552   Temp     Pulse 82 02/17/23 1555   Resp 16 02/17/23 1555   SpO2 100 % 02/17/23 1555   Vitals shown include unvalidated device data.    Electronically Signed By: FALGUNI Zapata CRNA  February 17, 2023  3:56 PM

## 2023-02-17 NOTE — OR NURSING
RN NOTES:    1740-DELAY IN DISCHARGE R/T WAITING FOR RIDE AND TRANSPORT. PT. DRESSED AND WAITING IN CHAIR, NO CONCERNS AT THIS TIME. PREPARING FOR discharge WITHIN THE NEXT FEW MINUTES.

## 2023-02-17 NOTE — DISCHARGE INSTRUCTIONS
Same-Day Surgery   Adult Discharge Orders & Instructions     For 24 hours after surgery:  Get plenty of rest.  A responsible adult must stay with you for at least 24 hours after you leave the hospital.   Pain medication can slow your reflexes. Do not drive or use heavy equipment.  If you have weakness or tingling, don't drive or use heavy equipment until this feeling goes away.  Mixing alcohol and pain medication can cause dizziness and slow your breathing. It can even be fatal. Do not drink alcohol while taking pain medication.  Avoid strenuous or risky activities.  Ask for help when climbing stairs.   You may feel lightheaded.  If so, sit for a few minutes before standing.  Have someone help you get up.   If you have nausea (feel sick to your stomach), drink only clear liquids such as apple juice, ginger ale, broth or 7-Up.  Rest may also help.  Be sure to drink enough fluids.  Move to a regular diet as you feel able. Take pain medications with a small amount of solid food, such as toast or crackers, to avoid nausea.   A slight fever is normal. Call the doctor if your fever is over 100 F (37.7 C) (taken under the tongue) or lasts longer than 24 hours.  You may have a dry mouth, muscle aches, trouble sleeping or a sore throat.  These symptoms should go away after 24 hours.  Do not make important or legal decisions.   Pain Management:      1. Take pain medication (if prescribed) for pain as directed by your physician.        2. WARNING: If the pain medication you have been prescribed contains Tylenol  (acetaminophen), DO NOT take additional doses of Tylenol (acetaminophen).     Call your doctor for any of the followin.  Signs of infection (fever, growing tenderness at the surgery site, severe pain, a large amount of drainage or bleeding, foul-smelling drainage, redness, swelling).    2.  It has been over 8 to 10 hours since surgery and you are still not able to urinate (pee).    3.  Headache for over 24  Mentalis Units: 0 hours.    4.  Numbness, tingling or weakness the day after surgery (if you had spinal anesthesia).  To contact a doctor, call __David Hinson's office at [phone] at the [facility] from 8 am till 5 pm __ or:  '   [phone] and ask for the Resident On Call for:          ___________Plastic Reconstructive Surgery_________________ (answered 24 hours a day)  '   Emergency Department:  [facility] [facility]: [phone]  [place] [facility]: [phone]               Rev. 10/2014     Show Ucl Units: No Show Additional Area 3: Yes Show Inventory Tab: Show Glabellar Complex Units: 12 Lot #: [id number] Periorbital Skin Units: 4 Post-Care Instructions: Patient instructed to not lie down for 4 hours and limit physical activity for 24 hours. Patient instructed not to travel by airplane for 48 hours. Expiration Date (Month Year): 12/2020 Price (Use Numbers Only, No Special Characters Or $): 220.00 Consent: Written consent obtained. Risks include but not limited to lid/brow ptosis, bruising, swelling, diplopia, temporary effect, incomplete chemical denervation. Detail Level: Detailed Dilution (U/0.1 Cc): 2.6

## 2023-02-17 NOTE — PROVIDER NOTIFICATION
Left axillary area reddened and inflamed.  Skin in right axillary area intact and without inflammation.  Reddened areas in bilateral groins.

## 2023-02-18 NOTE — ANESTHESIA POSTPROCEDURE EVALUATION
Patient: Sean Woodard    Procedure: Procedure(s):  EXCISION, HIDRADENITIS, LEFT AXILLA,       Anesthesia Type:  General    Note:  Disposition: Outpatient   Postop Pain Control: Uneventful            Sign Out: Well controlled pain   PONV: No   Neuro/Psych: Uneventful            Sign Out: Acceptable/Baseline neuro status   Airway/Respiratory: Uneventful            Sign Out: Acceptable/Baseline resp. status   CV/Hemodynamics: Uneventful            Sign Out: Acceptable CV status; No obvious hypovolemia; No obvious fluid overload   Other NRE: NONE   DID A NON-ROUTINE EVENT OCCUR? No           Last vitals:  Vitals Value Taken Time   /98 02/17/23 1622   Temp 36.7  C (98.1  F) 02/17/23 1622   Pulse 86 02/17/23 1622   Resp 12 02/17/23 1622   SpO2 97 % 02/17/23 1622       Electronically Signed By: Grecia Palomino MD  February 17, 2023  7:13 PM

## 2023-02-21 LAB
PATH REPORT.COMMENTS IMP SPEC: NORMAL
PATH REPORT.COMMENTS IMP SPEC: NORMAL
PATH REPORT.FINAL DX SPEC: NORMAL
PATH REPORT.GROSS SPEC: NORMAL
PATH REPORT.MICROSCOPIC SPEC OTHER STN: NORMAL
PATH REPORT.RELEVANT HX SPEC: NORMAL
PHOTO IMAGE: NORMAL

## 2023-02-26 NOTE — OP NOTE
Procedure Date: 02/17/2023    ATTENDING:  Shahnaz Garibay MD    FIRST ASSISTANT:  Latonya Smith PA-C    SECOND ASSISTANT:  Tacho Baig MD PGY-1.    PREOPERATIVE DIAGNOSIS:  Recurrence of left axillary hidradenitis suppurativa.    POSTOPERATIVE DIAGNOSIS:  Recurrence of left axillary hidradenitis suppurativa.    PROCEDURE:  Sharp excisional debridement of left axillary hidradenitis.  Wound resulted in wound size 17 x 11 x 3 cm.    ANESTHESIA:  GETA.    ESTIMATED BLOOD LOSS:  Less than 25 mL    COUNTS:  Correct.    COMPLICATIONS:  None.    DRAINS:  None.    INDICATIONS FOR PROCEDURE:  This is a 43-year-old gentleman with a history of hidradenitis suppurativa.  He has undergone numerous local excisions with a usual primary closure.  He presented with recurrent disease of the left axilla that has become somewhat extensive to the point of not being able to guarantee possible closure.  This would require wound cares and healing by secondary intention with possible skin grafting in the future.  Of note, the patient had recovered quite well from the previous partial closure with some limited range of motion, but nothing prohibitive for his level of activity.    DESCRIPTION OF PROCEDURE:  The patient was seen in the preoperative waiting area.  The operative site was marked including the areas of recurrent disease and an extension with satellite lesions.  Informed consent was obtained after reviewing possible risks and complications, including but not limited to the following:  Infection, bleeding, hematoma/seroma formation, poor healing, possible injury to surrounding neurovascular musculoskeletal structures, possible recurrent disease, possible need for further surgery, possible failed closure, and possible anesthetic risks such as DVT, PE and cardiopulmonary arrest.      The patient was then brought to the operating room and placed supine on the OR table.  After intubation, the patient's left arm was positioned  in abduction to allow access to the axilla.  Of note, some of the disease did go more posterior, but we were still able to access it from supine position.      The area was prepped and draped in the usual sterile fashion with PCMX soap.  The area was then injected with 0.25% Marcaine as a field block.  This was allowed to take effect before sharply excising the affected active disease area of the left axilla.  After proper patient and procedure were identified, a 10 blade was used to incise the border of the disease.  Cautery was then used to divide through the subcutaneous tissues down into the subcutaneous tissue until we were able to find the base of our previous resections as identified by fibrotic scar tissue.  We did not see any significant vasculature or nerve structures.      The specimen was resected in its entirety.  Hemostasis was achieved with cautery.  The wound was then irrigated with Vashe hypochlorous acid solution.  Final wound was measured to be 17 x 11 x 3 cm.  It was apparent that no portion of this would really be able to be closed primarily.  Therefore, the wound was dressed with Aquacel Ag and filled with fluff Kerlix and covered with ABDs.  Ultimately, it was secured with Medipore tape since this is a very difficult area to secure a cover dressing, such as flexing it.  Specimen was sent to Pathology for permanent histologic exam.      The patient was extubated and transferred to a stretcher and taken to the recovery room in satisfactory condition having tolerated the procedure without difficulty or complication.    Shahnaz Garibay MD        D: 2023   T: 2023   MT: [id number]    Name:     SEB SMITH  MRN:      [id number]        Account:        [id number]   :      1980           Procedure Date: 2023     Document: [id number]

## 2023-03-02 ENCOUNTER — HOSPITAL ENCOUNTER (OUTPATIENT)
Dept: WOUND CARE | Facility: CLINIC | Age: 43
Discharge: HOME | End: 2023-03-02
Attending: SURGERY | Admitting: SURGERY
Payer: COMMERCIAL

## 2023-03-02 VITALS — HEART RATE: 80 BPM | SYSTOLIC BLOOD PRESSURE: 119 MMHG | DIASTOLIC BLOOD PRESSURE: 85 MMHG | TEMPERATURE: 99.1 F

## 2023-03-02 DIAGNOSIS — S41.102A OPEN WOUND OF LEFT AXILLARY REGION, INITIAL ENCOUNTER: ICD-10-CM

## 2023-03-02 DIAGNOSIS — L73.2 HIDRADENITIS SUPPURATIVA: Primary | ICD-10-CM

## 2023-03-02 PROBLEM — L70.9 ACNE: Status: ACTIVE | Noted: 2021-10-21

## 2023-03-02 PROBLEM — M25.40 EFFUSION OF JOINT: Status: ACTIVE | Noted: 2021-10-21

## 2023-03-02 PROBLEM — R07.89 ATYPICAL CHEST PAIN: Status: ACTIVE | Noted: 2021-10-21

## 2023-03-02 PROBLEM — L72.0 EPIDERMOID CYST OF SKIN: Status: ACTIVE | Noted: 2021-10-21

## 2023-03-02 PROBLEM — M25.569 ARTHRALGIA OF KNEE: Status: ACTIVE | Noted: 2023-03-02

## 2023-03-02 PROCEDURE — 99024 POSTOP FOLLOW-UP VISIT: CPT | Performed by: SURGERY

## 2023-03-02 PROCEDURE — 97602 WOUND(S) CARE NON-SELECTIVE: CPT

## 2023-03-02 NOTE — PROGRESS NOTES
Visit Date: 03/02/2023    HISTORY OF PRESENT ILLNESS:  This is a 43-year-old gentleman with history of hidradenitis who is here for his first postop appointment following wide local excision of recurrent left axillary HS on 02/17/2023.  He is well versed in doing his wound cares at home and has the assistance of his wife.  They have been spraying the wound with MicroKlenz after soaking the old dressing in the shower to help with removal.  He is then dressing with Kerlix roll and covering with ABD and securing it with tape.  Both he and his wife have been working from home, so she does the change over her lunch hour.  He states that he has enough narcotics and really tries not to use those.  There really has not been any problem regarding the wound itself.     PHYSICAL EXAMINATION:  It is fairly large and we were unable to do even partial closure, but already the base of the wound is very beefy with granulation tissue and actually a little bit of hypertrophy with some bleeding today, but not bad.      ASSESSMENT AND PLAN:  Since they were only using plain Kerlix, I would like to switch over to AMD antimicrobial gauze instead.  He can certainly use his leftover Kerlix for additional absorbable padding on top of that and then ABD and tape, but I would like to have a more consistent antimicrobial contact layer, so we will order some AMD now that its stock.  As far as overall plan, while we could do split thickness skin grafting now, postop recovery for that includes a donor site and also would require more complex dressings with the VAC for bolster dressing and then more limitations with regard to movement.  At this point, Sean prefers healing by secondary intention since he has done it before with limited sequela for range of motion for what he needs.  At this point, we will follow up with him on the phone on 04/13/2023.  He is due to leave on a cruise to [facility] on 04/15/2023, so hopefully by then he will  have healed quite a bit.  He does also have some leftover Hydrofera Blue from the past if he wants to try that as well for more absorbancy and less sliding of the dressings.  It might be easier to use that as the wound get smaller as well.  He knows that he is not to go in the water while he has an open wound.  Please see nursing notes for dimensions of the wound, vital signs and photos today.    Shahnaz Garibay MD        D: 2023   T: 2023   MT: SALVADOR    Name:     SEB SMITH  MRN:      [id number]        Account:    [id number]   :      1980           Visit Date: 2023     Document: [id number]

## 2023-03-02 NOTE — PROGRESS NOTES
Patient Active Problem List   Diagnosis     Health Care Home     Hyperlipidemia with target LDL less than 100     Benign essential hypertension     PTSD (post-traumatic stress disorder)     Class 3 severe obesity due to excess calories with serious comorbidity in adult (H)     Diabetes mellitus, type 2 (H)     FER (obstructive sleep apnea)     Hidradenitis suppurativa     Hidradenitis axillaris     Fatty liver     UTI (urinary tract infection)     Left ureteral stone     Lab test negative for COVID-19 virus     Acute bronchitis     Allergic rhinitis     Benign neoplasm of skin     Effusion of joint     Encounter for other physical therapy     Lesion of penis     Low back pain     Acne     Atypical chest pain     Pain in joint, lower leg     Pain of finger     Screening examination for pulmonary tuberculosis     Sebaceous cyst     Epidermoid cyst of skin     Tear of medial cartilage or meniscus of knee, current     Tinnitus     Borderline diabetes mellitus     Arthralgia of knee     Open wound of left axillary region     Past Medical History:   Diagnosis Date     Diabetes (H)      Hypertension      Obese      FER (obstructive sleep apnea)      Sleep apnea      Labs:   Recent Labs   Lab Test 02/03/23  1630 12/11/22  1145 04/23/21  1431 09/08/20  1013 10/15/18  1000 10/01/18  1801   ALBUMIN  --  4.6   < >  --    < > 4.1   HGB 14.9 15.6   < > 15.2   < > 16.3   INR  --   --   --  0.98  --   --    WBC 7.5 6.1   < > 6.8   < > 7.4   A1C 6.2*  --    < >  --    < > 6.3*   CRP  --   --   --   --   --  <2.9    < > = values in this interval not displayed.     Nutrition requirements were discussed with patient today.  Vitals:  /85 (BP Location: Left arm, Patient Position: Sitting)   Pulse 80   Temp 99.1  F (37.3  C) (Temporal)   Wound:   Wound (used by OP [facility] only) 02/17/22 1544 Left mid axillary other (see comments) (Active)   Thickness/Stage full thickness 03/02/23 0810   Base granulating 03/02/23 0810   Periwound  "intact 03/02/23 0810   Periwound Temperature warm 03/02/23 0810   Periwound Skin Turgor soft 03/02/23 0810   Edges open 03/02/23 0810   Length (cm) 13 03/02/23 0810   Width (cm) 13 03/02/23 0810   Depth (cm) 0.3 03/02/23 0810   Wound (cm^2) 169 cm^2 03/02/23 0810   Wound Volume (cm^3) 50.7 cm^3 03/02/23 0810   Wound healing % -[id number] 03/02/23 0810   Drainage Characteristics/Odor serosanguineous;green 03/02/23 0810   Drainage Amount large 03/02/23 0810   Care, Wound non-select wound debridement performed 03/02/23 0810       Incision/Surgical Site 02/17/23 Left Axillary (Active)   Incision Assessment WDL 02/17/23 1622   Incision Drainage Amount UTV 02/17/23 1622   Drainage Description UTV 02/17/23 1622   Dressing Intervention Clean, dry, intact 02/17/23 1622      Photo:         Further instructions from your care team       Sean Woodard      1980    A DME order for supplies has been placed to [facility]. If there are any issues with your order including not receiving the order please call [facility] at [phone] option 3. They can also provide a tracking number for you if you had supplies shipped to you.    Your next visit is a telephone visit. Please text a photo to [phone] prior to your  visit. Please include a ruler with the photo. In the text, state name, date of birth and body location.     Wound Dressing Change: Left Axilla  - Wash your hands with soap and water before you begin your dressing change and prepare a clean surface for dressings.  -Feel free to shower with dressing on (to ease removal)  -Cleanse with wound cleanser  -Apply 1/6 roll of dry AMD gauze 4\"   -Cover wound with ABD pad 8x10\" & secure with Medipore tape  -Change Daily & as needed    Will see how it heals & contracts. Possible need for skin graft in future.     David Garibay M.D. March 2, 2023    Call us at [phone] if you have any questions about your wounds, have redness or swelling around your " wound, have a fever of 101 or greater or if you have any other problems or concerns. We answer the phone Monday through Friday 8 am to 4 pm, please leave a message as we check the voicemail frequently throughout the day.     If you had a positive experience please indicate that on your patient satisfaction survey form that [facility] will be sending you.    It was a pleasure meeting with you today.  Thank you for allowing me and my team the privilege of caring for you today.  YOU are the reason we are here, and I truly hope we provided you with the excellent service you deserve.  Please let us know if there is anything else we can do for you so that we can be sure you are leaving completely satisfied with your care experience.      If you have any billing related questions please call the [facility] Business office at [phone]. The clinic staff does not handle billing related matters.    If you are scheduled to have a follow up appointment, you will receive a reminder call the day before your visit. On the appointment day please arrive 15 minutes prior to your appointment time. If you are unable to keep that appointment, please call the clinic to cancel or reschedule. If you are more than 10 minutes late or greater for your appointment, the clinic policy is that you may be asked to reschedule.        Durable Medical Equipment Wound Care Orders     Wound Care Order for DME - ONLY FOR DME   As directed      DME Provider: marjorie Delgadillo    Start Date: 3/2/2023    Wound Supply Order Options: Complex Wound    Optional: .dmewound can be used to pull in order specific information into documentation    Wound Number: Wound 1    Wound 1 Location: left axilla    Wound 1 Dressing Change Frequency: Daily    Wound 1 Length of Need: 30 days    Wound 1 - Dressing Supplies:  Secondary  Tape/Securing  Wrap/Gauze       Wound 1 - Secondary Dressing Dispensing Instructions: Ok to Substitute    Wound 1 - Secondary Dressing  Types: Absorbant    Wound 1 - Absorbant Types: ABD Pad    Wound 1 - ABD Pad Size: Other (Comments) Comment - 8x10    Wound 1 - ABD Pad Quantity: 30    Wound 1 - Wrap/Gauze Types: Rolls    Wound 1 - Roll Type: Antimicrobial Bandage Roll Gauze    Wound 1 - Antimicrobial Bandage Roll Gauze Size: 4.5 x 4.1    Wound 1 - Antimicrobial Bandage Roll Gauze Quantity: Other (Comments) Comment - 5 rolls    Wound 1 - Tape Type: Medipore    Wound 1 - Medipore Size: 2 x 10    Wound 1 - Medipore Quantity: 1 Roll    Hidradenitis suppurativa

## 2023-03-02 NOTE — DISCHARGE INSTRUCTIONS
"Sean Woodard      1980    A DME order for supplies has been placed to [facility]. If there are any issues with your order including not receiving the order please call [facility] at [phone] option 3. They can also provide a tracking number for you if you had supplies shipped to you.    Your next visit is a telephone visit. Please text a photo to [phone] prior to your  visit. Please include a ruler with the photo. In the text, state name, date of birth and body location.     Wound Dressing Change: Left Axilla  - Wash your hands with soap and water before you begin your dressing change and prepare a clean surface for dressings.  -Feel free to shower with dressing on (to ease removal)  -Cleanse with wound cleanser  -Apply 1/6 roll of dry AMD gauze 4\"   -Cover wound with ABD pad 8x10\" & secure with Medipore tape  -Change Daily & as needed    Will see how it heals & contracts. Possible need for skin graft in future.     David Garibay M.D. March 2, 2023    Call us at [phone] if you have any questions about your wounds, have redness or swelling around your wound, have a fever of 101 or greater or if you have any other problems or concerns. We answer the phone Monday through Friday 8 am to 4 pm, please leave a message as we check the voicemail frequently throughout the day.     If you had a positive experience please indicate that on your patient satisfaction survey form that [facility] will be sending you.    It was a pleasure meeting with you today.  Thank you for allowing me and my team the privilege of caring for you today.  YOU are the reason we are here, and I truly hope we provided you with the excellent service you deserve.  Please let us know if there is anything else we can do for you so that we can be sure you are leaving completely satisfied with your care experience.      If you have any billing related questions please call the [facility] Business office " at [phone]. The clinic staff does not handle billing related matters.    If you are scheduled to have a follow up appointment, you will receive a reminder call the day before your visit. On the appointment day please arrive 15 minutes prior to your appointment time. If you are unable to keep that appointment, please call the clinic to cancel or reschedule. If you are more than 10 minutes late or greater for your appointment, the clinic policy is that you may be asked to reschedule.

## 2023-03-03 ENCOUNTER — TELEPHONE (OUTPATIENT)
Dept: DERMATOLOGY | Facility: CLINIC | Age: 43
End: 2023-03-03
Payer: COMMERCIAL

## 2023-03-03 NOTE — TELEPHONE ENCOUNTER
Called patient for reminder to upload photos for upcoming appointment (3/6/23). Patient states he will upload photos before appointment.     Shelby Kocher

## 2023-03-06 ENCOUNTER — VIRTUAL VISIT (OUTPATIENT)
Dept: DERMATOLOGY | Facility: CLINIC | Age: 43
End: 2023-03-06
Payer: COMMERCIAL

## 2023-03-06 ENCOUNTER — TELEPHONE (OUTPATIENT)
Dept: DERMATOLOGY | Facility: CLINIC | Age: 43
End: 2023-03-06

## 2023-03-06 DIAGNOSIS — D84.9 IMMUNOSUPPRESSION (H): ICD-10-CM

## 2023-03-06 DIAGNOSIS — L73.2 HIDRADENITIS SUPPURATIVA: Primary | ICD-10-CM

## 2023-03-06 PROCEDURE — 99213 OFFICE O/P EST LOW 20 MIN: CPT | Mod: 95 | Performed by: DERMATOLOGY

## 2023-03-06 NOTE — PROGRESS NOTES
[facility] Dermatology Note  Encounter Date: Mar 6, 2023  Store-and-Forward and Telephone ([phone]). Location of teledermatologist: [facility]MAT[facility] [place].  Start time: 8:32. End time: 8:40.    Dermatology Problem List:  1. Hidradenitis suppurativa - Hamilton stage III involving axillae > neck, groin.              - s/p WLE (BL axillae, groin crease) 10/4/21 w/ Dr. Garibay              - current treatment: adalimumab 80 mg weekly, topical clindamycin/benzoyl peroxide, intralesional triamcinolone                   - past treatment: doxycycline, other oral antibiotics, isotretinoin, adalimumab, infliximab (neuropathy), ustekinumab (insurance issues), oral clindamycin       ____________________________________________    Assessment & Plan:     1. Hidradenitis suppurativa: recent left axillary excision and healing well. Right axilla without recent activity. Does have a few active spots in the groin but less severe than in the past. We discussed dose reduction in adalimumab, potentially at next visit, with goal to stop this treatment in the near future. For now will maintain current regimen.   - wound care for left axilla  - adalimumab 80 mg weekly for now    Procedures Performed:    None    Follow-up: 3 months    Staff:     Derrick Avina MD, FAAD   of Dermatology  Department of Dermatology  [facility]    ____________________________________________    CC: Video Visit (Follow up)    HPI:  Mr. Sean Woodard is a(n) 43 year old male who presents today as a return patient for hidradenitis suppurativa    Had left axillary surgery 2 weeks ago  - healing well    Right axilla - has been inactive since ~June - no new lesion    Groin - a few spots here and there - not as active as armpits had been    Adalimumab 80 mg weekly - tolerating well  - would like to stop - hasn't been helpful    Patient is otherwise feeling well,  without additional skin concerns.    Labs Reviewed:  N/A    Physical Exam:  Vitals: There were no vitals taken for this visit.  SKIN: Teledermatology photos were reviewed; image quality and interpretability: acceptable. Image date: 3/3/23.  - surgical site in left axilla healing appropriately  - right axilla postoperative changes with no active lesions  - No other lesions of concern on areas examined.     Medications:  Current Outpatient Medications   Medication     adalimumab (HUMIRA *CF*) 80 MG/0.8ML pen kit     atorvastatin (LIPITOR) 20 MG tablet     augmented betamethasone dipropionate (DIPROLENE-AF) 0.05 % ointment     azelastine (ASTELIN) 0.1 % nasal spray     azelastine (OPTIVAR) 0.05 % ophthalmic solution     benzoyl peroxide 5 % external liquid     blood glucose (ACCU-CHEK GUIDE) test strip     clindamycin (CLEOCIN) 300 MG capsule     clindamycin-benzoyl peroxide (BENZACLIN) 1-5 % external gel     diphenhydrAMINE (BENADRYL) 25 MG capsule     doxycycline monohydrate (MONODOX) 100 MG capsule     fluticasone (FLONASE) 50 MCG/ACT nasal spray     hydrOXYzine (ATARAX) 25 MG tablet     ibuprofen (ADVIL/MOTRIN) 600 MG tablet     losartan-hydrochlorothiazide (HYZAAR) 50-12.5 MG tablet     metFORMIN (GLUCOPHAGE XR) 500 MG 24 hr tablet     ondansetron (ZOFRAN ODT) 4 MG ODT tab     ondansetron (ZOFRAN ODT) 4 MG ODT tab     oxyCODONE (ROXICODONE) 5 MG tablet     No current facility-administered medications for this visit.      Past Medical/Surgical History:   Patient Active Problem List   Diagnosis     Health Care Home     Hyperlipidemia with target LDL less than 100     Benign essential hypertension     PTSD (post-traumatic stress disorder)     Class 3 severe obesity due to excess calories with serious comorbidity in adult (H)     Diabetes mellitus, type 2 (H)     FER (obstructive sleep apnea)     Hidradenitis suppurativa     Hidradenitis axillaris     Fatty liver     UTI (urinary tract infection)     Left ureteral  stone     Lab test negative for COVID-19 virus     Acute bronchitis     Allergic rhinitis     Benign neoplasm of skin     Effusion of joint     Encounter for other physical therapy     Lesion of penis     Low back pain     Acne     Atypical chest pain     Pain in joint, lower leg     Pain of finger     Screening examination for pulmonary tuberculosis     Sebaceous cyst     Epidermoid cyst of skin     Tear of medial cartilage or meniscus of knee, current     Tinnitus     Borderline diabetes mellitus     Arthralgia of knee     Open wound of left axillary region     Past Medical History:   Diagnosis Date     Diabetes (H)      Hypertension      Obese      FER (obstructive sleep apnea)      Sleep apnea        CC Susanne Alberto PA-C  [address]  [place] [address] on close of this encounter.

## 2023-03-06 NOTE — LETTER
3/6/2023       RE: Sean Woodard  [address] [facility] [address]     Dear Colleague,    Thank you for referring your patient, Sean Woodard, to the [facility] DERMATOLOGY [facility] [place] at [facility]. Please see a copy of my visit note below.    [facility] Dermatology Note  Encounter Date: Mar 6, 2023  Store-and-Forward and Telephone ([phone]). Location of teledermatologist: [facility] [place].  Start time: 8:32. End time: 8:40.    Dermatology Problem List:  1. Hidradenitis suppurativa - Hamilton stage III involving axillae > neck, groin.              - s/p WLE (BL axillae, groin crease) 10/4/21 w/ Dr. Garibay              - current treatment: adalimumab 80 mg weekly, topical clindamycin/benzoyl peroxide, intralesional triamcinolone                   - past treatment: doxycycline, other oral antibiotics, isotretinoin, adalimumab, infliximab (neuropathy), ustekinumab (insurance issues), oral clindamycin       ____________________________________________    Assessment & Plan:     1. Hidradenitis suppurativa: recent left axillary excision and healing well. Right axilla without recent activity. Does have a few active spots in the groin but less severe than in the past. We discussed dose reduction in adalimumab, potentially at next visit, with goal to stop this treatment in the near future. For now will maintain current regimen.   - wound care for left axilla  - adalimumab 80 mg weekly for now    Procedures Performed:    None    Follow-up: 3 months    Staff:     Derrick Avina MD, FAAD   of Dermatology  Department of Dermatology  [facility]    ____________________________________________    CC: Video Visit (Follow up)    HPI:  Mr. Sean Woodard is a(n) 43 year old male who presents today as a return patient for hidradenitis suppurativa    Had  left axillary surgery 2 weeks ago  - healing well    Right axilla - has been inactive since ~June - no new lesion    Groin - a few spots here and there - not as active as armpits had been    Adalimumab 80 mg weekly - tolerating well  - would like to stop - hasn't been helpful    Patient is otherwise feeling well, without additional skin concerns.    Labs Reviewed:  N/A    Physical Exam:  Vitals: There were no vitals taken for this visit.  SKIN: Teledermatology photos were reviewed; image quality and interpretability: acceptable. Image date: 3/3/23.  - surgical site in left axilla healing appropriately  - right axilla postoperative changes with no active lesions  - No other lesions of concern on areas examined.     Medications:  Current Outpatient Medications   Medication     adalimumab (HUMIRA *CF*) 80 MG/0.8ML pen kit     atorvastatin (LIPITOR) 20 MG tablet     augmented betamethasone dipropionate (DIPROLENE-AF) 0.05 % ointment     azelastine (ASTELIN) 0.1 % nasal spray     azelastine (OPTIVAR) 0.05 % ophthalmic solution     benzoyl peroxide 5 % external liquid     blood glucose (ACCU-CHEK GUIDE) test strip     clindamycin (CLEOCIN) 300 MG capsule     clindamycin-benzoyl peroxide (BENZACLIN) 1-5 % external gel     diphenhydrAMINE (BENADRYL) 25 MG capsule     doxycycline monohydrate (MONODOX) 100 MG capsule     fluticasone (FLONASE) 50 MCG/ACT nasal spray     hydrOXYzine (ATARAX) 25 MG tablet     ibuprofen (ADVIL/MOTRIN) 600 MG tablet     losartan-hydrochlorothiazide (HYZAAR) 50-12.5 MG tablet     metFORMIN (GLUCOPHAGE XR) 500 MG 24 hr tablet     ondansetron (ZOFRAN ODT) 4 MG ODT tab     ondansetron (ZOFRAN ODT) 4 MG ODT tab     oxyCODONE (ROXICODONE) 5 MG tablet     No current facility-administered medications for this visit.      Past Medical/Surgical History:   Patient Active Problem List   Diagnosis     Health Care Home     Hyperlipidemia with target LDL less than 100     Benign essential hypertension     PTSD  (post-traumatic stress disorder)     Class 3 severe obesity due to excess calories with serious comorbidity in adult (H)     Diabetes mellitus, type 2 (H)     FER (obstructive sleep apnea)     Hidradenitis suppurativa     Hidradenitis axillaris     Fatty liver     UTI (urinary tract infection)     Left ureteral stone     Lab test negative for COVID-19 virus     Acute bronchitis     Allergic rhinitis     Benign neoplasm of skin     Effusion of joint     Encounter for other physical therapy     Lesion of penis     Low back pain     Acne     Atypical chest pain     Pain in joint, lower leg     Pain of finger     Screening examination for pulmonary tuberculosis     Sebaceous cyst     Epidermoid cyst of skin     Tear of medial cartilage or meniscus of knee, current     Tinnitus     Borderline diabetes mellitus     Arthralgia of knee     Open wound of left axillary region     Past Medical History:   Diagnosis Date     Diabetes (H)      Hypertension      Obese      FER (obstructive sleep apnea)      Sleep apnea        CC Susanne Alberto PA-C  [address] [place]  [place] [address] on close of this encounter.

## 2023-03-06 NOTE — NURSING NOTE
Patient declined individual allergy and medication review by support staff. Patient stated nothing has changed.     Chief Complaint   Patient presents with     Video Visit     Follow up     Teledermatology Nurse Call Patients:     Are you in the state [facility] at the time of the encounter? yes    Today's visit will be billed to you and your insurance.    A teledermatology visit is not as thorough as an in-person visit and the quality of the photograph sent may not be of the same quality as that taken by the dermatology clinic.

## 2023-03-16 ENCOUNTER — OFFICE VISIT (OUTPATIENT)
Dept: UROLOGY | Facility: CLINIC | Age: 43
End: 2023-03-16
Attending: FAMILY MEDICINE
Payer: COMMERCIAL

## 2023-03-16 VITALS — OXYGEN SATURATION: 99 % | HEART RATE: 93 BPM | DIASTOLIC BLOOD PRESSURE: 80 MMHG | SYSTOLIC BLOOD PRESSURE: 121 MMHG

## 2023-03-16 DIAGNOSIS — N20.1 URETERAL STONE: ICD-10-CM

## 2023-03-16 LAB
ALBUMIN UR-MCNC: ABNORMAL MG/DL
APPEARANCE UR: CLEAR
BACTERIA #/AREA URNS HPF: ABNORMAL /HPF
BILIRUB UR QL STRIP: NEGATIVE
COLOR UR AUTO: YELLOW
GLUCOSE UR STRIP-MCNC: NEGATIVE MG/DL
HGB UR QL STRIP: ABNORMAL
KETONES UR STRIP-MCNC: ABNORMAL MG/DL
LEUKOCYTE ESTERASE UR QL STRIP: NEGATIVE
MUCOUS THREADS #/AREA URNS LPF: PRESENT /LPF
NITRATE UR QL: NEGATIVE
PH UR STRIP: 6 [PH] (ref 5–7)
RBC #/AREA URNS AUTO: ABNORMAL /HPF
SP GR UR STRIP: >=1.03 (ref 1–1.03)
SQUAMOUS #/AREA URNS AUTO: ABNORMAL /LPF
UROBILINOGEN UR STRIP-ACNC: 0.2 E.U./DL
WBC #/AREA URNS AUTO: ABNORMAL /HPF

## 2023-03-16 PROCEDURE — 87086 URINE CULTURE/COLONY COUNT: CPT | Performed by: STUDENT IN AN ORGANIZED HEALTH CARE EDUCATION/TRAINING PROGRAM

## 2023-03-16 PROCEDURE — 81001 URINALYSIS AUTO W/SCOPE: CPT | Performed by: STUDENT IN AN ORGANIZED HEALTH CARE EDUCATION/TRAINING PROGRAM

## 2023-03-16 PROCEDURE — 51798 US URINE CAPACITY MEASURE: CPT | Performed by: STUDENT IN AN ORGANIZED HEALTH CARE EDUCATION/TRAINING PROGRAM

## 2023-03-16 PROCEDURE — 99213 OFFICE O/P EST LOW 20 MIN: CPT | Mod: 25 | Performed by: STUDENT IN AN ORGANIZED HEALTH CARE EDUCATION/TRAINING PROGRAM

## 2023-03-16 ASSESSMENT — PAIN SCALES - GENERAL: PAINLEVEL: NO PAIN (0)

## 2023-03-16 NOTE — NURSING NOTE
"Initial /80   Pulse 93   SpO2 99%  Estimated body mass index is 39.79 kg/m  as calculated from the following:    Height as of 2/17/23: 1.727 m (5' 8\").    Weight as of 2/17/23: 118.7 kg (261 lb 11 oz). .    Active order to obtain bladder scan? Yes   Name of ordering provider:  Yakelin Solano  Bladder scan preformed post void Yes.  Bladder scan reveled 26ML  Provider notified?  Yes    April Rosen CMA          "

## 2023-03-16 NOTE — PATIENT INSTRUCTIONS
Once you have completed your 24 hour urine collection, call [phone] to schedule an appointment with Xuan Tracey to review results.       General recommendations to prevent kidney stones:     1) Low oxalate diet. Foods that are particularly high in oxalate include spinach, rhubarb, beets, nuts, nut butters, and black teas.     2) Low salt diet.      3) High fluid intake. Recommend 3 liters of fluid daily to produce goal of 2.5L of urine.     4) Modest animal protein.     5) Normal dietary calcium.

## 2023-03-16 NOTE — PROGRESS NOTES
Chief Complaint:   Kidney stones         History of Present Illness:   Sean Woodard is a 43 year old male with a history of fatty liver, HTN, T2 DM, and HLD presenting for an evaluation of kidney stones.     The patient was seen in the ED on 12/11/2022 for right flank pain and was found to have a 2 mm right distal ureter stone with mild right sided hydronephrosis and hydroureter.     He was seen by urology in July 2021 for a 3 mm left distal ureter stone. This was his first kidney stone. He denies a family history of kidney stones.     He is confident he passed the kidney stone from December 2022. The day after he was in the ED, he saw blood in his urine and the right flank pain resolved.          Past Medical History:     Past Medical History:   Diagnosis Date     Diabetes (H)      Hypertension      Obese      FER (obstructive sleep apnea)      Sleep apnea             Past Surgical History:     Past Surgical History:   Procedure Laterality Date     EXCISE HIDRADENITIS (LOCATION) Left 5/21/2021    Procedure: Sharp excisional debridement of left axillary hidradenitis.;  Surgeon: Shahnaz Garibay MD;  Location: [facility]     EXCISE HIDRADENITIS (LOCATION) Bilateral 10/4/2021    Procedure: EXCISION, HIDRADENITIS - bilateral axillae, bilateral groin;  Surgeon: Shahnaz Garibay MD;  Location: [facility]     EXCISE HIDRADENITIS (LOCATION) Left 2/17/2023    Procedure: EXCISION, HIDRADENITIS, LEFT AXILLA,;  Surgeon: Shahnaz Garibay MD;  Location: [facility] OR     ORTHOPEDIC SURGERY  1/1/2005    RT knee arthroscopic menisectomy     Thumb Surgery  2009    Mass removal neuroma            Medications     Current Outpatient Medications   Medication     adalimumab (HUMIRA *CF*) 80 MG/0.8ML pen kit     atorvastatin (LIPITOR) 20 MG tablet     augmented betamethasone dipropionate (DIPROLENE-AF) 0.05 % ointment     azelastine (ASTELIN) 0.1 % nasal spray     azelastine (OPTIVAR) 0.05 % ophthalmic solution     benzoyl  peroxide 5 % external liquid     blood glucose (ACCU-CHEK GUIDE) test strip     clindamycin (CLEOCIN) 300 MG capsule     clindamycin-benzoyl peroxide (BENZACLIN) 1-5 % external gel     diphenhydrAMINE (BENADRYL) 25 MG capsule     doxycycline monohydrate (MONODOX) 100 MG capsule     fluticasone (FLONASE) 50 MCG/ACT nasal spray     hydrOXYzine (ATARAX) 25 MG tablet     ibuprofen (ADVIL/MOTRIN) 600 MG tablet     losartan-hydrochlorothiazide (HYZAAR) 50-12.5 MG tablet     metFORMIN (GLUCOPHAGE XR) 500 MG 24 hr tablet     ondansetron (ZOFRAN ODT) 4 MG ODT tab     ondansetron (ZOFRAN ODT) 4 MG ODT tab     oxyCODONE (ROXICODONE) 5 MG tablet     No current facility-administered medications for this visit.            Allergies:   Lisinopril         Review of Systems:  From intake questionnaire   Negative 14 system review except as noted on HPI, nurse's note.         Physical Exam:   Patient is a 43 year old  male   Vitals: Blood pressure 121/80, pulse 93, SpO2 99 %.  General Appearance Adult: Alert, no acute distress, oriented.  Lungs: Non-labored breathing.  Heart: No obvious jugular venous distension present.  Neuro: Alert, oriented, speech and mentation normal    PVR: 26 mL      Labs and Pathology:    I personally reviewed all applicable laboratory data and went over findings with patient  Significant for:    BMP RESULTS:  Recent Labs   Lab Test 02/17/23  1603 02/17/23  1020 12/11/22  1145 09/16/22  1356 10/04/21  0606 10/04/21  0556 06/27/21  0528 06/26/21  1710 06/07/21  0040 05/21/21  1156 04/23/21  1431   NA  --   --  137 136  --   --  140 138 140  --  135   POTASSIUM  --   --  4.1 4.0 3.7  --  3.6 3.6 3.9   < > 3.6   CHLORIDE  --   --  102 100  --   --  109 105 106  --  101   CO2  --   --  26 26  --   --  25 25 24  --  26   ANIONGAP  --   --  9 10  --   --  6 8 10  --  8   * 126* 152* 99  --    < > 105* 108* 104*  --  118*   BUN  --   --  11.6 10.4  --   --  13 14 14  --  16   CR  --   --  1.06 0.89 1.05  --   0.94 1.00 1.00  --  0.97   GFRESTIMATED  --   --  90 >90 88  --  >90 >90 >90  --  >90   GFRESTBLACK  --   --   --   --   --   --  >90 >90 >90  --  >90   DAWIT  --   --  9.7 9.7  --   --  8.6 9.7 9.1  --  9.5    < > = values in this interval not displayed.       UA RESULTS:   Recent Labs   Lab Test 12/11/22  1145 06/26/21  1940 06/07/21  0108   SG 1.030 1.018 1.028   URINEPH 5.5 5.0 5.0   NITRITE Negative Positive* Negative   RBCU >182* 16* >182*   WBCU 5 1 3         Imaging:    I personally reviewed all applicable imaging and went over findings with patient.  Significant for:    Results for orders placed or performed during the hospital encounter of 12/11/22   CT Abdomen Pelvis w/o Contrast    Narrative    EXAM: CT ABDOMEN PELVIS W/O CONTRAST  LOCATION: [facility]  DATE/TIME: 12/11/2022 12:22 PM    INDICATION: R flank, recent hx of antibiotics for UTI vs STD  COMPARISON: 07/30/2021  TECHNIQUE: CT scan of the abdomen and pelvis was performed without IV contrast. Multiplanar reformats were obtained. Dose reduction techniques were used.  CONTRAST: None.    FINDINGS:   LOWER CHEST: Scars identified in the right lower lobe. Dependent atelectasis and trace bilateral pleural effusions are identified in both lower lobes.    HEPATOBILIARY: Liver measures 19.7 cm in size. No intrahepatic biliary ductal dilatation. No gallstones.    PANCREAS: Normal.    SPLEEN: Normal.    ADRENAL GLANDS: Normal.    KIDNEYS/BLADDER: No left hydronephrosis. Cortical renal calculus identified on the left which may be at the site of previous injury. Small left renal cyst. No further evaluation of this is warranted. On the right, mild right hydronephrosis and   hydroureter identified. Questionable calculus is identified within the distal ureter, best identified on image #192, series 2. This measures only 2 mm in size. A stones are identified within the bladder.    BOWEL: The small and large bowel are normal in caliber. The  appendix is normal. No free fluid or free air. No lytic or blastic lesions of the spine.    LYMPH NODES: Normal    VASCULATURE: Unremarkable.    PELVIC ORGANS: Prostatomegaly.    MUSCULOSKELETAL: Normal.      Impression    IMPRESSION:   1.  Mild right-sided hydronephrosis and hydroureter. A faint 2 mm calculus is identified within the distal ureter.  2.  Hepatomegaly              Assessment and Plan:   Assessment: Sean Woodard is a 43 year old male seen in evaluation for kidney stones. He was found to have a 2 mm distal right ureteral stone after presenting to the ED on 2022. He believes he passed this stone. We discussed obtaining a CT scan to ensure passage since the stone was not caught, patient declined.     We reviewed general recommendations to prevent kidney stones including the followin) Low oxalate diet. We discussed foods that are particularly high in oxalate including spinach, rhubarb, beets, nuts, nut butters, and black teas.     2) Low salt diet. Discussed common foods with high amounts of salt as well as dietary strategies to minimize sodium.     3) High fluid intake. Recommend 3 liters of fluid daily to produce goal of 2.5L of urine.     4) Modest animal protein. Recommend limiting animal protein to one serving or less daily.     5) Normal dietary calcium.     Since this is his second kidney stone, [facility] was offered for complete evaluation. The patient would like to proceed.     Plan:  1. 24 hour urine collection via [facility].   2. Follow up with Xuan Tracey NP to review results.       Yakelin Solano PA-C  Department of Urology

## 2023-03-18 LAB — BACTERIA UR CULT: NORMAL

## 2023-03-21 DIAGNOSIS — L73.2 HIDRADENITIS SUPPURATIVA: ICD-10-CM

## 2023-03-23 RX ORDER — DOXYCYCLINE 100 MG/1
100 CAPSULE ORAL 2 TIMES DAILY
Qty: 60 CAPSULE | Refills: 3 | Status: SHIPPED | OUTPATIENT
Start: 2023-03-23 | End: 2023-08-24

## 2023-03-23 NOTE — TELEPHONE ENCOUNTER
doxycycline monohydrate (MONODOX) 100 MG      Last Written Prescription Date:  10/27/22  Last Fill Quantity: 60,   # refills: 3  Last Office Visit : 3/6/23  Future Office visit:  6/26/23  Routing refill request to provider for review/approval because:  Does Derm want to continue/RF med?

## 2023-04-13 ENCOUNTER — HOSPITAL ENCOUNTER (OUTPATIENT)
Dept: WOUND CARE | Facility: CLINIC | Age: 43
Discharge: HOME | End: 2023-04-13
Attending: SURGERY
Payer: COMMERCIAL

## 2023-04-13 DIAGNOSIS — S41.102D OPEN WOUND OF LEFT AXILLARY REGION, SUBSEQUENT ENCOUNTER: ICD-10-CM

## 2023-04-13 DIAGNOSIS — L73.2 HIDRADENITIS SUPPURATIVA: ICD-10-CM

## 2023-04-13 PROCEDURE — 99024 POSTOP FOLLOW-UP VISIT: CPT | Mod: 95 | Performed by: SURGERY

## 2023-04-13 NOTE — PROGRESS NOTES
Visit Date: 2023    PHONE VISIT    This is a 43-year-old H[place] gentleman who has a diagnosis of hidradenitis who underwent a fairly recent revision reexcision of recurrent HS on the left axilla.  He has been doing his dressing at home using AMD and ABDs and states that things are going well, to the point where he can now do his own dressings rather than having his wife's help.  He sent photos that shows very beefy granulation base with evidence of a new skin around the perimeter.  There is no evidence of any active disease at this point.  He states there is not any pain per se, but there is some slight tightness with some range of motion that hopefully will not become too limiting for him.  He is well aware of that possibility and has experienced it in the past.  He is having some groin flares, but we did not address that at his last trip to the OR.  Overall, things are looking good, moving forward, so we will not change anything.  We would like him, however, to come to see us in person next month on , just in case we need to do some silver nitrate or some treatment of the hypertrophic granulation tissue.  He is willing to do that.  We do not have measurements today, but we do photos.    Shahnaz Garibay MD        D: 2023   T: 2023   MT: mary lou    Name:     SEB SMITH.  MRN:      [id number]        Account:    [id number]   :      1980           Visit Date: 2023     Document: [id number]

## 2023-04-13 NOTE — DISCHARGE INSTRUCTIONS
"Sean Woodard      1980  A DME order was not completed because the patient declined the need for supplies    Wound Dressing Change: Left Axilla  - Wash your hands with soap and water before you begin your dressing change and  prepare a clean surface for dressings.  -Feel free to shower with dressing on (to ease removal)  -Cleanse with wound cleanser  -Apply 1/6 roll of dry AMD gauze 4\"  -Cover wound with ABD pad 8x10\" & secure with Medipore tape  -Change Daily & as needed     David Garibay M.D. April 13, 2023    Call us at [phone] if you have any questions about your wounds, have redness or swelling around your wound, have a fever of 101 or greater or if you have any other problems or concerns. We answer the phone Monday through Friday 8 am to 4 pm, please leave a message as we check the voicemail frequently throughout the day.     If you had a positive experience please indicate that on your patient satisfaction survey form that [facility] will be sending you.  It was a pleasure meeting with you today.  Thank you for allowing me and my team the privilege of caring for you today.  YOU are the reason we are here, and I truly hope we provided you with the excellent service you deserve.  Please let us know if there is anything else we can do for you so that we can be sure you are leaving completely satisfied with your care experience.      If you have any billing related questions please call the [facility] Business office at [phone]. The clinic staff does not handle billing related matters.  If you are scheduled to have a follow up appointment, you will receive a reminder call the day before your visit. On the appointment day please arrive 15 minutes prior to your appointment time. If you are unable to keep that appointment, please call the clinic to cancel or reschedule. If you are more than 10 minutes late or greater for your appointment, the clinic policy is that you may be asked to " reschedule.

## 2023-04-13 NOTE — PROGRESS NOTES
Patient Active Problem List   Diagnosis     Health Care Home     Hyperlipidemia with target LDL less than 100     Benign essential hypertension     PTSD (post-traumatic stress disorder)     Class 3 severe obesity due to excess calories with serious comorbidity in adult (H)     Diabetes mellitus, type 2 (H)     FER (obstructive sleep apnea)     Hidradenitis suppurativa     Hidradenitis axillaris     Fatty liver     UTI (urinary tract infection)     Left ureteral stone     Lab test negative for COVID-19 virus     Acute bronchitis     Allergic rhinitis     Benign neoplasm of skin     Effusion of joint     Encounter for other physical therapy     Lesion of penis     Low back pain     Acne     Atypical chest pain     Pain in joint, lower leg     Pain of finger     Screening examination for pulmonary tuberculosis     Sebaceous cyst     Epidermoid cyst of skin     Tear of medial cartilage or meniscus of knee, current     Tinnitus     Borderline diabetes mellitus     Arthralgia of knee     Open wound of left axillary region     Past Medical History:   Diagnosis Date     Diabetes (H)      Hypertension      Obese      FER (obstructive sleep apnea)      Sleep apnea      Labs:   Recent Labs   Lab Test 02/03/23  1630 12/11/22  1145 04/23/21  1431 09/08/20  1013 10/15/18  1000 10/01/18  1801   ALBUMIN  --  4.6   < >  --    < > 4.1   HGB 14.9 15.6   < > 15.2   < > 16.3   INR  --   --   --  0.98  --   --    WBC 7.5 6.1   < > 6.8   < > 7.4   A1C 6.2*  --    < >  --    < > 6.3*   CRP  --   --   --   --   --  <2.9    < > = values in this interval not displayed.     Nutrition requirements were discussed with patient today.  Vitals:  There were no vitals taken for this visit.  Wound:   Wound (used by OP [facility] only) 02/17/22 8594 Left mid axillary other (see comments) (Active)   Thickness/Stage full thickness 04/13/23 0820   Base granulating;hypergranulation 04/13/23 0820   Periwound intact 04/13/23 0820   Periwound Temperature warm  "04/13/23 0820   Periwound Skin Turgor soft 04/13/23 0820   Edges open 04/13/23 0820   Length (cm) 9 04/13/23 0820   Width (cm) 4.8 04/13/23 0820   Depth (cm) 0.1 04/13/23 0820   Wound (cm^2) 43.2 cm^2 04/13/23 0820   Wound Volume (cm^3) 4.32 cm^3 04/13/23 0820   Wound healing % -09597 04/13/23 0820   Drainage Characteristics/Odor serosanguineous 04/13/23 0820   Drainage Amount moderate 04/13/23 0820   Care, Wound non-select wound debridement performed 04/13/23 0820       Incision/Surgical Site 02/17/23 Left Axillary (Active)      Photo: No images are attached to the encounter.  Sent thru My Chart; please see Media tab for the pictures;   Further instructions from your care team       Sean Woodard      1980  A DME order was not completed because the patient declined the need for supplies    Wound Dressing Change: Left Axilla  - Wash your hands with soap and water before you begin your dressing change and  prepare a clean surface for dressings.  -Feel free to shower with dressing on (to ease removal)  -Cleanse with wound cleanser  -Apply 1/6 roll of dry AMD gauze 4\"  -Cover wound with ABD pad 8x10\" & secure with Medipore tape  -Change Daily & as needed     David Garibay M.D. April 13, 2023    "

## 2023-04-13 NOTE — PROGRESS NOTES
Patient called for a telephone visit. Certified Wound Care Nurse time spent evaluating patient record, completed a full evaluation and documented wound(s) & david-wound skin; provided recommendation based on treatment plan. Reviewed discharge instructions, patient education, and discussed plan of care with appropriate medical team staff members and patient and/or family members.   No further questions or concerns.  Patient stated he does not need supplies.

## 2023-05-18 ENCOUNTER — HOSPITAL ENCOUNTER (OUTPATIENT)
Dept: WOUND CARE | Facility: CLINIC | Age: 43
Discharge: HOME | End: 2023-05-18
Attending: SURGERY | Admitting: SURGERY
Payer: COMMERCIAL

## 2023-05-18 VITALS — SYSTOLIC BLOOD PRESSURE: 137 MMHG | TEMPERATURE: 98.3 F | DIASTOLIC BLOOD PRESSURE: 83 MMHG | HEART RATE: 65 BPM

## 2023-05-18 DIAGNOSIS — L73.2 HIDRADENITIS SUPPURATIVA: Primary | ICD-10-CM

## 2023-05-18 PROCEDURE — 99024 POSTOP FOLLOW-UP VISIT: CPT | Performed by: SURGERY

## 2023-05-18 PROCEDURE — G0463 HOSPITAL OUTPT CLINIC VISIT: HCPCS

## 2023-05-18 NOTE — DISCHARGE INSTRUCTIONS
Sean Woodard      1980  A DME order was not completed because supplies were not needed    Wound Dressing Change: Left Axilla  Healed; shower to clean  In 1 week: start to gently massage with soap and water in shower    Call [facility] if lesions return and become bothersome for you.   David Garibay M.D. May 18, 2023    Call us at [phone] if you have any questions about your wounds, have redness or swelling around your wound, have a fever of 101 or greater or if you have any other problems or concerns. We answer the phone Monday through Friday 8 am to 4 pm, please leave a message as we check the voicemail frequently throughout the day.     If you had a positive experience please indicate that on your patient satisfaction survey form that [facility] will be sending you.  It was a pleasure meeting with you today.  Thank you for allowing me and my team the privilege of caring for you today.  YOU are the reason we are here, and I truly hope we provided you with the excellent service you deserve.  Please let us know if there is anything else we can do for you so that we can be sure you are leaving completely satisfied with your care experience.      If you have any billing related questions please call the [facility] Business office at [phone]. The clinic staff does not handle billing related matters.  If you are scheduled to have a follow up appointment, you will receive a reminder call the day before your visit. On the appointment day please arrive 15 minutes prior to your appointment time. If you are unable to keep that appointment, please call the clinic to cancel or reschedule. If you are more than 10 minutes late or greater for your appointment, the clinic policy is that you may be asked to reschedule.

## 2023-05-18 NOTE — PROGRESS NOTES
Visit Date: 05/18/2023    This is a 43-year-old gentleman with a history of hidradenitis who is here today in person for final followup of his left axillary surgical site.  We had re-resected some recurrent disease on the left axilla, and he has been doing dressing changes for secondary healing at home since that time.  Per his last phone visit, he stated that it was to the size where he did not need the assistance of his wife anymore.      Today, his wound is healed, having closed last week.  There is still a rather wide scar that is quite ruborous, if you will, but not hypertrophic.  It is also quite tight, and his range of motion for abduction is only about 90 degrees.  He states that this does not in any way prevent him from doing necessary activities for work.  There is no evidence of any residual disease at this point, and he states that it feels pretty good. As far as his right axillary scar is concerned, that healed very well and some pigmentation is fading there, but the scar is still wide and a little bit heaped up, actually.  His range of motion on that side is probably about 120 degrees. As far as any other lesions, he states that the ones that are kind of in his upper inner thighs and groin area are stable on Humira.  That is provided by Dr. Avina, from Derm.  I am not sure how he would respond if they put a hold on the Humira.      Interestingly, he has 2 almost matching hypertrophic scars of the, kind of mandibular angles within his beard.  They are quite prominent, but less than 1 cm in diameter.  He states he had some disease or some lesions there that when squeezed, ultimately ended up healing by scar.  I suggested that he follow up with his Dermatology for any desired excision.      As far as his active areas of concern, Sean can just let us know if he needs to go to the OR for further excision.  We also mentioned the possibility of massaging the axillary skin to help with stretching to  increase his range of motion.  I would recommend anything that did not cause clogging of the oil glands that remain.  We told Marlo he could come on a p.r.n. basis, and we wished him well, and hopefully, he will not need us or our services in the future.    Shahnaz Garibay MD        D: 2023   T: 2023   MT: cp    Name:     SEB SMITH  MRN:      [id number]        Account:    [id number]   :      1980           Visit Date: 2023     Document: [id number]

## 2023-05-18 NOTE — PROGRESS NOTES
Patient Active Problem List   Diagnosis    Health Care Home    Hyperlipidemia with target LDL less than 100    Benign essential hypertension    PTSD (post-traumatic stress disorder)    Class 3 severe obesity due to excess calories with serious comorbidity in adult (H)    Diabetes mellitus, type 2 (H)    FER (obstructive sleep apnea)    Hidradenitis suppurativa    Hidradenitis axillaris    Fatty liver    UTI (urinary tract infection)    Left ureteral stone    Lab test negative for COVID-19 virus    Acute bronchitis    Allergic rhinitis    Benign neoplasm of skin    Effusion of joint    Encounter for other physical therapy    Lesion of penis    Low back pain    Acne    Atypical chest pain    Pain in joint, lower leg    Pain of finger    Screening examination for pulmonary tuberculosis    Sebaceous cyst    Epidermoid cyst of skin    Tear of medial cartilage or meniscus of knee, current    Tinnitus    Borderline diabetes mellitus    Arthralgia of knee    Open wound of left axillary region     Past Medical History:   Diagnosis Date    Diabetes (H)     Hypertension     Obese     FER (obstructive sleep apnea)     Sleep apnea      Labs:   Recent Labs   Lab Test 02/03/23  1630 12/11/22  1145 04/23/21  1431 09/08/20  1013 10/15/18  1000 10/01/18  1801   ALBUMIN  --  4.6   < >  --    < > 4.1   HGB 14.9 15.6   < > 15.2   < > 16.3   INR  --   --   --  0.98  --   --    WBC 7.5 6.1   < > 6.8   < > 7.4   A1C 6.2*  --    < >  --    < > 6.3*   CRP  --   --   --   --   --  <2.9    < > = values in this interval not displayed.     Nutrition requirements were discussed with patient today.  Vitals:  /83 (BP Location: Left arm, Patient Position: Sitting)   Pulse 65   Temp 98.3  F (36.8  C) (Temporal)   Wound:   Wound (used by OP [facility] only) 02/17/22 1544 Left mid axillary other (see comments) (Active)   Thickness/Stage full thickness 03/02/23 0810   Base closed/resurfaced;epithelialization 05/18/23 1000   Periwound intact 03/02/23  0810   Periwound Temperature warm 03/02/23 0810   Periwound Skin Turgor soft 03/02/23 0810   Edges open 03/02/23 0810   Length (cm) 13 03/02/23 0810   Width (cm) 13 03/02/23 0810   Depth (cm) 0.3 03/02/23 0810   Wound (cm^2) 169 cm^2 03/02/23 0810   Wound Volume (cm^3) 50.7 cm^3 03/02/23 0810   Wound healing % -37497 03/02/23 0810   Drainage Characteristics/Odor serosanguineous;green 03/02/23 0810   Drainage Amount large 03/02/23 0810   Care, Wound non-select wound debridement performed 03/02/23 0810       Incision/Surgical Site 02/17/23 Left Axillary (Active)      Photo:       Further instructions from your care team           Sean Woodard      1980  A DME order was not completed because supplies were not needed    Wound Dressing Change: Left Axilla  Healed; shower to clean  In 1 week: start to gently massage with soap and water in shower     David Garibay M.D. May 18, 2023

## 2023-05-23 ENCOUNTER — TELEPHONE (OUTPATIENT)
Dept: DERMATOLOGY | Facility: CLINIC | Age: 43
End: 2023-05-23
Payer: COMMERCIAL

## 2023-05-23 NOTE — TELEPHONE ENCOUNTER
Prior Authorization Retail Medication Request    Medication/Dose: adalimumab (HUMIRA *CF*) 80 MG/0.8ML pen kit  ICD code (if different than what is on RX):  Hidradenitis suppurativa [L73.2]   Previously Tried and Failed:  See chart  Rationale:      Insurance Name:  [facility]  Insurance ID:  [id number]

## 2023-06-14 DIAGNOSIS — E11.9 TYPE 2 DIABETES MELLITUS WITHOUT COMPLICATION, WITHOUT LONG-TERM CURRENT USE OF INSULIN (H): ICD-10-CM

## 2023-06-14 DIAGNOSIS — E66.813 CLASS 3 SEVERE OBESITY WITH BODY MASS INDEX (BMI) OF 40.0 TO 44.9 IN ADULT, UNSPECIFIED OBESITY TYPE, UNSPECIFIED WHETHER SERIOUS COMORBIDITY PRESENT (H): ICD-10-CM

## 2023-06-14 DIAGNOSIS — E66.01 CLASS 3 SEVERE OBESITY WITH BODY MASS INDEX (BMI) OF 40.0 TO 44.9 IN ADULT, UNSPECIFIED OBESITY TYPE, UNSPECIFIED WHETHER SERIOUS COMORBIDITY PRESENT (H): ICD-10-CM

## 2023-06-14 RX ORDER — METFORMIN HCL 500 MG
TABLET, EXTENDED RELEASE 24 HR ORAL
Qty: 360 TABLET | Refills: 3 | Status: SHIPPED | OUTPATIENT
Start: 2023-06-14 | End: 2024-05-31

## 2023-06-14 NOTE — TELEPHONE ENCOUNTER
Routing refill request to provider for review/approval because:  Patient needs to be seen because:  Due for DM check    Andrea Fernandez RN

## 2023-06-15 DIAGNOSIS — E66.813 CLASS 3 SEVERE OBESITY WITH BODY MASS INDEX (BMI) OF 40.0 TO 44.9 IN ADULT, UNSPECIFIED OBESITY TYPE, UNSPECIFIED WHETHER SERIOUS COMORBIDITY PRESENT (H): ICD-10-CM

## 2023-06-15 DIAGNOSIS — E66.01 CLASS 3 SEVERE OBESITY WITH BODY MASS INDEX (BMI) OF 40.0 TO 44.9 IN ADULT, UNSPECIFIED OBESITY TYPE, UNSPECIFIED WHETHER SERIOUS COMORBIDITY PRESENT (H): ICD-10-CM

## 2023-06-15 DIAGNOSIS — E11.9 TYPE 2 DIABETES MELLITUS WITHOUT COMPLICATION, WITHOUT LONG-TERM CURRENT USE OF INSULIN (H): ICD-10-CM

## 2023-06-15 RX ORDER — METFORMIN HCL 500 MG
TABLET, EXTENDED RELEASE 24 HR ORAL
Qty: 360 TABLET | Refills: 3 | OUTPATIENT
Start: 2023-06-15

## 2023-06-16 DIAGNOSIS — E11.9 TYPE 2 DIABETES MELLITUS WITHOUT COMPLICATION, WITHOUT LONG-TERM CURRENT USE OF INSULIN (H): ICD-10-CM

## 2023-06-16 DIAGNOSIS — E66.01 CLASS 3 SEVERE OBESITY WITH BODY MASS INDEX (BMI) OF 40.0 TO 44.9 IN ADULT, UNSPECIFIED OBESITY TYPE, UNSPECIFIED WHETHER SERIOUS COMORBIDITY PRESENT (H): ICD-10-CM

## 2023-06-16 DIAGNOSIS — E66.813 CLASS 3 SEVERE OBESITY WITH BODY MASS INDEX (BMI) OF 40.0 TO 44.9 IN ADULT, UNSPECIFIED OBESITY TYPE, UNSPECIFIED WHETHER SERIOUS COMORBIDITY PRESENT (H): ICD-10-CM

## 2023-06-19 RX ORDER — METFORMIN HCL 500 MG
TABLET, EXTENDED RELEASE 24 HR ORAL
Qty: 360 TABLET | Refills: 3 | OUTPATIENT
Start: 2023-06-19

## 2023-06-19 NOTE — TELEPHONE ENCOUNTER
Medication not refilled,  Duplicate request.  Medication filled on 6/14/23 for 360 tablets with 3 refills.  Sebastien Reddy RN

## 2023-06-22 DIAGNOSIS — E78.5 HYPERLIPIDEMIA WITH TARGET LDL LESS THAN 100: ICD-10-CM

## 2023-06-22 RX ORDER — ATORVASTATIN CALCIUM 20 MG/1
TABLET, FILM COATED ORAL
Qty: 90 TABLET | Refills: 3 | Status: SHIPPED | OUTPATIENT
Start: 2023-06-22 | End: 2024-06-11

## 2023-06-22 NOTE — TELEPHONE ENCOUNTER
"Requested Prescriptions   Pending Prescriptions Disp Refills    atorvastatin (LIPITOR) 20 MG tablet [Pharmacy Med Name: ATORVASTATIN CALCIUM 20MG TABS] 90 tablet 3     Sig: TAKE ONE TABLET BY MOUTH ONCE DAILY       Statins Protocol Failed - 6/22/2023  5:01 AM        Failed - LDL on file in past 12 months     Recent Labs   Lab Test 07/03/20  1002   LDL 85             Passed - No abnormal creatine kinase in past 12 months     No lab results found.             Passed - Recent (12 mo) or future (30 days) visit within the authorizing provider's specialty     Patient has had an office visit with the authorizing provider or a provider within the authorizing providers department within the previous 12 mos or has a future within next 30 days. See \"Patient Info\" tab in inbasket, or \"Choose Columns\" in Meds & Orders section of the refill encounter.              Passed - Medication is active on med list        Passed - Patient is age 18 or older             "

## 2023-06-26 ENCOUNTER — VIRTUAL VISIT (OUTPATIENT)
Dept: DERMATOLOGY | Facility: CLINIC | Age: 43
End: 2023-06-26
Payer: COMMERCIAL

## 2023-06-26 VITALS — BODY MASS INDEX: 41.05 KG/M2 | WEIGHT: 270 LBS

## 2023-06-26 DIAGNOSIS — D84.9 IMMUNOSUPPRESSION (H): ICD-10-CM

## 2023-06-26 DIAGNOSIS — L73.2 HIDRADENITIS SUPPURATIVA: Primary | ICD-10-CM

## 2023-06-26 PROCEDURE — 99213 OFFICE O/P EST LOW 20 MIN: CPT | Mod: 93 | Performed by: DERMATOLOGY

## 2023-06-26 ASSESSMENT — PAIN SCALES - GENERAL: PAINLEVEL: NO PAIN (0)

## 2023-06-26 NOTE — NURSING NOTE
Teledermatology Nurse Call Patients:     Are you in the [facility] at the time of the encounter? yes    Today's visit will be billed to you and your insurance.    A teledermatology visit is not as thorough as an in-person visit and the quality of the photograph sent may not be of the same quality as that taken by the dermatology clinic.    Chief Complaint   Patient presents with     RECHECK     Hidradentis Suppura

## 2023-06-26 NOTE — LETTER
6/26/2023       RE: Sean Woodard  [address] [address]     Dear Colleague,    Thank you for referring your patient, Sean Woodard, to the [facility] [place] at [facility]. Please see a copy of my visit note below.    [facility] Dermatology Note  Encounter Date: Jun 26, 2023  Store-and-Forward and Telephone ([phone]). Location of teledermatologist: [facility] [place].  Start time: 9:15. End time: 9:25.    Dermatology Problem List:  1. Hidradenitis suppurativa - Hamilton stage III involving axillae > neck, groin.              - s/p WLE (BL axillae, groin crease) 10/4/21 w/ Dr. Garibay              - current treatment: adalimumab 80 mg weekly, topical clindamycin/benzoyl peroxide, intralesional triamcinolone                   - past treatment: doxycycline, other oral antibiotics, isotretinoin, adalimumab, infliximab (neuropathy), ustekinumab (insurance issues), oral clindamycin       ____________________________________________    Assessment & Plan:     1. Hidradenitis suppurativa: overall axillae doing well following surgery, however groin has been more active recently. We discussed change in pharmacotherapy versus repeat surgical excision; at this time patient would like to continue regimen and monitor. If worsens, would prefer surgery.  - adalimumab 80 mg weekly  - topicals    2. Cystic nodule on the shoulder: suspect epidermoid cyst however may be hidradenitis suppurativa. Will evaluation in person at follow up.    Procedures Performed:    None    Follow-up: 3-4 months    Staff:     Derrick Avina MD, FAAD   of Dermatology  Department of Dermatology  [facility]    ____________________________________________    CC: RECHECK (Hidradentis Suppura)    HPI:  Mr. Sean Woodard is a(n) 43 year old male who presents  today as a return patient for hidradenitis suppurativa    Hidradenitis suppurativa - armpits doing well postoperatively  - groin more active recently - raw, open - became more active in the last few weeks    Growth on shoulder - present for 1-2 years - seems to be enlarging    Patient is otherwise feeling well, without additional skin concerns.    Labs Reviewed:  N/A    Physical Exam:  Vitals: There were no vitals taken for this visit.  SKIN: Teledermatology photos were reviewed; image quality and interpretability: acceptable. Image date: 6/25/23.  - erythema and subcutaneous nodules with erosions in the groin  - subcutaneous nodule on the right posterior shoulder  - No other lesions of concern on areas examined.     Medications:  Current Outpatient Medications   Medication    adalimumab (HUMIRA *CF*) 80 MG/0.8ML pen kit    atorvastatin (LIPITOR) 20 MG tablet    augmented betamethasone dipropionate (DIPROLENE-AF) 0.05 % ointment    azelastine (ASTELIN) 0.1 % nasal spray    azelastine (OPTIVAR) 0.05 % ophthalmic solution    benzoyl peroxide 5 % external liquid    blood glucose (ACCU-CHEK GUIDE) test strip    clindamycin (CLEOCIN) 300 MG capsule    clindamycin-benzoyl peroxide (BENZACLIN) 1-5 % external gel    diphenhydrAMINE (BENADRYL) 25 MG capsule    doxycycline monohydrate (MONODOX) 100 MG capsule    fluticasone (FLONASE) 50 MCG/ACT nasal spray    hydrOXYzine (ATARAX) 25 MG tablet    ibuprofen (ADVIL/MOTRIN) 600 MG tablet    losartan-hydrochlorothiazide (HYZAAR) 50-12.5 MG tablet    metFORMIN (GLUCOPHAGE XR) 500 MG 24 hr tablet    ondansetron (ZOFRAN ODT) 4 MG ODT tab    ondansetron (ZOFRAN ODT) 4 MG ODT tab    oxyCODONE (ROXICODONE) 5 MG tablet     No current facility-administered medications for this visit.      Past Medical/Surgical History:   Patient Active Problem List   Diagnosis    Health Care Home    Hyperlipidemia with target LDL less than 100    Benign essential hypertension    PTSD (post-traumatic  stress disorder)    Class 3 severe obesity due to excess calories with serious comorbidity in adult (H)    Diabetes mellitus, type 2 (H)    FER (obstructive sleep apnea)    Hidradenitis suppurativa    Hidradenitis axillaris    Fatty liver    UTI (urinary tract infection)    Left ureteral stone    Lab test negative for COVID-19 virus    Acute bronchitis    Allergic rhinitis    Benign neoplasm of skin    Effusion of joint    Encounter for other physical therapy    Lesion of penis    Low back pain    Acne    Atypical chest pain    Pain in joint, lower leg    Pain of finger    Screening examination for pulmonary tuberculosis    Sebaceous cyst    Epidermoid cyst of skin    Tear of medial cartilage or meniscus of knee, current    Tinnitus    Borderline diabetes mellitus    Arthralgia of knee    Open wound of left axillary region     Past Medical History:   Diagnosis Date    Diabetes (H)     Hypertension     Obese     FER (obstructive sleep apnea)     Sleep apnea        CC Susanne Alberto PA-C  [address]  [place] [address] on close of this encounter.

## 2023-06-26 NOTE — PROGRESS NOTES
[facility] Dermatology Note  Encounter Date: Jun 26, 2023  Store-and-Forward and Telephone ([phone]). Location of teledermatologist: [facility] [place].  Start time: 9:15. End time: 9:25.    Dermatology Problem List:  1. Hidradenitis suppurativa - Hamilton stage III involving axillae > neck, groin.              - s/p WLE (BL axillae, groin crease) 10/4/21 w/ Dr. Garibay              - current treatment: adalimumab 80 mg weekly, topical clindamycin/benzoyl peroxide, intralesional triamcinolone                   - past treatment: doxycycline, other oral antibiotics, isotretinoin, adalimumab, infliximab (neuropathy), ustekinumab (insurance issues), oral clindamycin       ____________________________________________    Assessment & Plan:     1. Hidradenitis suppurativa: overall axillae doing well following surgery, however groin has been more active recently. We discussed change in pharmacotherapy versus repeat surgical excision; at this time patient would like to continue regimen and monitor. If worsens, would prefer surgery.  - adalimumab 80 mg weekly  - topicals    2. Cystic nodule on the shoulder: suspect epidermoid cyst however may be hidradenitis suppurativa. Will evaluation in person at follow up.    Procedures Performed:    None    Follow-up: 3-4 months    Staff:     Derrick Avina MD, FAAD   of Dermatology  Department of Dermatology  [facility]    ____________________________________________    CC: RECHECK (Hidradentis Suppura)    HPI:  Mr. Sean Woodard is a(n) 43 year old male who presents today as a return patient for hidradenitis suppurativa    Hidradenitis suppurativa - armpits doing well postoperatively  - groin more active recently - raw, open - became more active in the last few weeks    Growth on shoulder - present for 1-2 years - seems to be enlarging    Patient is otherwise feeling well,  without additional skin concerns.    Labs Reviewed:  N/A    Physical Exam:  Vitals: There were no vitals taken for this visit.  SKIN: Teledermatology photos were reviewed; image quality and interpretability: acceptable. Image date: 6/25/23.  - erythema and subcutaneous nodules with erosions in the groin  - subcutaneous nodule on the right posterior shoulder  - No other lesions of concern on areas examined.     Medications:  Current Outpatient Medications   Medication     adalimumab (HUMIRA *CF*) 80 MG/0.8ML pen kit     atorvastatin (LIPITOR) 20 MG tablet     augmented betamethasone dipropionate (DIPROLENE-AF) 0.05 % ointment     azelastine (ASTELIN) 0.1 % nasal spray     azelastine (OPTIVAR) 0.05 % ophthalmic solution     benzoyl peroxide 5 % external liquid     blood glucose (ACCU-CHEK GUIDE) test strip     clindamycin (CLEOCIN) 300 MG capsule     clindamycin-benzoyl peroxide (BENZACLIN) 1-5 % external gel     diphenhydrAMINE (BENADRYL) 25 MG capsule     doxycycline monohydrate (MONODOX) 100 MG capsule     fluticasone (FLONASE) 50 MCG/ACT nasal spray     hydrOXYzine (ATARAX) 25 MG tablet     ibuprofen (ADVIL/MOTRIN) 600 MG tablet     losartan-hydrochlorothiazide (HYZAAR) 50-12.5 MG tablet     metFORMIN (GLUCOPHAGE XR) 500 MG 24 hr tablet     ondansetron (ZOFRAN ODT) 4 MG ODT tab     ondansetron (ZOFRAN ODT) 4 MG ODT tab     oxyCODONE (ROXICODONE) 5 MG tablet     No current facility-administered medications for this visit.      Past Medical/Surgical History:   Patient Active Problem List   Diagnosis     Health Care Home     Hyperlipidemia with target LDL less than 100     Benign essential hypertension     PTSD (post-traumatic stress disorder)     Class 3 severe obesity due to excess calories with serious comorbidity in adult (H)     Diabetes mellitus, type 2 (H)     FER (obstructive sleep apnea)     Hidradenitis suppurativa     Hidradenitis axillaris     Fatty liver     UTI (urinary tract infection)     Left  ureteral stone     Lab test negative for COVID-19 virus     Acute bronchitis     Allergic rhinitis     Benign neoplasm of skin     Effusion of joint     Encounter for other physical therapy     Lesion of penis     Low back pain     Acne     Atypical chest pain     Pain in joint, lower leg     Pain of finger     Screening examination for pulmonary tuberculosis     Sebaceous cyst     Epidermoid cyst of skin     Tear of medial cartilage or meniscus of knee, current     Tinnitus     Borderline diabetes mellitus     Arthralgia of knee     Open wound of left axillary region     Past Medical History:   Diagnosis Date     Diabetes (H)      Hypertension      Obese      FER (obstructive sleep apnea)      Sleep apnea        CC Susanne Alberto PA-C  [address]  [place] [address] on close of this encounter.

## 2023-06-30 ENCOUNTER — TELEPHONE (OUTPATIENT)
Dept: DERMATOLOGY | Facility: CLINIC | Age: 43
End: 2023-06-30
Payer: COMMERCIAL

## 2023-06-30 NOTE — TELEPHONE ENCOUNTER
PA Needed    Medication: Humira  QTY/DS:  4 PER 28 DAYS  NEW INS:NA  Insurance Company:  [facility] [employer]  Pharmacy Filling the Rx:  [facility]  PA :  23  Date of last fill:        Key: [id number]

## 2023-06-30 NOTE — TELEPHONE ENCOUNTER
PA Initiation    Medication: HUMIRA PEN 80 MG/0.8ML SC PNKT  Insurance Company: [email or web address] - Phone [phone] Fax [phone]  Pharmacy Filling the Rx: [facility] [address] [facility]  Filling Pharmacy Phone:    Filling Pharmacy Fax:    Start Date: 6/30/2023    Key: [id number]

## 2023-07-03 NOTE — TELEPHONE ENCOUNTER
Prior Authorization Approval    Medication: HUMIRA PEN 80 MG/0.8ML SC PNKT  Authorization Effective Date: 5/31/2023  Authorization Expiration Date: 6/29/2024  Approved Dose/Quantity: 4 for 28 days  Reference #: Key: [id number]   Insurance Company: [employer] - Phone [phone] Fax [phone]  Expected CoPay:       CoPay Card Available:      Financial Assistance Needed: n.a  Which Pharmacy is filling the prescription: [facility] [place]  Pharmacy Notified: Yes  Patient Notified: No

## 2023-07-12 DIAGNOSIS — L73.2 HIDRADENITIS SUPPURATIVA: ICD-10-CM

## 2023-07-12 NOTE — TELEPHONE ENCOUNTER
[facility] Call Center    Phone Message    May a detailed message be left on voicemail: yes     Reason for Call: Medication Refill Request    Has the patient contacted the pharmacy for the refill? Yes   Name of medication being requested: adalimumab (HUMIRA *CF*) 80 MG/0.8ML pen kit  Provider who prescribed the medication: Fabrice  Pharmacy: [facility]  Ph# [phone]  Fax# [phone]  Date medication is needed: Now      Action Taken: Message routed to:  [facility] Derm    Travel Screening: Not Applicable

## 2023-07-13 PROBLEM — M54.50 LOW BACK PAIN: Status: ACTIVE | Noted: 2021-10-21

## 2023-07-13 NOTE — TELEPHONE ENCOUNTER
adalimumab (HUMIRA *CF*) 80 MG/0.8ML pen kit  Last Written Prescription Date:   6/26/2023  Last Fill Quantity: 3.2,   # refills: 12  Last Office Visit :  6/26/2023  Future Office visit:  None  Routing refill request to provider for review/approval because:  Drug not on the [facility], [facility] or [facility] refill protocol or controlled substance      Niki Montes RN  Central Triage Red Flags/Med Refills

## 2023-07-14 ENCOUNTER — TELEPHONE (OUTPATIENT)
Dept: DERMATOLOGY | Facility: CLINIC | Age: 43
End: 2023-07-14
Payer: COMMERCIAL

## 2023-07-14 NOTE — TELEPHONE ENCOUNTER
Health Call Center    Phone Message    May a detailed message be left on voicemail: yes     Reason for Call: Medication Question or concern regarding medication   Prescription Clarification  Name of Medication: adalimumab (HUMIRA *CF*)   Prescribing Provider: Dr. Fabrice Meza   Pharmacy: [facility]   What on the order needs clarification? Pharmacy needs to order sent over. The only information they have is needing adalimumab (HUMIRA *CF*). No dose or strength. Please review and send over to Pharmacy. Thanks           Action Taken: Message routed to:  [facility] Derm    Travel Screening: Not Applicable

## 2023-07-20 ENCOUNTER — MYC MEDICAL ADVICE (OUTPATIENT)
Dept: DERMATOLOGY | Facility: CLINIC | Age: 43
End: 2023-07-20
Payer: COMMERCIAL

## 2023-08-04 ENCOUNTER — OFFICE VISIT (OUTPATIENT)
Dept: DERMATOLOGY | Facility: CLINIC | Age: 43
End: 2023-08-04
Payer: COMMERCIAL

## 2023-08-04 DIAGNOSIS — L72.9 CYST OF SKIN: ICD-10-CM

## 2023-08-04 DIAGNOSIS — D48.9 NEOPLASM OF UNCERTAIN BEHAVIOR: ICD-10-CM

## 2023-08-04 DIAGNOSIS — L73.2 HIDRADENITIS SUPPURATIVA: Primary | ICD-10-CM

## 2023-08-04 PROCEDURE — 99213 OFFICE O/P EST LOW 20 MIN: CPT | Mod: 25 | Performed by: DERMATOLOGY

## 2023-08-04 PROCEDURE — 11102 TANGNTL BX SKIN SINGLE LES: CPT | Mod: GC | Performed by: DERMATOLOGY

## 2023-08-04 PROCEDURE — 88305 TISSUE EXAM BY PATHOLOGIST: CPT | Mod: TC | Performed by: DERMATOLOGY

## 2023-08-04 PROCEDURE — 11900 INJECT SKIN LESIONS </W 7: CPT | Mod: GC | Performed by: DERMATOLOGY

## 2023-08-04 PROCEDURE — 11103 TANGNTL BX SKIN EA SEP/ADDL: CPT | Mod: GC | Performed by: DERMATOLOGY

## 2023-08-04 PROCEDURE — 88305 TISSUE EXAM BY PATHOLOGIST: CPT | Mod: 26 | Performed by: DERMATOLOGY

## 2023-08-04 ASSESSMENT — PAIN SCALES - GENERAL: PAINLEVEL: MODERATE PAIN (5)

## 2023-08-04 NOTE — NURSING NOTE
Drug Administration Record    Prior to injection, verified patient identity using patient's name and date of birth.  Due to injection administration, patient instructed to remain in clinic for 15 minutes  afterwards, and to report any adverse reaction to me immediately.    Drug Name: triamcinolone acetonide(kenalog)  Dose: 0.2mL of triamcinolone 10mg/mL, 2mg dose  Route administered: ID  NDC #: Kenalog-10 (4323-5082-60) or Lidocaine (3394-3978-19)  Amount of waste(mL):4.7  Reason for waste: Multi dose vial    LOT #: [id number]  SITE: see chart  : [employer]  EXPIRATION DATE: 09/2025

## 2023-08-04 NOTE — PROGRESS NOTES
[facility] Dermatology Note  Encounter Date: Aug 4, 2023  Office Visit    Dermatology Problem List:  1. Hidradenitis suppurativa - Hamilton stage III involving axillae > neck, groin.              - s/p WLE (BL axillae, groin crease) 10/4/21 w/ Dr. Garibay              - current treatment: adalimumab 80 mg weekly, topical clindamycin/benzoyl peroxide, intralesional triamcinolone, doxycycline                  - past treatment: doxycycline, other oral antibiotics, isotretinoin, adalimumab, infliximab (neuropathy), ustekinumab (insurance issues), oral clindamycin       ____________________________________________    Assessment & Plan:   1. Hidradenitis suppurativa: overall axillae doing well following surgery, however scrotum/groin has been more active recently. We discussed changes versus repeat surgical excision; at this time patient would like to [facility] to see if alternative systemic can be covered. Amendable to ILK today  - adalimumab 80 mg weekly  - topicals  - IL triamcinolone  - future: golimumab vs secukinumab     2. Cystic nodule on the shoulder: suspect epidermoid cyst   - Derm surg referral    3. Lesions on the cheeks: irritating and catching on razor. DDx scar vs skin tag r/o BCC  - shave biopsy today    Procedures Performed:    Shave biopsy procedure note  - location(s): right and left mandible  After discussion of benefits and risks including but not limited to bleeding/bruising, pain/swelling, infection, scar, incomplete removal, nerve damage/numbness, recurrence, verbal consent and photographs obtained, time-out performed, area cleaned with alcohol, 1% lidocaine injected to obtain anesthesia, shave removal performed, hemostasis achieved with Drysol, Vaseline and bandage applied, post-care instructions provided     Kenalog intralesional injection procedure note  - location(s): right scrotum  - strength: 10 mg/ml  - volume: 0.2 ml  - number of injections: 1  After verbal  consent and discussion of risks including but not limited to atrophy, pain, and bruising, time out was performed, patient positioned, area cleaned with alcohol, Kenalog injected into affected sites; patient tolerated procedure well      Follow-up: 4-6 months    Staff and Resident:    Tejas Kaur MD  PGY-4 Dermatology  Pager: [phone]    Staff Physician Comments:   I saw and evaluated the patient with the resident and I edited the assessment and plan as documented in the note. I was present for the entire minor procedure, key portions of the above major procedure, and examination.    Derrick Avina MD   of Dermatology  Department of Dermatology  [facility]        ____________________________________________    CC: Derm Problem (HS f/up. Sean reports no changes. Sean reports current flare-up in groin, started about a week ago. )    HPI:  Mr. Sean Woodard is a(n) 43 year old male who presents today as a return patient for hidradenitis suppurativa    Hidradenitis suppurativa - armpits doing well postoperatively  - groin more active recently - raw, open - became more active in the last few weeks  - has lesion on right scrotum, tender, usually just takes a lot of time for areas to resolve  - Growth on shoulder - present for 1-2 years - seems to be enlarging  Patient is otherwise feeling well, without additional skin concerns.    Labs Reviewed:  N/A    Physical Exam:  Vitals: There were no vitals taken for this visit.  SKIN: Focused exam of scalp, back, axillae, and groin  - erythema and subcutaneous nodules with erosions in the groin  - right scrotum with superficially eroded tender papulonodule  - subcutaneous nodule on the right posterior shoulder  - firm hypertrophic scarring on bilateral cheeks  - No other lesions of concern on areas examined.     Medications:  Current Outpatient Medications   Medication    adalimumab (HUMIRA *CF*) 80 MG/0.8ML pen kit     atorvastatin (LIPITOR) 20 MG tablet    augmented betamethasone dipropionate (DIPROLENE-AF) 0.05 % ointment    azelastine (ASTELIN) 0.1 % nasal spray    diphenhydrAMINE (BENADRYL) 25 MG capsule    doxycycline monohydrate (MONODOX) 100 MG capsule    fluticasone (FLONASE) 50 MCG/ACT nasal spray    losartan-hydrochlorothiazide (HYZAAR) 50-12.5 MG tablet    metFORMIN (GLUCOPHAGE XR) 500 MG 24 hr tablet    azelastine (OPTIVAR) 0.05 % ophthalmic solution    benzoyl peroxide 5 % external liquid    blood glucose (ACCU-CHEK GUIDE) test strip    clindamycin (CLEOCIN) 300 MG capsule    clindamycin-benzoyl peroxide (BENZACLIN) 1-5 % external gel    hydrOXYzine (ATARAX) 25 MG tablet    ibuprofen (ADVIL/MOTRIN) 600 MG tablet    ondansetron (ZOFRAN ODT) 4 MG ODT tab    ondansetron (ZOFRAN ODT) 4 MG ODT tab    oxyCODONE (ROXICODONE) 5 MG tablet     No current facility-administered medications for this visit.      Past Medical/Surgical History:   Patient Active Problem List   Diagnosis    Health Care Home    Hyperlipidemia with target LDL less than 100    Benign essential hypertension    PTSD (post-traumatic stress disorder)    Class 3 severe obesity due to excess calories with serious comorbidity in adult (H)    Diabetes mellitus, type 2 (H)    FER (obstructive sleep apnea)    Hidradenitis suppurativa    Hidradenitis axillaris    Fatty liver    UTI (urinary tract infection)    Left ureteral stone    Lab test negative for COVID-19 virus    Acute bronchitis    Allergic rhinitis    Benign neoplasm of skin    Effusion of joint    Encounter for other physical therapy    Lesion of penis    Low back pain    Acne    Atypical chest pain    Pain in joint, lower leg    Pain of finger    Screening examination for pulmonary tuberculosis    Sebaceous cyst    Epidermoid cyst of skin    Tear of medial cartilage or meniscus of knee, current    Tinnitus    Borderline diabetes mellitus    Arthralgia of knee    Open wound of left axillary region      Past Medical History:   Diagnosis Date    Diabetes (H)     Hypertension     Obese     FER (obstructive sleep apnea)     Sleep apnea        CC Susanne Alberto PA-C  [address] [address] on close of this encounter.

## 2023-08-04 NOTE — NURSING NOTE
Lidocaine-epinephrine 1-1:451803 % injection   2mL once for one use, starting 8/4/2023 ending 8/4/2023,  2mL disp, R-0, injection  Injected by Dr. Kaur      Wound Care (No Sutures): Petrolatum

## 2023-08-04 NOTE — PATIENT INSTRUCTIONS
Wound Care After a Biopsy    What is a skin biopsy?  A skin biopsy allows the doctor to examine a very small piece of tissue under the microscope to determine the diagnosis and the best treatment for the skin condition. A local anesthetic (numbing medicine) is injected with a very small needle into the skin area to be tested. A small piece of skin is taken from the area. Sometimes a suture (stitch) is used.     What are the risks of a skin biopsy?  I will experience scar, bleeding, swelling, pain, crusting and redness. I may experience incomplete removal or recurrence. Risks of this procedure are excessive bleeding, bruising, infection, nerve damage, numbness, thick (hypertrophic or keloidal) scar and non-diagnostic biopsy.    How should I care for my wound for the first 24 hours?  Keep the wound dry and covered for 24 hours  If it bleeds, hold direct pressure on the area for 15 minutes. If bleeding does not stop, call us or go to the emergency room  Avoid strenuous exercise the first 1-2 days or as your doctor instructs you    How should I care for the wound after 24 hours?  After 24 hours, remove the bandage  You may bathe or shower as normal  If you had a scalp biopsy, you can shampoo as usual and can use shower water to clean the biopsy site daily  Clean the wound once a day with gentle soap and water  Do not scrub, be gentle  Apply white petroleum/Vaseline after cleaning the wound with a cotton swab or a clean finger, and keep the site covered with a Bandaid /bandage. Bandages are not necessary with a scalp biopsy  If you are unable to cover the site with a Bandaid /bandage, re-apply ointment 2-3 times a day to keep the site moist. Moisture will help with healing  Avoid strenuous activity for first 1-2 days  Avoid lakes, rivers, pools, and oceans until the stitches are removed or the site is healed    How do I clean my wound?  Wash hands thoroughly with soap or use hand  before all wound care  Clean  the wound with gentle soap and water  Apply white petroleum/Vaseline  to wound after it is clean  Replace the Bandaid /bandage to keep the wound covered for the first few days or as instructed by your doctor  If you had a scalp biopsy, warm shower water to the area on a daily basis should suffice    What should I use to clean my wound?   Cotton-tipped applicators (Qtips )  White petroleum jelly (Vaseline ). Use a clean new container and use Q-tips to apply.  Bandaids  as needed  Gentle soap     How should I care for my wound long term?  Do not get your wound dirty  Keep up with wound care for one week or until the area is healed.  If you have stitches, stitches need to be removed in 14 days. You may return to our clinic for this or you may have it done locally at your doctor s office.  A small scab will form and fall off by itself when the area is completely healed. The area will be red and will become pink in color as it heals. Sun protection is very important for how your scar will turn out. Sunscreen with an SPF 30 or greater is recommended once the area is healed.  You should have some soreness but it should be mild and slowly go away over several days. Talk to your doctor about using tylenol for pain,    When should I call my doctor?  If you have increased:   Pain or swelling  Pus or drainage (clear or slightly yellow drainage is ok)  Temperature over 100F  Spreading redness or warmth around wound    When will I hear about my results?  The biopsy results can take 2 weeks to come back.  Your results will automatically release to [facility] before your provider has even reviewed them.  The clinic will call you with the results, send you a [facility] message, or have you schedule a follow-up clinic or phone time to discuss the results.  Contact our clinics if you do not hear from us in 2 weeks.    Who should I call with questions?  [facility]: [phone]  [facility]  [facility]: [phone]  For urgent needs outside of business hours call the [facility] at [phone] and ask for the dermatology resident on call

## 2023-08-04 NOTE — LETTER
8/4/2023       RE: Sean Woodard  [address] [facility] [address]     Dear Colleague,    Thank you for referring your patient, Sean Woodard, to the [facility]OLOG[facility] [place] at [facility]. Please see a copy of my visit note below.    [facility] Dermatology Note  Encounter Date: Aug 4, 2023  Office Visit    Dermatology Problem List:  1. Hidradenitis suppurativa - Hamilton stage III involving axillae > neck, groin.              - s/p WLE (BL axillae, groin crease) 10/4/21 w/ Dr. Garibay              - current treatment: adalimumab 80 mg weekly, topical clindamycin/benzoyl peroxide, intralesional triamcinolone, doxycycline                  - past treatment: doxycycline, other oral antibiotics, isotretinoin, adalimumab, infliximab (neuropathy), ustekinumab (insurance issues), oral clindamycin       ____________________________________________    Assessment & Plan:   1. Hidradenitis suppurativa: overall axillae doing well following surgery, however scrotum/groin has been more active recently. We discussed changes versus repeat surgical excision; at this time patient would [facility] to [facility] if alternative systemic can be covered. Amendable to ILK today  - adalimumab 80 mg weekly  - topicals  - IL triamcinolone  - future: golimumab vs secukinumab     2. Cystic nodule on the shoulder: suspect epidermoid cyst   - Derm surg referral    3. Hypertrophic scarring, bilateral mandible from prior inflammatory papules  - shave removal today    Procedures Performed:    Shave removal procedure note  - location(s): right and left mandible  After discussion of benefits and risks including but not limited to bleeding/bruising, pain/swelling, infection, scar, incomplete removal, nerve damage/numbness, recurrence, verbal consent and photographs obtained, time-out performed, area cleaned with alcohol, 1% lidocaine injected to  obtain anesthesia, shave removal performed, hemostasis achieved with Drysol, Vaseline and bandage applied, post-care instructions provided     Kenalog intralesional injection procedure note  - location(s): right scrotum  - strength: 10 mg/ml  - volume: 0.2 ml  - number of injections: 1  After verbal consent and discussion of risks including but not limited to atrophy, pain, and bruising, time out was performed, patient positioned, area cleaned with alcohol, Kenalog injected into affected sites; patient tolerated procedure well      Follow-up: 4-6 months    Staff and Resident:    Tejas Kaur MD  PGY-4 Dermatology  Pager: [phone]    Staff Physician Comments:   I saw and evaluated the patient with the resident and I edited the assessment and plan as documented in the note. I was present for the entire minor procedure, key portions of the above major procedure, and examination.    Derrick Avina MD   of Dermatology  Department of Dermatology  [facility] [employer]        ____________________________________________    CC: Derm Problem (HS f/up. Sean reports no changes. Sean reports current flare-up in groin, started about a week ago. )    HPI:  Mr. Sean Woodard is a(n) 43 year old male who presents today as a return patient for hidradenitis suppurativa    Hidradenitis suppurativa - armpits doing well postoperatively  - groin more active recently - raw, open - became more active in the last few weeks  - has lesion on right scrotum, tender, usually just takes a lot of time for areas to resolve  - Growth on shoulder - present for 1-2 years - seems to be enlarging  Patient is otherwise feeling well, without additional skin concerns.    Labs Reviewed:  N/A    Physical Exam:  Vitals: There were no vitals taken for this visit.  SKIN: Focused exam of scalp, back, axillae, and groin  - erythema and subcutaneous nodules with erosions in the groin  - right scrotum with superficially  eroded tender papulonodule  - subcutaneous nodule on the right posterior shoulder  - firm hypertrophic scarring on bilateral cheeks  - No other lesions of concern on areas examined.     Medications:  Current Outpatient Medications   Medication    adalimumab (HUMIRA *CF*) 80 MG/0.8ML pen kit    atorvastatin (LIPITOR) 20 MG tablet    augmented betamethasone dipropionate (DIPROLENE-AF) 0.05 % ointment    azelastine (ASTELIN) 0.1 % nasal spray    diphenhydrAMINE (BENADRYL) 25 MG capsule    doxycycline monohydrate (MONODOX) 100 MG capsule    fluticasone (FLONASE) 50 MCG/ACT nasal spray    losartan-hydrochlorothiazide (HYZAAR) 50-12.5 MG tablet    metFORMIN (GLUCOPHAGE XR) 500 MG 24 hr tablet    azelastine (OPTIVAR) 0.05 % ophthalmic solution    benzoyl peroxide 5 % external liquid    blood glucose (ACCU-CHEK GUIDE) test strip    clindamycin (CLEOCIN) 300 MG capsule    clindamycin-benzoyl peroxide (BENZACLIN) 1-5 % external gel    hydrOXYzine (ATARAX) 25 MG tablet    ibuprofen (ADVIL/MOTRIN) 600 MG tablet    ondansetron (ZOFRAN ODT) 4 MG ODT tab    ondansetron (ZOFRAN ODT) 4 MG ODT tab    oxyCODONE (ROXICODONE) 5 MG tablet     No current facility-administered medications for this visit.      Past Medical/Surgical History:   Patient Active Problem List   Diagnosis    Health Care Home    Hyperlipidemia with target LDL less than 100    Benign essential hypertension    PTSD (post-traumatic stress disorder)    Class 3 severe obesity due to excess calories with serious comorbidity in adult (H)    Diabetes mellitus, type 2 (H)    FER (obstructive sleep apnea)    Hidradenitis suppurativa    Hidradenitis axillaris    Fatty liver    UTI (urinary tract infection)    Left ureteral stone    Lab test negative for COVID-19 virus    Acute bronchitis    Allergic rhinitis    Benign neoplasm of skin    Effusion of joint    Encounter for other physical therapy    Lesion of penis    Low back pain    Acne    Atypical chest pain    Pain in  joint, lower leg    Pain of finger    Screening examination for pulmonary tuberculosis    Sebaceous cyst    Epidermoid cyst of skin    Tear of medial cartilage or meniscus of knee, current    Tinnitus    Borderline diabetes mellitus    Arthralgia of knee    Open wound of left axillary region     Past Medical History:   Diagnosis Date    Diabetes (H)     Hypertension     Obese     FER (obstructive sleep apnea)     Sleep apnea        CC Susanne Alberto PA-C  [id number]  [place]  [place] [id number] on close of this encounter.

## 2023-08-04 NOTE — NURSING NOTE
Dermatology Rooming Note    Sean Woodard's goals for this visit include:   Chief Complaint   Patient presents with    Derm Problem     HS f/up. Sean reports no changes. Sean reports current flare-up in groin, started about a week ago.      Ale Ernst, EMT

## 2023-08-07 LAB
PATH REPORT.COMMENTS IMP SPEC: NORMAL
PATH REPORT.FINAL DX SPEC: NORMAL
PATH REPORT.GROSS SPEC: NORMAL
PATH REPORT.MICROSCOPIC SPEC OTHER STN: NORMAL
PATH REPORT.RELEVANT HX SPEC: NORMAL

## 2023-08-08 ENCOUNTER — TELEPHONE (OUTPATIENT)
Dept: DERMATOLOGY | Facility: CLINIC | Age: 43
End: 2023-08-08
Payer: COMMERCIAL

## 2023-08-08 NOTE — TELEPHONE ENCOUNTER
Left vm for pt to call back and schedule procedure with resident clinic for   EIC right shoulder        Cheyenne Castellanos, Procedure  8/8/2023 11:16 AM

## 2023-08-09 NOTE — TELEPHONE ENCOUNTER
Called and verified patient by last name and . Offered patient appointment on 2023 for cyst removal, patient declined as he will be out of town following week and wound care would be difficult at that time. Scheduled patient for next available 2023 @10am for cyst removal. Also scheduled patient for follow-up with Dr. Avina in 4 months. Patient had no other questions or concerns. Closing this encounter.    Galdino Liz, EMT

## 2023-08-24 DIAGNOSIS — L73.2 HIDRADENITIS SUPPURATIVA: ICD-10-CM

## 2023-08-28 NOTE — TELEPHONE ENCOUNTER
doxycycline monohydrate (MONODOX) 100 MG capsule         Last Written Prescription Date:  3-23-23  Last Fill Quantity: 60,   # refills: 3  Last Office Visit : 8-4-23  Future Office visit:  12-7-23                                  (procedure 9-28-23)  Derm process 4    Routing refill request to provider for review/approval because:  med not in last clinic note plan

## 2023-08-29 RX ORDER — DOXYCYCLINE 100 MG/1
100 CAPSULE ORAL 2 TIMES DAILY
Qty: 60 CAPSULE | Refills: 3 | Status: SHIPPED | OUTPATIENT
Start: 2023-08-29 | End: 2023-12-07 | Stop reason: ALTCHOICE

## 2023-09-26 DIAGNOSIS — I10 BENIGN ESSENTIAL HYPERTENSION: ICD-10-CM

## 2023-09-26 RX ORDER — LOSARTAN POTASSIUM AND HYDROCHLOROTHIAZIDE 12.5; 5 MG/1; MG/1
1 TABLET ORAL DAILY
Qty: 90 TABLET | Refills: 1 | Status: SHIPPED | OUTPATIENT
Start: 2023-09-26 | End: 2024-03-22

## 2023-09-28 ENCOUNTER — OFFICE VISIT (OUTPATIENT)
Dept: DERMATOLOGY | Facility: CLINIC | Age: 43
End: 2023-09-28
Payer: COMMERCIAL

## 2023-09-28 DIAGNOSIS — L72.9 CYST OF SKIN: ICD-10-CM

## 2023-09-28 DIAGNOSIS — L73.2 HIDRADENITIS: Primary | ICD-10-CM

## 2023-09-28 PROCEDURE — 11900 INJECT SKIN LESIONS </W 7: CPT | Mod: 59 | Performed by: DERMATOLOGY

## 2023-09-28 PROCEDURE — 12032 INTMD RPR S/A/T/EXT 2.6-7.5: CPT | Mod: GC | Performed by: DERMATOLOGY

## 2023-09-28 PROCEDURE — 88304 TISSUE EXAM BY PATHOLOGIST: CPT | Mod: 26 | Performed by: DERMATOLOGY

## 2023-09-28 PROCEDURE — 11404 EXC TR-EXT B9+MARG 3.1-4 CM: CPT | Mod: GC | Performed by: DERMATOLOGY

## 2023-09-28 PROCEDURE — 88304 TISSUE EXAM BY PATHOLOGIST: CPT | Mod: TC | Performed by: DERMATOLOGY

## 2023-09-28 PROCEDURE — 2894A PR EXC BENIGN SKIN LESION TRUNK/ARM/LEG 3.1-4.0 CM: CPT | Mod: GC

## 2023-09-28 PROCEDURE — 2894A PR REPAIR INTERMED, WOUND TRUNK/ARM/LEG 2.6-7.5 CM: CPT | Mod: GC

## 2023-09-28 NOTE — PROGRESS NOTES
Lidocaine-epinephrine 1-1:603664 % injection   6 mL once for one use, starting 9/28/2023 ending 9/28/2023,  2mL disp, R-0, injection  Injected by Dr. Leong

## 2023-09-28 NOTE — PROGRESS NOTES
DERMATOLOGY EXCISION PROCEDURE NOTE    Dermatology Problem List:  1. Hidradenitis suppurativa - Hamilton stage III involving axillae > neck, groin.              - s/p WLE (BL axillae, groin crease) 10/4/21 w/ Dr. Garibay              - current treatment: adalimumab 80 mg weekly, topical clindamycin/benzoyl peroxide, intralesional triamcinolone, doxycycline      - past treatment: doxycycline, other oral antibiotics, isotretinoin, adalimumab, infliximab (neuropathy), ustekinumab (insurance issues), oral clindamycin  #. EIC s/p excision 9/28/2023     NAME OF PROCEDURE: Excision intermediate layered linear closure  Staff surgeon: Dr. Saima Mims  Resident: Yanet Banuelos MD  Scrub Nurse: Jerson Chiang    PRE-OPERATIVE DIAGNOSIS:  cyst  POST-OPERATIVE DIAGNOSIS: Same   LOCATION: RIGHT POSTERIOR SHOULDER  FINAL EXCISION SIZE(DEFECT SIZE): 3.8 cm  MARGIN: N/A cm  FINAL REPAIR LENGTH: 3.8 cm   ANESTHESIA: 6 cc 1% lidocaine with 1:100,000 epinephrine    INDICATIONS: This patient presented with a 3.8 cm cyst. Excision was indicated. We discussed the principles of treatment and most likely complications including scarring, bleeding, infection, incomplete excision, wound dehiscence, pain, nerve damage, and recurrence. Informed consent was obtained and the patient underwent the procedure as follows:    PROCEDURE: The patient was taken to the operative suite. Time-out was performed.  The treatment area was anesthetized with 1% lidocaine with epinephrine. The area was prepped with Chlorhexidine and rinsed with sterile saline and draped with sterile towels. The lesion was delineated and excised down to subcutaneous fat in a elliptical manner. Hemostasis was obtained by electrocoagulation.     REPAIR: An intermediate layered linear closure was selected as the procedure which would maximally preserve both function and cosmesis.    After the excision of the tumor, the area was carefully undermined. There was a significant amount  of scar tissue. There was not significant bleeding requiring hemostasis.  Closure was oriented so that the wound was in the patient's natural skin tension lines. The subcutaneous and dermal layers were then closed with 4-0 vicryl sutures. The epidermis was then carefully approximated along the length of the wound using 4-0 prolene simple running sutures.     Estimated blood loss was less than 10 ml for all surgical sites. A sterile pressure dressing was applied and wound care instructions, with a written handout, were given. The [facility] alert and ambulatory.    The patient elected for pathology results to automatically release and understands that the clinical staff will contact them as soon as possible to notify them of the results.    Follow-up in 14 days for suture removal.    Dr. Mims was immediately available for the entire surgery and was physicially present for the key portions of the procedure.    Anatomic Pathology Results: pending    Clinical Follow-Up: As previously scheduled    Staff Involved:  Resident/Staff       OTHER PROCEDURES    - Intra-lesional triamcinolone procedure note. After verbal consent and review of risk of pain and skin thinning/atrophy, positioning and cleansing with isopropyl alcohol, 2 total mL of triamcinolone 10 mg/mL was injected into 2 lesion(s) on the left neck and right scrotum. The patient tolerated the procedure well and left the dermatology clinic in good condition.      I was present for key portions of the  surgical procedure. Injections reviewed with resident. Saima HIGUERA I, Saima Mims MD, saw this patient with the resident and agree with the resident s findings and plan of care as documented in the resident s note.

## 2023-09-28 NOTE — LETTER
9/28/2023       RE: Sean Woodard  [address] [facility] [address]     Dear Colleague,    Thank you for referring your patient, Sean Woodard, to the [facility]ERMATOLOGY [facility]INIC [place] at [facility]. Please see a copy of my visit note below.    DERMATOLOGY EXCISION PROCEDURE NOTE    Dermatology Problem List:  1. Hidradenitis suppurativa - Hamilton stage III involving axillae > neck, groin.              - s/p WLE (BL axillae, groin crease) 10/4/21 w/ Dr. Garibay              - current treatment: adalimumab 80 mg weekly, topical clindamycin/benzoyl peroxide, intralesional triamcinolone, doxycycline      - past treatment: doxycycline, other oral antibiotics, isotretinoin, adalimumab, infliximab (neuropathy), ustekinumab (insurance issues), oral clindamycin  #. EIC s/p excision 9/28/2023     NAME OF PROCEDURE: Excision intermediate layered linear closure  Staff surgeon: Dr. Saima Mims  Resident: Yanet Banuelos MD  Scrub Nurse: Jerson Chiang    PRE-OPERATIVE DIAGNOSIS:  cyst  POST-OPERATIVE DIAGNOSIS: Same   LOCATION: RIGHT POSTERIOR SHOULDER  FINAL EXCISION SIZE(DEFECT SIZE): 3.8 cm  MARGIN: N/A cm  FINAL REPAIR LENGTH: 3.8 cm   ANESTHESIA: 6 cc 1% lidocaine with 1:100,000 epinephrine    INDICATIONS: This patient presented with a 3.8 cm cyst. Excision was indicated. We discussed the principles of treatment and most likely complications including scarring, bleeding, infection, incomplete excision, wound dehiscence, pain, nerve damage, and recurrence. Informed consent was obtained and the patient underwent the procedure as follows:    PROCEDURE: The patient was taken to the operative suite. Time-out was performed.  The treatment area was anesthetized with 1% lidocaine with epinephrine. The area was prepped with Chlorhexidine and rinsed with sterile saline and draped with sterile towels. The lesion was delineated and excised down to subcutaneous  fat in a elliptical manner. Hemostasis was obtained by electrocoagulation.     REPAIR: An intermediate layered linear closure was selected as the procedure which would maximally preserve both function and cosmesis.    After the excision of the tumor, the area was carefully undermined. There was a significant amount of scar tissue. There was not significant bleeding requiring hemostasis.  Closure was oriented so that the wound was in the patient's natural skin tension lines. The subcutaneous and dermal layers were then closed with 4-0 vicryl sutures. The epidermis was then carefully approximated along the length of the wound using 4-0 prolene simple running sutures.     Estimated blood loss was less than 10 ml for all surgical sites. A sterile pressure dressing was applied and wound care instructions, with a written handout, were given. The patient was [facility] from the [facility] alert and ambulatory.    The patient elected for pathology results to automatically release and understands that the clinical staff will contact them as soon as possible to notify them of the results.    Follow-up in 14 days for suture removal.    Dr. Mims was immediately available for the entire surgery and was physicially present for the key portions of the procedure.    Anatomic Pathology Results: pending    Clinical Follow-Up: As previously scheduled    Staff Involved:  Resident/Staff       OTHER PROCEDURES    - Intra-lesional triamcinolone procedure note. After verbal consent and review of risk of pain and skin thinning/atrophy, positioning and cleansing with isopropyl alcohol, 2 total mL of triamcinolone 10 mg/mL was injected into 2 lesion(s) on the left neck and right scrotum. The patient tolerated the procedure well and left the dermatology clinic in good condition.    Dermatology Rooming Note    Sean Woodard's goals for this visit include:   Chief Complaint   Patient presents with    Derm Problem     Cyst  removal on back.     Андрей Arthur, EMT-B      Drug Administration Record    Prior to injection, verified patient identity using patient's name and date of birth.  Due to injection administration, patient instructed to remain in clinic for 15 minutes  afterwards, and to report any adverse reaction to me immediately.    Drug Name: triamcinolone acetonide(kenalog)  Dose: 2 mL of triamcinolone 10mg/mL, 20 mg dose  Route administered: ID  NDC #: Kenalog-10 (8191-6152-51)  Amount of waste(mL):3 mL  Reason for waste: Multi dose vial    LOT #: [id number]  SITE: see notes  : [employer]  EXPIRATION DATE: OCT 1 2025      Lidocaine-epinephrine 1-1:741262 % injection   6 mL once for one use, starting 9/28/2023 ending 9/28/2023,  2mL disp, R-0, injection  Injected by Dr. Leong

## 2023-09-28 NOTE — PROGRESS NOTES
Drug Administration Record    Prior to injection, verified patient identity using patient's name and date of birth.  Due to injection administration, patient instructed to remain in clinic for 15 minutes  afterwards, and to report any adverse reaction to me immediately.    Drug Name: triamcinolone acetonide(kenalog)  Dose: 2 mL of triamcinolone 10mg/mL, 20 mg dose  Route administered: ID  NDC #: Kenalog-10 (4180-5565-25)  Amount of waste(mL):3 mL  Reason for waste: Multi dose vial    LOT #: [id number]  SITE: see notes  : [employer]  EXPIRATION DATE: OCT 1 2025

## 2023-09-28 NOTE — NURSING NOTE
Dermatology Rooming Note    Sean Woodard's goals for this visit include:   Chief Complaint   Patient presents with    Derm Problem     Cyst removal on back.     Андрей Arthur, EMT-B

## 2023-09-30 ENCOUNTER — HEALTH MAINTENANCE LETTER (OUTPATIENT)
Age: 43
End: 2023-09-30

## 2023-10-01 LAB
PATH REPORT.COMMENTS IMP SPEC: NORMAL
PATH REPORT.COMMENTS IMP SPEC: NORMAL
PATH REPORT.FINAL DX SPEC: NORMAL
PATH REPORT.GROSS SPEC: NORMAL
PATH REPORT.MICROSCOPIC SPEC OTHER STN: NORMAL
PATH REPORT.RELEVANT HX SPEC: NORMAL

## 2023-10-02 ENCOUNTER — MEDICAL CORRESPONDENCE (OUTPATIENT)
Dept: DERMATOLOGY | Facility: CLINIC | Age: 43
End: 2023-10-02
Payer: COMMERCIAL

## 2023-10-02 ENCOUNTER — MYC MEDICAL ADVICE (OUTPATIENT)
Dept: DERMATOLOGY | Facility: CLINIC | Age: 43
End: 2023-10-02
Payer: COMMERCIAL

## 2023-10-13 ENCOUNTER — ALLIED HEALTH/NURSE VISIT (OUTPATIENT)
Dept: DERMATOLOGY | Facility: CLINIC | Age: 43
End: 2023-10-13
Payer: COMMERCIAL

## 2023-10-13 DIAGNOSIS — Z48.02 VISIT FOR SUTURE REMOVAL: Primary | ICD-10-CM

## 2023-10-13 PROCEDURE — 99207 PR NO CHARGE NURSE ONLY: CPT | Performed by: STUDENT IN AN ORGANIZED HEALTH CARE EDUCATION/TRAINING PROGRAM

## 2023-10-13 NOTE — PROGRESS NOTES
Sean Woodard comes into clinic today at the request of Dr. Banuelos Ordering Provider for removal of 1 running suture, 2 inches long.         This service provided today was under the supervising provider of the day Dr. Couch, who was available if needed.    Removed 1 sutures, from R upper back without complication.  Patient tolerated procedure well, and understood all followup instructions.    Ale Ernst, EMT

## 2023-10-13 NOTE — PROGRESS NOTES
Sean is here for removal of 1 running suture, approximately 2 inches long, on the R upper back. Suture site is clean and intact. Suture is removed without complication. Sean understood all care instructions and followup, and left clinic in good condition.    Sutures removed under supervision of Dr. Couch, who was available if needed.     Ale Ernst, EMT

## 2023-11-09 ENCOUNTER — APPOINTMENT (OUTPATIENT)
Dept: CT IMAGING | Facility: CLINIC | Age: 43
End: 2023-11-09
Attending: EMERGENCY MEDICINE
Payer: COMMERCIAL

## 2023-11-09 ENCOUNTER — HOSPITAL ENCOUNTER (EMERGENCY)
Facility: CLINIC | Age: 43
Discharge: HOME | End: 2023-11-09
Attending: EMERGENCY MEDICINE | Admitting: EMERGENCY MEDICINE
Payer: COMMERCIAL

## 2023-11-09 VITALS
TEMPERATURE: 98.2 F | BODY MASS INDEX: 40.92 KG/M2 | HEIGHT: 68 IN | RESPIRATION RATE: 16 BRPM | OXYGEN SATURATION: 97 % | SYSTOLIC BLOOD PRESSURE: 124 MMHG | HEART RATE: 82 BPM | DIASTOLIC BLOOD PRESSURE: 73 MMHG | WEIGHT: 270 LBS

## 2023-11-09 DIAGNOSIS — Q64.4 URACHAL ANOMALY: ICD-10-CM

## 2023-11-09 DIAGNOSIS — R10.33 PERIUMBILICAL ABDOMINAL PAIN: ICD-10-CM

## 2023-11-09 LAB
ALBUMIN SERPL BCG-MCNC: 4.1 G/DL (ref 3.5–5.2)
ALBUMIN UR-MCNC: NEGATIVE MG/DL
ALP SERPL-CCNC: 70 U/L (ref 40–129)
ALT SERPL W P-5'-P-CCNC: 43 U/L (ref 0–70)
ANION GAP SERPL CALCULATED.3IONS-SCNC: 13 MMOL/L (ref 7–15)
APPEARANCE UR: CLEAR
AST SERPL W P-5'-P-CCNC: 25 U/L (ref 0–45)
BASOPHILS # BLD AUTO: 0 10E3/UL (ref 0–0.2)
BASOPHILS NFR BLD AUTO: 1 %
BILIRUB SERPL-MCNC: 0.9 MG/DL
BILIRUB UR QL STRIP: NEGATIVE
BUN SERPL-MCNC: 11.7 MG/DL (ref 6–20)
CALCIUM SERPL-MCNC: 9.3 MG/DL (ref 8.6–10)
CHLORIDE SERPL-SCNC: 101 MMOL/L (ref 98–107)
COLOR UR AUTO: YELLOW
CREAT SERPL-MCNC: 0.92 MG/DL (ref 0.67–1.17)
DEPRECATED HCO3 PLAS-SCNC: 23 MMOL/L (ref 22–29)
EGFRCR SERPLBLD CKD-EPI 2021: >90 ML/MIN/1.73M2
EOSINOPHIL # BLD AUTO: 0.1 10E3/UL (ref 0–0.7)
EOSINOPHIL NFR BLD AUTO: 2 %
ERYTHROCYTE [DISTWIDTH] IN BLOOD BY AUTOMATED COUNT: 12.1 % (ref 10–15)
GLUCOSE SERPL-MCNC: 153 MG/DL (ref 70–99)
GLUCOSE UR STRIP-MCNC: NEGATIVE MG/DL
HCT VFR BLD AUTO: 42.6 % (ref 40–53)
HGB BLD-MCNC: 14.8 G/DL (ref 13.3–17.7)
HGB UR QL STRIP: NEGATIVE
IMM GRANULOCYTES # BLD: 0 10E3/UL
IMM GRANULOCYTES NFR BLD: 0 %
KETONES UR STRIP-MCNC: NEGATIVE MG/DL
LEUKOCYTE ESTERASE UR QL STRIP: NEGATIVE
LIPASE SERPL-CCNC: 33 U/L (ref 13–60)
LYMPHOCYTES # BLD AUTO: 1.1 10E3/UL (ref 0.8–5.3)
LYMPHOCYTES NFR BLD AUTO: 16 %
MCH RBC QN AUTO: 30.8 PG (ref 26.5–33)
MCHC RBC AUTO-ENTMCNC: 34.7 G/DL (ref 31.5–36.5)
MCV RBC AUTO: 89 FL (ref 78–100)
MONOCYTES # BLD AUTO: 0.5 10E3/UL (ref 0–1.3)
MONOCYTES NFR BLD AUTO: 7 %
MUCOUS THREADS #/AREA URNS LPF: PRESENT /LPF
NEUTROPHILS # BLD AUTO: 5.1 10E3/UL (ref 1.6–8.3)
NEUTROPHILS NFR BLD AUTO: 74 %
NITRATE UR QL: NEGATIVE
NRBC # BLD AUTO: 0 10E3/UL
NRBC BLD AUTO-RTO: 0 /100
PH UR STRIP: 5 [PH] (ref 5–7)
PLATELET # BLD AUTO: 222 10E3/UL (ref 150–450)
POTASSIUM SERPL-SCNC: 3.7 MMOL/L (ref 3.4–5.3)
PROT SERPL-MCNC: 7.6 G/DL (ref 6.4–8.3)
RBC # BLD AUTO: 4.81 10E6/UL (ref 4.4–5.9)
RBC URINE: 1 /HPF
SODIUM SERPL-SCNC: 137 MMOL/L (ref 135–145)
SP GR UR STRIP: 1.01 (ref 1–1.03)
UROBILINOGEN UR STRIP-MCNC: NORMAL MG/DL
WBC # BLD AUTO: 6.9 10E3/UL (ref 4–11)
WBC URINE: 1 /HPF

## 2023-11-09 PROCEDURE — 74177 CT ABD & PELVIS W/CONTRAST: CPT

## 2023-11-09 PROCEDURE — 80053 COMPREHEN METABOLIC PANEL: CPT | Performed by: EMERGENCY MEDICINE

## 2023-11-09 PROCEDURE — 96374 THER/PROPH/DIAG INJ IV PUSH: CPT | Mod: 59 | Performed by: EMERGENCY MEDICINE

## 2023-11-09 PROCEDURE — 250N000011 HC RX IP 250 OP 636: Performed by: EMERGENCY MEDICINE

## 2023-11-09 PROCEDURE — 250N000009 HC RX 250: Performed by: EMERGENCY MEDICINE

## 2023-11-09 PROCEDURE — 83690 ASSAY OF LIPASE: CPT | Performed by: EMERGENCY MEDICINE

## 2023-11-09 PROCEDURE — 96361 HYDRATE IV INFUSION ADD-ON: CPT | Performed by: EMERGENCY MEDICINE

## 2023-11-09 PROCEDURE — 99284 EMERGENCY DEPT VISIT MOD MDM: CPT | Performed by: EMERGENCY MEDICINE

## 2023-11-09 PROCEDURE — 258N000003 HC RX IP 258 OP 636: Performed by: EMERGENCY MEDICINE

## 2023-11-09 PROCEDURE — 85025 COMPLETE CBC W/AUTO DIFF WBC: CPT | Performed by: EMERGENCY MEDICINE

## 2023-11-09 PROCEDURE — 81001 URINALYSIS AUTO W/SCOPE: CPT | Performed by: EMERGENCY MEDICINE

## 2023-11-09 PROCEDURE — 250N000011 HC RX IP 250 OP 636: Mod: JZ | Performed by: EMERGENCY MEDICINE

## 2023-11-09 PROCEDURE — 99285 EMERGENCY DEPT VISIT HI MDM: CPT | Mod: 25 | Performed by: EMERGENCY MEDICINE

## 2023-11-09 PROCEDURE — 36415 COLL VENOUS BLD VENIPUNCTURE: CPT | Performed by: EMERGENCY MEDICINE

## 2023-11-09 RX ORDER — OXYCODONE HYDROCHLORIDE 5 MG/1
5 TABLET ORAL EVERY 6 HOURS PRN
Qty: 12 TABLET | Refills: 0 | Status: SHIPPED | OUTPATIENT
Start: 2023-11-09 | End: 2024-06-11

## 2023-11-09 RX ORDER — KETOROLAC TROMETHAMINE 15 MG/ML
15 INJECTION, SOLUTION INTRAMUSCULAR; INTRAVENOUS ONCE
Status: COMPLETED | OUTPATIENT
Start: 2023-11-09 | End: 2023-11-09

## 2023-11-09 RX ORDER — IOPAMIDOL 755 MG/ML
100 INJECTION, SOLUTION INTRAVASCULAR ONCE
Status: COMPLETED | OUTPATIENT
Start: 2023-11-09 | End: 2023-11-09

## 2023-11-09 RX ADMIN — IOPAMIDOL 100 ML: 755 INJECTION, SOLUTION INTRAVENOUS at 09:29

## 2023-11-09 RX ADMIN — KETOROLAC TROMETHAMINE 15 MG: 15 INJECTION, SOLUTION INTRAMUSCULAR; INTRAVENOUS at 09:22

## 2023-11-09 RX ADMIN — SODIUM CHLORIDE 1000 ML: 9 INJECTION, SOLUTION INTRAVENOUS at 09:19

## 2023-11-09 RX ADMIN — SODIUM CHLORIDE 73 ML: 9 INJECTION, SOLUTION INTRAVENOUS at 09:31

## 2023-11-09 ASSESSMENT — ACTIVITIES OF DAILY LIVING (ADL)
ADLS_ACUITY_SCORE: 35
ADLS_ACUITY_SCORE: 35

## 2023-11-09 NOTE — ED TRIAGE NOTES
Pt here with pain above his umbilicus that started on Tuesday. Described as dull ache rated 1-2/10. Worse with pressure. Normal BM last night. Denied fevers, N/V/D.       Triage Assessment (Adult)       Row Name 11/09/23 0830          Triage Assessment    Airway WDL WDL        Respiratory WDL    Respiratory WDL WDL        Skin Circulation/Temperature WDL    Skin Circulation/Temperature WDL WDL        Cardiac WDL    Cardiac WDL WDL        Peripheral/Neurovascular WDL    Peripheral Neurovascular WDL WDL        Cognitive/Neuro/Behavioral WDL    Cognitive/Neuro/Behavioral WDL WDL

## 2023-11-09 NOTE — ED PROVIDER NOTES
History     Chief Complaint   Patient presents with    Abdominal Pain     HPI  History per patient, review of [facility] EMR and Care Everywhere EMR, including extensive chart review of multiple prior imaging studies, multiple clinic notes and multiple prior laboratory evaluations.   Sean Woodard is a 43 year old male with history of nephrolithiasis who presents to the emergency department for evaluation of 2 days of periumbilical pain of insidious onset, gradually worsening, constant, nonradiating and without nausea, vomiting, fever or chills.  Last bowel movement yesterday was normal.  No constipation, diarrhea or signs or symptoms of GI bleeding.  No UTI signs or symptoms or hematuria. No back or flank pain.  No prior abdominal surgeries.  He reports chronic history of infrequent intermittent scant bloody drainage from the umbilicus, most recently couple months ago. He has no abdominal wall redness. No hx of hernia. EMR shows no prior colonoscopy.  No other pertinent history or acute complaints or concerns.    Previous Records Reviewed:  CT ABDOMEN PELVIS W/O CONTRAST  [facility]  DATE/TIME: 12/11/2022 12:22 PM     INDICATION: R flank, recent hx of antibiotics for UTI vs STD  COMPARISON: 07/30/2021                                                  IMPRESSION:   1.  Mild right-sided hydronephrosis and hydroureter. A faint 2 mm calculus is identified within the distal ureter.  2.  Hepatomegaly     Allergies:  Allergies   Allergen Reactions    Lisinopril Cough       Problem List:    Patient Active Problem List    Diagnosis Date Noted    Arthralgia of knee 03/02/2023     Priority: Medium     pt.instructed to return for xray results 4/22/05. pt. given crutches - told to RICE times 24 hrs.      Open wound of left axillary region 03/02/2023     Priority: Medium    Acute bronchitis 10/21/2021     Priority: Medium    Allergic rhinitis 10/21/2021     Priority: Medium    Benign neoplasm of skin  10/21/2021     Priority: Medium    Effusion of joint 10/21/2021     Priority: Medium     Ice to area BID.   Continue Motrin as directed.    Ice to area BID.   Continue Motrin as directed.      Encounter for other physical therapy 10/21/2021     Priority: Medium    Lesion of penis 10/21/2021     Priority: Medium    Low back pain 10/21/2021     Priority: Medium    Acne 10/21/2021     Priority: Medium    Atypical chest pain 10/21/2021     Priority: Medium    Pain in joint, lower leg 10/21/2021     Priority: Medium     pt.instructed to return for xray results 4/22/05. pt. given crutches - told to RICE times 24 hrs.      Pain of finger 10/21/2021     Priority: Medium    Screening examination for pulmonary tuberculosis 10/21/2021     Priority: Medium    Sebaceous cyst 10/21/2021     Priority: Medium    Epidermoid cyst of skin 10/21/2021     Priority: Medium     right thumb  Jun 26, 2009 Entered By: EMANI RUSS Comment: right thumb    right thumb      Tear of medial cartilage or meniscus of knee, current 10/21/2021     Priority: Medium    Tinnitus 10/21/2021     Priority: Medium    UTI (urinary tract infection) 06/26/2021     Priority: Medium    Left ureteral stone 06/26/2021     Priority: Medium    Lab test negative for COVID-19 virus 06/26/2021     Priority: Medium    Fatty liver 06/07/2021     Priority: Medium    Hidradenitis suppurativa 05/07/2020     Priority: Medium    Hidradenitis axillaris 05/07/2020     Priority: Medium     Added automatically from request for surgery [id number]    Formatting of this note might be different from the original.  Formatting of this note might be different from the original.  Added automatically from request for surgery [id number]      FER (obstructive sleep apnea) 01/28/2019     Priority: Medium    Diabetes mellitus, type 2 (H) 01/25/2019     Priority: Medium    PTSD (post-traumatic stress disorder) 11/08/2018     Priority: Medium     [place], [place]  Diagnosed with PTSD by  Dr. Darlene Jeffery on 11/9/2010      Class 3 severe obesity due to excess calories with serious comorbidity in adult (H) 11/08/2018     Priority: Medium    Benign essential hypertension 07/28/2017     Priority: Medium    Borderline diabetes mellitus 03/01/2016     Priority: Medium    Hyperlipidemia with target LDL less than 100 08/14/2014     Priority: Medium     Diagnosis updated by automated process. Provider to review and confirm.      Health Care Home 03/28/2014     Priority: Medium     EMERGENCY CARE PLAN  March 28, 2014: No current Care Coordination follow up planned. Please refer if Care Coordination services are needed.    Presenting Problem Signs and Symptoms Treatment Plan   Questions or concerns   during clinic hours   I will call my clinic directly:  [facility]  [id number] [place] [id number]  [phone].    Questions or concerns outside clinic hours   I will call the 24 hour nurse line at   [phone] or [facility].   Need to schedule an appointment   I will call the 24 hour scheduling team at [phone] or my clinic directly at [phone].    Same day treatment     I will call my clinic first, nurse line if after hours, urgent care and express care if needed.     Clinic care coordination services (regular clinic hours)     I will call a clinic care coordinator directly:     Marlo Talley RN  Mon, Tues, Fri - [phone]  Wed, Thurs - [phone]    Anat Wall, :    [phone]    Or call my clinic at [phone] and ask to speak with care coordination.     Crisis Services: Behavioral or Mental Health  [facility] 24 Hour Phone Line  [phone]    [facility] 24 Hour Crisis Services  [phone]    [facility] (Behavioral Health Providers) Network [phone]    [facility]   [phone]         Emergency treatment -- Immediately    CAll 020           Past Medical History:    Past Medical History:   Diagnosis Date    Diabetes (H)      Hypertension     Obese     FER (obstructive sleep apnea)     Sleep apnea        Past Surgical History:    Past Surgical History:   Procedure Laterality Date    EXCISE HIDRADENITIS (LOCATION) Left 2021    Procedure: Sharp excisional debridement of left axillary hidradenitis.;  Surgeon: Shahnaz Garibay MD;  Location: [facility] OR    EXCISE HIDRADENITIS (LOCATION) Bilateral 10/4/2021    Procedure: EXCISION, HIDRADENITIS - bilateral axillae, bilateral groin;  Surgeon: Shahnaz Garibay MD;  Location: [facility] OR    EXCISE HIDRADENITIS (LOCATION) Left 2023    Procedure: EXCISION, HIDRADENITIS, LEFT AXILLA,;  Surgeon: Shahnaz Garibay MD;  Location: [facility] OR    ORTHOPEDIC SURGERY  2005    RT knee arthroscopic menisectomy    Thumb Surgery  2009    Mass removal neuroma       Family History:    Family History   Problem Relation Age of Onset    Heart Defect Father         valve replacement    Diabetes Maternal Grandmother     Cancer Maternal Grandfather         stomach    Hypertension Brother     Diabetes Sister     Cerebrovascular Disease Brother 38    Hypertension Brother     Melanoma No family hx of     Skin Cancer No family hx of        Social History:  Marital Status:   [2]  Social History     Tobacco Use    Smoking status: Former     Packs/day: 0.50     Years: 5.00     Additional pack years: 0.00     Total pack years: 2.50     Types: Cigarettes     Quit date: 2005     Years since quittin.8    Smokeless tobacco: Never   Substance Use Topics    Alcohol use: Yes     Comment: Social. 1 drink per month    Drug use: No        Medications:    amoxicillin-clavulanate (AUGMENTIN) 875-125 MG tablet  oxyCODONE (ROXICODONE) 5 MG tablet  adalimumab (HUMIRA *CF*) 80 MG/0.8ML pen kit  atorvastatin (LIPITOR) 20 MG tablet  augmented betamethasone dipropionate (DIPROLENE-AF) 0.05 % ointment  azelastine (ASTELIN) 0.1 % nasal spray  azelastine (OPTIVAR) 0.05 % ophthalmic solution  benzoyl peroxide 5 %  "external liquid  blood glucose (ACCU-CHEK GUIDE) test strip  clindamycin (CLEOCIN) 300 MG capsule  clindamycin-benzoyl peroxide (BENZACLIN) 1-5 % external gel  diphenhydrAMINE (BENADRYL) 25 MG capsule  doxycycline monohydrate (MONODOX) 100 MG capsule  fluticasone (FLONASE) 50 MCG/ACT nasal spray  hydrOXYzine (ATARAX) 25 MG tablet  ibuprofen (ADVIL/MOTRIN) 600 MG tablet  losartan-hydrochlorothiazide (HYZAAR) 50-12.5 MG tablet  metFORMIN (GLUCOPHAGE XR) 500 MG 24 hr tablet  ondansetron (ZOFRAN ODT) 4 MG ODT tab  ondansetron (ZOFRAN ODT) 4 MG ODT tab      Review of Systems  As mentioned in the HPI, in addition focused review of systems was negative.    Physical Exam   BP: 130/84  Pulse: 82  Temp: 98.2  F (36.8  C)  Resp: 16  Height: 172.7 cm (5' 8\")  Weight: 122.5 kg (270 lb)  SpO2: 99 %      Physical Exam  Vitals and nursing note reviewed.   Constitutional:       General: He is not in acute distress.     Appearance: Normal appearance. He is well-developed. He is not ill-appearing or diaphoretic.   HENT:      Head: Normocephalic and atraumatic.      Right Ear: External ear normal.      Left Ear: External ear normal.      Nose: Nose normal.      Mouth/Throat:      Mouth: Mucous membranes are moist.   Eyes:      General: No scleral icterus.     Extraocular Movements: Extraocular movements intact.      Conjunctiva/sclera: Conjunctivae normal.   Neck:      Trachea: No tracheal deviation.   Cardiovascular:      Rate and Rhythm: Normal rate and regular rhythm.      Heart sounds: Normal heart sounds. No murmur heard.     No friction rub. No gallop.   Pulmonary:      Effort: Pulmonary effort is normal. No respiratory distress.      Breath sounds: Normal breath sounds. No wheezing, rhonchi or rales.   Abdominal:      General: Bowel sounds are normal. There is no distension or abdominal bruit.      Palpations: Abdomen is soft. There is no pulsatile mass.      Tenderness: There is abdominal tenderness in the periumbilical area. " There is no guarding or rebound. Negative signs include Banks's sign and McBurney's sign.      Hernia: No hernia is present.          Comments: No abdominal wall erythema, warmth, fluctuance, crepitance, mass or hernia.  Cotton tip probing and swab of deep umbilicus (due to obesity).  Revealed scant pink/blood-tinged fluid at single pass, not on repeat swabbing.  No purulent drainage.   Musculoskeletal:         General: No tenderness. Normal range of motion.      Cervical back: Normal range of motion and neck supple.      Right lower leg: No edema.      Left lower leg: No edema.   Skin:     General: Skin is warm and dry.      Coloration: Skin is not pale.      Findings: No erythema or rash.   Neurological:      General: No focal deficit present.      Mental Status: He is alert and oriented to person, place, and time.      Coordination: Coordination normal.   Psychiatric:         Mood and Affect: Mood normal.         Behavior: Behavior normal.         ED Course           Procedures              Results for orders placed or performed during the hospital encounter of 11/09/23   CT Abdomen Pelvis w Contrast     Status: None    Narrative    CT ABDOMEN PELVIS W CONTRAST 11/9/2023 9:45 AM    CLINICAL HISTORY: 2 days of periumbilical pain    TECHNIQUE: CT scan of the abdomen and pelvis was performed following  injection of IV contrast. Multiplanar reformats were obtained. Dose  reduction techniques were used.  CONTRAST: 100 mL Isovue-370    COMPARISON: 12/11/2022    FINDINGS:   LOWER CHEST: Normal.    HEPATOBILIARY: Hepatic steatosis.    PANCREAS: Normal.    SPLEEN: Normal.    ADRENAL GLANDS: Normal.    KIDNEYS/BLADDER: Table benign left renal cysts. No hydronephrosis.    BOWEL: Normal appendix. No obstruction or inflammatory changes.    PELVIC ORGANS: Normal.    ADDITIONAL FINDINGS: None.    MUSCULOSKELETAL: Normal. No hernia or fluid collection.      Impression    IMPRESSION:   1.  No acute findings or specific  abnormality to explain the patient's  symptoms.    AVNI NEVILLE MD         SYSTEM ID:  [id number]   Comprehensive metabolic panel     Status: Abnormal   Result Value Ref Range    Sodium 137 135 - 145 mmol/L    Potassium 3.7 3.4 - 5.3 mmol/L    Carbon Dioxide (CO2) 23 22 - 29 mmol/L    Anion Gap 13 7 - 15 mmol/L    Urea Nitrogen 11.7 6.0 - 20.0 mg/dL    Creatinine 0.92 0.67 - 1.17 mg/dL    GFR Estimate >90 >60 mL/min/1.73m2    Calcium 9.3 8.6 - 10.0 mg/dL    Chloride 101 98 - 107 mmol/L    Glucose 153 (H) 70 - 99 mg/dL    Alkaline Phosphatase 70 40 - 129 U/L    AST 25 0 - 45 U/L    ALT 43 0 - 70 U/L    Protein Total 7.6 6.4 - 8.3 g/dL    Albumin 4.1 3.5 - 5.2 g/dL    Bilirubin Total 0.9 <=1.2 mg/dL   Lipase     Status: Normal   Result Value Ref Range    Lipase 33 13 - 60 U/L   UA with Microscopic reflex to Culture     Status: Abnormal    Specimen: Urine, Clean Catch   Result Value Ref Range    Color Urine Yellow Colorless, Straw, Light Yellow, Yellow    Appearance Urine Clear Clear    Glucose Urine Negative Negative mg/dL    Bilirubin Urine Negative Negative    Ketones Urine Negative Negative mg/dL    Specific Gravity Urine 1.015 1.003 - 1.035    Blood Urine Negative Negative    pH Urine 5.0 5.0 - 7.0    Protein Albumin Urine Negative Negative mg/dL    Urobilinogen Urine Normal Normal, 2.0 mg/dL    Nitrite Urine Negative Negative    Leukocyte Esterase Urine Negative Negative    Mucus Urine Present (A) None Seen /LPF    RBC Urine 1 <=2 /HPF    WBC Urine 1 <=5 /HPF    Narrative    Urine Culture not indicated   CBC with platelets and differential     Status: None   Result Value Ref Range    WBC Count 6.9 4.0 - 11.0 10e3/uL    RBC Count 4.81 4.40 - 5.90 10e6/uL    Hemoglobin 14.8 13.3 - 17.7 g/dL    Hematocrit 42.6 40.0 - 53.0 %    MCV 89 78 - 100 fL    MCH 30.8 26.5 - 33.0 pg    MCHC 34.7 31.5 - 36.5 g/dL    RDW 12.1 10.0 - 15.0 %    Platelet Count 222 150 - 450 10e3/uL    % Neutrophils 74 %    % Lymphocytes 16 %    %  Monocytes 7 %    % Eosinophils 2 %    % Basophils 1 %    % Immature Granulocytes 0 %    NRBCs per 100 WBC 0 <1 /100    Absolute Neutrophils 5.1 1.6 - 8.3 10e3/uL    Absolute Lymphocytes 1.1 0.8 - 5.3 10e3/uL    Absolute Monocytes 0.5 0.0 - 1.3 10e3/uL    Absolute Eosinophils 0.1 0.0 - 0.7 10e3/uL    Absolute Basophils 0.0 0.0 - 0.2 10e3/uL    Absolute Immature Granulocytes 0.0 <=0.4 10e3/uL    Absolute NRBCs 0.0 10e3/uL   CBC with platelets differential     Status: None    Narrative    The following orders were created for panel order CBC with platelets differential.  Procedure                               Abnormality         Status                     ---------                               -----------         ------                     CBC with platelets and d...[[id number]]                      Final result                 Please view results for these tests on the individual orders.           Medications   sodium chloride 0.9% BOLUS 1,000 mL (0 mLs Intravenous Stopped 11/9/23 1224)   ketorolac (TORADOL) injection 15 mg (15 mg Intravenous $Given 11/9/23 0922)   iopamidol (ISOVUE-370) solution 100 mL (100 mLs Intravenous $Given 11/9/23 0929)   sodium chloride 0.9 % bag 500mL for CT scan flush use (73 mLs Intravenous $Given 11/9/23 0931)     He drove himself to the emergency department.  Opiate analgesic deferred and he does not want to call for a ride home to receive an opiate analgesic.    Assessments & Plan (with Medical Decision Making)   43 year old male with 2 days of periumbilical pain. No prior abdominal surgeries.  He reports chronic history of infrequent intermittent scant bloody drainage from the umbilicus, most recently couple months ago. He has no abdominal wall redness. No hx of hernia  VSS, abdomen benign and non-surgical. No abdominal wall erythema, warmth, fluctuance, crepitance, mass or hernia. Cotton tip probing and swabbing of the umbilicus revealed scant pink/blood-tinged. No purulent drainage.  ? patent urachus/urachal cyst/urachal polyp pain with possible infection. Extensive eval negative for other emergent disease process such as appendicitis, AAA/dissection, abscess,dissection, etc. Will Rx Augmentin X 7 days prophylactically and make surgery clinic referral for his follow-up.  I will prescribe a small number of Oxycodone for pain refractory to OTC analgesics.  He was provided instructions for supportive care and will return as needed for worsened condition or worsening symptoms, or new problems or concerns.    I have reviewed the nursing notes.    I have reviewed the findings, diagnosis, plan and need for follow up with the patient.    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

## 2023-11-16 ENCOUNTER — OFFICE VISIT (OUTPATIENT)
Dept: SURGERY | Facility: CLINIC | Age: 43
End: 2023-11-16
Payer: COMMERCIAL

## 2023-11-16 VITALS
HEIGHT: 68 IN | WEIGHT: 270 LBS | HEART RATE: 89 BPM | SYSTOLIC BLOOD PRESSURE: 137 MMHG | OXYGEN SATURATION: 97 % | BODY MASS INDEX: 40.92 KG/M2 | TEMPERATURE: 98.2 F | DIASTOLIC BLOOD PRESSURE: 85 MMHG

## 2023-11-16 DIAGNOSIS — R19.09 UMBILICAL MASS: ICD-10-CM

## 2023-11-16 DIAGNOSIS — B37.2 CUTANEOUS CANDIDIASIS: Primary | ICD-10-CM

## 2023-11-16 DIAGNOSIS — R10.33 PERIUMBILICAL ABDOMINAL PAIN: ICD-10-CM

## 2023-11-16 PROCEDURE — 99244 OFF/OP CNSLTJ NEW/EST MOD 40: CPT | Performed by: SURGERY

## 2023-11-16 RX ORDER — NYSTATIN 100000 U/G
CREAM TOPICAL 2 TIMES DAILY
Qty: 30 G | Refills: 0 | Status: SHIPPED | OUTPATIENT
Start: 2023-11-16

## 2023-11-16 RX ORDER — NYSTATIN 100000 U/G
CREAM TOPICAL 2 TIMES DAILY
Qty: 30 G | Refills: 0 | Status: SHIPPED | OUTPATIENT
Start: 2023-11-16 | End: 2023-11-16

## 2023-11-16 ASSESSMENT — PAIN SCALES - GENERAL: PAINLEVEL: NO PAIN (1)

## 2023-11-16 NOTE — PROGRESS NOTES
Surgical Consultation/History and Physical  [facility] General Surgery    Sean is seen in consultation for Umbilical drainage at the request of Dr. Vel Floyd MD    Chief Complaint:  Umbilical drainage    History of Present Illness: Sean Woodard is a 43 year old male presents with umbilical pain.  He was seen in ED, placed on antibiotics.  Denies urinary symptoms.  The drainage was bloody in nature.  It has since stopped.  He admits some irritation to the skin around the area.      He has a history of axillary and groin hidradenitis.  He has had multiple operations for this and is currently on Humira.  He has well controlled NIDDM.    Patient Active Problem List   Diagnosis    Health Care Home    Hyperlipidemia with target LDL less than 100    Benign essential hypertension    PTSD (post-traumatic stress disorder)    Class 3 severe obesity due to excess calories with serious comorbidity in adult (H)    Diabetes mellitus, type 2 (H)    FER (obstructive sleep apnea)    Hidradenitis suppurativa    Hidradenitis axillaris    Fatty liver    UTI (urinary tract infection)    Left ureteral stone    Lab test negative for COVID-19 virus    Acute bronchitis    Allergic rhinitis    Benign neoplasm of skin    Effusion of joint    Encounter for other physical therapy    Lesion of penis    Low back pain    Acne    Atypical chest pain    Pain in joint, lower leg    Pain of finger    Screening examination for pulmonary tuberculosis    Sebaceous cyst    Epidermoid cyst of skin    Tear of medial cartilage or meniscus of knee, current    Tinnitus    Borderline diabetes mellitus    Arthralgia of knee    Open wound of left axillary region     Past Medical History:   Diagnosis Date    Diabetes (H)     Hypertension     Obese     FER (obstructive sleep apnea)     Sleep apnea      Past Surgical History:   Procedure Laterality Date    EXCISE HIDRADENITIS (LOCATION) Left 5/21/2021    Procedure: Sharp excisional debridement of left  axillary hidradenitis.;  Surgeon: Shahnaz Garibay MD;  [facility]: [facility] OR    EXCISE HIDRADENITIS (LOCATION) Bilateral 10/4/2021    Procedure: EXCISION, HIDRADENITIS - bilateral axillae, bilateral groin;  Surgeon: Shahnaz Garibay MD;  [facility]: [facility] OR    EXCISE HIDRADENITIS (LOCATION) Left 2023    Procedure: EXCISION, HIDRADENITIS, LEFT AXILLA,;  Surgeon: Shahnaz Garibay MD;  [facility]: [facility] OR    ORTHOPEDIC SURGERY  2005    RT knee arthroscopic menisectomy    Thumb Surgery  2009    Mass removal neuroma     Family History   Problem Relation Age of Onset    Heart Defect Father         valve replacement    Diabetes Maternal Grandmother     Cancer Maternal Grandfather         stomach    Hypertension Brother     Diabetes Sister     Cerebrovascular Disease Brother 38    Hypertension Brother     Melanoma No family hx of     Skin Cancer No family hx of      Social History     Tobacco Use    Smoking status: Former     Packs/day: 0.50     Years: 5.00     Additional pack years: 0.00     Total pack years: 2.50     Types: Cigarettes     Quit date: 2005     Years since quittin.8    Smokeless tobacco: Never   Substance Use Topics    Alcohol use: Yes     Comment: Social. 1 drink per month      History   Drug Use No     Current Outpatient Medications   Medication Sig Dispense Refill    adalimumab (HUMIRA *CF*) 80 MG/0.8ML pen kit Inject 0.8 mLs (80 mg) Subcutaneous once a week 3.2 mL 11    amoxicillin-clavulanate (AUGMENTIN) 875-125 MG tablet Take 1 tablet by mouth 2 times daily for 7 days 14 tablet 0    atorvastatin (LIPITOR) 20 MG tablet TAKE ONE TABLET BY MOUTH ONCE DAILY 90 tablet 3    augmented betamethasone dipropionate (DIPROLENE-AF) 0.05 % ointment Apply sparingly to affected area twice daily as needed.  Do not apply to face. 45 g 1    azelastine (ASTELIN) 0.1 % nasal spray Spray 2 sprays into both nostrils 2 times daily as needed for rhinitis 30 mL 3    blood glucose (ACCU-CHEK GUIDE) test  strip Use to test blood sugar 2 times daily 100 strip 11    diphenhydrAMINE (BENADRYL) 25 MG capsule Take 25 mg by mouth every 6 hours as needed for itching or allergies      doxycycline monohydrate (MONODOX) 100 MG capsule Take 1 capsule (100 mg) by mouth 2 times daily 60 capsule 3    fluticasone (FLONASE) 50 MCG/ACT nasal spray Spray 2 sprays into both nostrils daily 16 g 3    losartan-hydrochlorothiazide (HYZAAR) 50-12.5 MG tablet TAKE 1 TABLET BY MOUTH DAILY 90 tablet 1    metFORMIN (GLUCOPHAGE XR) 500 MG 24 hr tablet TAKE TWO TABLETS BY MOUTH TWICE A DAY WITH MEALS 360 tablet 3    oxyCODONE (ROXICODONE) 5 MG tablet Take 1 tablet (5 mg) by mouth every 6 hours as needed for severe pain 12 tablet 0    azelastine (OPTIVAR) 0.05 % ophthalmic solution Apply 1 drop to eye 2 times daily (Patient not taking: Reported on 8/4/2023) 6 mL 3    benzoyl peroxide 5 % external liquid Use daily as directed (Patient not taking: Reported on 6/26/2023) 226 g 11    clindamycin (CLEOCIN) 300 MG capsule Take 1 capsule (300 mg) by mouth 2 times daily (Patient not taking: Reported on 6/26/2023) 60 capsule 3    clindamycin-benzoyl peroxide (BENZACLIN) 1-5 % external gel Apply topically 2 times daily (Patient not taking: Reported on 11/16/2023) 50 g 11    hydrOXYzine (ATARAX) 25 MG tablet Take 1 tablet (25 mg) by mouth 3 times daily as needed for itching (Patient not taking: Reported on 6/26/2023) 12 tablet 0    ibuprofen (ADVIL/MOTRIN) 600 MG tablet  (Patient not taking: Reported on 8/4/2023)      ondansetron (ZOFRAN ODT) 4 MG ODT tab Take 1-2 tablets (4-8 mg) by mouth every 8 hours as needed for nausea (Patient not taking: Reported on 6/26/2023) 10 tablet 0    ondansetron (ZOFRAN ODT) 4 MG ODT tab Take 1-2 tablets (4-8 mg) by mouth every 8 hours as needed for nausea (Patient not taking: Reported on 6/26/2023) 12 tablet 0     Allergies   Allergen Reactions    Lisinopril Cough     Review of Systems:   5 point ROS otherwise  "negative    Physical Exam:  /85   Pulse 89   Temp 98.2  F (36.8  C) (Tympanic)   Ht 1.727 m (5' 8\")   Wt 122.5 kg (270 lb)   SpO2 97%   BMI 41.05 kg/m      Constitutional- No acute distress, well nourished, non-toxic  Eyes: Anicteric, no injection.  PERRL  ENT:  Normocephalic, atraumatic, Nose midline, moist mucus membranes  Neck - supple, no LAD  Respiratory- Good inspiratory effort  Cardiovascular - No peripheral edema.  No clubbing.  Abdomen - Soft, non-tender, +BS, no hepatosplenomegaly, rash consistent with candidiasis around umbilicus.  At base of umbilicus at superior aspect there is a granuloma without active drainage.  No discrete mass underlying granuloma.    Neuro - No focal neuro deficits, Alert and oriented x 3  Psych: Appropriate mood and affect  Musculoskeletal: Normal gait, symmetric strength.  FROM upper and lower extremities.  Skin: Warm, Dry    CT Abdomen/Pelvis:  TECHNIQUE: CT scan of the abdomen and pelvis was performed following  injection of IV contrast. Multiplanar reformats were obtained. Dose  reduction techniques were used.  CONTRAST: 100 mL Isovue-370     COMPARISON: 12/11/2022     FINDINGS:   LOWER CHEST: Normal.     HEPATOBILIARY: Hepatic steatosis.     PANCREAS: Normal.     SPLEEN: Normal.     ADRENAL GLANDS: Normal.     KIDNEYS/BLADDER: Table benign left renal cysts. No hydronephrosis.     BOWEL: Normal appendix. No obstruction or inflammatory changes.     PELVIC ORGANS: Normal.     ADDITIONAL FINDINGS: None.     MUSCULOSKELETAL: Normal. No hernia or fluid collection.                                                                      IMPRESSION:   1.  No acute findings or specific abnormality to explain the patient's  symptoms.      Assessment:  1. Cutaneous candidiasis    2. Periumbilical abdominal pain    3. Urachal anomaly    4. Umbilical mass      Plan:   Mr. Woodard presents with umbilical drainage.  On exam he has a small granuloma.  His history is not consistent with " patent urachus.  This may represent a skin cyst or a potential urachal cyst.  I reviewed the CT personally and with radiology, there is no obvious patient urachal remnant.  The patient has completed antibiotics and has no overt signs of infection at this time.  I advised diligent skin care to the area and keeping this clean and dry.  He has yeast dermatitis.  Nystatin cream prescribed.  We discussed management options, at this time, would recommend ultrasound to better assess if there is an underlying mass.  Options would include limited resection vs omphalectomy.  Patient will RTC after ultrasound to discuss and assess improvement/resolution.    Chintan Rosales, DO on 11/16/2023 at 11:34 AM

## 2023-11-16 NOTE — LETTER
11/16/2023         RE: Sean Woodard  [address] [id number]        Dear Colleague,    Thank you for referring your patient, Sean Woodard, to the [facility]. Please see a copy of my visit note below.    Surgical Consultation/History and Physical  [facility] Surgery    Sean is seen in consultation for Umbilical drainage at the request of Dr. Vel Floyd MD    Chief Complaint:  Umbilical drainage    History of Present Illness: Sean Woodard is a 43 year old male presents with umbilical pain.  He was seen in ED, placed on antibiotics.  Denies urinary symptoms.  The drainage was bloody in nature.  It has since stopped.  He admits some irritation to the skin around the area.      He has a history of axillary and groin hidradenitis.  He has had multiple operations for this and is currently on Humira.  He has well controlled NIDDM.    Patient Active Problem List   Diagnosis     Health Care Home     Hyperlipidemia with target LDL less than 100     Benign essential hypertension     PTSD (post-traumatic stress disorder)     Class 3 severe obesity due to excess calories with serious comorbidity in adult (H)     Diabetes mellitus, type 2 (H)     FER (obstructive sleep apnea)     Hidradenitis suppurativa     Hidradenitis axillaris     Fatty liver     UTI (urinary tract infection)     Left ureteral stone     Lab test negative for COVID-19 virus     Acute bronchitis     Allergic rhinitis     Benign neoplasm of skin     Effusion of joint     Encounter for other physical therapy     Lesion of penis     Low back pain     Acne     Atypical chest pain     Pain in joint, lower leg     Pain of finger     Screening examination for pulmonary tuberculosis     Sebaceous cyst     Epidermoid cyst of skin     Tear of medial cartilage or meniscus of knee, current     Tinnitus     Borderline diabetes mellitus     Arthralgia of knee     Open wound of left axillary region     Past Medical  History:   Diagnosis Date     Diabetes (H)      Hypertension      Obese      FER (obstructive sleep apnea)      Sleep apnea      Past Surgical History:   Procedure Laterality Date     EXCISE HIDRADENITIS (LOCATION) Left 2021    Procedure: Sharp excisional debridement of left axillary hidradenitis.;  Surgeon: Shahnaz Garibay MD;  [facility]     EXCISE HIDRADENITIS (LOCATION) Bilateral 10/4/2021    Procedure: EXCISION, HIDRADENITIS - bilateral axillae, bilateral groin;  Surgeon: Shahnaz Garibay MD;  [facility]     EXCISE HIDRADENITIS (LOCATION) Left 2023    Procedure: EXCISION, HIDRADENITIS, LEFT AXILLA,;  Surgeon: Shahnaz Garibay MD;  [facility]     ORTHOPEDIC SURGERY  2005    RT knee arthroscopic menisectomy     Thumb Surgery  2009    Mass removal neuroma     Family History   Problem Relation Age of Onset     Heart Defect Father         valve replacement     Diabetes Maternal Grandmother      Cancer Maternal Grandfather         stomach     Hypertension Brother      Diabetes Sister      Cerebrovascular Disease Brother 38     Hypertension Brother      Melanoma No family hx of      Skin Cancer No family hx of      Social History     Tobacco Use     Smoking status: Former     Packs/day: 0.50     Years: 5.00     Additional pack years: 0.00     Total pack years: 2.50     Types: Cigarettes     Quit date: 2005     Years since quittin.8     Smokeless tobacco: Never   Substance Use Topics     Alcohol use: Yes     Comment: Social. 1 drink per month      History   Drug Use No     Current Outpatient Medications   Medication Sig Dispense Refill     adalimumab (HUMIRA *CF*) 80 MG/0.8ML pen kit Inject 0.8 mLs (80 mg) Subcutaneous once a week 3.2 mL 11     amoxicillin-clavulanate (AUGMENTIN) 875-125 MG tablet Take 1 tablet by mouth 2 times daily for 7 days 14 tablet 0     atorvastatin (LIPITOR) 20 MG tablet TAKE ONE TABLET BY MOUTH ONCE DAILY 90 tablet 3     augmented  betamethasone dipropionate (DIPROLENE-AF) 0.05 % ointment Apply sparingly to affected area twice daily as needed.  Do not apply to face. 45 g 1     azelastine (ASTELIN) 0.1 % nasal spray Spray 2 sprays into both nostrils 2 times daily as needed for rhinitis 30 mL 3     blood glucose (ACCU-CHEK GUIDE) test strip Use to test blood sugar 2 times daily 100 strip 11     diphenhydrAMINE (BENADRYL) 25 MG capsule Take 25 mg by mouth every 6 hours as needed for itching or allergies       doxycycline monohydrate (MONODOX) 100 MG capsule Take 1 capsule (100 mg) by mouth 2 times daily 60 capsule 3     fluticasone (FLONASE) 50 MCG/ACT nasal spray Spray 2 sprays into both nostrils daily 16 g 3     losartan-hydrochlorothiazide (HYZAAR) 50-12.5 MG tablet TAKE 1 TABLET BY MOUTH DAILY 90 tablet 1     metFORMIN (GLUCOPHAGE XR) 500 MG 24 hr tablet TAKE TWO TABLETS BY MOUTH TWICE A DAY WITH MEALS 360 tablet 3     oxyCODONE (ROXICODONE) 5 MG tablet Take 1 tablet (5 mg) by mouth every 6 hours as needed for severe pain 12 tablet 0     azelastine (OPTIVAR) 0.05 % ophthalmic solution Apply 1 drop to eye 2 times daily (Patient not taking: Reported on 8/4/2023) 6 mL 3     benzoyl peroxide 5 % external liquid Use daily as directed (Patient not taking: Reported on 6/26/2023) 226 g 11     clindamycin (CLEOCIN) 300 MG capsule Take 1 capsule (300 mg) by mouth 2 times daily (Patient not taking: Reported on 6/26/2023) 60 capsule 3     clindamycin-benzoyl peroxide (BENZACLIN) 1-5 % external gel Apply topically 2 times daily (Patient not taking: Reported on 11/16/2023) 50 g 11     hydrOXYzine (ATARAX) 25 MG tablet Take 1 tablet (25 mg) by mouth 3 times daily as needed for itching (Patient not taking: Reported on 6/26/2023) 12 tablet 0     ibuprofen (ADVIL/MOTRIN) 600 MG tablet  (Patient not taking: Reported on 8/4/2023)       ondansetron (ZOFRAN ODT) 4 MG ODT tab Take 1-2 tablets (4-8 mg) by mouth every 8 hours as needed for nausea (Patient not taking:  "Reported on 6/26/2023) 10 tablet 0     ondansetron (ZOFRAN ODT) 4 MG ODT tab Take 1-2 tablets (4-8 mg) by mouth every 8 hours as needed for nausea (Patient not taking: Reported on 6/26/2023) 12 tablet 0     Allergies   Allergen Reactions     Lisinopril Cough     Review of Systems:   5 point ROS otherwise negative    Physical Exam:  /85   Pulse 89   Temp 98.2  F (36.8  C) (Tympanic)   Ht 1.727 m (5' 8\")   Wt 122.5 kg (270 lb)   SpO2 97%   BMI 41.05 kg/m      Constitutional- No acute distress, well nourished, non-toxic  Eyes: Anicteric, no injection.  PERRL  ENT:  Normocephalic, atraumatic, Nose midline, moist mucus membranes  Neck - supple, no LAD  Respiratory- Good inspiratory effort  Cardiovascular - No peripheral edema.  No clubbing.  Abdomen - Soft, non-tender, +BS, no hepatosplenomegaly, rash consistent with candidiasis around umbilicus.  At base of umbilicus at superior aspect there is a granuloma without active drainage.  No discrete mass underlying granuloma.    Neuro - No focal neuro deficits, Alert and oriented x 3  Psych: Appropriate mood and affect  Musculoskeletal: Normal gait, symmetric strength.  FROM upper and lower extremities.  Skin: Warm, Dry    CT Abdomen/Pelvis:  TECHNIQUE: CT scan of the abdomen and pelvis was performed following  injection of IV contrast. Multiplanar reformats were obtained. Dose  reduction techniques were used.  CONTRAST: 100 mL Isovue-370     COMPARISON: 12/11/2022     FINDINGS:   LOWER CHEST: Normal.     HEPATOBILIARY: Hepatic steatosis.     PANCREAS: Normal.     SPLEEN: Normal.     ADRENAL GLANDS: Normal.     KIDNEYS/BLADDER: Table benign left renal cysts. No hydronephrosis.     BOWEL: Normal appendix. No obstruction or inflammatory changes.     PELVIC ORGANS: Normal.     ADDITIONAL FINDINGS: None.     MUSCULOSKELETAL: Normal. No hernia or fluid collection.                                                                      IMPRESSION:   1.  No acute findings " or specific abnormality to explain the patient's  symptoms.      Assessment:  1. Cutaneous candidiasis    2. Periumbilical abdominal pain    3. Urachal anomaly    4. Umbilical mass      Plan:   Mr. Woodard presents with umbilical drainage.  On exam he has a small granuloma.  His history is not consistent with patent urachus.  This may represent a skin cyst or a potential urachal cyst.  I reviewed the CT personally and with radiology, there is no obvious patient urachal remnant.  The patient has completed antibiotics and has no overt signs of infection at this time.  I advised diligent skin care to the area and keeping this clean and dry.  He has yeast dermatitis.  Nystatin cream prescribed.  We discussed management options, at this time, would recommend ultrasound to better assess if there is an underlying mass.  Options would include limited resection vs omphalectomy.  Patient will RTC after ultrasound to discuss and assess improvement/resolution.    Chintan Rosales DO on 11/16/2023 at 11:34 AM      Again, thank you for allowing me to participate in the care of your patient.        Sincerely,        Chintan Rosales DO

## 2023-11-17 ENCOUNTER — HOSPITAL ENCOUNTER (OUTPATIENT)
Dept: ULTRASOUND IMAGING | Facility: HOSPITAL | Age: 43
Discharge: HOME | End: 2023-11-17
Attending: SURGERY | Admitting: SURGERY
Payer: COMMERCIAL

## 2023-11-17 DIAGNOSIS — R19.09 UMBILICAL MASS: ICD-10-CM

## 2023-11-17 PROCEDURE — 76882 US LMTD JT/FCL EVL NVASC XTR: CPT | Mod: RT

## 2023-12-05 ENCOUNTER — TELEPHONE (OUTPATIENT)
Dept: DERMATOLOGY | Facility: CLINIC | Age: 43
End: 2023-12-05
Payer: COMMERCIAL

## 2023-12-06 ENCOUNTER — TELEPHONE (OUTPATIENT)
Dept: DERMATOLOGY | Facility: CLINIC | Age: 43
End: 2023-12-06
Payer: COMMERCIAL

## 2023-12-07 ENCOUNTER — VIRTUAL VISIT (OUTPATIENT)
Dept: DERMATOLOGY | Facility: CLINIC | Age: 43
End: 2023-12-07
Payer: COMMERCIAL

## 2023-12-07 VITALS — BODY MASS INDEX: 40.16 KG/M2 | WEIGHT: 265 LBS | HEIGHT: 68 IN

## 2023-12-07 DIAGNOSIS — L73.2 HIDRADENITIS SUPPURATIVA: Primary | ICD-10-CM

## 2023-12-07 DIAGNOSIS — D84.9 IMMUNOSUPPRESSION (H): ICD-10-CM

## 2023-12-07 PROCEDURE — 99213 OFFICE O/P EST LOW 20 MIN: CPT | Mod: 93 | Performed by: DERMATOLOGY

## 2023-12-07 ASSESSMENT — PAIN SCALES - GENERAL: PAINLEVEL: NO PAIN (0)

## 2023-12-07 NOTE — PROGRESS NOTES
[facility] Dermatology Note  Encounter Date: Dec 7, 2023  [employer] and Telephone ([phone]). Location of teledermatologist: [facility] [place].  Start time: 12:20. End time: 12:26.    Dermatology Problem List:  1. Hidradenitis suppurativa - Hamilton stage III involving axillae > neck, groin.              - s/p WLE (BL axillae, groin crease) 10/4/21 w/ Dr. Garibay              - current treatment: adalimumab 80 mg weekly, topical clindamycin/benzoyl peroxide, intralesional triamcinolone              - past treatment: doxycycline, other oral antibiotics, isotretinoin, adalimumab, infliximab (neuropathy), ustekinumab (insurance issues), oral clindamycin       ____________________________________________    Assessment & Plan:     Hidradenitis suppurativa: overall stable on current regimen and has stopped doxycycline with no worsening or flaring of symptoms. We discussed intralesional triamcinolone injections but not needed right now. [facility] - still pending.  - adalimumab 80 mg weekly    Procedures Performed:    None    Follow-up: 6 months    Staff:     Derrick Avina MD, FAAD   of Dermatology  Department of Dermatology  [facility] [employer]    ____________________________________________    CC: RECHECK    HPI:  Mr. Sean Woodard is a(n) 43 year old male who presents today as a return patient for hidradenitis suppurativa    Hidradenitis suppurativa - overall stable  - still developing some new areas of drainage, but overall stable  - [facility]  - some ongoing activity in the groin area  - on adalimumab 80 mg weekly  - stopped doxycycline about 2 months ago - no change or increased flares    Patient is otherwise feeling well, without additional skin concerns.    Labs Reviewed:  11/9/23 CBC, CMP unremarkable    Physical  "Exam:  Vitals: Ht 1.727 m (5' 8\")   Wt 120.2 kg (265 lb)   BMI 40.29 kg/m    SKIN: Teledermatology photos were reviewed; image quality and interpretability: acceptable. Image date: 12/6/23.  - erythematous nodules and scarring in the groin  - No other lesions of concern on areas examined.     Medications:  Current Outpatient Medications   Medication    adalimumab (HUMIRA *CF*) 80 MG/0.8ML pen kit    atorvastatin (LIPITOR) 20 MG tablet    augmented betamethasone dipropionate (DIPROLENE-AF) 0.05 % ointment    azelastine (ASTELIN) 0.1 % nasal spray    diphenhydrAMINE (BENADRYL) 25 MG capsule    doxycycline monohydrate (MONODOX) 100 MG capsule    fluticasone (FLONASE) 50 MCG/ACT nasal spray    losartan-hydrochlorothiazide (HYZAAR) 50-12.5 MG tablet    metFORMIN (GLUCOPHAGE XR) 500 MG 24 hr tablet    nystatin (MYCOSTATIN) 293661 UNIT/GM external cream    oxyCODONE (ROXICODONE) 5 MG tablet    azelastine (OPTIVAR) 0.05 % ophthalmic solution    benzoyl peroxide 5 % external liquid    blood glucose (ACCU-CHEK GUIDE) test strip    clindamycin (CLEOCIN) 300 MG capsule    clindamycin-benzoyl peroxide (BENZACLIN) 1-5 % external gel    hydrOXYzine (ATARAX) 25 MG tablet    ibuprofen (ADVIL/MOTRIN) 600 MG tablet    ondansetron (ZOFRAN ODT) 4 MG ODT tab    ondansetron (ZOFRAN ODT) 4 MG ODT tab     No current facility-administered medications for this visit.      Past Medical/Surgical History:   Patient Active Problem List   Diagnosis    Health Care Home    Hyperlipidemia with target LDL less than 100    Benign essential hypertension    PTSD (post-traumatic stress disorder)    Class 3 severe obesity due to excess calories with serious comorbidity in adult (H)    Diabetes mellitus, type 2 (H)    FER (obstructive sleep apnea)    Hidradenitis suppurativa    Hidradenitis axillaris    Fatty liver    UTI (urinary tract infection)    Left ureteral stone    Lab test negative for COVID-19 virus    Acute bronchitis    Allergic rhinitis    " Benign neoplasm of skin    Effusion of joint    Encounter for other physical therapy    Lesion of penis    Low back pain    Acne    Atypical chest pain    Pain in joint, lower leg    Pain of finger    Screening examination for pulmonary tuberculosis    Sebaceous cyst    Epidermoid cyst of skin    Tear of medial cartilage or meniscus of knee, current    Tinnitus    Borderline diabetes mellitus    Arthralgia of knee    Open wound of left axillary region     Past Medical History:   Diagnosis Date    Diabetes (H)     Hypertension     Obese     FER (obstructive sleep apnea)     Sleep apnea        CC No referring provider defined for this encounter. on close of this encounter.

## 2023-12-07 NOTE — LETTER
12/7/2023       RE: Sean Woodard  [address] [facility] [address]     Dear Colleague,    Thank you for referring your patient, Sean Woodard, to the [facility] DERMATOLOGY CLINIC [place] at [facility]. Please see a copy of my visit note below.    [facility] Dermatology Note  Encounter Date: Dec 7, 2023  [employer]-and[employer] and Telephone ([phone]). Location of teledermatologist: [facility] DERMATOLOGY CLINIC [place].  Start time: 12:20. End time: 12:26.    Dermatology Problem List:  1. Hidradenitis suppurativa - Hamilton stage III involving axillae > neck, groin.              - s/p WLE (BL axillae, groin crease) 10/4/21 w/ Dr. Garibay              - current treatment: adalimumab 80 mg weekly, topical clindamycin/benzoyl peroxide, intralesional triamcinolone              - past treatment: doxycycline, other oral antibiotics, isotretinoin, adalimumab, infliximab (neuropathy), ustekinumab (insurance issues), oral clindamycin       ____________________________________________    Assessment & Plan:     Hidradenitis suppurativa: overall stable on current regimen and has stopped doxycycline with no worsening or flaring of symptoms. We discussed intralesional triamcinolone injections but not needed right now. [facility]  - adalimumab 80 mg weekly    Procedures Performed:    None    Follow-up: 6 months    Staff:     Derrick Avina MD, FAAD   of Dermatology  Department of Dermatology  [facility]    ____________________________________________    CC: RECHECK    HPI:  Mr. Sean Woodard is a(n) 43 year old male who presents today as a return patient for hidradenitis suppurativa    Hidradenitis suppurativa - overall stable  - still developing some new areas of drainage, but overall stable  - [facility]  "[facility]  - some ongoing activity in the groin area  - on adalimumab 80 mg weekly  - stopped doxycycline about 2 months ago - no change or increased flares    Patient is otherwise feeling well, without additional skin concerns.    Labs Reviewed:  11/9/23 CBC, CMP unremarkable    Physical Exam:  Vitals: Ht 1.727 m (5' 8\")   Wt 120.2 kg (265 lb)   BMI 40.29 kg/m    SKIN: Teledermatology photos were reviewed; image quality and interpretability: acceptable. Image date: 12/6/23.  - erythematous nodules and scarring in the groin  - No other lesions of concern on areas examined.     Medications:  Current Outpatient Medications   Medication    adalimumab (HUMIRA *CF*) 80 MG/0.8ML pen kit    atorvastatin (LIPITOR) 20 MG tablet    augmented betamethasone dipropionate (DIPROLENE-AF) 0.05 % ointment    azelastine (ASTELIN) 0.1 % nasal spray    diphenhydrAMINE (BENADRYL) 25 MG capsule    doxycycline monohydrate (MONODOX) 100 MG capsule    fluticasone (FLONASE) 50 MCG/ACT nasal spray    losartan-hydrochlorothiazide (HYZAAR) 50-12.5 MG tablet    metFORMIN (GLUCOPHAGE XR) 500 MG 24 hr tablet    nystatin (MYCOSTATIN) 287398 UNIT/GM external cream    oxyCODONE (ROXICODONE) 5 MG tablet    azelastine (OPTIVAR) 0.05 % ophthalmic solution    benzoyl peroxide 5 % external liquid    blood glucose (ACCU-CHEK GUIDE) test strip    clindamycin (CLEOCIN) 300 MG capsule    clindamycin-benzoyl peroxide (BENZACLIN) 1-5 % external gel    hydrOXYzine (ATARAX) 25 MG tablet    ibuprofen (ADVIL/MOTRIN) 600 MG tablet    ondansetron (ZOFRAN ODT) 4 MG ODT tab    ondansetron (ZOFRAN ODT) 4 MG ODT tab     No current facility-administered medications for this visit.      Past Medical/Surgical History:   Patient Active Problem List   Diagnosis    Health Care Home    Hyperlipidemia with target LDL less than 100    Benign essential hypertension    PTSD (post-traumatic stress disorder)    Class 3 severe obesity due to excess " calories with serious comorbidity in adult (H)    Diabetes mellitus, type 2 (H)    FER (obstructive sleep apnea)    Hidradenitis suppurativa    Hidradenitis axillaris    Fatty liver    UTI (urinary tract infection)    Left ureteral stone    Lab test negative for COVID-19 virus    Acute bronchitis    Allergic rhinitis    Benign neoplasm of skin    Effusion of joint    Encounter for other physical therapy    Lesion of penis    Low back pain    Acne    Atypical chest pain    Pain in joint, lower leg    Pain of finger    Screening examination for pulmonary tuberculosis    Sebaceous cyst    Epidermoid cyst of skin    Tear of medial cartilage or meniscus of knee, current    Tinnitus    Borderline diabetes mellitus    Arthralgia of knee    Open wound of left axillary region     Past Medical History:   Diagnosis Date    Diabetes (H)     Hypertension     Obese     FER (obstructive sleep apnea)     Sleep apnea    CC No referring provider defined for this encounter. on close of this encounter.

## 2023-12-07 NOTE — NURSING NOTE
Teledermatology Nurse Call Patients:     Are you in the [facility] at the time of the encounter? yes    Today's visit will be billed to you and your insurance.    A teledermatology visit is not as thorough as an in-person visit and the quality of the photograph sent may not be of the same quality as that taken by the dermatology clinic.    Chief Complaint   Patient presents with    RECHECK

## 2023-12-08 ENCOUNTER — TELEPHONE (OUTPATIENT)
Dept: DERMATOLOGY | Facility: CLINIC | Age: 43
End: 2023-12-08
Payer: COMMERCIAL

## 2023-12-08 NOTE — TELEPHONE ENCOUNTER
Patient Contacted and schedule the following:    Appointment type: Return  Provider: Dr. Avina  Return date: 6/24/24  Specialty phone number: [phone]

## 2023-12-09 ENCOUNTER — HEALTH MAINTENANCE LETTER (OUTPATIENT)
Age: 43
End: 2023-12-09

## 2023-12-12 ENCOUNTER — VIRTUAL VISIT (OUTPATIENT)
Dept: SURGERY | Facility: CLINIC | Age: 43
End: 2023-12-12
Payer: COMMERCIAL

## 2023-12-12 DIAGNOSIS — R10.33 PERIUMBILICAL ABDOMINAL PAIN: Primary | ICD-10-CM

## 2023-12-12 PROCEDURE — 99207 PR NO CHARGE LOS: CPT | Performed by: SURGERY

## 2023-12-12 NOTE — LETTER
12/12/2023         RE: Sean Woodard  [address] [facility] [address]        Dear Colleague,    Thank you for referring your patient, Sean Woodard, to the [facility]. Please see a copy of my visit note below.    Sean is a 43 year old who is being evaluated via a billable telephone visit.      What phone number would you like to be contacted at? [phone]  How would you like to obtain your AVS? MyChart  Distant Location (provider location):  On-site    Called patient.  Patient improving.  No further drainage.  Feels a small amount of thickening in area.  No redness.      Reviewed ultrasound results with patient and personally reviewed images.     Discussed options of: operative excision, repeat ultrasound in 1-2 months vs observation.  Patient wishes to continue observation.  Discussed signs/symptoms to be aware of which should prompt evaluation at sooner date.  Patient comfortable with plan.     Phone call duration: 3 minutes        Again, thank you for allowing me to participate in the care of your patient.        Sincerely,        Chintan Rosales, DO

## 2023-12-12 NOTE — NURSING NOTE
Chief Complaint   Patient presents with    Results     Follow up Ultra sound results       There were no vitals filed for this visit.  Wt Readings from Last 1 Encounters:   12/07/23 120.2 kg (265 lb)   Sarah Henderson MA

## 2023-12-12 NOTE — PROGRESS NOTES
Sean is a 43 year old who is being evaluated via a billable telephone visit.      What phone number would you like to be contacted at? [phone]  How would you like to obtain your AVS? MyChart  Distant Location (provider location):  On-site    Called patient.  Patient improving.  No further drainage.  Feels a small amount of thickening in area.  No redness.      Reviewed ultrasound results with patient and personally reviewed images.     Discussed options of: operative excision, repeat ultrasound in 1-2 months vs observation.  Patient wishes to continue observation.  Discussed signs/symptoms to be aware of which should prompt evaluation at sooner date.  Patient comfortable with plan.     Phone call duration: 3 minutes

## 2024-03-05 DIAGNOSIS — L70.0 ACNE VULGARIS: Primary | ICD-10-CM

## 2024-03-05 NOTE — TELEPHONE ENCOUNTER
Hello,    Patient came to pharmacy to pickup the recent order for 2 prescriptions(Clindamycin 1% gel and Benzoyl Peroxide5%). He was looking for a different combination product he has filled in the past that contains both of these products in a gel formulation. If appropriate, can you please send a new Rx for Benzaclin Gel, which is the brand name of the product patient wants. We can then substitute the desired combo product Clindamycin/Benzoyl Peroxide 1/5% gel.    Thank You,    Taylor Almaguer, [facility] ] Department   [facility]  [email or web address]

## 2024-03-06 NOTE — TELEPHONE ENCOUNTER
benzoyl peroxide 5 % external liquid    Apply topically daily - Topical   Last Written Prescription Date:  3/3/24  Last Fill Quantity: 226 g ,   # refills: 11   clindamycin (CLEOCIN T) 1 % external lotion     Last Written Prescription Date:  3/3/24  Last Fill Quantity: 60 ml ,   # refills: 1  Last Office Visit : 12/723  Future Office visit:  6/24/24 [facility] Derm/ Avina    Routing refill request to provider for review/approval because:  Alternate combination product requested: Benzaclin Gel- had in past    Patient came to pharmacy to pickup the recent order for 2 prescriptions(Clindamycin 1% gel and Benzoyl Peroxide5%). He was looking for a different combination product he has filled in the past that contains both of these products in a gel formulation. If appropriate, can you please send a new Rx for Benzaclin Gel, which is the brand name of the product patient wants. We can then substitute the desired combo product Clindamycin/Benzoyl Peroxide 1/5% gel.

## 2024-03-07 ENCOUNTER — OFFICE VISIT (OUTPATIENT)
Dept: DERMATOLOGY | Facility: CLINIC | Age: 44
End: 2024-03-07
Payer: COMMERCIAL

## 2024-03-07 ENCOUNTER — TELEPHONE (OUTPATIENT)
Dept: DERMATOLOGY | Facility: CLINIC | Age: 44
End: 2024-03-07

## 2024-03-07 DIAGNOSIS — L73.2 HIDRADENITIS SUPPURATIVA: Primary | ICD-10-CM

## 2024-03-07 PROCEDURE — 11900 INJECT SKIN LESIONS </W 7: CPT | Performed by: PHYSICIAN ASSISTANT

## 2024-03-07 RX ORDER — CLINDAMYCIN AND BENZOYL PEROXIDE 10; 50 MG/G; MG/G
GEL TOPICAL 2 TIMES DAILY
Qty: 50 G | Refills: 5 | Status: SHIPPED | OUTPATIENT
Start: 2024-03-07

## 2024-03-07 NOTE — PROGRESS NOTES
Clinic Administered Medication Documentation        Patient was given 9 hours ml of kenalog 10 mg . Prior to medication administration, verified patient's identity using patient s name and date of birth. Please see MAR and medication order for additional information. Patient instructed to remain in clinic for 15 minutes and report any adverse reaction to staff immediately.    Vial/Syringe: Multi dose vial

## 2024-03-07 NOTE — PATIENT INSTRUCTIONS
Patient Education       Proper skin care from [facility]:    -Eliminate harsh soaps as they strip the natural oils from the skin, often resulting in dry itchy skin ( i.e. Dial, Zest, Persian Spring)  -Use mild soaps such as Cetaphil or Dove Sensitive Skin in the shower. You do not need to use soap on arms, legs, and trunk every time you shower unless visibly soiled.   -Avoid hot or cold showers.  -After showering, lightly dry off and apply moisturizing within 2-3 minutes. This will help trap moisture in the skin.   -Aggressive use of a moisturizer at least 1-2 times a day to the entire body (including -Vanicream, Cetaphil, Aquaphor or Cerave) and moisturize hands after every washing.  -We recommend using moisturizers that come in a tub that needs to be scooped out, not a pump. This has more of an oil base. It will hold moisture in your skin much better than a water base moisturizer. The above recommended are non-pore clogging.      Wear a sunscreen with at least SPF 30 on your face, ears, neck and V of the chest daily. Wear sunscreen on other areas of the body if those areas are exposed to the sun throughout the day. Sunscreens can contain physical and/or chemical blockers. Physical blockers are less likely to clog pores, these include zinc oxide and titanium dioxide. Reapply every two hour and after swimming.     Sunscreen examples: https://www.ewg.org/sunscreen/    UV radiation  UVA radiation remains constant throughout the day and throughout the year. It is a longer wavelength than UVB and therefore penetrates deeper into the skin leading to immediate and delayed tanning, photoaging, and skin cancer. 70-80% of UVA and UVB radiation occurs between the hours of 10am-2pm.  UVB radiation  UVB radiation causes the most harmful effects and is more significant during the summer months. However, snow and ice can reflect UVB radiation leading to skin damage during the winter months as well. UVB radiation is  responsible for tanning, burning, inflammation, delayed erythema (pinkness), pigmentation (brown spots), and skin cancer.     I recommend self monthly full body exams and yearly full body exams with a dermatology provider. If you develop a new or changing lesion please follow up for examination. Most skin cancers are pink and scaly or pink and pearly. However, we do see blue/brown/black skin cancers.  Consider the ABCDEs of melanoma when giving yourself your monthly full body exam ( don't forget the groin, buttocks, feet, toes, etc). A-asymmetry, B-borders, C-color, D-diameter, E-elevation or evolving. If you see any of these changes please follow up in clinic. If you cannot see your back I recommend purchasing a hand held mirror to use with a larger wall mirror.       Checking for Skin Cancer  You can find cancer early by checking your skin each month. There are 3 kinds of skin cancer. They are melanoma, basal cell carcinoma, and squamous cell carcinoma. Doing monthly skin checks is the best way to find new marks or skin changes. Follow the instructions below for checking your skin.   The ABCDEs of checking moles for melanoma   Check your moles or growths for signs of melanoma using ABCDE:   Asymmetry: the sides of the mole or growth don t match  Border: the edges are ragged, notched, or blurred  Color: the color within the mole or growth varies  Diameter: the mole or growth is larger than 6 mm (size of a pencil eraser)  Evolving: the size, shape, or color of the mole or growth is changing (evolving is not shown in the images below)    Checking for other types of skin cancer  Basal cell carcinoma or squamous cell carcinoma have symptoms such as:     A spot or mole that looks different from all other marks on your skin  Changes in how an area feels, such as itching, tenderness, or pain  Changes in the skin's surface, such as oozing, bleeding, or scaliness  A sore that does not heal  New swelling or redness beyond  the border of a mole    Who s at risk?  Anyone can get skin cancer. But you are at greater risk if you have:   Fair skin, light-colored hair, or light-colored eyes  Many moles or abnormal moles on your skin  A history of sunburns from sunlight or tanning beds  A family history of skin cancer  A history of exposure to radiation or chemicals  A weakened immune system  If you have had skin cancer in the past, you are at risk for recurring skin cancer.   How to check your skin  Do your monthly skin checkups in front of a full-length mirror. Check all parts of your body, including your:   Head (ears, face, neck, and scalp)  Torso (front, back, and sides)  Arms (tops, undersides, upper, and lower armpits)  Hands (palms, backs, and fingers, including under the nails)  Buttocks and genitals  Legs (front, back, and sides)  Feet (tops, soles, toes, including under the nails, and between toes)  If you have a lot of moles, take digital photos of them each month. Make sure to take photos both up close and from a distance. These can help you see if any moles change over time.   Most skin changes are not cancer. But if you see any changes in your skin, call your doctor right away. Only he or she can diagnose a problem. If you have skin cancer, seeing your doctor can be the first step toward getting the treatment that could save your life.   [employer] last reviewed this educational content on 4/1/2019 2000-2020 [employer]. [address] [place] [address], [place] [address]. All rights reserved. This information is not intended as a substitute for professional medical care. Always follow your healthcare professional's instructions.       When should I call my doctor?  If you are worsening or not improving, please, contact us or seek urgent care as noted below.     Who should I call with questions (adults)?  [facility] (adult and pediatric): [phone]  [facility]  [place] (adult): [phone]  [facility] ([place], [place], [place] and [place]) [phone]  For urgent needs outside of business hours call the [facility] at [phone] and ask for the dermatology resident on call to be paged  If this is a medical emergency and you are unable to reach an ER, Call 911      If you need a prescription refill, please contact your pharmacy. Refills are approved or denied by our Physicians during normal business hours, Monday through Fridays  Per office policy, refills will not be granted if you have not been seen within the past year (or sooner depending on your child's condition)

## 2024-03-07 NOTE — LETTER
"    3/7/2024         RE: Sean Woodard  [address] [facility] [address]        Dear Colleague,    Thank you for referring your patient, Sean Woodard, to the [facility]. Please see a copy of my visit note below.    Clinic Administered Medication Documentation        Patient was given 9 hours ml of kenalog 10 mg . Prior to medication administration, verified patient's identity using patient s name and date of birth. Please see MAR and medication order for additional information. Patient instructed to {CAM INSTRUCTIONS:755644::\"remain in clinic for 15 minutes\",\"report any adverse reaction to staff immediately\"}.    Vial/Syringe: Multi dose vial     Sean Woodard is an extremely pleasant 44 year old year old male patient here today for inflamed HS on left neck. He would like steroid injection today. Currently on 80 mg of Humira weekly. Patient has no other skin complaints today.  Remainder of the HPI, Meds, PMH, Allergies, FH, and SH was reviewed in chart.    Pertinent Hx:   HS  Past Medical History:   Diagnosis Date     Diabetes (H)      Hypertension      Obese      FER (obstructive sleep apnea)      Sleep apnea        Past Surgical History:   Procedure Laterality Date     EXCISE HIDRADENITIS (LOCATION) Left 5/21/2021    Procedure: Sharp excisional debridement of left axillary hidradenitis.;  Surgeon: Shahnaz Garibay MD;  Location: [facility] OR     EXCISE HIDRADENITIS (LOCATION) Bilateral 10/4/2021    Procedure: EXCISION, HIDRADENITIS - bilateral axillae, bilateral groin;  Surgeon: Shahnaz Garibay MD;  Location: [facility] OR     EXCISE HIDRADENITIS (LOCATION) Left 2/17/2023    Procedure: EXCISION, HIDRADENITIS, LEFT AXILLA,;  Surgeon: Shahnaz Garibay MD;  Location: [facility] OR     ORTHOPEDIC SURGERY  1/1/2005    RT knee arthroscopic menisectomy     Thumb Surgery  2009    Mass removal neuroma        Family History   Problem Relation Age of Onset     Heart Defect Father         valve " replacement     Diabetes Maternal Grandmother      Cancer Maternal Grandfather         stomach     Hypertension Brother      Diabetes Sister      Cerebrovascular Disease Brother 38     Hypertension Brother      Melanoma No family hx of      Skin Cancer No family hx of        Social History     Socioeconomic History     Marital status:      Spouse name: Joycelyn Stokes     Number of children: 1     Years of education: 12+     Highest education level: Not on file   Occupational History     Occupation: Adjucations officer     Employer: [employer]   Tobacco Use     Smoking status: Former     Packs/day: 0.50     Years: 5.00     Additional pack years: 0.00     Total pack years: 2.50     Types: Cigarettes     Quit date: 2005     Years since quittin.1     Smokeless tobacco: Never   Substance and Sexual Activity     Alcohol use: Yes     Comment: Social. 1 drink per month     Drug use: No     Sexual activity: Yes     Partners: Female   Other Topics Concern     Parent/sibling w/ CABG, MI or angioplasty before 65F 55M? No   Social History Narrative    2019    ENVIRONMENTAL HISTORY: The family [place] in a [place] year old [place]. The home is heated with a forced air. They do have central air conditioning. The patient's bedroom is furnished with carpeting in bedroom. No pets inside the house. There is no history of cockroach or mice infestation. There are no smokers in the house.  The house does not have a damp basement.      Social Determinants of Health     Financial Resource Strain: Not on file   Food Insecurity: Not on file   Transportation Needs: Not on file   Physical Activity: Not on file   Stress: Not on file   Social Connections: Not on file   Interpersonal Safety: Not on file   Housing Stability: Not on file       Outpatient Encounter Medications as of 3/7/2024   Medication Sig Dispense Refill     adalimumab (HUMIRA *CF*) 80 MG/0.8ML pen kit Inject 0.8  mLs (80 mg) Subcutaneous once a week 3.2 mL 11     atorvastatin (LIPITOR) 20 MG tablet TAKE ONE TABLET BY MOUTH ONCE DAILY 90 tablet 3     augmented betamethasone dipropionate (DIPROLENE-AF) 0.05 % ointment Apply sparingly to affected area twice daily as needed.  Do not apply to face. 45 g 1     azelastine (ASTELIN) 0.1 % nasal spray Spray 2 sprays into both nostrils 2 times daily as needed for rhinitis 30 mL 3     azelastine (OPTIVAR) 0.05 % ophthalmic solution Apply 1 drop to eye 2 times daily (Patient not taking: Reported on 8/4/2023) 6 mL 3     benzoyl peroxide 5 % external liquid Apply topically daily 226 g 11     blood glucose (ACCU-CHEK GUIDE) test strip Use to test blood sugar 2 times daily (Patient not taking: Reported on 12/7/2023) 100 strip 11     clindamycin (CLEOCIN T) 1 % external lotion Apply topically 2 times daily 60 mL 11     clindamycin-benzoyl peroxide (BENZACLIN) 1-5 % external gel Apply topically 2 times daily 50 g 5     diphenhydrAMINE (BENADRYL) 25 MG capsule Take 25 mg by mouth every 6 hours as needed for itching or allergies (Patient not taking: Reported on 12/12/2023)       fluticasone (FLONASE) 50 MCG/ACT nasal spray Spray 2 sprays into both nostrils daily (Patient not taking: Reported on 12/12/2023) 16 g 3     hydrOXYzine (ATARAX) 25 MG tablet Take 1 tablet (25 mg) by mouth 3 times daily as needed for itching (Patient not taking: Reported on 6/26/2023) 12 tablet 0     ibuprofen (ADVIL/MOTRIN) 600 MG tablet  (Patient not taking: Reported on 8/4/2023)       losartan-hydrochlorothiazide (HYZAAR) 50-12.5 MG tablet TAKE 1 TABLET BY MOUTH DAILY 90 tablet 1     metFORMIN (GLUCOPHAGE XR) 500 MG 24 hr tablet TAKE TWO TABLETS BY MOUTH TWICE A DAY WITH MEALS 360 tablet 3     nystatin (MYCOSTATIN) 444487 UNIT/GM external cream Apply topically 2 times daily 30 g 0     ondansetron (ZOFRAN ODT) 4 MG ODT tab Take 1-2 tablets (4-8 mg) by mouth every 8 hours as needed for nausea (Patient not taking:  Reported on 6/26/2023) 10 tablet 0     ondansetron (ZOFRAN ODT) 4 MG ODT tab Take 1-2 tablets (4-8 mg) by mouth every 8 hours as needed for nausea (Patient not taking: Reported on 6/26/2023) 12 tablet 0     oxyCODONE (ROXICODONE) 5 MG tablet Take 1 tablet (5 mg) by mouth every 6 hours as needed for severe pain (Patient not taking: Reported on 12/12/2023) 12 tablet 0     Facility-Administered Encounter Medications as of 3/7/2024   Medication Dose Route Frequency Provider Last Rate Last Admin     [COMPLETED] triamcinolone acetonide (KENALOG-10) injection 10 mg  10 mg Intra-Lesional Once Sabi Bustos PA-C   10 mg at 03/07/24 1257             O:   NAD, WDWN, Alert & Oriented, Mood & Affect wnl, Vitals stable   Here today alone   There were no vitals taken for this visit.   General appearance normal   Vitals stable   Alert, oriented and in no acute distress      Inflamed nodule on left neck       Eyes: Conjunctivae/lids:Normal     ENT: Lips: normal    MSK:Normal    Pulm: Breathing Normal    Neuro/Psych: Orientation:Alert and Orientedx3 ; Mood/Affect:normal     A/P:  1. Inflamed HS on left posterior neck  IL TAC: PGACAC discussed.  Risks including but not limited to injection site reaction, bruising, no resolution.  All questions answered and entertained to patient s satisfaction.  Informed consent obtained.  IL TAC in concentration of 10mg/ml was injected ID to HS on neck.  Total injected was  0.9 ml.  Patient tolerated without complications and given wound care instructions, including not to move product around.  Return in 4 weeks for follow-up and possible additional IL TAC.        Again, thank you for allowing me to participate in the care of your patient.        Sincerely,        Sabi Bustos PA-C

## 2024-03-07 NOTE — TELEPHONE ENCOUNTER
Patient Contact    Attempt # 1    Was call answered?  Yes    Writer called patient after talking with Sabi in regards to a [employer] appt. Patient accepted.     Amy Iniguez LPN   [facility]   [phone]

## 2024-03-07 NOTE — PROGRESS NOTES
Sean Woodard is an extremely pleasant 44 year old year old male patient here today for inflamed HS on left neck. He would like steroid injection today. Currently on 80 mg of Humira weekly. Patient has no other skin complaints today.  Remainder of the HPI, Meds, PMH, Allergies, FH, and SH was reviewed in chart.    Pertinent Hx:   HS  Past Medical History:   Diagnosis Date    Diabetes (H)     Hypertension     Obese     FER (obstructive sleep apnea)     Sleep apnea        Past Surgical History:   Procedure Laterality Date    EXCISE HIDRADENITIS (LOCATION) Left 5/21/2021    Procedure: Sharp excisional debridement of left axillary hidradenitis.;  Surgeon: Shahnaz Garibay MD;  Location: [facility] OR    EXCISE HIDRADENITIS (LOCATION) Bilateral 10/4/2021    Procedure: EXCISION, HIDRADENITIS - bilateral axillae, bilateral groin;  Surgeon: Shahnaz Garibay MD;  Location: [facility]    EXCISE HIDRADENITIS (LOCATION) Left 2/17/2023    Procedure: EXCISION, HIDRADENITIS, LEFT AXILLA,;  Surgeon: Shahnaz Garibay MD;  Location: [facility]    ORTHOPEDIC SURGERY  1/1/2005    RT knee arthroscopic menisectomy    Thumb Surgery  2009    Mass removal neuroma        Family History   Problem Relation Age of Onset    Heart Defect Father         valve replacement    Diabetes Maternal Grandmother     Cancer Maternal Grandfather         stomach    Hypertension Brother     Diabetes Sister     Cerebrovascular Disease Brother 38    Hypertension Brother     Melanoma No family hx of     Skin Cancer No family hx of        Social History     Socioeconomic History    Marital status:      Spouse name: Joycelyn Stokes    Number of children: 1    Years of education: 12+    Highest education level: Not on file   Occupational History    Occupation: Adjucations officer     Employer: [employer]   Tobacco Use    Smoking status: Former     Packs/day: 0.50     Years: 5.00     Additional pack years: 0.00     Total pack years:  2.50     Types: Cigarettes     Quit date: 2005     Years since quittin.1    Smokeless tobacco: Never   Substance and Sexual Activity    Alcohol use: Yes     Comment: Social. 1 drink per month    Drug use: No    Sexual activity: Yes     Partners: Female   Other Topics Concern    Parent/sibling w/ CABG, MI or angioplasty before 65F 55M? No   Social History Narrative    2019    ENVIRONMENTAL HISTORY: The family [place] in a 9 year old [place]. The home is heated with a forced air. They do have central air conditioning. The patient's bedroom is furnished with carpeting in bedroom. No pets inside the house. There is no history of cockroach or mice infestation. There are no smokers in the house.  The house does not have a damp basement.      Social Determinants of Health     Financial Resource Strain: Not on file   Food Insecurity: Not on file   Transportation Needs: Not on file   Physical Activity: Not on file   Stress: Not on file   Social Connections: Not on file   Interpersonal Safety: Not on file   Housing Stability: Not on file       Outpatient Encounter Medications as of 3/7/2024   Medication Sig Dispense Refill    adalimumab (HUMIRA *CF*) 80 MG/0.8ML pen kit Inject 0.8 mLs (80 mg) Subcutaneous once a week 3.2 mL 11    atorvastatin (LIPITOR) 20 MG tablet TAKE ONE TABLET BY MOUTH ONCE DAILY 90 tablet 3    augmented betamethasone dipropionate (DIPROLENE-AF) 0.05 % ointment Apply sparingly to affected area twice daily as needed.  Do not apply to face. 45 g 1    azelastine (ASTELIN) 0.1 % nasal spray Spray 2 sprays into both nostrils 2 times daily as needed for rhinitis 30 mL 3    azelastine (OPTIVAR) 0.05 % ophthalmic solution Apply 1 drop to eye 2 times daily (Patient not taking: Reported on 2023) 6 mL 3    benzoyl peroxide 5 % external liquid Apply topically daily 226 g 11    blood glucose (ACCU-CHEK GUIDE) test strip Use to test blood sugar 2 times daily (Patient not taking:  Reported on 12/7/2023) 100 strip 11    clindamycin (CLEOCIN T) 1 % external lotion Apply topically 2 times daily 60 mL 11    clindamycin-benzoyl peroxide (BENZACLIN) 1-5 % external gel Apply topically 2 times daily 50 g 5    diphenhydrAMINE (BENADRYL) 25 MG capsule Take 25 mg by mouth every 6 hours as needed for itching or allergies (Patient not taking: Reported on 12/12/2023)      fluticasone (FLONASE) 50 MCG/ACT nasal spray Spray 2 sprays into both nostrils daily (Patient not taking: Reported on 12/12/2023) 16 g 3    hydrOXYzine (ATARAX) 25 MG tablet Take 1 tablet (25 mg) by mouth 3 times daily as needed for itching (Patient not taking: Reported on 6/26/2023) 12 tablet 0    ibuprofen (ADVIL/MOTRIN) 600 MG tablet  (Patient not taking: Reported on 8/4/2023)      losartan-hydrochlorothiazide (HYZAAR) 50-12.5 MG tablet TAKE 1 TABLET BY MOUTH DAILY 90 tablet 1    metFORMIN (GLUCOPHAGE XR) 500 MG 24 hr tablet TAKE TWO TABLETS BY MOUTH TWICE A DAY WITH MEALS 360 tablet 3    nystatin (MYCOSTATIN) 164066 UNIT/GM external cream Apply topically 2 times daily 30 g 0    ondansetron (ZOFRAN ODT) 4 MG ODT tab Take 1-2 tablets (4-8 mg) by mouth every 8 hours as needed for nausea (Patient not taking: Reported on 6/26/2023) 10 tablet 0    ondansetron (ZOFRAN ODT) 4 MG ODT tab Take 1-2 tablets (4-8 mg) by mouth every 8 hours as needed for nausea (Patient not taking: Reported on 6/26/2023) 12 tablet 0    oxyCODONE (ROXICODONE) 5 MG tablet Take 1 tablet (5 mg) by mouth every 6 hours as needed for severe pain (Patient not taking: Reported on 12/12/2023) 12 tablet 0     Facility-Administered Encounter Medications as of 3/7/2024   Medication Dose Route Frequency Provider Last Rate Last Admin    [COMPLETED] triamcinolone acetonide (KENALOG-10) injection 10 mg  10 mg Intra-Lesional Once Sabi Bustos PA-C   10 mg at 03/07/24 1257             O:   NAD, WDWN, Alert & Oriented, Mood & Affect wnl, Vitals stable   Here today  alone   There were no vitals taken for this visit.   General appearance normal   Vitals stable   Alert, oriented and in no acute distress      Inflamed nodule on left neck       Eyes: Conjunctivae/lids:Normal     ENT: Lips: normal    MSK:Normal    Pulm: Breathing Normal    Neuro/Psych: Orientation:Alert and Orientedx3 ; Mood/Affect:normal     A/P:  1. Inflamed HS on left posterior neck  IL TAC: PGACAC discussed.  Risks including but not limited to injection site reaction, bruising, no resolution.  All questions answered and entertained to patient s satisfaction.  Informed consent obtained.  IL TAC in concentration of 10mg/ml was injected ID to HS on neck.  Total injected was  0.9 ml.  Patient tolerated without complications and given wound care instructions, including not to move product around.  Return in 4 weeks for follow-up and possible additional IL TAC.

## 2024-03-21 DIAGNOSIS — I10 BENIGN ESSENTIAL HYPERTENSION: ICD-10-CM

## 2024-03-21 NOTE — TELEPHONE ENCOUNTER
Requested Prescriptions   Pending Prescriptions Disp Refills    losartan-hydrochlorothiazide (HYZAAR) 50-12.5 MG tablet [Pharmacy Med Name: LOSARTAN POTASSIUM-HCT 50-12.5 TABS] 90 tablet 1     Sig: TAKE ONE TABLET BY MOUTH ONCE DAILY       Angiotensin-II Receptors Failed - 3/21/2024  9:59 AM        Failed - Recent (12 mo) or future (90days) visit within the authorizing provider's specialty     The patient must have completed an in-person or virtual visit within the past 12 months or has a future visit scheduled within the next 90 days with the authorizing provider s specialty.  Urgent care and e-visits do not quality as an office visit for this protocol.          Passed - Last blood pressure under 140/90 in past 12 months     BP Readings from Last 3 Encounters:   11/16/23 137/85   11/09/23 124/73   05/18/23 137/83                 Passed - Medication is active on med list        Passed - Has GFR on file in past 12 months and most recent value is normal        Passed - Medication indicated for associated diagnosis     The medication is prescribed for one or more of the following conditions:    Chronic Kidney Disease (CDK)    Heart Failure (HF)    Diabetes, Nephropathy   Hypertension    Coronary Artery Disease (CAD)   Raynaud's Disease          Passed - Patient is age 18 or older        Passed - Normal serum potassium on file in past 12 months     Recent Labs   Lab Test 11/09/23  0919   POTASSIUM 3.7                   Diuretics (Including Combos) Protocol Failed - 3/21/2024  9:59 AM        Failed - Recent (12 mo) or future (90 days) visit within the authorizing provider's specialty     The patient must have completed an in-person or virtual visit within the past 12 months or has a future visit scheduled within the next 90 days with the authorizing provider s specialty.  Urgent care and e-visits do not quality as an office visit for this protocol.          Passed - Blood pressure under 140/90 in past 12 months     BP  Readings from Last 3 Encounters:   11/16/23 137/85   11/09/23 124/73   05/18/23 137/83                 Passed - Medication is active on med list        Passed - Medication indicated for associated diagnosis     Medication is associated with one or more of the following diagnoses:     Edema   Hypertension   Heart Failure   Meniere's Disease          Passed - Has GFR on file in past 12 months and most recent value is normal        Passed - Patient is age 18 or older          Last Written Prescription Date:  9/26/23  Last Fill Quantity: 90,  # refills: 1   Last office visit: 2/3/2023 ; last virtual visit: Visit date not found with prescribing provider:     Future Office Visit:  none    Julie Behrendt RN

## 2024-03-22 RX ORDER — LOSARTAN POTASSIUM AND HYDROCHLOROTHIAZIDE 12.5; 5 MG/1; MG/1
1 TABLET ORAL DAILY
Qty: 90 TABLET | Refills: 1 | Status: SHIPPED | OUTPATIENT
Start: 2024-03-22 | End: 2024-06-11

## 2024-03-26 ENCOUNTER — TELEPHONE (OUTPATIENT)
Dept: DERMATOLOGY | Facility: CLINIC | Age: 44
End: 2024-03-26
Payer: COMMERCIAL

## 2024-03-27 ENCOUNTER — MYC MEDICAL ADVICE (OUTPATIENT)
Dept: DERMATOLOGY | Facility: CLINIC | Age: 44
End: 2024-03-27

## 2024-03-27 ENCOUNTER — OFFICE VISIT (OUTPATIENT)
Dept: DERMATOLOGY | Facility: CLINIC | Age: 44
End: 2024-03-27
Payer: COMMERCIAL

## 2024-03-27 DIAGNOSIS — Z51.81 MEDICATION MONITORING ENCOUNTER: ICD-10-CM

## 2024-03-27 DIAGNOSIS — L73.2 HIDRADENITIS SUPPURATIVA: Primary | ICD-10-CM

## 2024-03-27 LAB
ALBUMIN SERPL BCG-MCNC: 4.7 G/DL (ref 3.5–5.2)
ALP SERPL-CCNC: 81 U/L (ref 40–150)
ALT SERPL W P-5'-P-CCNC: 37 U/L (ref 0–70)
ANION GAP SERPL CALCULATED.3IONS-SCNC: 14 MMOL/L (ref 7–15)
AST SERPL W P-5'-P-CCNC: 29 U/L (ref 0–45)
BASOPHILS # BLD AUTO: 0.1 10E3/UL (ref 0–0.2)
BASOPHILS NFR BLD AUTO: 1 %
BILIRUB SERPL-MCNC: 0.9 MG/DL
BUN SERPL-MCNC: 14.7 MG/DL (ref 6–20)
CALCIUM SERPL-MCNC: 10.3 MG/DL (ref 8.6–10)
CHLORIDE SERPL-SCNC: 98 MMOL/L (ref 98–107)
CREAT SERPL-MCNC: 0.99 MG/DL (ref 0.67–1.17)
DEPRECATED HCO3 PLAS-SCNC: 24 MMOL/L (ref 22–29)
EGFRCR SERPLBLD CKD-EPI 2021: >90 ML/MIN/1.73M2
EOSINOPHIL # BLD AUTO: 0.1 10E3/UL (ref 0–0.7)
EOSINOPHIL NFR BLD AUTO: 1 %
ERYTHROCYTE [DISTWIDTH] IN BLOOD BY AUTOMATED COUNT: 12.4 % (ref 10–15)
GLUCOSE SERPL-MCNC: 220 MG/DL (ref 70–99)
HCT VFR BLD AUTO: 47.7 % (ref 40–53)
HGB BLD-MCNC: 15.9 G/DL (ref 13.3–17.7)
IMM GRANULOCYTES # BLD: 0 10E3/UL
IMM GRANULOCYTES NFR BLD: 0 %
LYMPHOCYTES # BLD AUTO: 1.2 10E3/UL (ref 0.8–5.3)
LYMPHOCYTES NFR BLD AUTO: 14 %
MCH RBC QN AUTO: 29.9 PG (ref 26.5–33)
MCHC RBC AUTO-ENTMCNC: 33.3 G/DL (ref 31.5–36.5)
MCV RBC AUTO: 90 FL (ref 78–100)
MONOCYTES # BLD AUTO: 0.7 10E3/UL (ref 0–1.3)
MONOCYTES NFR BLD AUTO: 8 %
NEUTROPHILS # BLD AUTO: 6.1 10E3/UL (ref 1.6–8.3)
NEUTROPHILS NFR BLD AUTO: 75 %
NRBC # BLD AUTO: 0 10E3/UL
NRBC BLD AUTO-RTO: 0 /100
PLATELET # BLD AUTO: 252 10E3/UL (ref 150–450)
POTASSIUM SERPL-SCNC: 4 MMOL/L (ref 3.4–5.3)
PROT SERPL-MCNC: 8.9 G/DL (ref 6.4–8.3)
RBC # BLD AUTO: 5.32 10E6/UL (ref 4.4–5.9)
SODIUM SERPL-SCNC: 136 MMOL/L (ref 135–145)
WBC # BLD AUTO: 8.1 10E3/UL (ref 4–11)

## 2024-03-27 PROCEDURE — 11900 INJECT SKIN LESIONS </W 7: CPT | Performed by: PHYSICIAN ASSISTANT

## 2024-03-27 PROCEDURE — 36415 COLL VENOUS BLD VENIPUNCTURE: CPT | Performed by: PHYSICIAN ASSISTANT

## 2024-03-27 PROCEDURE — 80053 COMPREHEN METABOLIC PANEL: CPT | Performed by: PHYSICIAN ASSISTANT

## 2024-03-27 PROCEDURE — 85025 COMPLETE CBC W/AUTO DIFF WBC: CPT | Performed by: PHYSICIAN ASSISTANT

## 2024-03-27 PROCEDURE — 99214 OFFICE O/P EST MOD 30 MIN: CPT | Mod: 25 | Performed by: PHYSICIAN ASSISTANT

## 2024-03-27 PROCEDURE — 86481 TB AG RESPONSE T-CELL SUSP: CPT | Performed by: PHYSICIAN ASSISTANT

## 2024-03-27 RX ORDER — DOXYCYCLINE 100 MG/1
100 CAPSULE ORAL 2 TIMES DAILY
Qty: 180 CAPSULE | Refills: 0 | Status: SHIPPED | OUTPATIENT
Start: 2024-03-27 | End: 2024-06-28

## 2024-03-27 NOTE — PROGRESS NOTES
[facility] Dermatology Note  Encounter Date: Mar 27, 2024  Office Visit      Dermatology Problem List:  1. Hidradenitis suppurativa - Hamilton stage III involving axillae > neck, groin.              - s/p WLE (BL axillae, groin crease) 10/4/21 w/ Dr. Garibay              - current treatment: adalimumab 80 mg weekly, doxycycline               - past treatment: doxycycline, other oral antibiotics, isotretinoin, adalimumab, infliximab (neuropathy), ustekinumab (insurance issues), oral clindamycin   - Pending labs results: TB gold, CMP, and CBC   - ILK received: 3/7/24, 3/27/24  ____________________________________________    Assessment & Plan:  # Hidradenitis suppurativa, Hamilton stage III involving axillae > neck, groin. - flaring today on L neck and L cheek. Previously had been quiet with very few flares for several months.  - Continue use of Humira 80 mg weekly.   - Started him on doxycycline BID x 3 months given recent flare ups. Plan to either decrease to 100mg daily or ideally 50mg or off if possible at follow up visit with Dr. Avina, but given recent flares sarthak try and minimize these for him because they have salvador terribly painful  - Labs ordered: TB Gold, CBC, CMP   - ILK to neck and L cheek received today, see procedure note below  - he did have ILK to his neck 20 days ago with ABRIL Bustos PA-C in [place] derm clinic, and I did speak with him that this is somewhat soon to have another ILK injection, but given his pain and the fact that the previous one helped so much would be willing to repeat this again today. Ideally we would want to space these out over 1mo if they are in the same location.    Procedures Performed:   - Intra-lesional triamcinolone procedure note. After verbal consent and review of risk of pain and skin thinning/atrophy, positioning and cleansing with isopropyl alcohol,  1.5 total mL of triamcinolone 10 mg/mL was injected into 2 lesion(s) on the L jaw line and L neck. The  patient tolerated the procedure well and left the dermatology clinic in good condition.     Follow-up: 3 month(s) in-person, or earlier for new or changing lesions    Staff and scribe:    Scribe Disclosure:   I, HECTOR THOMAS, am serving as a scribe; to document services personally performed by Mady Rose PA-C -based on data collection and the provider's statements to me.     Provider Disclosure:  I agree with above History, Review of Systems, Physical exam and Plan.  I have reviewed the content of the documentation and have edited it as needed. I have personally performed the services documented here and the documentation accurately represents those services and the decisions I have made.      Electronically signed by:    All risks, benefits and alternatives were discussed with patient.  Patient is in agreement and understands the assessment and plan.  All questions were answered.    Mady Rose PA-C, [facility]  [facility]: Phone: [phone], Fax: [phone]  [facility]: Phone: [phone],  Fax: [phone]  [facility]: Phone: [phone], Fax: [phone]  ____________________________________________    CC: Derm Problem (HS flare up on the neck)      Reviewed patients past medical history and pertinent chart review prior to patient's visit today.     HPI:  Mr. Sean Woodard is a 44 year old male who presents today as a return patient for HS follow up.     Today patient reported a severe flare up on his neck. Flaring occurred over the weekend, has noticed some draining. Last seen by [place] ABRIL Palacios PA-C on 3/7/24. He did have ILK to a lesion on the neck at that time. However aside from these two flare ups of the neck, he has been doing really well with very few flares recently. Neck is painful, difficult for him to hold neck up straight. Has not had TB labs recently.     Patient  is otherwise feeling well, without additional concerns.    Labs:  TB Gold, CBC, CMP ordered today    Physical Exam:  Vitals: There were no vitals taken for this visit.  SKIN: Focused examination of face, neck and forearms was performed.   - L posterior neck there is a 2. 5 x 1 cm inflammatory, tender nodule. Not actively draining.   - L cheek there is a large, 3x3cm tender nodule  - No other lesions of concern on areas examined.     Medications:  Current Outpatient Medications   Medication    adalimumab (HUMIRA *CF*) 80 MG/0.8ML pen kit    atorvastatin (LIPITOR) 20 MG tablet    augmented betamethasone dipropionate (DIPROLENE-AF) 0.05 % ointment    azelastine (ASTELIN) 0.1 % nasal spray    azelastine (OPTIVAR) 0.05 % ophthalmic solution    clindamycin-benzoyl peroxide (BENZACLIN) 1-5 % external gel    diphenhydrAMINE (BENADRYL) 25 MG capsule    doxycycline monohydrate (MONODOX) 100 MG capsule    fluticasone (FLONASE) 50 MCG/ACT nasal spray    ibuprofen (ADVIL/MOTRIN) 600 MG tablet    losartan-hydrochlorothiazide (HYZAAR) 50-12.5 MG tablet    metFORMIN (GLUCOPHAGE XR) 500 MG 24 hr tablet    nystatin (MYCOSTATIN) 955200 UNIT/GM external cream    benzoyl peroxide 5 % external liquid    blood glucose (ACCU-CHEK GUIDE) test strip    clindamycin (CLEOCIN T) 1 % external lotion    hydrOXYzine (ATARAX) 25 MG tablet    ondansetron (ZOFRAN ODT) 4 MG ODT tab    ondansetron (ZOFRAN ODT) 4 MG ODT tab    oxyCODONE (ROXICODONE) 5 MG tablet     No current facility-administered medications for this visit.      Past Medical/Surgical History:   Patient Active Problem List   Diagnosis    Health Care Home    Hyperlipidemia with target LDL less than 100    Benign essential hypertension    PTSD (post-traumatic stress disorder)    Class 3 severe obesity due to excess calories with serious comorbidity in adult (H)    Diabetes mellitus, type 2 (H)    FER (obstructive sleep apnea)    Hidradenitis suppurativa    Hidradenitis axillaris    Fatty  liver    UTI (urinary tract infection)    Left ureteral stone    Lab test negative for COVID-19 virus    Acute bronchitis    Allergic rhinitis    Benign neoplasm of skin    Effusion of joint    Encounter for other physical therapy    Lesion of penis    Low back pain    Acne    Atypical chest pain    Pain in joint, lower leg    Pain of finger    Screening examination for pulmonary tuberculosis    Sebaceous cyst    Epidermoid cyst of skin    Tear of medial cartilage or meniscus of knee, current    Tinnitus    Borderline diabetes mellitus    Arthralgia of knee    Open wound of left axillary region     Past Medical History:   Diagnosis Date    Diabetes (H)     Hypertension     Obese     FER (obstructive sleep apnea)     Sleep apnea

## 2024-03-27 NOTE — NURSING NOTE
Sean Woodard's goals for this visit include:   Chief Complaint   Patient presents with    Derm Problem     HS flare up on the neck       He requests these members of his care team be copied on today's visit information:     PCP: Susanne Alberto    Referring Provider:  No referring provider defined for this encounter.    There were no vitals taken for this visit.    Do you need any medication refills at today's visit?       Amelie Casiano LPN on 3/27/2024 at 12:40 PM

## 2024-03-27 NOTE — LETTER
3/27/2024         RE: Sean Woodard  [address] [facility] [address]        Dear Colleague,    Thank you for referring your patient, Sean Woodard, to the [facility]. Please see a copy of my visit note below.    [facility] Dermatology Note  Encounter Date: Mar 27, 2024  Office Visit      Dermatology Problem List:  1. Hidradenitis suppurativa - Hamilton stage III involving axillae > neck, groin.              - s/p WLE (BL axillae, groin crease) 10/4/21 w/ Dr. Garibay              - current treatment: adalimumab 80 mg weekly, doxycycline               - past treatment: doxycycline, other oral antibiotics, isotretinoin, adalimumab, infliximab (neuropathy), ustekinumab (insurance issues), oral clindamycin   - Pending labs results: TB gold, CMP, and CBC   - ILK received: 3/7/24, 3/27/24  ____________________________________________    Assessment & Plan:  # Hidradenitis suppurativa, Hamilton stage III involving axillae > neck, groin. - flaring today on L neck and L cheek. Previously had been quiet with very few flares for several months.  - Continue use of Humira 80 mg weekly.   - Started him on doxycycline BID x 3 months given recent flare ups. Plan to either decrease to 100mg daily or ideally 50mg or off if possible at follow up visit with Dr. Avina, but given recent flares sarthak try and minimize these for him because they have salvador terribly painful  - Labs ordered: TB Gold, CBC, CMP   - ILK to neck and L cheek received today, see procedure note below  - he did have ILK to his neck 20 days ago with ABRIL Bustos PA-C in [place] derm clinic, and I did speak with him that this is somewhat soon to have another ILK injection, but given his pain and the fact that the previous one helped so much would be willing to repeat this again today. Ideally we would want to space these out over 1mo if they are in the same location.    Procedures Performed:   - Intra-lesional  triamcinolone procedure note. After verbal consent and review of risk of pain and skin thinning/atrophy, positioning and cleansing with isopropyl alcohol,  1.5 total mL of triamcinolone 10 mg/mL was injected into 2 lesion(s) on the L jaw line and L neck. The patient tolerated the procedure well and left the dermatology clinic in good condition.     Follow-up: 3 month(s) in-person, or earlier for new or changing lesions    Staff and scribe:    Scribe Disclosure:   I, HECTOR THOMAS, am serving as a scribe; to document services personally performed by Mady Rose PA-C -based on data collection and the provider's statements to me.     Provider Disclosure:  I agree with above History, Review of Systems, Physical exam and Plan.  I have reviewed the content of the documentation and have edited it as needed. I have personally performed the services documented here and the documentation accurately represents those services and the decisions I have made.      Electronically signed by:    All risks, benefits and alternatives were discussed with patient.  Patient is in agreement and understands the assessment and plan.  All questions were answered.    Mady Rose PA-C, MPAS  [facility]: Phone: [phone], Fax: [phone]  [facility]: Phone: [phone],  Fax: [phone]  [facility]: Phone: [phone], Fax: [phone]  ____________________________________________    CC: Derm Problem (HS flare up on the neck)      Reviewed patients past medical history and pertinent chart review prior to patient's visit today.     HPI:  Mr. Sean Woodard is a 44 year old male who presents today as a return patient for HS follow up.     Today patient reported a severe flare up on his neck. Flaring occurred over the weekend, has noticed some draining. Last seen by [place] Suzanne ROBERTS, ABRIL Bustos on 3/7/24. He did have  ILK to a lesion on the neck at that time. However aside from these two flare ups of the neck, he has been doing really well with very few flares recently. Neck is painful, difficult for him to hold neck up straight. Has not had TB labs recently.     Patient is otherwise feeling well, without additional concerns.    Labs:  TB Gold, CBC, CMP ordered today    Physical Exam:  Vitals: There were no vitals taken for this visit.  SKIN: Focused examination of face, neck and forearms was performed.   - L posterior neck there is a 2. 5 x 1 cm inflammatory, tender nodule. Not actively draining.   - L cheek there is a large, 3x3cm tender nodule  - No other lesions of concern on areas examined.     Medications:  Current Outpatient Medications   Medication     adalimumab (HUMIRA *CF*) 80 MG/0.8ML pen kit     atorvastatin (LIPITOR) 20 MG tablet     augmented betamethasone dipropionate (DIPROLENE-AF) 0.05 % ointment     azelastine (ASTELIN) 0.1 % nasal spray     azelastine (OPTIVAR) 0.05 % ophthalmic solution     clindamycin-benzoyl peroxide (BENZACLIN) 1-5 % external gel     diphenhydrAMINE (BENADRYL) 25 MG capsule     doxycycline monohydrate (MONODOX) 100 MG capsule     fluticasone (FLONASE) 50 MCG/ACT nasal spray     ibuprofen (ADVIL/MOTRIN) 600 MG tablet     losartan-hydrochlorothiazide (HYZAAR) 50-12.5 MG tablet     metFORMIN (GLUCOPHAGE XR) 500 MG 24 hr tablet     nystatin (MYCOSTATIN) 077116 UNIT/GM external cream     benzoyl peroxide 5 % external liquid     blood glucose (ACCU-CHEK GUIDE) test strip     clindamycin (CLEOCIN T) 1 % external lotion     hydrOXYzine (ATARAX) 25 MG tablet     ondansetron (ZOFRAN ODT) 4 MG ODT tab     ondansetron (ZOFRAN ODT) 4 MG ODT tab     oxyCODONE (ROXICODONE) 5 MG tablet     No current facility-administered medications for this visit.      Past Medical/Surgical History:   Patient Active Problem List   Diagnosis     Health Care Home     Hyperlipidemia with target LDL less than 100     Benign  essential hypertension     PTSD (post-traumatic stress disorder)     Class 3 severe obesity due to excess calories with serious comorbidity in adult (H)     Diabetes mellitus, type 2 (H)     FER (obstructive sleep apnea)     Hidradenitis suppurativa     Hidradenitis axillaris     Fatty liver     UTI (urinary tract infection)     Left ureteral stone     Lab test negative for COVID-19 virus     Acute bronchitis     Allergic rhinitis     Benign neoplasm of skin     Effusion of joint     Encounter for other physical therapy     Lesion of penis     Low back pain     Acne     Atypical chest pain     Pain in joint, lower leg     Pain of finger     Screening examination for pulmonary tuberculosis     Sebaceous cyst     Epidermoid cyst of skin     Tear of medial cartilage or meniscus of knee, current     Tinnitus     Borderline diabetes mellitus     Arthralgia of knee     Open wound of left axillary region     Past Medical History:   Diagnosis Date     Diabetes (H)      Hypertension      Obese      FER (obstructive sleep apnea)      Sleep apnea                         Again, thank you for allowing me to participate in the care of your patient.        Sincerely,        Mady Rose PA-C

## 2024-03-27 NOTE — NURSING NOTE
Drug Administration Record    Prior to injection, verified patient identity using patient's name and date of birth.  Due to injection administration, patient instructed to remain in clinic for 15 minutes  afterwards, and to report any adverse reaction to me immediately.    Drug Name: triamcinolone acetonide(kenalog)  Dose: mL  Route administered: ID  NDC #: 3510-3794-83  Amount of waste(mL):None  Reason for waste: Multi dose vial     LOT #: [id number]  SITE:   : [employer]  EXPIRATION DATE: 12/2025

## 2024-03-29 LAB
GAMMA INTERFERON BACKGROUND BLD IA-ACNC: 0.03 IU/ML
M TB IFN-G BLD-IMP: NEGATIVE
M TB IFN-G CD4+ BCKGRND COR BLD-ACNC: 9.97 IU/ML
MITOGEN IGNF BCKGRD COR BLD-ACNC: -0.01 IU/ML
MITOGEN IGNF BCKGRD COR BLD-ACNC: 0.02 IU/ML
QUANTIFERON MITOGEN: 10 IU/ML
QUANTIFERON NIL TUBE: 0.03 IU/ML
QUANTIFERON TB1 TUBE: 0.05 IU/ML
QUANTIFERON TB2 TUBE: 0.02

## 2024-04-07 ENCOUNTER — TRANSFERRED RECORDS (OUTPATIENT)
Dept: MULTI SPECIALTY CLINIC | Facility: CLINIC | Age: 44
End: 2024-04-07

## 2024-04-07 LAB — RETINOPATHY: NORMAL

## 2024-04-27 ENCOUNTER — HEALTH MAINTENANCE LETTER (OUTPATIENT)
Age: 44
End: 2024-04-27

## 2024-05-30 DIAGNOSIS — E66.01 CLASS 3 SEVERE OBESITY WITH BODY MASS INDEX (BMI) OF 40.0 TO 44.9 IN ADULT, UNSPECIFIED OBESITY TYPE, UNSPECIFIED WHETHER SERIOUS COMORBIDITY PRESENT (H): ICD-10-CM

## 2024-05-30 DIAGNOSIS — E11.9 TYPE 2 DIABETES MELLITUS WITHOUT COMPLICATION, WITHOUT LONG-TERM CURRENT USE OF INSULIN (H): ICD-10-CM

## 2024-05-30 DIAGNOSIS — E66.813 CLASS 3 SEVERE OBESITY WITH BODY MASS INDEX (BMI) OF 40.0 TO 44.9 IN ADULT, UNSPECIFIED OBESITY TYPE, UNSPECIFIED WHETHER SERIOUS COMORBIDITY PRESENT (H): ICD-10-CM

## 2024-05-31 RX ORDER — METFORMIN HCL 500 MG
TABLET, EXTENDED RELEASE 24 HR ORAL
Qty: 360 TABLET | Refills: 3 | Status: SHIPPED | OUTPATIENT
Start: 2024-05-31 | End: 2024-06-11

## 2024-06-11 ENCOUNTER — OFFICE VISIT (OUTPATIENT)
Dept: FAMILY MEDICINE | Facility: CLINIC | Age: 44
End: 2024-06-11
Payer: COMMERCIAL

## 2024-06-11 ENCOUNTER — TELEPHONE (OUTPATIENT)
Dept: DERMATOLOGY | Facility: CLINIC | Age: 44
End: 2024-06-11

## 2024-06-11 VITALS
HEIGHT: 68 IN | WEIGHT: 263 LBS | HEART RATE: 77 BPM | TEMPERATURE: 97.6 F | DIASTOLIC BLOOD PRESSURE: 78 MMHG | OXYGEN SATURATION: 98 % | SYSTOLIC BLOOD PRESSURE: 128 MMHG | BODY MASS INDEX: 39.86 KG/M2

## 2024-06-11 DIAGNOSIS — J02.9 SORE THROAT: ICD-10-CM

## 2024-06-11 DIAGNOSIS — E66.01 MORBID OBESITY (H): ICD-10-CM

## 2024-06-11 DIAGNOSIS — Z00.00 ROUTINE GENERAL MEDICAL EXAMINATION AT A HEALTH CARE FACILITY: Primary | ICD-10-CM

## 2024-06-11 DIAGNOSIS — E78.5 HYPERLIPIDEMIA WITH TARGET LDL LESS THAN 100: ICD-10-CM

## 2024-06-11 DIAGNOSIS — E66.813 CLASS 3 SEVERE OBESITY WITH BODY MASS INDEX (BMI) OF 40.0 TO 44.9 IN ADULT, UNSPECIFIED OBESITY TYPE, UNSPECIFIED WHETHER SERIOUS COMORBIDITY PRESENT (H): ICD-10-CM

## 2024-06-11 DIAGNOSIS — Z11.4 SCREENING FOR HIV (HUMAN IMMUNODEFICIENCY VIRUS): ICD-10-CM

## 2024-06-11 DIAGNOSIS — E11.65 TYPE 2 DIABETES MELLITUS WITH HYPERGLYCEMIA, WITHOUT LONG-TERM CURRENT USE OF INSULIN (H): ICD-10-CM

## 2024-06-11 DIAGNOSIS — I10 BENIGN ESSENTIAL HYPERTENSION: ICD-10-CM

## 2024-06-11 DIAGNOSIS — E11.9 TYPE 2 DIABETES MELLITUS WITHOUT COMPLICATION, WITHOUT LONG-TERM CURRENT USE OF INSULIN (H): ICD-10-CM

## 2024-06-11 DIAGNOSIS — R07.89 ATYPICAL CHEST PAIN: ICD-10-CM

## 2024-06-11 DIAGNOSIS — R21 RASH AND NONSPECIFIC SKIN ERUPTION: ICD-10-CM

## 2024-06-11 DIAGNOSIS — E66.01 CLASS 3 SEVERE OBESITY WITH BODY MASS INDEX (BMI) OF 40.0 TO 44.9 IN ADULT, UNSPECIFIED OBESITY TYPE, UNSPECIFIED WHETHER SERIOUS COMORBIDITY PRESENT (H): ICD-10-CM

## 2024-06-11 LAB
ANION GAP SERPL CALCULATED.3IONS-SCNC: 13 MMOL/L (ref 7–15)
BUN SERPL-MCNC: 16.1 MG/DL (ref 6–20)
CALCIUM SERPL-MCNC: 9.6 MG/DL (ref 8.6–10)
CHLORIDE SERPL-SCNC: 103 MMOL/L (ref 98–107)
CHOLEST SERPL-MCNC: 158 MG/DL
CREAT SERPL-MCNC: 1 MG/DL (ref 0.67–1.17)
CREAT UR-MCNC: 347.3 MG/DL
DEPRECATED HCO3 PLAS-SCNC: 23 MMOL/L (ref 22–29)
DEPRECATED S PYO AG THROAT QL EIA: NEGATIVE
EGFRCR SERPLBLD CKD-EPI 2021: >90 ML/MIN/1.73M2
FASTING STATUS PATIENT QL REPORTED: YES
FASTING STATUS PATIENT QL REPORTED: YES
GLUCOSE SERPL-MCNC: 156 MG/DL (ref 70–99)
GROUP A STREP BY PCR: NOT DETECTED
HBA1C MFR BLD: 7.8 % (ref 0–5.6)
HDLC SERPL-MCNC: 40 MG/DL
HIV 1+2 AB+HIV1 P24 AG SERPL QL IA: NONREACTIVE
LDLC SERPL CALC-MCNC: 89 MG/DL
MICROALBUMIN UR-MCNC: 21.9 MG/L
MICROALBUMIN/CREAT UR: 6.31 MG/G CR (ref 0–17)
NONHDLC SERPL-MCNC: 118 MG/DL
POTASSIUM SERPL-SCNC: 4 MMOL/L (ref 3.4–5.3)
SODIUM SERPL-SCNC: 139 MMOL/L (ref 135–145)
TRIGL SERPL-MCNC: 145 MG/DL

## 2024-06-11 PROCEDURE — 82570 ASSAY OF URINE CREATININE: CPT | Performed by: PHYSICIAN ASSISTANT

## 2024-06-11 PROCEDURE — 83036 HEMOGLOBIN GLYCOSYLATED A1C: CPT | Performed by: PHYSICIAN ASSISTANT

## 2024-06-11 PROCEDURE — 82043 UR ALBUMIN QUANTITATIVE: CPT | Performed by: PHYSICIAN ASSISTANT

## 2024-06-11 PROCEDURE — 87651 STREP A DNA AMP PROBE: CPT | Performed by: PHYSICIAN ASSISTANT

## 2024-06-11 PROCEDURE — 87635 SARS-COV-2 COVID-19 AMP PRB: CPT | Performed by: PHYSICIAN ASSISTANT

## 2024-06-11 PROCEDURE — 87389 HIV-1 AG W/HIV-1&-2 AB AG IA: CPT | Performed by: PHYSICIAN ASSISTANT

## 2024-06-11 PROCEDURE — 36415 COLL VENOUS BLD VENIPUNCTURE: CPT | Performed by: PHYSICIAN ASSISTANT

## 2024-06-11 PROCEDURE — 99396 PREV VISIT EST AGE 40-64: CPT | Performed by: PHYSICIAN ASSISTANT

## 2024-06-11 PROCEDURE — 93000 ELECTROCARDIOGRAM COMPLETE: CPT | Performed by: PHYSICIAN ASSISTANT

## 2024-06-11 PROCEDURE — 80061 LIPID PANEL: CPT | Performed by: PHYSICIAN ASSISTANT

## 2024-06-11 PROCEDURE — 99214 OFFICE O/P EST MOD 30 MIN: CPT | Mod: 25 | Performed by: PHYSICIAN ASSISTANT

## 2024-06-11 PROCEDURE — 80048 BASIC METABOLIC PNL TOTAL CA: CPT | Performed by: PHYSICIAN ASSISTANT

## 2024-06-11 RX ORDER — ATORVASTATIN CALCIUM 20 MG/1
20 TABLET, FILM COATED ORAL DAILY
Qty: 90 TABLET | Refills: 3 | Status: SHIPPED | OUTPATIENT
Start: 2024-06-11

## 2024-06-11 RX ORDER — METFORMIN HCL 500 MG
TABLET, EXTENDED RELEASE 24 HR ORAL
Qty: 360 TABLET | Refills: 3 | Status: SHIPPED | OUTPATIENT
Start: 2024-06-11

## 2024-06-11 RX ORDER — LOSARTAN POTASSIUM AND HYDROCHLOROTHIAZIDE 12.5; 5 MG/1; MG/1
1 TABLET ORAL DAILY
Qty: 90 TABLET | Refills: 3 | Status: SHIPPED | OUTPATIENT
Start: 2024-06-11

## 2024-06-11 SDOH — HEALTH STABILITY: PHYSICAL HEALTH: ON AVERAGE, HOW MANY DAYS PER WEEK DO YOU ENGAGE IN MODERATE TO STRENUOUS EXERCISE (LIKE A BRISK WALK)?: 2 DAYS

## 2024-06-11 ASSESSMENT — SOCIAL DETERMINANTS OF HEALTH (SDOH): HOW OFTEN DO YOU GET TOGETHER WITH FRIENDS OR RELATIVES?: ONCE A WEEK

## 2024-06-11 NOTE — PROGRESS NOTES
Preventive Care Visit  [facility] VADIM Alberto PA-C, Family Medicine  Jun 11, 2024      Assessment & Plan     (Z00.00) Routine general medical examination at a health care facility  (primary encounter diagnosis)  Comment:   Plan:     (E11.65) Type 2 diabetes mellitus with hyperglycemia, without long-term current use of insulin (H)  Comment: recheck A1c today and confirm elevation. He is working on lifestyle changes and would like to try that first but we did have a discussion about hte medication options should we decide to add something - specifically we discussed the GLP-1 medications as well as the SLGT2 options. Would plan to start with the GLP1 if covered by insurance and available  Plan: Lipid panel reflex to direct LDL Non-fasting,         HEMOGLOBIN A1C, Albumin Random Urine         Quantitative with Creat Ratio, Basic metabolic         panel  (Ca, Cl, CO2, Creat, Gluc, K, Na, BUN)            (Z11.4) Screening for HIV (human immunodeficiency virus)  Comment:   Plan: HIV Antigen Antibody Combo            (J02.9) Sore throat  Comment: rapid strep negative. Likely viral. Continue supportive cares  Plan: Streptococcus A Rapid Screen w/Reflex to PCR -         Clinic Collect, Symptomatic COVID-19 Virus         (Coronavirus) by PCR Nose, Group A         Streptococcus PCR Throat Swab            (R07.89) Atypical chest pain  Comment: very infrequent episodes - not occurring with activity and no red flags although with his medical history would prefer to exclude something cardiac in nature if possible. EKG in clinic unremarkable. Stress test ordered  Plan: Exercise Stress Test - Adult, EKG 12-lead         complete w/read - Clinics            (I10) Benign essential hypertension  Comment: well controlled today  Plan: Basic metabolic panel  (Ca, Cl, CO2, Creat,         Gluc, K, Na, BUN)            (E66.01) Morbid obesity (H)  Comment:   Plan: discussed possibility of adding a medication for his  "diabetes and would choose a GLP1 as that could also be helpful for weight loss    (R21) Rash and nonspecific skin eruption  Comment:   Plan: would continue topical anti fungal to area as it has been helping        Patient has been advised of split billing requirements and indicates understanding: Yes        BMI  Estimated body mass index is 39.99 kg/m  as calculated from the following:    Height as of this encounter: 1.727 m (5' 8\").    Weight as of this encounter: 119.3 kg (263 lb).       Counseling  Appropriate preventive services were discussed with this patient, including applicable screening as appropriate for fall prevention, nutrition, physical activity, Tobacco-use cessation, weight loss and cognition.  Checklist reviewing preventive services available has been given to the patient.  Reviewed patient's diet, addressing concerns and/or questions.   He is at risk for lack of exercise and has been provided with information to increase physical activity for the benefit of his well-being.   He is at risk for psychosocial distress and has been provided with information to reduce risk.       Olga Estrada is a 44 year old, presenting for the following:  Physical        6/11/2024     7:36 AM   Additional Questions   Roomed by LISA Robles        Via the Health Maintenance questionnaire, the patient has reported the following services have been completed -Eye Exam: clinic 2024-04-07, this information has been sent to the abstraction team.  Health Care Directive  Patient does not have a Health Care Directive or Living Will:     HPI    A few months ago had some pain in his belly button and drainage  Was treated with antibiotics and the pain did improve. Still has a firm area and will occasionally note some clear-marianna drainage but otherwise not bothersome to him  Did develop a rash below the belly button that was diagnosed as fungal - uses a topical and it improves but did return recently     A few weeks ago was " laying in bed and had some left sided sternal chest pain  Lasted for about 20-30 min  No other symptoms   Did contemplate going to the ED but then it resolved  Since then has mowed the lawn (and has to go up a hill) at least 2x/week and pain has not been reproduced    [facility] and it was reported to be 8  Has changed his diet (cut out soda) and is working on getting more activity (walking, mowing the lawn)  Would like to recheck          2024   General Health   How would you rate your overall physical health? Good   Feel stress (tense, anxious, or unable to sleep) Only a little   (!) STRESS CONCERN      2024   Nutrition   Three or more servings of calcium each day? Yes   Diet: Regular (no restrictions)   How many servings of fruit and vegetables per day? (!) 0-1   How many sweetened beverages each day? 0-1         2024   Exercise   Days per week of moderate/strenous exercise 2 days   (!) EXERCISE CONCERN      2024   Social Factors   Frequency of gathering with friends or relatives Once a week   Worry food won't last until get money to buy more No   Food not last or not have enough money for food? No   Do you have housing?  Yes   Are you worried about losing your housing? No   Lack of transportation? No   Unable to get utilities (heat,electricity)? No         2024   Dental   Dentist two times every year? Yes            2024   Substance Use   Alcohol more than 3/day or more than 7/wk No   Do you use any other substances recreationally? No     Social History     Tobacco Use    Smoking status: Former     Current packs/day: 0.00     Average packs/day: 0.5 packs/day for 5.0 years (2.5 ttl pk-yrs)     Types: Cigarettes     Start date: 2000     Quit date: 2005     Years since quittin.4    Smokeless tobacco: Never   Substance Use Topics    Alcohol use: Yes     Comment: Social. 1 drink per month    Drug use: No           2024   One time HIV  "Screening   Previous HIV test? Yes         6/11/2024   STI Screening   New sexual partner(s) since last STI/HIV test? No   ASCVD Risk   The 10-year ASCVD risk score (Saw SIMON, et al., 2019) is: 2.9%    Values used to calculate the score:      Age: 44 years      Sex: Male      Is Non- : No      Diabetic: Yes      Tobacco smoker: No      Systolic Blood Pressure: 128 mmHg      Is BP treated: Yes      HDL Cholesterol: 49 mg/dL      Total Cholesterol: 175 mg/dL        6/11/2024   Contraception/Family Planning   Questions about contraception or family planning No       Reviewed and updated as needed this visit by Provider                    BP Readings from Last 3 Encounters:   06/11/24 128/78   11/16/23 137/85   11/09/23 124/73    Wt Readings from Last 3 Encounters:   06/11/24 119.3 kg (263 lb)   12/07/23 120.2 kg (265 lb)   11/16/23 122.5 kg (270 lb)                      Review of Systems  CONSTITUTIONAL: NEGATIVE for fever, chills, change in weight  INTEGUMENTARY/SKIN: see HPI above  EYES: NEGATIVE for vision changes or irritation  ENT/MOUTH: NEGATIVE for ear, mouth and throat problems  RESP: NEGATIVE for significant cough or SOB  BREAST: NEGATIVE for masses, tenderness or discharge  CV: see HPI above  GI: NEGATIVE for nausea, abdominal pain, heartburn, or change in bowel habits  : NEGATIVE for frequency, dysuria, or hematuria  MUSCULOSKELETAL: NEGATIVE for significant arthralgias or myalgia  NEURO: NEGATIVE for weakness, dizziness or paresthesias  ENDOCRINE: NEGATIVE for temperature intolerance, skin/hair changes  HEME: NEGATIVE for bleeding problems  PSYCHIATRIC: NEGATIVE for changes in mood or affect     Objective    Exam  /78   Pulse 77   Temp 97.6  F (36.4  C) (Tympanic)   Ht 1.727 m (5' 8\")   Wt 119.3 kg (263 lb)   SpO2 98%   BMI 39.99 kg/m     Estimated body mass index is 39.99 kg/m  as calculated from the following:    Height as of this encounter: 1.727 m (5' " "8\").    Weight as of this encounter: 119.3 kg (263 lb).    Physical Exam  GENERAL: alert and no distress  EYES: Eyes grossly normal to inspection, PERRL and conjunctivae and sclerae normal  HENT: ear canals and TM's normal, nose and mouth without ulcers or lesions  NECK: no adenopathy, no asymmetry, masses, or scars  RESP: lungs clear to auscultation - no rales, rhonchi or wheezes  CV: regular rate and rhythm, normal S1 S2, no S3 or S4, no murmur, click or rub, no peripheral edema  ABDOMEN: soft, nontender, no hepatosplenomegaly, no masses and bowel sounds normal  MS: no gross musculoskeletal defects noted, no edema  SKIN: some crusting within the belly button. Very faint patch of mildly hyperpigmented skin just below belly button  NEURO: Normal strength and tone, mentation intact and speech normal  PSYCH: mentation appears normal, affect normal/bright    Diagnostic Tests:   Labs pending  EKG: unremarkable. NSR    Signed Electronically by: Susanne Alberto PA-C    "

## 2024-06-11 NOTE — TELEPHONE ENCOUNTER
PA Initiation    Medication: HUMIRA (2 PEN) 80 MG/0.8ML SC PNKT  Insurance Company: [employer] - Phone [phone] Fax [phone]  Pharmacy Filling the Rx:    Filling Pharmacy Phone:    Filling Pharmacy Fax:    Start Date: 6/11/2024    GLENN Kaur [facility] Liawilbur [email or web address]  Phone: [phone]  Fax: [phone]

## 2024-06-11 NOTE — PATIENT INSTRUCTIONS
"Preventive Care Advice   This is general advice we often give to help people stay healthy. Your care team may have specific advice just for you. Please talk to your care team about your own preventive care needs.  Lifestyle  Exercise at least 150 minutes each week (30 minutes a day, 5 days a week).  Do muscle strengthening activities 2 days a week. These help control your weight and prevent disease.  No smoking.  Wear sunscreen to prevent skin cancer.  Have your home tested for radon every 2 to 5 years. Radon is a colorless, odorless gas that can harm your lungs. To learn more, go to www.health.Counts include 234 beds at the [facility].mn. and search for \"Radon in Homes.\"  Keep guns unloaded and locked up in a safe place like a safe or gun vault, or, use a gun lock and hide the keys. Always lock away bullets separately. To learn more, visit [employer]mn.gov and search for \"safe gun storage.\"  Nutrition  Eat 5 or more servings of fruits and vegetables each day.  Try wheat bread, brown rice and whole grain pasta (instead of white bread, rice, and pasta).  Get enough calcium and vitamin D. Check the label on foods and aim for 100% of the RDA (recommended daily allowance).  Regular exams  Have a dental exam and cleaning every 6 months.  See your health care team every year to talk about:  Any changes in your health.  Any medicines your care team has prescribed.  Preventive care, family planning, and ways to prevent chronic diseases.  Shots (vaccines)   HPV shots (up to age 26), if you've never had them before.  Hepatitis B shots (up to age 59), if you've never had them before.  COVID-19 shot: Get this shot when it's due.  Flu shot: Get a flu shot every year.  Tetanus shot: Get a tetanus shot every 10 years.  Pneumococcal, hepatitis A, and RSV shots: Ask your care team if you need these based on your risk.  Shingles shot (for age 50 and up).  General health tests  Diabetes screening:  Starting at age 35, Get screened for diabetes at least every 3 years.  If " you are younger than age 35, ask your care team if you should be screened for diabetes.  Cholesterol test: At age 39, start having a cholesterol test every 5 years, or more often if advised.  Bone density scan (DEXA): At age 50, ask your care team if you should have this scan for osteoporosis (brittle bones).  Hepatitis C: Get tested at least once in your life.  Abdominal aortic aneurysm screening: Talk to your doctor about having this screening if you:  Have ever smoked; and  Are biologically male; and  Are between the ages of 65 and 75.  STIs (sexually transmitted infections)  Before age 24: Ask your care team if you should be screened for STIs.  After age 24: Get screened for STIs if you're at risk. You are at risk for STIs (including HIV) if:  You are sexually active with more than one person.  You don't use condoms every time.  You or a partner was diagnosed with a sexually transmitted infection.  If you are at risk for HIV, ask about PrEP medicine to prevent HIV.  Get tested for HIV at least once in your life, whether you are at risk for HIV or not.  Cancer screening tests  Cervical cancer screening: If you have a cervix, begin getting regular cervical cancer screening tests at age 21. Most people who have regular screenings with normal results can stop after age 65. Talk about this with your provider.  Breast cancer scan (mammogram): If you've ever had breasts, begin having regular mammograms starting at age 40. This is a scan to check for breast cancer.  Colon cancer screening: It is important to start screening for colon cancer at age 45.  Have a colonoscopy test every 10 years (or more often if you're at risk) Or, ask your provider about stool tests like a FIT test every year or Cologuard test every 3 years.  To learn more about your testing options, visit: www.[employer]/923500.pdf.  For help making a decision, visit: efren/kq17246.  Prostate cancer screening test: If you have a prostate and are age 55  to 69, ask your provider if you would benefit from a yearly prostate cancer screening test.  Lung cancer screening: If you are a current or former smoker age 50 to 80, ask your care team if ongoing lung cancer screenings are right for you.  For informational purposes only. Not to replace the advice of your health care provider. Copyright   2023 [employer]. All rights reserved. Clinically reviewed by the [facility] Transitions Program. [employer] 307402 - REV 04/24.

## 2024-06-12 LAB — SARS-COV-2 RNA RESP QL NAA+PROBE: NEGATIVE

## 2024-06-13 ENCOUNTER — MYC MEDICAL ADVICE (OUTPATIENT)
Dept: DERMATOLOGY | Facility: CLINIC | Age: 44
End: 2024-06-13
Payer: COMMERCIAL

## 2024-06-13 ENCOUNTER — MYC MEDICAL ADVICE (OUTPATIENT)
Dept: FAMILY MEDICINE | Facility: CLINIC | Age: 44
End: 2024-06-13
Payer: COMMERCIAL

## 2024-06-13 DIAGNOSIS — L73.2 HIDRADENITIS SUPPURATIVA: ICD-10-CM

## 2024-06-13 DIAGNOSIS — E11.65 TYPE 2 DIABETES MELLITUS WITH HYPERGLYCEMIA, WITHOUT LONG-TERM CURRENT USE OF INSULIN (H): Primary | ICD-10-CM

## 2024-06-13 NOTE — TELEPHONE ENCOUNTER
Prior Authorization Approval    Medication: HUMIRA (2 PEN) 80 MG/0.8ML SC PNKT  Authorization Effective Date: 5/12/2024  Authorization Expiration Date: 12/8/2025  Approved Dose/Quantity: 2 pens per 28 days  Reference #: [id number]   Insurance Company: [employer] - Phone [phone] Fax [phone]  Expected CoPay: $    CoPay Card Available:      Financial Assistance Needed: na  Which Pharmacy is filling the prescription:    Pharmacy Notified: no  Patient Notified:   Carmelina Kaur [facility] Liaison  [email or web address]  Phone: [phone]  Fax: [phone]

## 2024-06-17 NOTE — TELEPHONE ENCOUNTER
Prior Authorization Approval     Medication: HUMIRA (2 PEN) 80 MG/0.8ML SC PNKT  Authorization Effective Date: 5/12/2024  Authorization Expiration Date: 12/8/2025  Approved Dose/Quantity: 2 pens per 28 days        Patient is suppose to be taking the medication once a week    Wendie ORDONEZ CMA

## 2024-06-17 NOTE — TELEPHONE ENCOUNTER
4 pen PA initiated.        Appreciate [facility]'s correction.    [facility] Lia[facility]  [email or web address]  Phone: [phone]  Fax: [phone]

## 2024-06-17 NOTE — TELEPHONE ENCOUNTER
It looks like PA approval is on file for Humira 80 mg x 2 pen kit for a quantity of 2 per month. If approval is for 2 pens kits per month then this should equal 4 pens per month. Routed urgent request to pharmacy liaison (Carmelina Roberts) to assist in separate telephone encounter. Need to clarify if PA is approved for 80 mg weekly.     Digna Myers [facility]

## 2024-06-17 NOTE — TELEPHONE ENCOUNTER
Patients dose updated to Humira 80 mg once weekly. Can you confirm patient has a PA approval for this dosing regimen?     Digna Myers RPh

## 2024-06-18 NOTE — TELEPHONE ENCOUNTER
Appeal initiated via fax with letter from 2022. Sent as urgent.    Medication Appeal Initiation    Medication: HUMIRA (2 PEN) 80 MG/0.8ML SC PNKT  Appeal Start Date: 6/18/2024    Insurance Company: [facility] [employer])  Insurance Phone: [phone]  Insurance Fax: [phone]  Comments:       Sent via fax.    Carmelina Kaur [facility] Liaison  [email or web address]  Phone: [phone]  Fax: [phone]

## 2024-06-18 NOTE — TELEPHONE ENCOUNTER
Initial PA QL denied.        Carmelina Kaur CphT  [facility] Liaison  [email or web address]  Phone: [phone]  Fax: [phone]

## 2024-06-20 DIAGNOSIS — L73.2 HIDRADENITIS SUPPURATIVA: ICD-10-CM

## 2024-06-28 RX ORDER — DOXYCYCLINE 100 MG/1
100 CAPSULE ORAL 2 TIMES DAILY
Qty: 60 CAPSULE | Refills: 0 | Status: SHIPPED | OUTPATIENT
Start: 2024-06-28 | End: 2024-07-31

## 2024-06-28 NOTE — TELEPHONE ENCOUNTER
"doxycycline monohydrate (MONODOX) 100 MG capsule 180 capsule 0 3/27/2024 -- No   Sig - Route: Take 1 capsule (100 mg) by mouth 2 times daily - Oral     ----------------------  Last Office Visit : 3/27/2024  [facility] Office visit:    8/21/2024 11:30 AM (15 min)  [place]    Arrive by: 11:15 AM   RETURN DERMATOLOGY   [facility] ([place])   Derrick Avina MD     ----------------------  Per last visit note:  \"- Started him on doxycycline BID x 3 months given recent flare ups. Plan to either decrease to 100mg daily or ideally 50mg or off if possible at follow up visit with Dr. Avina, but given recent flares sarthak try and minimize these for him because they have salvador terribly painful \"    Routing refill request to provider for review/approval because:  Previous clinic visit was doxy BID x3 mo, then decreasing or getting off med - sending for review.    Derm process #4          "

## 2024-07-05 ENCOUNTER — TELEPHONE (OUTPATIENT)
Dept: DERMATOLOGY | Facility: CLINIC | Age: 44
End: 2024-07-05
Payer: COMMERCIAL

## 2024-07-05 NOTE — TELEPHONE ENCOUNTER
[facility] Call Center    Phone Message    May a detailed message be left on voicemail: yes     Reason for Call: Other: Pt says he was told previously that if he has a HS flare up to try & come into clinic for a steroid injection, no openings currently showing today, are there any squeeze in appts available from clinic view?     Action Taken: Other: [facility] derm    Travel Screening: Not Applicable     Date of Service:

## 2024-07-19 ENCOUNTER — HOSPITAL ENCOUNTER (OUTPATIENT)
Dept: CARDIOLOGY | Facility: CLINIC | Age: 44
Discharge: HOME | End: 2024-07-19
Attending: PHYSICIAN ASSISTANT | Admitting: PHYSICIAN ASSISTANT
Payer: COMMERCIAL

## 2024-07-19 DIAGNOSIS — R07.89 ATYPICAL CHEST PAIN: ICD-10-CM

## 2024-07-19 PROCEDURE — 93017 CV STRESS TEST TRACING ONLY: CPT

## 2024-07-19 PROCEDURE — 93018 CV STRESS TEST I&R ONLY: CPT | Performed by: STUDENT IN AN ORGANIZED HEALTH CARE EDUCATION/TRAINING PROGRAM

## 2024-07-19 PROCEDURE — 93016 CV STRESS TEST SUPVJ ONLY: CPT | Performed by: STUDENT IN AN ORGANIZED HEALTH CARE EDUCATION/TRAINING PROGRAM

## 2024-07-26 DIAGNOSIS — L73.2 HIDRADENITIS SUPPURATIVA: ICD-10-CM

## 2024-07-31 RX ORDER — DOXYCYCLINE 100 MG/1
100 CAPSULE ORAL 2 TIMES DAILY
Qty: 60 CAPSULE | Refills: 11 | Status: SHIPPED | OUTPATIENT
Start: 2024-07-31

## 2024-07-31 NOTE — TELEPHONE ENCOUNTER
doxycycline monohydrate (MONODOX) 100 MG capsule  Last Written Prescription Date:  6/28/2024  Last Fill Quantity: 60,   # refills: 0  Last Office Visit : 3/27/2024  Future Office visit:  8/21/2024 Dr Avina  Routing  doxycycline monohydrate (MONODOX) 100 MG capsule refill request to Dr Avina and Mady FULLER for review/approval because: send refill at current dose or stop?  - plan last visit 3/27/2024 with Mady FULLER: Started him on doxycycline BID x 3 months given recent flare ups. Plan to either decrease to 100mg daily or ideally 50mg or off if possible at follow up visit with Dr. Avina   - future visit with Dr Avina 8/21/2024

## 2024-11-18 NOTE — PATIENT INSTRUCTIONS
Preventive Health Recommendations  Male Ages 26 - 39    Yearly exam:             See your health care provider every year in order to  o   Review health changes.   o   Discuss preventive care.    o   Review your medicines if your doctor has prescribed any.    You should be tested each year for STDs (sexually transmitted diseases), if you re at risk.     After age 35, talk to your provider about cholesterol testing. If you are at risk for heart disease, have your cholesterol tested at least every 5 years.     If you are at risk for diabetes, you should have a diabetes test (fasting glucose).  Shots: Get a flu shot each year. Get a tetanus shot every 10 years.     Nutrition:    Eat at least 5 servings of fruits and vegetables daily.     Eat whole-grain bread, whole-wheat pasta and brown rice instead of white grains and rice.     Talk to your provider about Calcium and Vitamin D.     Lifestyle    Exercise for at least 150 minutes a week (30 minutes a day, 5 days a week). This will help you control your weight and prevent disease.     Limit alcohol to one drink per day.     No smoking.     Wear sunscreen to prevent skin cancer.     See your dentist every six months for an exam and cleaning.     
Patient/Caregiver provided printed discharge information.

## 2025-01-11 ENCOUNTER — HEALTH MAINTENANCE LETTER (OUTPATIENT)
Age: 45
End: 2025-01-11

## (undated) DEVICE — DRSG GAUZE 4X8" NON21842

## (undated) DEVICE — SU CHROMIC 1 CTX 36" 905H

## (undated) DEVICE — DRAPE STOCKINETTE IMPERVIOUS 12" 1587

## (undated) DEVICE — NDL 25GA 1.5" 305127

## (undated) DEVICE — PEN MARKING SKIN W/LABELS 31145918

## (undated) DEVICE — LIGHT HANDLE X2

## (undated) DEVICE — ESU PENCIL W/SMOKE EVAC NEPTUNE STRYKER 0703-046-000

## (undated) DEVICE — DRAPE MAYO STAND 23X54 8337

## (undated) DEVICE — SU PROLENE 3-0 PS-1 18" 8663G

## (undated) DEVICE — ESU GROUND PAD UNIVERSAL W/O CORD

## (undated) DEVICE — LINEN TOWEL PACK X5 5464

## (undated) DEVICE — SUCTION MANIFOLD NEPTUNE 2 SYS 4 PORT 0702-020-000

## (undated) DEVICE — TAPE MEDIPORE 4"X2YD 2864

## (undated) DEVICE — STRAP KNEE/BODY 31143004

## (undated) DEVICE — Device

## (undated) DEVICE — GOWN XLG DISP 9545

## (undated) DEVICE — PREP BRUSH SURG SCRUB CHLOROXYLENOL PCMX 3% 371163

## (undated) DEVICE — LINEN ORTHO PACK 5446

## (undated) DEVICE — DRSG TEGADERM ALGINATE AG 4X5" 90303

## (undated) DEVICE — PAD ABD 7.5INW X 8INL NON-WOVEN FLUFF-FILLED 9192A

## (undated) DEVICE — DRAPE LAP TRANSVERSE 29421

## (undated) DEVICE — TUBE CULTURE AEROBIC/ANAEROBIC W/O SWABS A.C.T.I.1. 12401

## (undated) DEVICE — BLADE KNIFE SURG 10 371110

## (undated) DEVICE — LINEN DRAPE 54X72" 5467

## (undated) DEVICE — SOL NACL 0.9% IRRIG 1000ML BOTTLE 2F7124

## (undated) DEVICE — DRSG KERLIX FLUFFS X5

## (undated) DEVICE — SOL WATER IRRIG 1000ML BOTTLE 2F7114

## (undated) DEVICE — STPL SKIN PROXIMATE 35 WIDE PMW35

## (undated) DEVICE — DRAPE STERI U 1015

## (undated) DEVICE — SPECIMEN CONTAINER 5OZ STERILE 2600SA

## (undated) DEVICE — ESU ELEC BLADE HEX-LOCKING 2.5" E1450X

## (undated) DEVICE — SYR BULB IRRIG 50ML LATEX FREE 0035280

## (undated) DEVICE — GLOVE PROTEXIS MICRO 6.5  2D73PM65

## (undated) DEVICE — LINEN GOWN X4 5410

## (undated) DEVICE — DRAPE SPLIT SHEET 77X108 REINFORCED 29436

## (undated) DEVICE — GLOVE BIOGEL PI ULTRATOUCH SZ 6.5 41165

## (undated) DEVICE — PREP TECHNI-CARE CHLOROXYLENOL 3% 4OZ BOTTLE C222-4ZWO

## (undated) DEVICE — BLADE KNIFE SURG 15 371115

## (undated) DEVICE — DRSG KERLIX 4 1/2"X4YDS ROLL 6730

## (undated) DEVICE — APPLICATORS COTTON TIP 6"X2 STERILE LF C15053-006

## (undated) DEVICE — SOLUTION WOUND VASHE BOTTLE 16 FL OZ. (475 ML)  00317

## (undated) DEVICE — SU VICRYL 4-0 PS-2 18" UND J496H

## (undated) DEVICE — SOL ADH LIQUID BENZOIN SWAB 0.6ML C1544

## (undated) DEVICE — DRSG ABDOMINAL 07 1/2X8" 7197D

## (undated) DEVICE — DRSG AQUACEL EXTRA AG 4X5" 422299

## (undated) DEVICE — DRSG TELFA ISLAND 4X8" 7541

## (undated) DEVICE — SYR BULB IRRIG DOVER 60 ML LATEX FREE 67000

## (undated) DEVICE — DRAPE CONVERTORS U-DRAPE 60X72" 8476

## (undated) DEVICE — SU PDS II 3-0 FS-1 27" CLR  Z442H

## (undated) DEVICE — SPECIMEN CONTAINER W/10% BUFFERED FORMALIN 120ML 591201

## (undated) DEVICE — COVER CAMERA IN-LIGHT DISP LT-C02

## (undated) DEVICE — SU VICRYL 3-0 FS-1 27" J442H

## (undated) DEVICE — SYR 10ML FINGER CONTROL W/O NDL 309695

## (undated) RX ORDER — ACETAMINOPHEN 325 MG/1
TABLET ORAL
Status: DISPENSED
Start: 2023-02-17

## (undated) RX ORDER — HYDROMORPHONE HYDROCHLORIDE 1 MG/ML
INJECTION, SOLUTION INTRAMUSCULAR; INTRAVENOUS; SUBCUTANEOUS
Status: DISPENSED
Start: 2021-10-04

## (undated) RX ORDER — FENTANYL CITRATE 50 UG/ML
INJECTION, SOLUTION INTRAMUSCULAR; INTRAVENOUS
Status: DISPENSED
Start: 2023-02-17

## (undated) RX ORDER — FENTANYL CITRATE 50 UG/ML
INJECTION, SOLUTION INTRAMUSCULAR; INTRAVENOUS
Status: DISPENSED
Start: 2021-10-04

## (undated) RX ORDER — KETOROLAC TROMETHAMINE 30 MG/ML
INJECTION, SOLUTION INTRAMUSCULAR; INTRAVENOUS
Status: DISPENSED
Start: 2021-05-21

## (undated) RX ORDER — PROPOFOL 10 MG/ML
INJECTION, EMULSION INTRAVENOUS
Status: DISPENSED
Start: 2021-10-04

## (undated) RX ORDER — FENTANYL CITRATE-0.9 % NACL/PF 10 MCG/ML
PLASTIC BAG, INJECTION (ML) INTRAVENOUS
Status: DISPENSED
Start: 2021-10-04

## (undated) RX ORDER — OXYCODONE HYDROCHLORIDE 5 MG/1
TABLET ORAL
Status: DISPENSED
Start: 2023-02-17

## (undated) RX ORDER — ONDANSETRON 2 MG/ML
INJECTION INTRAMUSCULAR; INTRAVENOUS
Status: DISPENSED
Start: 2021-05-21

## (undated) RX ORDER — HYDROMORPHONE HYDROCHLORIDE 1 MG/ML
INJECTION, SOLUTION INTRAMUSCULAR; INTRAVENOUS; SUBCUTANEOUS
Status: DISPENSED
Start: 2021-05-21

## (undated) RX ORDER — CEFAZOLIN SODIUM 2 G/100ML
INJECTION, SOLUTION INTRAVENOUS
Status: DISPENSED
Start: 2021-10-04

## (undated) RX ORDER — ROCURONIUM BROMIDE 50 MG/5 ML
SYRINGE (ML) INTRAVENOUS
Status: DISPENSED
Start: 2021-10-04

## (undated) RX ORDER — PROPOFOL 10 MG/ML
INJECTION, EMULSION INTRAVENOUS
Status: DISPENSED
Start: 2021-05-21

## (undated) RX ORDER — LIDOCAINE HYDROCHLORIDE 20 MG/ML
INJECTION, SOLUTION EPIDURAL; INFILTRATION; INTRACAUDAL; PERINEURAL
Status: DISPENSED
Start: 2021-05-21

## (undated) RX ORDER — CEFAZOLIN SODIUM IN 0.9 % NACL 3 G/100 ML
INTRAVENOUS SOLUTION, PIGGYBACK (ML) INTRAVENOUS
Status: DISPENSED
Start: 2021-05-21

## (undated) RX ORDER — LIDOCAINE HYDROCHLORIDE 20 MG/ML
INJECTION, SOLUTION EPIDURAL; INFILTRATION; INTRACAUDAL; PERINEURAL
Status: DISPENSED
Start: 2021-10-04

## (undated) RX ORDER — ONDANSETRON 2 MG/ML
INJECTION INTRAMUSCULAR; INTRAVENOUS
Status: DISPENSED
Start: 2021-10-04

## (undated) RX ORDER — DEXAMETHASONE SODIUM PHOSPHATE 4 MG/ML
INJECTION, SOLUTION INTRA-ARTICULAR; INTRALESIONAL; INTRAMUSCULAR; INTRAVENOUS; SOFT TISSUE
Status: DISPENSED
Start: 2021-05-21

## (undated) RX ORDER — DEXAMETHASONE SODIUM PHOSPHATE 4 MG/ML
INJECTION, SOLUTION INTRA-ARTICULAR; INTRALESIONAL; INTRAMUSCULAR; INTRAVENOUS; SOFT TISSUE
Status: DISPENSED
Start: 2021-10-04

## (undated) RX ORDER — FENTANYL CITRATE 50 UG/ML
INJECTION, SOLUTION INTRAMUSCULAR; INTRAVENOUS
Status: DISPENSED
Start: 2021-05-21

## (undated) RX ORDER — EPHEDRINE SULFATE 50 MG/ML
INJECTION, SOLUTION INTRAMUSCULAR; INTRAVENOUS; SUBCUTANEOUS
Status: DISPENSED
Start: 2021-10-04

## (undated) RX ORDER — FENTANYL CITRATE 0.05 MG/ML
INJECTION, SOLUTION INTRAMUSCULAR; INTRAVENOUS
Status: DISPENSED
Start: 2023-02-17

## (undated) RX ORDER — SODIUM CHLORIDE, SODIUM LACTATE, POTASSIUM CHLORIDE, CALCIUM CHLORIDE 600; 310; 30; 20 MG/100ML; MG/100ML; MG/100ML; MG/100ML
INJECTION, SOLUTION INTRAVENOUS
Status: DISPENSED
Start: 2021-05-21

## (undated) RX ORDER — CEFAZOLIN SODIUM/WATER 2 G/20 ML
SYRINGE (ML) INTRAVENOUS
Status: DISPENSED
Start: 2023-02-17